# Patient Record
Sex: FEMALE | Race: WHITE | NOT HISPANIC OR LATINO | Employment: OTHER | URBAN - METROPOLITAN AREA
[De-identification: names, ages, dates, MRNs, and addresses within clinical notes are randomized per-mention and may not be internally consistent; named-entity substitution may affect disease eponyms.]

---

## 2017-01-30 ENCOUNTER — HOSPITAL ENCOUNTER (OUTPATIENT)
Dept: RADIOLOGY | Facility: HOSPITAL | Age: 82
Discharge: HOME | End: 2017-01-30
Payer: COMMERCIAL

## 2017-01-30 ENCOUNTER — ALLSCRIPTS OFFICE VISIT (OUTPATIENT)
Dept: OTHER | Facility: OTHER | Age: 82
End: 2017-01-30

## 2017-01-30 ENCOUNTER — TRANSCRIBE ORDERS (OUTPATIENT)
Dept: ADMINISTRATIVE | Facility: HOSPITAL | Age: 82
End: 2017-01-30

## 2017-01-30 DIAGNOSIS — R05.9 COUGH: ICD-10-CM

## 2017-01-30 DIAGNOSIS — R06.2 WHEEZING: ICD-10-CM

## 2017-01-30 PROCEDURE — 71020 HB CHEST X-RAY 2VW FRONTAL&LATL: CPT

## 2017-01-31 ENCOUNTER — GENERIC CONVERSION - ENCOUNTER (OUTPATIENT)
Dept: OTHER | Facility: OTHER | Age: 82
End: 2017-01-31

## 2017-04-19 ENCOUNTER — ALLSCRIPTS OFFICE VISIT (OUTPATIENT)
Dept: OTHER | Facility: OTHER | Age: 82
End: 2017-04-19

## 2017-07-19 DIAGNOSIS — E11.49 TYPE 2 DIABETES MELLITUS WITH OTHER DIABETIC NEUROLOGICAL COMPLICATION (HCC): ICD-10-CM

## 2017-07-26 ENCOUNTER — LAB CONVERSION - ENCOUNTER (OUTPATIENT)
Dept: OTHER | Facility: OTHER | Age: 82
End: 2017-07-26

## 2017-07-26 LAB
A/G RATIO (HISTORICAL): 1.9 (CALC) (ref 1–2.5)
ALBUMIN SERPL BCP-MCNC: 4.1 G/DL (ref 3.6–5.1)
ALP SERPL-CCNC: 88 U/L (ref 33–130)
ALT SERPL W P-5'-P-CCNC: 9 U/L (ref 6–29)
AST SERPL W P-5'-P-CCNC: 13 U/L (ref 10–35)
BILIRUB SERPL-MCNC: 0.6 MG/DL (ref 0.2–1.2)
BUN SERPL-MCNC: 31 MG/DL (ref 7–25)
BUN/CREA RATIO (HISTORICAL): 20 (CALC) (ref 6–22)
CALCIUM SERPL-MCNC: 10.2 MG/DL (ref 8.6–10.4)
CHLORIDE SERPL-SCNC: 110 MMOL/L (ref 98–110)
CO2 SERPL-SCNC: 26 MMOL/L (ref 20–31)
CREAT SERPL-MCNC: 1.58 MG/DL (ref 0.6–0.88)
CREATININE, RANDOM URINE (HISTORICAL): 59 MG/DL (ref 20–320)
EGFR AFRICAN AMERICAN (HISTORICAL): 33 ML/MIN/1.73M2
EGFR-AMERICAN CALC (HISTORICAL): 29 ML/MIN/1.73M2
GAMMA GLOBULIN (HISTORICAL): 2.2 G/DL (CALC) (ref 1.9–3.7)
GLUCOSE (HISTORICAL): 145 MG/DL (ref 65–99)
HBA1C MFR BLD HPLC: 6.4 % OF TOTAL HGB
MAGNESIUM, UR (HISTORICAL): 18.8 MG/DL
MICROALBUMIN/CREATININE RATIO (HISTORICAL): 319 MCG/MG CREAT
POTASSIUM SERPL-SCNC: 4.7 MMOL/L (ref 3.5–5.3)
SODIUM SERPL-SCNC: 143 MMOL/L (ref 135–146)
TOTAL PROTEIN (HISTORICAL): 6.3 G/DL (ref 6.1–8.1)

## 2017-08-02 ENCOUNTER — ALLSCRIPTS OFFICE VISIT (OUTPATIENT)
Dept: OTHER | Facility: OTHER | Age: 82
End: 2017-08-02

## 2017-09-02 DIAGNOSIS — N18.9 CHRONIC KIDNEY DISEASE: ICD-10-CM

## 2017-09-08 ENCOUNTER — GENERIC CONVERSION - ENCOUNTER (OUTPATIENT)
Dept: OTHER | Facility: OTHER | Age: 82
End: 2017-09-08

## 2017-09-22 LAB
BUN SERPL-MCNC: 31 MG/DL (ref 7–25)
BUN/CREA RATIO (HISTORICAL): 19 (CALC) (ref 6–22)
CALCIUM SERPL-MCNC: 10.3 MG/DL (ref 8.6–10.4)
CHLORIDE SERPL-SCNC: 109 MMOL/L (ref 98–110)
CO2 SERPL-SCNC: 27 MMOL/L (ref 20–31)
CREAT SERPL-MCNC: 1.64 MG/DL (ref 0.6–0.88)
EGFR AFRICAN AMERICAN (HISTORICAL): 32 ML/MIN/1.73M2
EGFR-AMERICAN CALC (HISTORICAL): 27 ML/MIN/1.73M2
GLUCOSE (HISTORICAL): 129 MG/DL (ref 65–99)
POTASSIUM SERPL-SCNC: 4.7 MMOL/L (ref 3.5–5.3)
SODIUM SERPL-SCNC: 142 MMOL/L (ref 135–146)

## 2017-09-25 LAB
LEFT EYE DIABETIC RETINOPATHY: NORMAL
RIGHT EYE DIABETIC RETINOPATHY: NORMAL

## 2017-09-27 ENCOUNTER — GENERIC CONVERSION - ENCOUNTER (OUTPATIENT)
Dept: OTHER | Facility: OTHER | Age: 82
End: 2017-09-27

## 2017-10-11 DIAGNOSIS — N18.9 CHRONIC KIDNEY DISEASE: ICD-10-CM

## 2017-10-18 LAB
BUN SERPL-MCNC: 35 MG/DL (ref 7–25)
BUN/CREA RATIO (HISTORICAL): 19 (CALC) (ref 6–22)
CALCIUM SERPL-MCNC: 10.4 MG/DL (ref 8.6–10.4)
CHLORIDE SERPL-SCNC: 109 MMOL/L (ref 98–110)
CO2 SERPL-SCNC: 25 MMOL/L (ref 20–31)
CREAT SERPL-MCNC: 1.81 MG/DL (ref 0.6–0.88)
EGFR AFRICAN AMERICAN (HISTORICAL): 28 ML/MIN/1.73M2
EGFR-AMERICAN CALC (HISTORICAL): 24 ML/MIN/1.73M2
GLUCOSE (HISTORICAL): 158 MG/DL (ref 65–99)
POTASSIUM SERPL-SCNC: 4.4 MMOL/L (ref 3.5–5.3)
SODIUM SERPL-SCNC: 140 MMOL/L (ref 135–146)

## 2017-10-19 ENCOUNTER — GENERIC CONVERSION - ENCOUNTER (OUTPATIENT)
Dept: OTHER | Facility: OTHER | Age: 82
End: 2017-10-19

## 2017-11-01 ENCOUNTER — ALLSCRIPTS OFFICE VISIT (OUTPATIENT)
Dept: OTHER | Facility: OTHER | Age: 82
End: 2017-11-01

## 2017-11-01 LAB — HBA1C MFR BLD HPLC: NORMAL %

## 2017-11-02 NOTE — PROGRESS NOTES
Assessment  1  Chronic renal insufficiency (585 9) (N18 9)   2  Hypertension (401 9) (I10)   3  Diabetes with neurologic complications (612 82) (H05 98)   4  Peripheral neuropathy (356 9) (G62 9)   5  Hyperlipidemia (272 4) (E78 5)    Plan  Chronic renal insufficiency    · 1 - Warner Martínez MD, Patricia De  (Nephrology) Co-Management  *  Status: Active  Requested for:  82GSK8888  Care Summary provided  : Yes  Diabetes with neurologic complications    · Hemoglobin A1c- POC; Status:Complete - Retrospective By Protocol Authorization;    Done: 36JDE1892 11:05AM  Hypertension    · Lisinopril 40 MG Oral Tablet; TAKE 1 TABLET DAILY  Need for pneumococcal vaccination    · Pneumo (Pneumovax)    Discussion/Summary    and youthful 80-year-old white female presents today to follow-up on her chronic medical conditions  She was recently seen in the ER and diagnosed with a UTI  She had a CT scan of her abdomen and pelvis at that time  Her daughter is present with her today  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 -hemoglobin A1c is 6 2  She is not on any medication at the present time  Her situation is limited due to her renal insufficiency and the fact that several medications are contraindicated in this state  Weighing the risks and benefit of additional therapy at this point, I have decided not to add any medications at this time  Await Nephrology input  Peripheral neuropathy -stable on gabapentin  Chronic renal insufficiency -patient's GFR is low and her creatinine is now 1 8  Her situation is complicated by diabetes and hypertension  I am not sure whether to decrease her lisinopril, or to maintain the current regimen  Her Mariellen [place] was recently decreased to 1 daily in the hopes that it would improve her renal function  I suspect her increasing renal insufficiency might be due to underlying diabetes  I am referring her to nephrology for consultation and further intervention  Hypertension -blood pressure is acceptable   Continue current regimen for now  As above, I am referring her to Nephrology  Gout -she has decreased her Colchrys to 1 daily  Symptoms are stable  No recent flare ups  reviewed with patient and her daughter  visit reviewed as well to office for months  The treatment plan was reviewed with the patient/guardian  The patient/guardian understands and agrees with the treatment plan     Self Referrals: No      Chief Complaint  pt is here for follow up of hyperlipidemia and to review blood work    she states she was at [facility] emergency department 10/22 for a uti   she is on the last day of her antibiotic that was given and that is cephalexin 500 mg 1 po 4 times a day   cd   Patient is here today for follow up of chronic conditions described in HPI  History of Present Illness  1  Patient was seen at [facility] ER at because her bladder mesh dropped  She was in discomfort  She was also diagnosed with a UTI  She is feeling much better now  SHe was referred to a nephrologist        Review of Systems    Constitutional: as noted in HPI  Eyes: No complaints of eye pain, no red eyes, no eyesight problems, no discharge, no dry eyes, no itching of eyes  ENT: no complaints of earache, no loss of hearing, no nose bleeds, no nasal discharge, no sore throat, no hoarseness  Cardiovascular: No complaints of slow heart rate, no fast heart rate, no chest pain, no palpitations, no leg claudication, no lower extremity edema  Respiratory: No complaints of shortness of breath, no wheezing, no cough, no SOB on exertion, no orthopnea, no PND  Gastrointestinal: as noted in HPI  Genitourinary: No complaints of dysuria, no incontinence, no pelvic pain, no dysmenorrhea, no vaginal discharge or bleeding  Musculoskeletal: No complaints of arthralgias, no myalgias, no joint swelling or stiffness, no limb pain or swelling  Integumentary: No complaints of skin rash or lesions, no itching, no skin wounds, no breast pain or lump  Neurological: No complaints of headache, no confusion, no convulsions, no numbness, no dizziness or fainting, no tingling, no limb weakness, no difficulty walking  Psychiatric: Not suicidal, no sleep disturbance, no anxiety or depression, no change in personality, no emotional problems  Endocrine: No complaints of proptosis, no hot flashes, no muscle weakness, no deepening of the voice, no feelings of weakness  Hematologic/Lymphatic: No complaints of swollen glands, no swollen glands in the neck, does not bleed easily, does not bruise easily  Active Problems  1  Allergic rhinitis (477 9) (J30 9)   2  Bilateral deafness (389 9) (H91 93)   3  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)   4  Cataract (366 9) (H26 9)   5  Chronic renal insufficiency (585 9) (N18 9)   6  Diabetes with neurologic complications (350 81) (Z23 65)   7  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)   8  Gait disturbance (781 2) (R26 9)   9  Gout (274 9) (M10 9)   10  Hyperlipidemia (272 4) (E78 5)   11  Hypertension (401 9) (I10)   12  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)   13  Need for pneumococcal vaccination (V03 82) (Z23)   14  Peripheral neuropathy (356 9) (G62 9)   15  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 54) (H33 321)   16  Vitamin D insufficiency (268 9) (E55 9)    Past Medical History  1  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)   2  History of Skin tear (879 8)   3  History of Squamous cell carcinoma, 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 42)    Surgical History  1  History of Bladder Surgery   2  History of Total Abdominal Hysterectomy With Removal Of Both Ovaries    The surgical history was reviewed and updated today  Family History  Mother    1  Family history of Family Health Status Of Mother -    2  Family history of Hypertension (V17 49)  Father    3  Family history of Family Health Status Of Father -    4  Family history of Hypertension (V17 49)  Family History    5   Family history of Alcoholism and drug addiction in family    The family history was reviewed and updated today  Social History   · Being A Social Drinker   · Former smoker (Z69 02) (E65 383)   · Lives independently  The social history was reviewed and updated today  The social history was reviewed and is unchanged  Current Meds   1  Aspirin 81 MG TABS; TAKE 1 TABLET DAILY; Therapy: 98ZIP4426 to Recorded   2  Colcrys 0 6 MG Oral Tablet; Take 1 tablet daily; Therapy: 49KYN2162 to  Requested for: 47RUI9362 Recorded   3  Durezol 0 05 % Ophthalmic Emulsion; Therapy: 62Pcs5605 to Recorded   4  Gabapentin 300 MG Oral Capsule; 1 tab po qhs;   Therapy: 91SGT9061 to (Evaluate:76Cmn0851) Recorded   5  Ipratropium Bromide 0 03 % Nasal Solution; USE 2 SPRAYS IN EACH NOSTRIL 3 TIMES   DAILY AS NEEDED; Therapy: 68MXG1174 to (Last Rx:30Jan2017)  Requested for: 30Jan2017 Ordered   6  Lisinopril 40 MG Oral Tablet; TAKE 1 TABLET DAILY; Therapy: 58DMG4013 to (Evaluate:26Nov2017)  Requested for: 19JIU5340; Last   Rx:53Ecs3479 Ordered   7  OneTouch Ultra Blue In [employer]; TEST TWICE A DAY; Therapy: 24KMS7651 to (Evaluate:52Xcw2010)  Requested for: 04Apr2016; Last   Rx:04Apr2016 Ordered   8  OneTouch Ultra System w/Device Kit; USE AS DIRECTED; Therapy: 86FGS0365 to (Last [facility])  Requested for: 21Nov2012 Ordered   9  OneTouch UltraSoft Lancets Miscellaneous; TEST TWICE A DAY; Therapy: 17ZHN6463 to (Evaluate:20Mar2016)  Requested for: 56HEO7790; Last   Rx:65Sye6799 Ordered   10  Simvastatin 40 MG Oral Tablet; TAKE 1 TABLET AT BEDTIME; Therapy: 75NNW8447 to (Evaluate:13Mpe7914)  Requested for: 15GPT5113; Last    Rx:15Mar2017 Ordered    The medication list was reviewed and updated today  Allergies  1  Influenza Virus Vaccine INJ   2   Sulfa Antibiotics    Vitals  Vital Signs    Recorded: 13UYM2398 10:26AM   Heart Rate 72, L Radial   Pulse Quality Regular, L Radial   Systolic 530, LUE, Sitting   Diastolic 82, LUE, Sitting   Height 4 ft 11 in   Weight 159 lb 6 4 oz   BMI Calculated 32 2   BSA Calculated 1 67     Physical Exam    Constitutional   General appearance: No acute distress, well appearing and well nourished  -- Looks younger than stated age  Eyes   Conjunctiva and lids: No swelling, erythema or discharge  Pupils and irises: Equal, round and reactive to light  Pulmonary   Respiratory effort: No increased work of breathing or signs of respiratory distress  Auscultation of lungs: Clear to auscultation  Cardiovascular   Auscultation of heart: Normal rate and rhythm, normal S1 and S2, without murmurs  Examination of extremities for edema and/or varicosities: Normal     Carotid pulses: Normal     Lymphatic   Palpation of lymph nodes in neck: No lymphadenopathy  Musculoskeletal   Gait and station: Abnormal  -- Uses cane for added stability  Psychiatric   Orientation to person, place, and time: Normal     Mood and affect: Normal          Results/Data  Hemoglobin A1c- POC 71LIE1985 11:05AM Arabella Espino     Test Name Result Flag Reference   HEMOGLOBIN A1C 6 2%       Falls Risk Assessment (Dx Z13 89 Screen for Neurologic Disorder) 69VAB9804 10:18AM User, Ahs     Test Name Result Flag Reference   Falls Risk      No falls in the past year       Future Appointments    Date/Time Provider Specialty Site   03/13/2018 09:15 KIARA Coleman   [address] [facility]     Signatures   Electronically signed by : KIARA Myles ; Nov 1 2017 11:22AM EST                       (Author)

## 2017-12-22 ENCOUNTER — ALLSCRIPTS OFFICE VISIT (OUTPATIENT)
Dept: OTHER | Facility: OTHER | Age: 82
End: 2017-12-22

## 2017-12-22 DIAGNOSIS — E11.9 TYPE 2 DIABETES MELLITUS WITHOUT COMPLICATIONS (HCC): ICD-10-CM

## 2017-12-22 LAB
BILIRUB UR QL STRIP: NORMAL
CLARITY UR: NORMAL
COLOR UR: YELLOW
GLUCOSE (HISTORICAL): NORMAL
HGB UR QL STRIP.AUTO: NORMAL
KETONES UR STRIP-MCNC: NORMAL MG/DL
LEUKOCYTE ESTERASE UR QL STRIP: NORMAL
NITRITE UR QL STRIP: NORMAL
PH UR STRIP.AUTO: 5 [PH]
PROT UR STRIP-MCNC: 2000 MG/DL
SP GR UR STRIP.AUTO: 1.01
UROBILINOGEN UR QL STRIP.AUTO: NORMAL

## 2018-01-14 VITALS
BODY MASS INDEX: 32.13 KG/M2 | HEART RATE: 72 BPM | HEIGHT: 59 IN | SYSTOLIC BLOOD PRESSURE: 140 MMHG | WEIGHT: 159.4 LBS | DIASTOLIC BLOOD PRESSURE: 82 MMHG

## 2018-01-14 VITALS
WEIGHT: 161 LBS | SYSTOLIC BLOOD PRESSURE: 140 MMHG | BODY MASS INDEX: 31.97 KG/M2 | HEART RATE: 72 BPM | DIASTOLIC BLOOD PRESSURE: 70 MMHG

## 2018-01-14 VITALS
WEIGHT: 159.2 LBS | DIASTOLIC BLOOD PRESSURE: 86 MMHG | SYSTOLIC BLOOD PRESSURE: 126 MMHG | HEIGHT: 60 IN | TEMPERATURE: 98.6 F | HEART RATE: 92 BPM | BODY MASS INDEX: 31.25 KG/M2

## 2018-01-15 VITALS
BODY MASS INDEX: 32.66 KG/M2 | DIASTOLIC BLOOD PRESSURE: 84 MMHG | HEIGHT: 59 IN | WEIGHT: 162 LBS | SYSTOLIC BLOOD PRESSURE: 132 MMHG | HEART RATE: 88 BPM

## 2018-01-15 NOTE — RESULT NOTES
Message   Recorded as Task   Date: 09/27/2017 08:38 AM, Created By: Bushra Barakat   Task Name: Follow Up   Assigned To: Elías and Heidi,Clinical Team   Regarding Patient: Anjel Conn, Status: Complete   Comment:    Jessica Mckinley - 27 Sep 2017 8:38 AM     TASK CREATED  Please call Mrs Obregon to follow up on her BMP  Her creatinine is still elevated  1  Please make sure she is not using Aleve /  NSAIDS every day, since this could contribute  2  Please DECREASE her colcrys to ONCE daily instead of twice  3  repeat BMP in 2 weeks  ( I will place the order )    THank you,    CB    When you are done, please copy to Nurse note  Thanks  Kiana Otero - 27 Sep 2017 12:58 PM     TASK IN PROGRESS   Kiana Otero 27 Sep 2017 1:01 PM     TASK EDITED  Pt aware of results  She does NOT take NSAIDS daily  Script mailed to pt     Kiana Otero - 27 Sep 2017 1:01 PM     TASK COMPLETED        Signatures   Electronically signed by : Mathews Snellen, ; Sep 27 2017  1:02PM EST                       (Author)

## 2018-01-15 NOTE — RESULT NOTES
Verified Results  * XR CHEST PA & LATERAL 22YFN0319 11:32AM Jeffie Hodgkin Deann Gladden Order Number: [id number]     Test Name Result Flag Reference   XR CHEST PA & LATERAL (Report)     CHEST - DUAL ENERGY     INDICATION: Chest congestion and sore throat and cough and shortness of breath  Symptoms for several days  COMPARISON: 2/27/2012     VIEWS: PA (including soft tissue/bone algorithms) and lateral projections; 4 images     FINDINGS:        Heart shadow appears unremarkable  Atherosclerotic vascular calcifications are noted  The lungs are clear  No pneumothorax or pleural effusion  Visualized osseous structures appear within normal limits for the patient's age  IMPRESSION:     No active pulmonary disease         Workstation performed: [id number]     Signed by:   Farhana Bonilla MD   1/31/17

## 2018-01-16 ENCOUNTER — GENERIC CONVERSION - ENCOUNTER (OUTPATIENT)
Dept: OTHER | Facility: OTHER | Age: 83
End: 2018-01-16

## 2018-01-16 ENCOUNTER — LAB CONVERSION - ENCOUNTER (OUTPATIENT)
Dept: OTHER | Facility: OTHER | Age: 83
End: 2018-01-16

## 2018-01-16 LAB
BUN SERPL-MCNC: 29 MG/DL (ref 7–25)
BUN/CREA RATIO (HISTORICAL): 18 (CALC) (ref 6–22)
CALCIUM SERPL-MCNC: 10.5 MG/DL (ref 8.6–10.4)
CHLORIDE SERPL-SCNC: 109 MMOL/L (ref 98–110)
CO2 SERPL-SCNC: 27 MMOL/L (ref 20–31)
CREAT SERPL-MCNC: 1.63 MG/DL (ref 0.6–0.88)
EGFR AFRICAN AMERICAN (HISTORICAL): 32 ML/MIN/1.73M2
EGFR-AMERICAN CALC (HISTORICAL): 27 ML/MIN/1.73M2
EOSINOPHIL # BLD AUTO: NORMAL % EOS
GLUCOSE (HISTORICAL): 118 MG/DL (ref 65–99)
POTASSIUM SERPL-SCNC: 4.5 MMOL/L (ref 3.5–5.3)
SODIUM SERPL-SCNC: 142 MMOL/L (ref 135–146)

## 2018-01-16 NOTE — RESULT NOTES
Message   Recorded as Task   Date: 10/18/2017 08:35 AM, Created By: Jorge Alberto Bernard   Task Name: Follow Up   Assigned To: Sravani,Clinical Team   Regarding Patient: Gabriella Kay, Status: In Progress   Meliton Miner - 18 Oct 2017 8:35 AM     TASK CREATED  Please notify patient that I reviewed her labs  I am still watching her kidney function  Please ask her to decrease her Colcrys to 1 tab daily  Thank you,    CB   Princess Bender - 18 Oct 2017 1:07 PM     TASK EDITED  lmom to call for results and instructions  Princess Bender - 18 Oct 2017 1:07 PM     TASK IN PROGRESS   Salome Nelson - 19 Oct 2017 10:56 AM     TASK EDITED  Pt notified of results and instructions          Signatures   Electronically signed by : Sheryl Doyle, ; Oct 19 2017 11:00AM EST                       (Author)

## 2018-01-23 VITALS
WEIGHT: 159.13 LBS | BODY MASS INDEX: 32.08 KG/M2 | DIASTOLIC BLOOD PRESSURE: 108 MMHG | HEIGHT: 59 IN | SYSTOLIC BLOOD PRESSURE: 162 MMHG

## 2018-01-23 NOTE — RESULT NOTES
Verified Results  (1) BASIC METABOLIC PROFILE 94SRJ1778 09:02AM Abel Chowdary     Test Name Result Flag Reference   GLUCOSE 118 mg/dL H 65-99   Fasting reference interval     For someone without known diabetes, a glucose value  between 100 and 125 mg/dL is consistent with  prediabetes and should be confirmed with a  follow-up test    UREA NITROGEN (BUN) 29 mg/dL H 7-25   CREATININE 1 63 mg/dL H 0 60-0 88   For patients >52years of age, the reference limit  for Creatinine is approximately 13% higher for people  identified as -American  eGFR NON-AFR   AMERICAN 27 mL/min/1 73m2 L > OR = 60   eGFR AFRICAN AMERICAN 32 mL/min/1 73m2 L > OR = 60   BUN/CREATININE RATIO 18 (calc)  6-22   SODIUM 142 mmol/L  135-146   POTASSIUM 4 5 mmol/L  3 5-5 3   CHLORIDE 109 mmol/L     CARBON DIOXIDE 27 mmol/L  20-31   CALCIUM 10 5 mg/dL H 8 6-10 4

## 2018-01-24 ENCOUNTER — TELEPHONE (OUTPATIENT)
Dept: NEPHROLOGY | Facility: CLINIC | Age: 83
End: 2018-01-24

## 2018-01-25 ENCOUNTER — HOSPITAL ENCOUNTER (OUTPATIENT)
Dept: ULTRASOUND IMAGING | Facility: HOSPITAL | Age: 83
Discharge: HOME | End: 2018-01-25
Attending: INTERNAL MEDICINE
Payer: COMMERCIAL

## 2018-01-25 ENCOUNTER — TELEPHONE (OUTPATIENT)
Dept: NEPHROLOGY | Facility: CLINIC | Age: 83
End: 2018-01-25

## 2018-01-25 DIAGNOSIS — E11.9 TYPE 2 DIABETES MELLITUS WITHOUT COMPLICATIONS (HCC): ICD-10-CM

## 2018-01-25 PROCEDURE — 76770 US EXAM ABDO BACK WALL COMP: CPT

## 2018-03-08 LAB
ALBUMIN SERPL-MCNC: 3.9 G/DL (ref 3.6–5.1)
ALBUMIN/CREAT UR: 483 MCG/MG CREAT
BUN SERPL-MCNC: 24 MG/DL (ref 7–25)
BUN/CREAT SERPL: 15 (CALC) (ref 6–22)
CALCIUM SERPL-MCNC: 10.1 MG/DL (ref 8.6–10.4)
CALCIUM SERPL-MCNC: 10.1 MG/DL (ref 8.6–10.4)
CHLORIDE SERPL-SCNC: 109 MMOL/L (ref 98–110)
CO2 SERPL-SCNC: 27 MMOL/L (ref 20–31)
CREAT SERPL-MCNC: 1.55 MG/DL (ref 0.6–0.88)
CREAT UR-MCNC: 72 MG/DL (ref 20–320)
ERYTHROCYTE [DISTWIDTH] IN BLOOD BY AUTOMATED COUNT: 12.6 % (ref 11–15)
GLUCOSE SERPL-MCNC: 206 MG/DL (ref 65–99)
HCT VFR BLD AUTO: 37.3 % (ref 35–45)
HGB BLD-MCNC: 12.5 G/DL (ref 11.7–15.5)
MCH RBC QN AUTO: 31.5 PG (ref 27–33)
MCHC RBC AUTO-ENTMCNC: 33.5 G/DL (ref 32–36)
MCV RBC AUTO: 94 FL (ref 80–100)
MICROALBUMIN UR-MCNC: 34.8 MG/DL
PHOSPHATE SERPL-MCNC: 2.9 MG/DL (ref 2.1–4.3)
PLATELET # BLD AUTO: 135 THOUSAND/UL (ref 140–400)
PMV BLD REES-ECKER: 13.7 FL (ref 7.5–12.5)
POTASSIUM SERPL-SCNC: 4.4 MMOL/L (ref 3.5–5.3)
PTH-INTACT SERPL-MCNC: 183 PG/ML (ref 14–64)
RBC # BLD AUTO: 3.97 MILLION/UL (ref 3.8–5.1)
SL AMB EGFR AFRICAN AMERICAN: 34 ML/MIN/1.73M2
SL AMB EGFR NON AFRICAN AMERICAN: 29 ML/MIN/1.73M2
SODIUM SERPL-SCNC: 142 MMOL/L (ref 135–146)
WBC # BLD AUTO: 6.1 THOUSAND/UL (ref 3.8–10.8)

## 2018-03-12 PROBLEM — E11.42 CONTROLLED TYPE 2 DIABETES MELLITUS WITH DIABETIC POLYNEUROPATHY (HCC): Status: ACTIVE | Noted: 2017-02-13

## 2018-03-12 PROBLEM — I10 HYPERTENSION: Status: ACTIVE | Noted: 2017-02-13

## 2018-03-12 PROBLEM — E78.5 HYPERLIPIDEMIA: Status: ACTIVE | Noted: 2017-02-13

## 2018-03-12 PROBLEM — N28.1 COMPLEX RENAL CYST: Status: ACTIVE | Noted: 2017-12-22

## 2018-03-12 PROBLEM — H33.321 RETINAL HOLE OF RIGHT EYE: Status: ACTIVE | Noted: 2017-09-25

## 2018-03-12 PROBLEM — N26.1 ATROPHIC KIDNEY, ACQUIRED: Status: ACTIVE | Noted: 2017-12-22

## 2018-03-12 PROBLEM — N18.4 CKD (CHRONIC KIDNEY DISEASE), STAGE IV (HCC): Status: ACTIVE | Noted: 2017-12-22

## 2018-03-12 PROBLEM — J30.9 ALLERGIC RHINITIS: Status: ACTIVE | Noted: 2017-01-30

## 2018-03-12 RX ORDER — AMLODIPINE BESYLATE 2.5 MG/1
1 TABLET ORAL DAILY
COMMUNITY
Start: 2017-12-22 | End: 2018-03-13 | Stop reason: SDUPTHER

## 2018-03-12 RX ORDER — LANCETS
EACH MISCELLANEOUS 2 TIMES DAILY
COMMUNITY
Start: 2011-11-16

## 2018-03-12 RX ORDER — IPRATROPIUM BROMIDE 21 UG/1
2 SPRAY, METERED NASAL 3 TIMES DAILY PRN
COMMUNITY
Start: 2017-01-30 | End: 2019-10-01

## 2018-03-12 RX ORDER — COLCHICINE 0.6 MG/1
0.6 TABLET ORAL
COMMUNITY
Start: 2015-09-11 | End: 2018-03-13 | Stop reason: SDUPTHER

## 2018-03-12 RX ORDER — LISINOPRIL 40 MG/1
40 TABLET ORAL
COMMUNITY
Start: 2015-10-21 | End: 2018-03-12

## 2018-03-12 RX ORDER — GABAPENTIN 300 MG/1
1 CAPSULE ORAL
COMMUNITY
Start: 2016-08-17 | End: 2019-04-26 | Stop reason: SDUPTHER

## 2018-03-12 RX ORDER — LISINOPRIL 20 MG/1
1 TABLET ORAL DAILY
COMMUNITY
Start: 2012-03-20 | End: 2018-07-11 | Stop reason: SDUPTHER

## 2018-03-12 RX ORDER — DIFLUPREDNATE 0.5 MG/ML
EMULSION OPHTHALMIC
COMMUNITY
Start: 2014-09-08 | End: 2018-08-01

## 2018-03-12 RX ORDER — ACETAMINOPHEN 160 MG
1 TABLET,DISINTEGRATING ORAL DAILY
COMMUNITY
Start: 2017-12-22 | End: 2022-03-03 | Stop reason: HOSPADM

## 2018-03-12 RX ORDER — SIMVASTATIN 40 MG
1 TABLET ORAL
COMMUNITY
Start: 2012-05-23 | End: 2018-05-14 | Stop reason: SDUPTHER

## 2018-03-13 ENCOUNTER — OFFICE VISIT (OUTPATIENT)
Dept: FAMILY MEDICINE CLINIC | Facility: CLINIC | Age: 83
End: 2018-03-13
Payer: COMMERCIAL

## 2018-03-13 VITALS
DIASTOLIC BLOOD PRESSURE: 100 MMHG | BODY MASS INDEX: 32.54 KG/M2 | SYSTOLIC BLOOD PRESSURE: 168 MMHG | HEIGHT: 59 IN | HEART RATE: 78 BPM | WEIGHT: 161.4 LBS

## 2018-03-13 DIAGNOSIS — E11.42 CONTROLLED TYPE 2 DIABETES MELLITUS WITH DIABETIC POLYNEUROPATHY, WITHOUT LONG-TERM CURRENT USE OF INSULIN (HCC): ICD-10-CM

## 2018-03-13 DIAGNOSIS — M1A.00X0 IDIOPATHIC CHRONIC GOUT WITHOUT TOPHUS, UNSPECIFIED SITE: Primary | ICD-10-CM

## 2018-03-13 DIAGNOSIS — I10 ESSENTIAL HYPERTENSION: ICD-10-CM

## 2018-03-13 DIAGNOSIS — E78.5 HYPERLIPIDEMIA, UNSPECIFIED HYPERLIPIDEMIA TYPE: ICD-10-CM

## 2018-03-13 DIAGNOSIS — G63 POLYNEUROPATHY ASSOCIATED WITH UNDERLYING DISEASE (HCC): ICD-10-CM

## 2018-03-13 DIAGNOSIS — E55.9 VITAMIN D INSUFFICIENCY: ICD-10-CM

## 2018-03-13 DIAGNOSIS — N18.4 CKD (CHRONIC KIDNEY DISEASE), STAGE IV (HCC): ICD-10-CM

## 2018-03-13 LAB — SL AMB POCT HEMOGLOBIN AIC: NORMAL

## 2018-03-13 PROCEDURE — 99214 OFFICE O/P EST MOD 30 MIN: CPT | Performed by: FAMILY MEDICINE

## 2018-03-13 PROCEDURE — 83036 HEMOGLOBIN GLYCOSYLATED A1C: CPT | Performed by: FAMILY MEDICINE

## 2018-03-13 RX ORDER — COLCHICINE 0.6 MG/1
0.6 TABLET ORAL DAILY
Qty: 90 TABLET | Refills: 3 | Status: SHIPPED | OUTPATIENT
Start: 2018-03-13 | End: 2019-04-26 | Stop reason: SDUPTHER

## 2018-03-13 RX ORDER — AMLODIPINE BESYLATE 5 MG/1
5 TABLET ORAL DAILY
Qty: 30 TABLET | Refills: 5 | Status: SHIPPED | OUTPATIENT
Start: 2018-03-13 | End: 2018-09-05 | Stop reason: SDUPTHER

## 2018-03-13 NOTE — PROGRESS NOTES
Assessment/Plan:    Spritely 19-year-old female presents with her daughter today to follow-up on her chronic medical conditions  Due to her decreasing GFR and increasing creatinine, she was referred to Nephrology  Her lisinopril was decreased to 20 mg daily and a low dose of amlodipine was added  Ultrasound of kidneys was ordered to evaluate for polycystic kidney disease  Labs reviewed  She needs a Hgb A1C     1   Hypertension -blood pressure is elevated today  Lisinopril was decreased to 20 mg daily in light of increased creatinine  Since then, her creatinine has come down  She is on amlodipine 2 5 mg   I am going to increase it to 5 mg daily  She will be seeing Dr Deznel Allison at the end of the month  Notify me if systolic blood pressure remains over 160   2   Hyperlipidemia -stable  Continue simvastatin 40 mg daily  3   Gout -stable on Colcrys  4   Diabetes mellitus type 2, controlled -hemoglobin A1c today is 6 3  Discussed low sugar, low-carbohydrate diet  Avoid orange juice and bananas  Focus mostly on vegetables and proteins  Patient voices understanding  5   Polyneuropathy -stable on gabapentin  6   Chronic kidney disease stage 4 -continue follow-up with Neurology  Ultrasound of kidneys was apparently done  7   Vitamin-D deficiency continue over-the-counter vitamin D3 supplementation  F/U 4 months  Subjective:      Patient ID: Chhaya Acevedo is a 80 y o  female  Chief complaint:  Pt is here for follow up of hyperlipidemia and hypertension    ~cd        The following portions of the patient's history were reviewed and updated as appropriate: allergies, current medications, past family history, past medical history, past social history, past surgical history and problem list     Review of Systems   Constitutional:        See HPI   HENT: Negative for congestion, ear pain, mouth sores, sinus pressure and trouble swallowing  Eyes: Negative for discharge, redness and itching  Respiratory: Negative for apnea, cough, chest tightness, shortness of breath, wheezing and stridor  Cardiovascular: Negative for chest pain, palpitations and leg swelling  Gastrointestinal: Negative for abdominal distention, abdominal pain, blood in stool, constipation, diarrhea, nausea and vomiting  Endocrine: Negative for cold intolerance and heat intolerance  Genitourinary: Negative for difficulty urinating, dysuria, flank pain and urgency  Musculoskeletal: Negative for arthralgias and myalgias  Skin: Negative for rash  Neurological: Negative for dizziness, seizures, syncope, speech difficulty, weakness, light-headedness, numbness and headaches  Hematological: Negative for adenopathy  Psychiatric/Behavioral: Negative for agitation, behavioral problems, confusion and sleep disturbance  The patient is not nervous/anxious  Objective:  Vitals:    03/13/18 0904   BP: 168/100   BP Location: Left arm   Patient Position: Sitting   Cuff Size: Standard   Pulse: 78   Weight: 73 2 kg (161 lb 6 4 oz)   Height: 4' 11" (1 499 m)                Physical Exam   Constitutional: She is oriented to person, place, and time  She appears well-developed and well-nourished  No distress  HENT:   Head: Normocephalic and atraumatic  Right Ear: External ear normal    Left Ear: External ear normal    Eyes: Conjunctivae and EOM are normal  Pupils are equal, round, and reactive to light  No scleral icterus  Neck: Normal range of motion  Neck supple  Cardiovascular: Normal rate, regular rhythm, normal heart sounds and intact distal pulses  Exam reveals no gallop and no friction rub  No murmur heard  Pulmonary/Chest: Effort normal  No respiratory distress  She has no wheezes  She has no rales  She exhibits no tenderness  Musculoskeletal: Normal range of motion  She exhibits no edema, tenderness or deformity  Lymphadenopathy:     She has no cervical adenopathy     Neurological: She is alert and oriented to person, place, and time  She has normal reflexes  Skin: Skin is warm and dry  She is not diaphoretic  Psychiatric: She has a normal mood and affect   Her behavior is normal  Judgment and thought content normal

## 2018-03-13 NOTE — PATIENT INSTRUCTIONS
1   Continue to watch high sugar foods and carbohydrates  This includes breads, muffins, bagels and pasta and potatoes  UR labs 3 servings of fruit per day, 1 serving is equal to about 1 cup berries are the best choice  Try to avoid bananas  A good snack food would be peanut butter on crackers  Stick with proteins and lots of vegetables  2  Continue to check her blood pressure  At each sitting check it 3 times all if the top number is consistently over 160 please let me or Dr Danya Whitley know  3  We are increasing Amlodipine to 5 mg daily

## 2018-03-26 ENCOUNTER — OFFICE VISIT (OUTPATIENT)
Dept: NEPHROLOGY | Facility: CLINIC | Age: 83
End: 2018-03-26
Payer: COMMERCIAL

## 2018-03-26 VITALS
WEIGHT: 164.2 LBS | HEIGHT: 59 IN | HEART RATE: 68 BPM | DIASTOLIC BLOOD PRESSURE: 84 MMHG | BODY MASS INDEX: 33.1 KG/M2 | SYSTOLIC BLOOD PRESSURE: 122 MMHG

## 2018-03-26 DIAGNOSIS — N18.4 CKD (CHRONIC KIDNEY DISEASE), STAGE IV (HCC): Primary | ICD-10-CM

## 2018-03-26 DIAGNOSIS — N28.1 COMPLEX RENAL CYST: ICD-10-CM

## 2018-03-26 DIAGNOSIS — I12.9 HYPERTENSIVE CHRONIC KIDNEY DISEASE WITH STAGE 1 THROUGH STAGE 4 CHRONIC KIDNEY DISEASE, OR UNSPECIFIED CHRONIC KIDNEY DISEASE: ICD-10-CM

## 2018-03-26 PROCEDURE — 99214 OFFICE O/P EST MOD 30 MIN: CPT | Performed by: INTERNAL MEDICINE

## 2018-03-26 RX ORDER — AMLODIPINE BESYLATE 2.5 MG/1
2.5 TABLET ORAL
Qty: 90 TABLET | Refills: 3 | Status: ON HOLD | OUTPATIENT
Start: 2018-03-26 | End: 2018-08-01

## 2018-03-26 NOTE — LETTER
March 26, 2018     Erna Saldivar, [address]    Patient: Jessica Marie   YOB: 1927   Date of Visit: 3/26/2018     Dear Dr Grecia Prado      Thank you for referring Lorena Campo to me for evaluation  Below are the relevant portions of my assessment and plan of care  If you have questions, please do not hesitate to call me  I look forward to following Pb Goldsmith along with you  Sincerely,        Master Coleman, DO        CC: No Recipients    Progress Notes:    ASSESSMENT and PLAN:  1  Chronic kidney disease, stage IV, baseline creatinine between 1 5 and 1 7, current creatinine 1 5, estimated GFR 29  2  Complex renal cysts, stable, continue with yearly ultrasounds  3  Hypertension, overall improved, increase amlodipine to a total dose of 7 5 mg daily, 5 mg in the morning, 2 5 mg in the evening, continue with 20 mg of lisinopril  4  Secondary hyperparathyroidism, , acceptable given patient's CKD  5  Proteinuria, stable, continue to monitor    · Noted blood pressure medication changes as above  · She is to call the office if her blood pressure is not improved, goal blood pressure in the 140s  · Assuming her blood pressure is improved, plan to see her in 6 months with repeat laboratory studies at that time

## 2018-03-26 NOTE — PROGRESS NOTES
NEPHROLOGY OUTPATIENT PROGRESS NOTE   Nadir Durham 80 y o  female MRN: [id number]  Reason for visit:  Chronic kidney disease    ASSESSMENT and PLAN:  1  Chronic kidney disease, stage IV, baseline creatinine between 1 5 and 1 7, current creatinine 1 5, estimated GFR 29  2  Complex renal cysts, stable, continue with yearly ultrasounds  3  Hypertension, overall improved, increase amlodipine to a total dose of 7 5 mg daily, 5 mg in the morning, 2 5 mg in the evening, continue with 20 mg of lisinopril  4  Secondary hyperparathyroidism, , acceptable given patient's CKD  5  Proteinuria, stable, continue to monitor    · Noted blood pressure medication changes as above  · She is to call the office if her blood pressure is not improved, goal blood pressure in the 140s  · Assuming her blood pressure is improved, plan to see her in 6 months with repeat laboratory studies at that time  SUBJECTIVE / INTERVAL HISTORY:  She has been doing well  She denies any recent hospitalizations  Denies any recent illnesses  Blood pressure recently has improved from 180s now to 160s  Denies any dizziness, lightheadedness  Denies any chest pain shortness of breath or significant lower extremity swelling  Review of Systems      OBJECTIVE:  /84 (BP Location: Left arm, Patient Position: Sitting, Cuff Size: Standard)   Pulse 68   Ht 4' 11" (1 499 m)   Wt 74 5 kg (164 lb 3 2 oz)   LMP  (LMP Unknown)   BMI 33 16 kg/m²     Physical Exam   Constitutional: She is oriented to person, place, and time  No distress  HENT:   Head: Normocephalic  Neck: Neck supple  No JVD present  Cardiovascular: Normal rate  Pulmonary/Chest: Effort normal and breath sounds normal    Abdominal: Soft  Bowel sounds are normal    Musculoskeletal: She exhibits no edema  Neurological: She is alert and oriented to person, place, and time  Skin: Skin is warm and dry  Psychiatric: She has a normal mood and affect  Medications:    Current Outpatient Prescriptions:     amLODIPine (NORVASC) 5 mg tablet, Take 1 tablet (5 mg total) by mouth daily, Disp: 30 tablet, Rfl: 5    aspirin 81 MG tablet, Take 1 tablet by mouth daily, Disp: , Rfl:     Cholecalciferol (VITAMIN D3) 2000 units capsule, Take 1 capsule by mouth daily, Disp: , Rfl:     colchicine (COLCRYS) 0 6 mg tablet, Take 1 tablet (0 6 mg total) by mouth daily, Disp: 90 tablet, Rfl: 3    gabapentin (NEURONTIN) 300 mg capsule, Take 1 tablet by mouth, Disp: , Rfl:     glucose blood test strip, Test twice a day E11 9 non insulin , Disp: , Rfl:     Lancets (ONETOUCH ULTRASOFT) lancets, by Does not apply route 2 (two) times a day, Disp: , Rfl:     lisinopril (ZESTRIL) 20 mg tablet, Take 1 tablet by mouth daily, Disp: , Rfl:     simvastatin (ZOCOR) 40 mg tablet, Take 1 tablet by mouth, Disp: , Rfl:     Difluprednate (DUREZOL) 0 05 % EMUL, Apply to eye, Disp: , Rfl:     ipratropium (ATROVENT) 0 03 % nasal spray, 2 sprays into each nostril 3 (three) times a day as needed, Disp: , Rfl:     Laboratory Results:  Results for orders placed or performed in visit on 03/13/18   POCT hemoglobin A1c   Result Value Ref Range     HGB A1C 6 3%

## 2018-03-26 NOTE — PATIENT INSTRUCTIONS
ASSESSMENT and PLAN:  1  Chronic kidney disease, stage IV, baseline creatinine between 1 5 and 1 7, current creatinine 1 5, estimated GFR 29  2  Complex renal cysts, stable, continue with yearly ultrasounds  3  Hypertension, overall improved, increase amlodipine to a total dose of 7 5 mg daily, 5 mg in the morning, 2 5 mg in the evening, continue with 20 mg of lisinopril  4  Secondary hyperparathyroidism, , acceptable given patient's CKD  5  Proteinuria, stable, continue to monitor    · Noted blood pressure medication changes as above  · She is to call the office if her blood pressure is not improved, goal blood pressure in the 140s  · Assuming her blood pressure is improved, plan to see her in 6 months with repeat laboratory studies at that time

## 2018-04-27 ENCOUNTER — TRANSCRIBE ORDERS (OUTPATIENT)
Dept: FAMILY MEDICINE CLINIC | Facility: CLINIC | Age: 83
End: 2018-04-27

## 2018-04-27 DIAGNOSIS — E11.00 TYPE 2 DIABETES MELLITUS WITH HYPEROSMOLARITY WITHOUT COMA, UNSPECIFIED WHETHER LONG TERM INSULIN USE (HCC): Primary | ICD-10-CM

## 2018-04-27 DIAGNOSIS — B35.1 ONYCHOMYCOSIS OF TOENAIL: ICD-10-CM

## 2018-05-14 DIAGNOSIS — E78.5 HYPERLIPIDEMIA, UNSPECIFIED HYPERLIPIDEMIA TYPE: Primary | ICD-10-CM

## 2018-05-14 RX ORDER — SIMVASTATIN 40 MG
40 TABLET ORAL DAILY
Qty: 30 TABLET | Refills: 5 | Status: SHIPPED | OUTPATIENT
Start: 2018-05-14 | End: 2018-12-03

## 2018-07-10 ENCOUNTER — TELEPHONE (OUTPATIENT)
Dept: NEPHROLOGY | Facility: CLINIC | Age: 83
End: 2018-07-10

## 2018-07-11 DIAGNOSIS — I10 HYPERTENSION, UNSPECIFIED TYPE: Primary | ICD-10-CM

## 2018-07-11 RX ORDER — LISINOPRIL 20 MG/1
20 TABLET ORAL DAILY
Qty: 30 TABLET | Refills: 5 | Status: SHIPPED | OUTPATIENT
Start: 2018-07-11 | End: 2019-01-02 | Stop reason: SDUPTHER

## 2018-07-24 PROBLEM — N25.81 SECONDARY HYPERPARATHYROIDISM OF RENAL ORIGIN (HCC): Status: ACTIVE | Noted: 2018-07-24

## 2018-07-25 ENCOUNTER — OFFICE VISIT (OUTPATIENT)
Dept: FAMILY MEDICINE CLINIC | Facility: CLINIC | Age: 83
End: 2018-07-25
Payer: COMMERCIAL

## 2018-07-25 VITALS
HEIGHT: 59 IN | SYSTOLIC BLOOD PRESSURE: 122 MMHG | HEART RATE: 64 BPM | DIASTOLIC BLOOD PRESSURE: 80 MMHG | BODY MASS INDEX: 32.21 KG/M2 | WEIGHT: 159.8 LBS

## 2018-07-25 DIAGNOSIS — G63 POLYNEUROPATHY ASSOCIATED WITH UNDERLYING DISEASE (HCC): Primary | ICD-10-CM

## 2018-07-25 DIAGNOSIS — M1A.00X0 IDIOPATHIC CHRONIC GOUT WITHOUT TOPHUS, UNSPECIFIED SITE: ICD-10-CM

## 2018-07-25 DIAGNOSIS — I10 ESSENTIAL HYPERTENSION: ICD-10-CM

## 2018-07-25 DIAGNOSIS — E11.42 CONTROLLED TYPE 2 DIABETES MELLITUS WITH DIABETIC POLYNEUROPATHY, WITHOUT LONG-TERM CURRENT USE OF INSULIN (HCC): ICD-10-CM

## 2018-07-25 DIAGNOSIS — D49.2 NEOPLASM OF EAR: ICD-10-CM

## 2018-07-25 DIAGNOSIS — N25.81 SECONDARY HYPERPARATHYROIDISM OF RENAL ORIGIN (HCC): ICD-10-CM

## 2018-07-25 DIAGNOSIS — N18.4 CKD (CHRONIC KIDNEY DISEASE), STAGE IV (HCC): ICD-10-CM

## 2018-07-25 PROCEDURE — 3725F SCREEN DEPRESSION PERFORMED: CPT | Performed by: FAMILY MEDICINE

## 2018-07-25 PROCEDURE — 1101F PT FALLS ASSESS-DOCD LE1/YR: CPT | Performed by: FAMILY MEDICINE

## 2018-07-25 PROCEDURE — 99214 OFFICE O/P EST MOD 30 MIN: CPT | Performed by: FAMILY MEDICINE

## 2018-07-25 NOTE — PROGRESS NOTES
Assessment/Plan:    1  Hypertension -blood pressure is well controlled  Continue lisinopril, amlodipine increased and pt tolerating it well  2   Chronic kidney disease -patient recently saw Nephrology  Amlodipine was increased to 5 mg in the morning and 2 5 mg in the evening  She remains on 20 mg of lisinopril for renal protection  Her proteinuria is stable  Follow-up scheduled for 6 months with Dr Yolanda Duval  3   Diabetes mellitus with polyneuropathy -last hemoglobin A1c was 6 3  Continue diet control  She continues to check her sugars at home  4   Peripheral neuropathy -stable on gabapentin  5   Gout -no recent flare-ups  Continue colchicine  6   Secondary hyperparathyroidism -due to renal origin  PTH is acceptable, as per Nephrology  Continue to monitor  7  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  Follow-up 4 months  Hemoglobin A1c will be rechecked in the office at that time  Subjective: pt is here for follow up of hypertension  She states she would like you to look at a growth on her left outer ear~cd     Patient ID: Tatianna Donis is a 80 y o  female  Patient presents today to review her chronic medical conditions  Since her last visit, she has seen Nephrology  Her amlodipine was increased to 5 mg in the morning and 2 5 mg at night  The following portions of the patient's history were reviewed and updated as appropriate: allergies, current medications, past family history, past medical history, past social history, past surgical history and problem list     Review of Systems   Constitutional:        See HPI   HENT: Negative for congestion, ear pain, mouth sores, sinus pressure and trouble swallowing  Eyes: Negative for discharge, redness and itching  Respiratory: Negative for apnea, cough, chest tightness, shortness of breath, wheezing and stridor  Cardiovascular: Negative for chest pain, palpitations and leg swelling     Gastrointestinal: Negative for abdominal distention, abdominal pain, blood in stool, constipation, diarrhea, nausea and vomiting  Endocrine: Negative for cold intolerance and heat intolerance  Genitourinary: Negative for difficulty urinating, dysuria, flank pain and urgency  Musculoskeletal: Negative for arthralgias and myalgias  Skin: Negative for rash  Neurological: Negative for dizziness, seizures, syncope, speech difficulty, weakness, light-headedness, numbness and headaches  Hematological: Negative for adenopathy  Psychiatric/Behavioral: Negative for agitation, behavioral problems, confusion and sleep disturbance  The patient is not nervous/anxious  Objective:    Vitals:    07/25/18 0912   BP: 122/80   BP Location: Left arm   Patient Position: Sitting   Cuff Size: Standard   Pulse: 64   Weight: 72 5 kg (159 lb 12 8 oz)   Height: 4' 11" (1 499 m)              Physical Exam   Constitutional: She is oriented to person, place, and time  She appears well-developed and well-nourished  No distress  HENT:   Head: Normocephalic and atraumatic  Right Ear: External ear normal    Left Ear: External ear normal    Eyes: Conjunctivae and EOM are normal  Pupils are equal, round, and reactive to light  No scleral icterus  Neck: Normal range of motion  Neck supple  Cardiovascular: Normal rate, regular rhythm, normal heart sounds and intact distal pulses  Exam reveals no gallop and no friction rub  No murmur heard  Pulmonary/Chest: Effort normal  No respiratory distress  She has no wheezes  She has no rales  She exhibits no tenderness  Musculoskeletal: Normal range of motion  She exhibits no edema, tenderness or deformity  Lymphadenopathy:     She has no cervical adenopathy  Neurological: She is alert and oriented to person, place, and time  She has normal reflexes  Skin: Skin is warm and dry  She is not diaphoretic  Left earlobe reveals a 0 5 cm thorny lesion  Psychiatric: She has a normal mood and affect   Her behavior is normal  Judgment and thought content normal

## 2018-07-31 ENCOUNTER — APPOINTMENT (EMERGENCY)
Dept: CT IMAGING | Facility: HOSPITAL | Age: 83
End: 2018-07-31
Payer: COMMERCIAL

## 2018-07-31 ENCOUNTER — HOSPITAL ENCOUNTER (OUTPATIENT)
Facility: HOSPITAL | Age: 83
Setting detail: OBSERVATION
Discharge: HOME | End: 2018-08-01
Attending: EMERGENCY MEDICINE | Admitting: INTERNAL MEDICINE
Payer: COMMERCIAL

## 2018-07-31 ENCOUNTER — APPOINTMENT (EMERGENCY)
Dept: RADIOLOGY | Facility: HOSPITAL | Age: 83
End: 2018-07-31
Payer: COMMERCIAL

## 2018-07-31 DIAGNOSIS — I10 ESSENTIAL HYPERTENSION: ICD-10-CM

## 2018-07-31 DIAGNOSIS — R07.9 CHEST PAIN: Primary | ICD-10-CM

## 2018-07-31 DIAGNOSIS — I12.9 HYPERTENSIVE CHRONIC KIDNEY DISEASE WITH STAGE 1 THROUGH STAGE 4 CHRONIC KIDNEY DISEASE, OR UNSPECIFIED CHRONIC KIDNEY DISEASE: ICD-10-CM

## 2018-07-31 DIAGNOSIS — N18.4 CKD (CHRONIC KIDNEY DISEASE), STAGE IV (HCC): ICD-10-CM

## 2018-07-31 DIAGNOSIS — E11.9 DIABETES (HCC): ICD-10-CM

## 2018-07-31 LAB
ALBUMIN SERPL BCP-MCNC: 3.7 G/DL (ref 3.5–5)
ALP SERPL-CCNC: 94 U/L (ref 46–116)
ALT SERPL W P-5'-P-CCNC: 17 U/L (ref 12–78)
ANION GAP BLD CALC-SCNC: 14 MMOL/L (ref 4–13)
ANION GAP SERPL CALCULATED.3IONS-SCNC: 9 MMOL/L (ref 4–13)
APTT PPP: 29 SECONDS (ref 24–36)
AST SERPL W P-5'-P-CCNC: 12 U/L (ref 5–45)
BASOPHILS # BLD AUTO: 0.03 THOUSANDS/ΜL (ref 0–0.1)
BASOPHILS NFR BLD AUTO: 0 % (ref 0–1)
BILIRUB SERPL-MCNC: 0.63 MG/DL (ref 0.2–1)
BUN BLD-MCNC: 35 MG/DL (ref 5–25)
BUN SERPL-MCNC: 36 MG/DL (ref 5–25)
CA-I BLD-SCNC: 1.32 MMOL/L (ref 1.12–1.32)
CALCIUM SERPL-MCNC: 10.1 MG/DL (ref 8.3–10.1)
CHLORIDE BLD-SCNC: 108 MMOL/L (ref 100–108)
CHLORIDE SERPL-SCNC: 106 MMOL/L (ref 100–108)
CO2 SERPL-SCNC: 25 MMOL/L (ref 21–32)
CREAT BLD-MCNC: 1.6 MG/DL (ref 0.6–1.3)
CREAT SERPL-MCNC: 1.73 MG/DL (ref 0.6–1.3)
EOSINOPHIL # BLD AUTO: 0.13 THOUSAND/ΜL (ref 0–0.61)
EOSINOPHIL NFR BLD AUTO: 2 % (ref 0–6)
ERYTHROCYTE [DISTWIDTH] IN BLOOD BY AUTOMATED COUNT: 13.4 % (ref 11.6–15.1)
GFR SERPL CREATININE-BSD FRML MDRD: 25 ML/MIN/1.73SQ M
GFR SERPL CREATININE-BSD FRML MDRD: 28 ML/MIN/1.73SQ M
GLUCOSE SERPL-MCNC: 233 MG/DL (ref 65–140)
GLUCOSE SERPL-MCNC: 238 MG/DL (ref 65–140)
HCT VFR BLD AUTO: 36.4 % (ref 34.8–46.1)
HCT VFR BLD CALC: 33 % (ref 34.8–46.1)
HGB BLD-MCNC: 12.2 G/DL (ref 11.5–15.4)
HGB BLDA-MCNC: 11.2 G/DL (ref 11.5–15.4)
INR PPP: 1.01 (ref 0.86–1.17)
LYMPHOCYTES # BLD AUTO: 1.75 THOUSANDS/ΜL (ref 0.6–4.47)
LYMPHOCYTES NFR BLD AUTO: 23 % (ref 14–44)
MAGNESIUM SERPL-MCNC: 2.1 MG/DL (ref 1.6–2.6)
MCH RBC QN AUTO: 32.6 PG (ref 26.8–34.3)
MCHC RBC AUTO-ENTMCNC: 33.5 G/DL (ref 31.4–37.4)
MCV RBC AUTO: 97 FL (ref 82–98)
MONOCYTES # BLD AUTO: 0.51 THOUSAND/ΜL (ref 0.17–1.22)
MONOCYTES NFR BLD AUTO: 7 % (ref 4–12)
NEUTROPHILS # BLD AUTO: 5.32 THOUSANDS/ΜL (ref 1.85–7.62)
NEUTS SEG NFR BLD AUTO: 69 % (ref 43–75)
NRBC BLD AUTO-RTO: 0 /100 WBCS
PCO2 BLD: 23 MMOL/L (ref 21–32)
PLATELET # BLD AUTO: 162 THOUSANDS/UL (ref 149–390)
PMV BLD AUTO: 12.6 FL (ref 8.9–12.7)
POTASSIUM BLD-SCNC: 4 MMOL/L (ref 3.5–5.3)
POTASSIUM SERPL-SCNC: 4 MMOL/L (ref 3.5–5.3)
PROT SERPL-MCNC: 7.1 G/DL (ref 6.4–8.2)
PROTHROMBIN TIME: 13.4 SECONDS (ref 11.8–14.2)
RBC # BLD AUTO: 3.74 MILLION/UL (ref 3.81–5.12)
SODIUM BLD-SCNC: 140 MMOL/L (ref 136–145)
SODIUM SERPL-SCNC: 140 MMOL/L (ref 136–145)
SPECIMEN SOURCE: ABNORMAL
TROPONIN I SERPL-MCNC: <0.02 NG/ML
WBC # BLD AUTO: 7.74 THOUSAND/UL (ref 4.31–10.16)

## 2018-07-31 PROCEDURE — 83690 ASSAY OF LIPASE: CPT | Performed by: NURSE PRACTITIONER

## 2018-07-31 PROCEDURE — 71275 CT ANGIOGRAPHY CHEST: CPT

## 2018-07-31 PROCEDURE — 74175 CTA ABDOMEN W/CONTRAST: CPT

## 2018-07-31 PROCEDURE — 80053 COMPREHEN METABOLIC PANEL: CPT | Performed by: EMERGENCY MEDICINE

## 2018-07-31 PROCEDURE — 96374 THER/PROPH/DIAG INJ IV PUSH: CPT

## 2018-07-31 PROCEDURE — 96361 HYDRATE IV INFUSION ADD-ON: CPT

## 2018-07-31 PROCEDURE — 71046 X-RAY EXAM CHEST 2 VIEWS: CPT

## 2018-07-31 PROCEDURE — 85014 HEMATOCRIT: CPT

## 2018-07-31 PROCEDURE — 96375 TX/PRO/DX INJ NEW DRUG ADDON: CPT

## 2018-07-31 PROCEDURE — 93005 ELECTROCARDIOGRAM TRACING: CPT

## 2018-07-31 PROCEDURE — 83735 ASSAY OF MAGNESIUM: CPT | Performed by: NURSE PRACTITIONER

## 2018-07-31 PROCEDURE — 85610 PROTHROMBIN TIME: CPT | Performed by: EMERGENCY MEDICINE

## 2018-07-31 PROCEDURE — 80047 BASIC METABLC PNL IONIZED CA: CPT

## 2018-07-31 PROCEDURE — 85025 COMPLETE CBC W/AUTO DIFF WBC: CPT | Performed by: EMERGENCY MEDICINE

## 2018-07-31 PROCEDURE — 85730 THROMBOPLASTIN TIME PARTIAL: CPT | Performed by: EMERGENCY MEDICINE

## 2018-07-31 PROCEDURE — 84484 ASSAY OF TROPONIN QUANT: CPT | Performed by: EMERGENCY MEDICINE

## 2018-07-31 PROCEDURE — 36415 COLL VENOUS BLD VENIPUNCTURE: CPT

## 2018-07-31 RX ORDER — FENTANYL CITRATE 50 UG/ML
25 INJECTION, SOLUTION INTRAMUSCULAR; INTRAVENOUS ONCE
Status: COMPLETED | OUTPATIENT
Start: 2018-07-31 | End: 2018-07-31

## 2018-07-31 RX ORDER — ONDANSETRON 2 MG/ML
4 INJECTION INTRAMUSCULAR; INTRAVENOUS ONCE
Status: COMPLETED | OUTPATIENT
Start: 2018-07-31 | End: 2018-07-31

## 2018-07-31 RX ADMIN — IODIXANOL 85 ML: 320 INJECTION, SOLUTION INTRAVASCULAR at 23:39

## 2018-07-31 RX ADMIN — ONDANSETRON 4 MG: 2 INJECTION INTRAMUSCULAR; INTRAVENOUS at 22:16

## 2018-07-31 RX ADMIN — SODIUM CHLORIDE 500 ML: 0.9 INJECTION, SOLUTION INTRAVENOUS at 23:54

## 2018-07-31 RX ADMIN — FENTANYL CITRATE 25 MCG: 50 INJECTION, SOLUTION INTRAMUSCULAR; INTRAVENOUS at 22:17

## 2018-08-01 ENCOUNTER — TRANSITIONAL CARE MANAGEMENT (OUTPATIENT)
Dept: FAMILY MEDICINE CLINIC | Facility: CLINIC | Age: 83
End: 2018-08-01

## 2018-08-01 VITALS
OXYGEN SATURATION: 95 % | HEART RATE: 61 BPM | DIASTOLIC BLOOD PRESSURE: 92 MMHG | BODY MASS INDEX: 31.97 KG/M2 | TEMPERATURE: 97.4 F | RESPIRATION RATE: 18 BRPM | SYSTOLIC BLOOD PRESSURE: 141 MMHG | WEIGHT: 158.29 LBS

## 2018-08-01 PROBLEM — Q76.49 VERTEBRAL ANOMALY: Status: ACTIVE | Noted: 2018-08-01

## 2018-08-01 PROBLEM — R07.9 RIGHT-SIDED CHEST PAIN: Status: RESOLVED | Noted: 2018-08-01 | Resolved: 2018-08-01

## 2018-08-01 PROBLEM — I10 ACCELERATED HYPERTENSION: Status: ACTIVE | Noted: 2018-08-01

## 2018-08-01 PROBLEM — R07.9 RIGHT-SIDED CHEST PAIN: Status: ACTIVE | Noted: 2018-08-01

## 2018-08-01 PROBLEM — I10 ACCELERATED HYPERTENSION: Status: RESOLVED | Noted: 2018-08-01 | Resolved: 2018-08-01

## 2018-08-01 LAB
ANION GAP SERPL CALCULATED.3IONS-SCNC: 5 MMOL/L (ref 4–13)
ATRIAL RATE: 81 BPM
ATRIAL RATE: 81 BPM
BASOPHILS # BLD AUTO: 0.02 THOUSANDS/ΜL (ref 0–0.1)
BASOPHILS NFR BLD AUTO: 0 % (ref 0–1)
BUN SERPL-MCNC: 31 MG/DL (ref 5–25)
CALCIUM SERPL-MCNC: 9.8 MG/DL (ref 8.3–10.1)
CHLORIDE SERPL-SCNC: 109 MMOL/L (ref 100–108)
CO2 SERPL-SCNC: 25 MMOL/L (ref 21–32)
CREAT SERPL-MCNC: 1.63 MG/DL (ref 0.6–1.3)
EOSINOPHIL # BLD AUTO: 0.12 THOUSAND/ΜL (ref 0–0.61)
EOSINOPHIL NFR BLD AUTO: 2 % (ref 0–6)
ERYTHROCYTE [DISTWIDTH] IN BLOOD BY AUTOMATED COUNT: 13.5 % (ref 11.6–15.1)
GFR SERPL CREATININE-BSD FRML MDRD: 27 ML/MIN/1.73SQ M
GLUCOSE P FAST SERPL-MCNC: 129 MG/DL (ref 65–99)
GLUCOSE SERPL-MCNC: 107 MG/DL (ref 65–140)
GLUCOSE SERPL-MCNC: 129 MG/DL (ref 65–140)
GLUCOSE SERPL-MCNC: 138 MG/DL (ref 65–140)
HCT VFR BLD AUTO: 35.2 % (ref 34.8–46.1)
HGB BLD-MCNC: 11.4 G/DL (ref 11.5–15.4)
LIPASE SERPL-CCNC: 170 U/L (ref 73–393)
LYMPHOCYTES # BLD AUTO: 2.06 THOUSANDS/ΜL (ref 0.6–4.47)
LYMPHOCYTES NFR BLD AUTO: 29 % (ref 14–44)
MCH RBC QN AUTO: 31.7 PG (ref 26.8–34.3)
MCHC RBC AUTO-ENTMCNC: 32.4 G/DL (ref 31.4–37.4)
MCV RBC AUTO: 98 FL (ref 82–98)
MONOCYTES # BLD AUTO: 0.73 THOUSAND/ΜL (ref 0.17–1.22)
MONOCYTES NFR BLD AUTO: 10 % (ref 4–12)
NEUTROPHILS # BLD AUTO: 4.12 THOUSANDS/ΜL (ref 1.85–7.62)
NEUTS SEG NFR BLD AUTO: 58 % (ref 43–75)
NRBC BLD AUTO-RTO: 0 /100 WBCS
P AXIS: 35 DEGREES
P AXIS: 71 DEGREES
PLATELET # BLD AUTO: 145 THOUSANDS/UL (ref 149–390)
PMV BLD AUTO: 12.7 FL (ref 8.9–12.7)
POTASSIUM SERPL-SCNC: 4.4 MMOL/L (ref 3.5–5.3)
PR INTERVAL: 156 MS
PR INTERVAL: 156 MS
QRS AXIS: -3 DEGREES
QRS AXIS: -7 DEGREES
QRSD INTERVAL: 78 MS
QRSD INTERVAL: 82 MS
QT INTERVAL: 352 MS
QT INTERVAL: 360 MS
QTC INTERVAL: 408 MS
QTC INTERVAL: 418 MS
RBC # BLD AUTO: 3.6 MILLION/UL (ref 3.81–5.12)
SODIUM SERPL-SCNC: 139 MMOL/L (ref 136–145)
T WAVE AXIS: 67 DEGREES
T WAVE AXIS: 68 DEGREES
TROPONIN I SERPL-MCNC: <0.02 NG/ML
TROPONIN I SERPL-MCNC: <0.02 NG/ML
VENTRICULAR RATE: 81 BPM
VENTRICULAR RATE: 81 BPM
WBC # BLD AUTO: 7.05 THOUSAND/UL (ref 4.31–10.16)

## 2018-08-01 PROCEDURE — 84484 ASSAY OF TROPONIN QUANT: CPT | Performed by: NURSE PRACTITIONER

## 2018-08-01 PROCEDURE — 99285 EMERGENCY DEPT VISIT HI MDM: CPT

## 2018-08-01 PROCEDURE — 93005 ELECTROCARDIOGRAM TRACING: CPT

## 2018-08-01 PROCEDURE — 93010 ELECTROCARDIOGRAM REPORT: CPT | Performed by: INTERNAL MEDICINE

## 2018-08-01 PROCEDURE — 82948 REAGENT STRIP/BLOOD GLUCOSE: CPT

## 2018-08-01 PROCEDURE — 85025 COMPLETE CBC W/AUTO DIFF WBC: CPT | Performed by: NURSE PRACTITIONER

## 2018-08-01 PROCEDURE — 80048 BASIC METABOLIC PNL TOTAL CA: CPT | Performed by: NURSE PRACTITIONER

## 2018-08-01 PROCEDURE — 36415 COLL VENOUS BLD VENIPUNCTURE: CPT | Performed by: NURSE PRACTITIONER

## 2018-08-01 PROCEDURE — 99235 HOSP IP/OBS SAME DATE MOD 70: CPT | Performed by: INTERNAL MEDICINE

## 2018-08-01 RX ORDER — DOCUSATE SODIUM 100 MG/1
100 CAPSULE, LIQUID FILLED ORAL DAILY
Status: DISCONTINUED | OUTPATIENT
Start: 2018-08-01 | End: 2018-08-01 | Stop reason: HOSPADM

## 2018-08-01 RX ORDER — DOCUSATE SODIUM 100 MG/1
100 CAPSULE, LIQUID FILLED ORAL DAILY
COMMUNITY
End: 2022-03-29

## 2018-08-01 RX ORDER — HYDRALAZINE HYDROCHLORIDE 20 MG/ML
5 INJECTION INTRAMUSCULAR; INTRAVENOUS EVERY 6 HOURS PRN
Status: DISCONTINUED | OUTPATIENT
Start: 2018-08-01 | End: 2018-08-01 | Stop reason: HOSPADM

## 2018-08-01 RX ORDER — AMLODIPINE BESYLATE 5 MG/1
5 TABLET ORAL DAILY
Status: DISCONTINUED | OUTPATIENT
Start: 2018-08-01 | End: 2018-08-01 | Stop reason: HOSPADM

## 2018-08-01 RX ORDER — ONDANSETRON 2 MG/ML
4 INJECTION INTRAMUSCULAR; INTRAVENOUS EVERY 6 HOURS PRN
Status: DISCONTINUED | OUTPATIENT
Start: 2018-08-01 | End: 2018-08-01 | Stop reason: HOSPADM

## 2018-08-01 RX ORDER — HEPARIN SODIUM 5000 [USP'U]/ML
5000 INJECTION, SOLUTION INTRAVENOUS; SUBCUTANEOUS EVERY 8 HOURS SCHEDULED
Status: DISCONTINUED | OUTPATIENT
Start: 2018-08-01 | End: 2018-08-01 | Stop reason: HOSPADM

## 2018-08-01 RX ORDER — COLCHICINE 0.6 MG/1
0.6 TABLET ORAL DAILY
Status: DISCONTINUED | OUTPATIENT
Start: 2018-08-01 | End: 2018-08-01 | Stop reason: HOSPADM

## 2018-08-01 RX ORDER — AMLODIPINE BESYLATE 2.5 MG/1
2.5 TABLET ORAL
Status: DISCONTINUED | OUTPATIENT
Start: 2018-08-01 | End: 2018-08-01 | Stop reason: HOSPADM

## 2018-08-01 RX ORDER — LISINOPRIL 20 MG/1
20 TABLET ORAL DAILY
Status: DISCONTINUED | OUTPATIENT
Start: 2018-08-01 | End: 2018-08-01 | Stop reason: HOSPADM

## 2018-08-01 RX ORDER — AMLODIPINE BESYLATE 2.5 MG/1
5 TABLET ORAL
Qty: 90 TABLET | Refills: 0 | Status: SHIPPED | OUTPATIENT
Start: 2018-08-01 | End: 2018-09-18

## 2018-08-01 RX ORDER — ASPIRIN 81 MG/1
324 TABLET, CHEWABLE ORAL ONCE
Status: COMPLETED | OUTPATIENT
Start: 2018-08-01 | End: 2018-08-01

## 2018-08-01 RX ORDER — ASPIRIN 81 MG/1
81 TABLET, CHEWABLE ORAL DAILY
Status: DISCONTINUED | OUTPATIENT
Start: 2018-08-01 | End: 2018-08-01 | Stop reason: HOSPADM

## 2018-08-01 RX ORDER — PRAVASTATIN SODIUM 80 MG/1
80 TABLET ORAL
Status: DISCONTINUED | OUTPATIENT
Start: 2018-08-01 | End: 2018-08-01 | Stop reason: HOSPADM

## 2018-08-01 RX ORDER — GABAPENTIN 300 MG/1
300 CAPSULE ORAL
Status: DISCONTINUED | OUTPATIENT
Start: 2018-08-01 | End: 2018-08-01 | Stop reason: HOSPADM

## 2018-08-01 RX ORDER — HYDRALAZINE HYDROCHLORIDE 20 MG/ML
10 INJECTION INTRAMUSCULAR; INTRAVENOUS ONCE
Status: COMPLETED | OUTPATIENT
Start: 2018-08-01 | End: 2018-08-01

## 2018-08-01 RX ORDER — MELATONIN
1000 DAILY
Status: DISCONTINUED | OUTPATIENT
Start: 2018-08-01 | End: 2018-08-01 | Stop reason: HOSPADM

## 2018-08-01 RX ORDER — ACETAMINOPHEN 325 MG/1
650 TABLET ORAL EVERY 6 HOURS PRN
Status: DISCONTINUED | OUTPATIENT
Start: 2018-08-01 | End: 2018-08-01 | Stop reason: HOSPADM

## 2018-08-01 RX ORDER — LIDOCAINE 50 MG/G
1 PATCH TOPICAL DAILY
Status: DISCONTINUED | OUTPATIENT
Start: 2018-08-01 | End: 2018-08-01

## 2018-08-01 RX ORDER — LIDOCAINE 50 MG/G
1 PATCH TOPICAL DAILY
Status: DISCONTINUED | OUTPATIENT
Start: 2018-08-01 | End: 2018-08-01 | Stop reason: HOSPADM

## 2018-08-01 RX ADMIN — COLCHICINE 0.6 MG: 0.6 TABLET, FILM COATED ORAL at 08:32

## 2018-08-01 RX ADMIN — AMLODIPINE BESYLATE 5 MG: 5 TABLET ORAL at 08:32

## 2018-08-01 RX ADMIN — ASPIRIN 81 MG 81 MG: 81 TABLET ORAL at 08:31

## 2018-08-01 RX ADMIN — HEPARIN SODIUM 5000 UNITS: 5000 INJECTION, SOLUTION INTRAVENOUS; SUBCUTANEOUS at 05:12

## 2018-08-01 RX ADMIN — ASPIRIN 81 MG 324 MG: 81 TABLET ORAL at 00:33

## 2018-08-01 RX ADMIN — VITAMIN D, TAB 1000IU (100/BT) 1000 UNITS: 25 TAB at 08:32

## 2018-08-01 RX ADMIN — DOCUSATE SODIUM 100 MG: 100 CAPSULE, LIQUID FILLED ORAL at 08:32

## 2018-08-01 RX ADMIN — LIDOCAINE 1 PATCH: 50 PATCH TOPICAL at 05:11

## 2018-08-01 RX ADMIN — LISINOPRIL 20 MG: 20 TABLET ORAL at 08:31

## 2018-08-01 RX ADMIN — HYDRALAZINE HYDROCHLORIDE 10 MG: 20 INJECTION INTRAMUSCULAR; INTRAVENOUS at 00:53

## 2018-08-01 NOTE — H&P
H&P- Jazmine Killian 2/24/1927, 80 y o  female MRN: [id number]    Unit/Bed#: E4 -01 Encounter: [id number]    Primary Care Provider: Ekta Woods MD   Date and time admitted to hospital: 7/31/2018  9:55 PM      Accelerated hypertension   Assessment & Plan    · /84, SBP's >190  · On amlodipine and lisinopril, continue  · P r n  hydralazine SBP >180        * Right-sided chest pain   Assessment & Plan    · Doubt cardiac  · Troponin negative x2, will check 1 more set  · GUERO 3  · EKG: SR, PAC, rate 81  · CTA:  Pulmonary artery mildly dilated which may indicate pulmonary artery hypertension  · CXR:  Negative for acute cardiopulmonary disease, official CXR result pending  · Continue ASA, lisinopril and statin  · Lidocaine TD patch  · Pain control  · Telemetry monitoring        CKD (chronic kidney disease), stage IV (HCC)   Assessment & Plan    · Creat 1 7, at baseline  · Avoid hypotension, NSAIDs and nephrotoxins   · Follows outpatient with Nephrology, seen 3/26/2018 by Dr Presley Pollack        Vertebral anomaly   Assessment & Plan    · CTA shows 18mm lucent lesion with rim sclerosis within the L2 vertebral body, nonspecific although possibly representing vertebral hemangioma  · Outpatient MRI lumbar spine if clinically warranted        Controlled type 2 diabetes mellitus with diabetic polyneuropathy [facility])   Assessment & Plan    Lab Results   Component Value Date    HGBA1C 6 3% 03/13/2018 ·   Accuchecks and ISS    No results for input(s): POCGLU in the last 72 hours  Blood Sugar Average: Last 72 hrs:          Hyperlipidemia   Assessment & Plan    · Continue statin          VTE Prophylaxis: Heparin  / reason for no mechanical VTE prophylaxis mbulate   Code Status: FC  POLST: POLST is not applicable to this patient  Discussion with family:     Anticipated Length of Stay:  Patient will be admitted on an Observation basis with an anticipated length of stay of  > 2 midnights     Justification for Hospital Stay: CP    Total Time for Visit, including Counseling / Coordination of Care: 45 minutes  Greater than 50% of this total time spent on direct patient counseling and coordination of care  Chief Complaint:   Right-sided chest pain    History of Present Illness:    Linda Coker is a 80 y o  female who presents with c/o right-sided chest pain radiating posteriorly to mid back, worse with deep breathing and when lying down, nonexertional, not associated with meals, no associated diaphoresis, nausea or SOB, no relieving factors identified; reproducible on palpation  Denies fevers, shortness of breath, cough, chills, loss of appetite, abdominal pain, NVCD or dysuria  Review of Systems:    Review of Systems   Constitutional: Negative  HENT: Negative  Respiratory: Negative  Cardiovascular: Positive for chest pain  Negative for palpitations and leg swelling  Right-sided chest pain, worse with deep respirations and rest   Gastrointestinal: Negative  Genitourinary: Negative  Musculoskeletal: Negative  Neurological: Negative  Past Medical and Surgical History:     Past Medical History:   Diagnosis Date    Diabetes mellitus ([facility] )     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  Authorizing Provider   amLODIPine (NORVASC) 2 5 mg tablet Take 1 tablet (2 5 mg total) by mouth daily at bedtime 3/26/18   Alexsander Chávez DO   amLODIPine (NORVASC) 5 mg tablet Take 1 tablet (5 mg total) by mouth daily 3/13/18   Isma Gamez MD   aspirin 81 MG tablet Take 1 tablet by mouth daily 6/27/14   Historical Provider, MD   Cholecalciferol (VITAMIN D3) 2000 units capsule Take 1 capsule by mouth daily 12/22/17   Historical Provider, MD   colchicine (COLCRYS) 0 6 mg tablet Take 1 tablet (0 6 mg total) by mouth daily 3/13/18   Isma Gamez MD   gabapentin (NEURONTIN) 300 mg capsule Take 1 tablet by mouth 8/17/16   Historical Provider, MD   glucose blood test strip Test twice a day E11 9 non insulin  10/22/15   Historical Provider, MD   ipratropium (ATROVENT) 0 03 % nasal spray 2 sprays into each nostril 3 (three) times a day as needed 1/30/17   Historical Provider, MD   Lancets (ONETOUCH ULTRASOFT) lancets by Does not apply route 2 (two) times a day 11/16/11   Historical Provider, MD   lisinopril (ZESTRIL) 20 mg tablet Take 1 tablet (20 mg total) by mouth daily 7/11/18   Myrna Celis PA-C   simvastatin (ZOCOR) 40 mg tablet Take 1 tablet (40 mg total) by mouth daily 5/14/18   Isma Gamez MD   Difluprednate (DUREZOL) 0 05 % EMUL Apply to eye 9/8/14 8/1/18  Historical Provider, MD     I have reviewed home medications with patient personally  Allergies: Allergies   Allergen Reactions    Flu Virus Vaccine      Other reaction(s): HX of Guillain-[place] Syndrome    Sulfa Antibiotics        Social History:     Marital Status:     Occupation:  Charge volunteer  Patient Pre-hospital Living Situation:  Lives at home  Patient Pre-hospital Level of Mobility:  Ambulates with cane  Patient Pre-hospital Diet Restrictions:  Low-salt diabetic  Substance Use History:   History   Alcohol Use    Yes     Comment: SOCIAL     History   Smoking Status    Former Smoker   Smokeless Tobacco    Never Used     History   Drug Use No       Family History:    Family History   Problem Relation Age of Onset    Hypertension Mother     Hypertension Father     Kidney disease Sister         CHRONIC    Alcohol abuse Family  Substance Abuse Family        Physical Exam:     Vitals:   Blood Pressure: 164/80 (08/01/18 0138)  Pulse: 77 (08/01/18 0138)  Temperature: (!) 97 4 °F (36 3 °C) (08/01/18 0138)  Temp Source: Tympanic (08/01/18 0138)  Respirations: 18 (08/01/18 0138)  Weight - Scale: 71 8 kg (158 lb 4 6 oz) (08/01/18 0138)  SpO2: 95 % (08/01/18 0138)    Physical Exam   Constitutional: She is oriented to person, place, and time  She appears well-developed and well-nourished  No distress  HENT:   Head: Normocephalic and atraumatic  Eyes: Conjunctivae and EOM are normal  Right eye exhibits no discharge  Left eye exhibits no discharge  No scleral icterus  Neck: Neck supple  No JVD present  Cardiovascular: Normal rate, regular rhythm, normal heart sounds and intact distal pulses  Pulmonary/Chest: Effort normal and breath sounds normal  No respiratory distress  She has no wheezes  She has no rales  She exhibits no tenderness  Chest wall tenderness on palpation   Abdominal: Soft  Bowel sounds are normal  She exhibits no distension and no mass  There is no tenderness  There is no rebound and no guarding  Musculoskeletal: She exhibits no edema  Neurological: She is alert and oriented to person, place, and time  Skin: Skin is warm and dry  No rash noted  She is not diaphoretic  No erythema  No pallor  Psychiatric: She has a normal mood and affect  Her behavior is normal  Judgment and thought content normal      Additional Data:     Lab Results: I have personally reviewed pertinent reports          Results from last 7 days  Lab Units 07/31/18 2213 07/31/18 2201   WBC Thousand/uL  --  7 74   HEMOGLOBIN g/dL  --  12 2   I STAT HEMOGLOBIN g/dl 11 2*  --    HEMATOCRIT %  --  36 4   PLATELETS Thousands/uL  --  162   NEUTROS PCT %  --  69   LYMPHS PCT %  --  23   MONOS PCT %  --  7   EOS PCT %  --  2       Results from last 7 days  Lab Units 07/31/18 2213 07/31/18 2201   SODIUM mmol/L  --  140   POTASSIUM mmol/L  --  4 0 CHLORIDE mmol/L  --  106   CO2 mmol/L  --  25   BUN mg/dL  --  36*   CREATININE mg/dL  --  1 73*   CALCIUM mg/dL  --  10 1   TOTAL PROTEIN g/dL  --  7 1   BILIRUBIN TOTAL mg/dL  --  0 63   ALK PHOS U/L  --  94   ALT U/L  --  17   AST U/L  --  12   GLUCOSE RANDOM mg/dL  --  238*   GLUCOSE, ISTAT mg/dl 233*  --        Results from last 7 days  Lab Units 07/31/18  2201   INR  1 01               Imaging: I have personally reviewed pertinent reports  CTA dissection protocol chest and abdomen   Final Result by Allen Garcia DO (08/01 0022)      No evidence of aortic dissection or aneurysm  The main pulmonary artery is mildly dilated measuring 31 mm which may be indicative of pulmonary artery hypertension  No acute intra-abdominal abnormality  Mildly complicated right renal cysts as above  Follow-up ultrasound in 9-12 months may be obtained  Colonic diverticulosis without evidence of diverticulitis  18 mm lucent lesion with rim sclerosis seen within the L2 vertebral body, nonspecific although possibly representing vertebral hemangioma  This could be verified with MRI lumbar spine if clinically warranted  Workstation performed: [id number]         XR chest 2 views   ED Interpretation by JONAH Jimenez (07/31 8612)   Preliminary reading   tortous aorta   Waiting for reading  EKG, Pathology, and Other Studies Reviewed on Admission:   · EKG CTA CXR    Allscripts / Epic Records Reviewed: Yes     ** Please Note: This note has been constructed using a voice recognition system   **

## 2018-08-01 NOTE — ED PROVIDER NOTES
History  Chief Complaint   Patient presents with    Chest Pain     pt reports RUQ pain, right-sided chest pain that radiates around to right side of back  pt reports pain worsens with deep breath  This is a 80year old female who states about 4 pm today she was sitting in her chair at home and developed right sided chest pain that wraps around into her right shoulder and back  She sates she did eat a roll with sausage in it after the pain began  She states she still has her gallbladder but has no RUQ pain  She denies n/v/d, diaphoresis  She denies MI, stents  She is on blood pressure medication  She states the pain seems to be worse with inspiration and with deep inspiration it is worse  Pain now 6/10  She states she takes 81 mg baby aspirin daily  Pt denies recent travel or hx of DVT/PE, leg swelling  History provided by:  Patient, medical records and relative  History limited by:  Age and acuity of condition   used: No    Chest Pain   Pain location:  R chest and R lateral chest  Pain quality: sharp and stabbing    Pain radiates to:  Upper back and R shoulder  Pain radiates to the back: yes    Pain severity:  Severe  Onset quality:  Sudden  Duration:  3 hours  Timing:  Constant  Progression:  Worsening  Chronicity:  New  Context: breathing and at rest    Relieved by:  None tried  Worsened by:  Deep breathing  Ineffective treatments:  None tried  Associated symptoms: back pain and shortness of breath    Risk factors: hypertension        Prior to Admission Medications   Prescriptions Last Dose Informant Patient Reported? Taking?    Cholecalciferol (VITAMIN D3) 2000 units capsule   Yes No   Sig: Take 1 capsule by mouth daily   Lancets (ONETOUCH ULTRASOFT) lancets   Yes No   Sig: by Does not apply route 2 (two) times a day   amLODIPine (NORVASC) 2 5 mg tablet   No No   Sig: Take 1 tablet (2 5 mg total) by mouth daily at bedtime   amLODIPine (NORVASC) 5 mg tablet   No No   Sig: Take 1 tablet (5 mg total) by mouth daily   aspirin 81 MG tablet   Yes No   Sig: Take 1 tablet by mouth daily   colchicine (COLCRYS) 0 6 mg tablet   No No   Sig: Take 1 tablet (0 6 mg total) by mouth daily   gabapentin (NEURONTIN) 300 mg capsule   Yes No   Sig: Take 1 tablet by mouth   glucose blood test strip   Yes No   Sig: Test twice a day E11 9 non insulin    ipratropium (ATROVENT) 0 03 % nasal spray   Yes No   Si sprays into each nostril 3 (three) times a day as needed   lisinopril (ZESTRIL) 20 mg tablet   No No   Sig: Take 1 tablet (20 mg total) by mouth daily   simvastatin (ZOCOR) 40 mg tablet   No No   Sig: Take 1 tablet (40 mg total) by mouth daily      Facility-Administered Medications: None       Past Medical History:   Diagnosis Date    Diabetes mellitus (HCC)     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 ([facility] [address] )     LAST ASSESSED: 2016    Hyperlipidemia     Hypertension     Renal disorder     Squamous cell carcinoma, scalp/neck     LAST ASSESSED: [id number]       Past Surgical History:   Procedure Laterality Date    BLADDER SURGERY      LAST ASSESSED: 2016    PELVIC FLOOR REPAIR      VAGINAL SURG INSERTION OF MESH    TOTAL ABDOMINAL HYSTERECTOMY W/ BILATERAL SALPINGOOPHORECTOMY      LAST ASSESSED: [id number]       Family History   Problem Relation Age of Onset    Hypertension Mother     Hypertension Father     Kidney disease Sister         CHRONIC    Alcohol abuse Family     Substance Abuse Family      I have reviewed and agree with the history as documented  Social History   Substance Use Topics    Smoking status: Former Smoker    Smokeless tobacco: Never Used    Alcohol use Yes      Comment: SOCIAL        Review of Systems   Constitutional: Negative  HENT: Negative  Eyes: Negative  Respiratory: Positive for shortness of breath  Cardiovascular: Positive for chest pain  Gastrointestinal: Negative  Endocrine: Negative  Genitourinary: Negative  Musculoskeletal: Positive for back pain  Skin: Negative  Allergic/Immunologic: Negative  Neurological: Negative  Hematological: Negative  Psychiatric/Behavioral: Negative  Physical Exam  Physical Exam   Constitutional: She is oriented to person, place, and time  She appears well-developed and well-nourished  She appears distressed  HENT:   Head: Normocephalic and atraumatic  Eyes: EOM are normal  Pupils are equal, round, and reactive to light  Neck: Normal range of motion  Neck supple  Cardiovascular: Normal rate, regular rhythm and intact distal pulses  Murmur heard  Questionable soft/faint murmer   + 1-2 pitting edema to lower legs, ankles  Pulmonary/Chest: Effort normal and breath sounds normal  No respiratory distress  She has no wheezes  She has no rales  She exhibits no tenderness  Guarded with breathing     Abdominal: Soft  Bowel sounds are normal  She exhibits no distension  There is no tenderness  No TTP RUQ  No cabrera sign    Musculoskeletal: Normal range of motion  Neurological: She is alert and oriented to person, place, and time  Skin: Skin is warm and dry  Capillary refill takes less than 2 seconds  She is not diaphoretic  Psychiatric: She has a normal mood and affect  Her behavior is normal  Judgment and thought content normal    Nursing note and vitals reviewed        Vital Signs  ED Triage Vitals   Temperature Pulse Respirations Blood Pressure SpO2   07/31/18 2208 07/31/18 2156 07/31/18 2156 07/31/18 2159 07/31/18 2156   98 8 °F (37 1 °C) 80 (!) 24 (!) 227/95 93 %      Temp Source Heart Rate Source Patient Position - Orthostatic VS BP Location FiO2 (%)   07/31/18 2208 07/31/18 2156 08/01/18 0138 07/31/18 2208 --   Oral Monitor Lying Left arm       Pain Score       08/01/18 0112       No Pain           Vitals:    07/31/18 2317 08/01/18 0037 08/01/18 0112 08/01/18 0138   BP: (!) 178/74 (!) 192/84 (!) 172/75 164/80   Pulse: 65 69 78 77   Patient Position - Orthostatic VS:    Lying       Visual Acuity      ED Medications  Medications   ondansetron (ZOFRAN) injection 4 mg (4 mg Intravenous Given 7/31/18 2216)   fentanyl citrate (PF) 100 MCG/2ML 25 mcg (25 mcg Intravenous Given 7/31/18 2217)   sodium chloride 0 9 % bolus 500 mL (0 mL Intravenous Stopped 8/1/18 0104)   iodixanol (VISIPAQUE) 320 MG/ML injection 85 mL (85 mL Intravenous Given 7/31/18 2339)   aspirin chewable tablet 324 mg (324 mg Oral Given 8/1/18 0033)   hydrALAZINE (APRESOLINE) injection 10 mg (10 mg Intravenous Given 8/1/18 0053)       Diagnostic Studies  Results Reviewed     Procedure Component Value Units Date/Time    Lipase [[id number]]  (Normal) Collected:  07/31/18 2201    Lab Status:  Final result Specimen:  Blood from Arm, Right Updated:  08/01/18 0131     Lipase 170 u/L     Troponin I [[id number]]  (Normal) Collected:  08/01/18 0052    Lab Status:  Final result Specimen:  Blood from Arm, Right Updated:  08/01/18 0118     Troponin I <0 02 ng/mL     Comprehensive metabolic panel [[id number]]  (Abnormal) Collected:  07/31/18 2201    Lab Status:  Final result Specimen:  Blood from Arm, Right Updated:  07/31/18 2246     Sodium 140 mmol/L      Potassium 4 0 mmol/L      Chloride 106 mmol/L      CO2 25 mmol/L      Anion Gap 9 mmol/L      BUN 36 (H) mg/dL      Creatinine 1 73 (H) mg/dL      Glucose 238 (H) mg/dL      Calcium 10 1 mg/dL      AST 12 U/L      ALT 17 U/L      Alkaline Phosphatase 94 U/L      Total Protein 7 1 g/dL      Albumin 3 7 g/dL      Total Bilirubin 0 63 mg/dL      eGFR 25 ml/min/1 73sq m     Narrative:         National Kidney Disease Education Program recommendations are as follows:  GFR calculation is accurate only with a steady state creatinine  Chronic Kidney disease less than 60 ml/min/1 73 sq  meters  Kidney failure less than 15 ml/min/1 73 sq  meters      Magnesium [[id number]]  (Normal) Collected:  07/31/18 2201    Lab Status:  Final result Specimen:  Blood from Arm, Right Updated: 07/31/18 2246     Magnesium 2 1 mg/dL     Troponin I [[id number]]  (Normal) Collected:  07/31/18 2201    Lab Status:  Final result Specimen:  Blood from Arm, Right Updated:  07/31/18 2236     Troponin I <0 02 ng/mL     Protime-INR [[id number]]  (Normal) Collected:  07/31/18 2201    Lab Status:  Final result Specimen:  Blood from Arm, Right Updated:  07/31/18 2233     Protime 13 4 seconds      INR 1 01    APTT [[id number]]  (Normal) Collected:  07/31/18 2201    Lab Status:  Final result Specimen:  Blood from Arm, Right Updated:  07/31/18 2233     PTT 29 seconds     POCT Chem 8+ [[id number]]  (Abnormal) Collected:  07/31/18 2213    Lab Status:  Final result Updated:  07/31/18 2218     SODIUM, I-STAT 140 mmol/l      Potassium, i-STAT 4 0 mmol/L      Chloride, istat 108 mmol/L      CO2, i-STAT 23 mmol/L      Anion Gap, Istat 14 (H) mmol/L      Calcium, Ionized i-STAT 1 32 mmol/L      BUN, I-STAT 35 (H) mg/dl      Creatinine, i-STAT 1 6 (H) mg/dl      eGFR 28 ml/min/1 73sq m      Glucose, i-STAT 233 (H) mg/dl      Hct, i-STAT 33 (L) %      Hgb, i-STAT 11 2 (L) g/dl      Specimen Type VENOUS    CBC and differential [[id number]]  (Abnormal) Collected:  07/31/18 2201    Lab Status:  Final result Specimen:  Blood from Arm, Right Updated:  07/31/18 2210     WBC 7 74 Thousand/uL      RBC 3 74 (L) Million/uL      Hemoglobin 12 2 g/dL      Hematocrit 36 4 %      MCV 97 fL      MCH 32 6 pg      MCHC 33 5 g/dL      RDW 13 4 %      MPV 12 6 fL      Platelets 967 Thousands/uL      nRBC 0 /100 WBCs      Neutrophils Relative 69 %      Lymphocytes Relative 23 %      Monocytes Relative 7 %      Eosinophils Relative 2 %      Basophils Relative 0 %      Neutrophils Absolute 5 32 Thousands/µL      Lymphocytes Absolute 1 75 Thousands/µL      Monocytes Absolute 0 51 Thousand/µL      Eosinophils Absolute 0 13 Thousand/µL      Basophils Absolute 0 03 Thousands/µL                  CTA dissection protocol chest and abdomen   Final Result by Maico Mo, DO (08/01 0022)      No evidence of aortic dissection or aneurysm  The main pulmonary artery is mildly dilated measuring 31 mm which may be indicative of pulmonary artery hypertension  No acute intra-abdominal abnormality  Mildly complicated right renal cysts as above  Follow-up ultrasound in 9-12 months may be obtained  Colonic diverticulosis without evidence of diverticulitis  18 mm lucent lesion with rim sclerosis seen within the L2 vertebral body, nonspecific although possibly representing vertebral hemangioma  This could be verified with MRI lumbar spine if clinically warranted  Workstation performed: [id number]         XR chest 2 views   ED Interpretation by JONAH Roman (07/31 2323)   Preliminary reading   tortous aorta   Waiting for reading                    Procedures  ECG 12 Lead Documentation  Date/Time: 7/31/2018 10:16 PM  Performed by: Enrrique Walsh by: Bowman Limb     Indications / Diagnosis:  Chest pain   ECG reviewed by me, the ED Provider: yes (Dr Maricarmen CARLOS )    Patient location:  ED  Previous ECG:     Previous ECG:  Unavailable    Comparison to cardiac monitor: Yes    Interpretation:     Interpretation: abnormal    Rate:     ECG rate:  81  nonspecific T wave abnormality     ECG rate assessment: normal    Rhythm:     Rhythm: sinus rhythm      ECG 12 Lead Documentation  Date/Time: 8/1/2018 1:12 AM  Performed by: Enrrique Walsh by: Bowman Limb     Indications / Diagnosis:  Chest pain   ECG reviewed: Dr Maricarmen CARLOS     Patient location:  ED  Previous ECG:     Previous ECG:  Compared to current    Similarity:  No change    Comparison to cardiac monitor: Yes    Interpretation:     Interpretation: abnormal    Rate:     ECG rate:  81 non specific T wave abnormality     ECG rate assessment: normal    Rhythm:     Rhythm: sinus rhythm             Phone Contacts  ED Phone Contact    ED Course  ED Course as of Aug 01 0148   Tue Jul 31, 2018 2224 GFR 28 POCT chem 8  Will wait for formal GFR and consider MRA if 30 or above  BP each arm reviewed - BP is elevated but similar  [place] Dr Brandon White saw pt  Pt will have CXR done and if negative, will do CTA chest rule out dissection/dissection protocol  Pt   of dissection  Pt agrees to [address]  2320 Xray reviewed and discussed with Dr Brandon White Will order CTA     0480 66 01 75 Spoke with CT - will do CTA  2359 Trop < 0 02  EKG unremarkable  Wed Aug 01, 2018   0024 IMPRESSION:     No evidence of aortic dissection or aneurysm  The main pulmonary artery is mildly dilated measuring 31 mm which may be indicative of pulmonary artery hypertension       No acute intra-abdominal abnormality      Mildly complicated right renal cysts as above  Follow-up ultrasound in 9-12 months may be obtained      Colonic diverticulosis without evidence of diverticulitis     18 mm lucent lesion with rim sclerosis seen within the L2 vertebral body, nonspecific although possibly representing vertebral hemangioma  This could be verified with MRI lumbar spine if clinically warranted        0026 Reviewed labs and radiology results with pt and family  CT negative for dissection  Pt will be admitted for serial troponins and EKG and cr recheck  Family and pt agree with POC       0031 Pt states her pain is almost gone  0894 Call placed to [facility] will accept for observation  Will give hydralazine and recheck BP              HEART Risk Score      Most Recent Value   History  1 Filed at: 2018 2344   ECG  1 Filed at: 2018 2344   Age  2 Filed at: 2018 2344   Risk Factors  1 Filed at: 2018 2344   Troponin  0 Filed at: 2018 2344   Heart Score Risk Calculator   History  1 Filed at: 2018 2344   ECG  1 Filed at: 2018 2344   Age  2 Filed at: 2018 2344   Risk Factors  1 Filed at: 2018 2344   Troponin  0 Filed at: 2018 2344   HEART Score  5 Filed at: 2018 2344   HEART Score  5 Filed at: 07/31/2018 2344                            [facility]  Number of Diagnoses or Management Options  Diagnosis management comments: Differential diagnosis:  Aortic dissection  PE  MI, STEMI, NSTEMI    Plan  Labs  ekg  Tele  Pain control  CTA        Amount and/or Complexity of Data Reviewed  Clinical lab tests: ordered and reviewed  Tests in the radiology section of CPT®: ordered and reviewed  Review and summarize past medical records: yes  Discuss the patient with other providers: (Dr Alexandro Darden - radiology   Dr Antonio Bansal - radiology )      CritCare Time    Disposition  Final diagnoses:   Essential hypertension   Chest pain   Diabetes [facility])     Time reflects when diagnosis was documented in both MDM as applicable and the Disposition within this note     Time User Action Codes Description Comment    8/1/2018 12:30 AM Julisa Jaksch Add [I10] Essential hypertension     8/1/2018 12:30 AM Julisa Jaksch Modify [I10] Essential hypertension     8/1/2018 12:31 AM Julisa Jaksch Add [R07 9] Chest pain     8/1/2018 12:31 AM Julisa Jaksch Add [E11 9] Diabetes ([facility] )     8/1/2018  1:42 AM Sherle Dienes Modify [I10] Essential hypertension     8/1/2018  1:42 AM Sherle Dienes Modify [R07 9] Chest pain       ED Disposition     ED Disposition Condition Comment    Admit  Case was discussed with [facility] and the patient's admission status was agreed to be Admission Status: observation status to the service of Dr Angel Kaur   Follow-up Information    None         Current Discharge Medication List      CONTINUE these medications which have NOT CHANGED    Details   !! amLODIPine (NORVASC) 2 5 mg tablet Take 1 tablet (2 5 mg total) by mouth daily at bedtime  Qty: 90 tablet, Refills: 3    Comments: Total dose 7 5 mg daily  Associated Diagnoses: CKD (chronic kidney disease), stage IV ([facility] [address] );  Hypertensive chronic kidney disease with stage 1 through stage 4 chronic kidney disease, or unspecified chronic kidney disease      !! amLODIPine (NORVASC) 5 mg tablet Take 1 tablet (5 mg total) by mouth daily  Qty: 30 tablet, Refills: 5    Associated Diagnoses: Essential hypertension      aspirin 81 MG tablet Take 1 tablet by mouth daily      Cholecalciferol (VITAMIN D3) 2000 units capsule Take 1 capsule by mouth daily      colchicine (COLCRYS) 0 6 mg tablet Take 1 tablet (0 6 mg total) by mouth daily  Qty: 90 tablet, Refills: 3    Associated Diagnoses: Idiopathic chronic gout without tophus, unspecified site      gabapentin (NEURONTIN) 300 mg capsule Take 1 tablet by mouth      glucose blood test strip Test twice a day E11 9 non insulin       ipratropium (ATROVENT) 0 03 % nasal spray 2 sprays into each nostril 3 (three) times a day as needed      Lancets (ONETOUCH ULTRASOFT) lancets by Does not apply route 2 (two) times a day      lisinopril (ZESTRIL) 20 mg tablet Take 1 tablet (20 mg total) by mouth daily  Qty: 30 tablet, Refills: 5    Associated Diagnoses: Hypertension, unspecified type      simvastatin (ZOCOR) 40 mg tablet Take 1 tablet (40 mg total) by mouth daily  Qty: 30 tablet, Refills: 5    Associated Diagnoses: Hyperlipidemia, unspecified hyperlipidemia type       !! - Potential duplicate medications found  Please discuss with provider  No discharge procedures on file      ED Provider  Electronically Signed by           Kirby Arthur  08/01/18 8761

## 2018-08-01 NOTE — DISCHARGE SUMMARY
Transition of Care Discharge Summary - [facility] Internal Medicine    Patient Information: Camilla Franz 80 y o  female MRN: [id number]  Unit/Bed#: E4 -01 Encounter: [id number]    Discharging Physician / Practitioner: Vamshi Matos MD  PCP: Keshav Garcia MD  Admission Date: 7/31/2018  Discharge Date: 08/01/18    Disposition:      Other: home    For Discharges to [facility] SNF:   · Not Applicable to this Patient - Not Applicable to this Patient    Reason for Admission: right sided chest pain    Discharge Diagnoses:     Principal Problem (Resolved):    Right-sided chest pain  Active Problems:    Hyperlipidemia    Controlled type 2 diabetes mellitus with diabetic polyneuropathy ([facility] [address] )    [facility]    Vertebral anomaly  Resolved Problems:    Accelerated hypertension      Consultations During Hospital Stay:  · none    Procedures Performed:     · none    Medication Adjustments and Discharge Medications:  · Medication Dosing Tapers - Please refer to Discharge Medication List for details on any medication dosing tapers (if applicable to patient)  · Discharge Medication List: See after visit summary for reconciled discharge medications  Wound Care Recommendations:  When applicable, please see wound care section of After Visit Summary  Diet Recommendations at Discharge:  Diet -        Diet Orders            Start     Ordered    08/01/18 0422  Diet Cardiac; Cardiac TLC 2 3 GM NA; Consistent Carbohydrate Diet Level 2 (5 carb servings/75 grams CHO/meal)  Diet effective now     Question Answer Comment   Diet Type Cardiac    Cardiac Cardiac TLC 2 3 GM NA    Other Restriction(s): Consistent Carbohydrate Diet Level 2 (5 carb servings/75 grams CHO/meal)    RD to adjust diet per protocol? Yes        08/01/18 0423        Fluid Restriction - No Fluid Restriction at Discharge        Significant Findings / Test Results:     · CXR: negative  CTA: No evidence of aortic dissection or aneurysm  The main pulmonary artery is mildly dilated measuring 31 mm which may be indicative of pulmonary artery hypertension       No acute intra-abdominal abnormality      Mildly complicated right renal cysts as above  Follow-up ultrasound in 9-12 months may be obtained      Colonic diverticulosis without evidence of diverticulitis     18 mm lucent lesion with rim sclerosis seen within the L2 vertebral body, nonspecific although possibly representing vertebral hemangioma  This could be verified with MRI lumbar spine if clinically warranted  Hospital Course:     Edward Givens is a 80 y o  female patient who originally presented to the hospital on 7/31/2018 due to right-sided chest pain  Patient lives at home by herself and walks with a cane  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 after which she started having right-sided chest pain radiating to the back  Patient denies any associated symptoms  Troponin negative x3  CTA was negative for any dissection  Patient's symptoms have resolved  Likely etiology was musculoskeletal   Incidental finding of L2 vertebral body possibly vertebral hemangioma, patient denies any pain  I recommended outpatient workup and follow up with PCP  Patient also noted to have right renal cyst which patient states she is aware and follows with Nephrology outpatient  Patient otherwise hemodynamically stable for discharge home today, daughter at bedside and explained      Condition at Discharge: good     Discharge Day Visit / Exam:     Subjective:  Patient sen and examined at bedside, denies    Vitals: Blood Pressure: 142/63 (08/01/18 0704)  Pulse: 61 (08/01/18 0704)  Temperature: (!) 97 4 °F (36 3 °C) (08/01/18 0704)  Temp Source: Tympanic (08/01/18 0704)  Respirations: 18 (08/01/18 0704)  Weight - Scale: 71 8 kg (158 lb 4 6 oz) (08/01/18 0138)  SpO2: 95 % (08/01/18 0704)    Physical Exam:    Constitutional: Patient is oriented to person, place and time, no acute distress  HEENT:  Normocephalic, atraumatic, EOMI, PERRLA, no scleral icterus, no pallor, moist oral mucosa  Neck:  Supple, no masses, no thyromegaly, no bruits Normal range of motion  Lymph nodes:  No lymphadenopathy  Cardiovascular: Normal S1S2, RRR, No murmurs/rubs/gallops appreciated  Pulmonary: Clear to auscultation bilaterally, No rhonchi/rales/wheezing appreciated  Abdominal: Soft, Bowel sounds present, Non-tender, Non-distended, No rebound/guarding, no hepatomegaly   Musculoskeletal: No tenderness/abnormality   Extremities:  No cyanosis, clubbing or edema  Peripheral pulses palpable and equal bilaterally  Neurological: Cranial nerves II-XII grossly intact, sensation intact, otherwise no focal neurological symptoms  Skin: Skin is warm and dry, no rashes  Discharge instructions/Information to patient and family:   See after visit summary section titled Discharge Instructions for information provided to patient and family  Planned Readmission: no     Discharge Statement:  I spent 30 minutes discharging the patient  This time was spent on the day of discharge  I had direct contact with the patient on the day of discharge  Greater than 50% of the total time was spent examining patient, answering all patient questions, arranging and discussing plan of care with patient as well as directly providing post-discharge instructions  Additional time then spent on discharge activities      ** Please Note: This note has been constructed using a voice recognition system **

## 2018-08-01 NOTE — CASE MANAGEMENT
Initial Clinical Review    Admission: Date/Time/Statement: 08/01/2018 @ 00:44  Observation    Orders Placed This Encounter   Procedures    Place in Observation (expected length of stay for this patient is less than two midnights)     Standing Status:   Standing     Number of Occurrences:   1     Order Specific Question:   Admitting Physician     Answer:   Sonya Eden     Order Specific Question:   Level of Care     Answer:   Med Surg [16]         ED: Date/Time/Mode of Arrival:   ED Arrival Information     Expected Arrival Acuity Means of Arrival Escorted By Service Admission Type    - 7/31/2018 21:50 Emergent Walk-In Family Member General Medicine Emergency    Arrival Complaint    chest pain          Chief Complaint:   Chief Complaint   Patient presents with    Chest Pain     pt reports RUQ pain, right-sided chest pain that radiates around to right side of back  pt reports pain worsens with deep breath  History of Illness: This is a 80year old female who states about 4 pm today she was sitting in her chair at home and developed right sided chest pain that wraps around into her right shoulder and back  She sates she did eat a roll with sausage in it after the pain began  She states she still has her gallbladder but has no RUQ pain  She is on blood pressure medication  She states the pain seems to be worse with inspiration and with deep inspiration it is worse  Pain now 6/10      ED Vital Signs:   ED Triage Vitals   Temperature Pulse Respirations Blood Pressure SpO2   07/31/18 2208 07/31/18 2156 07/31/18 2156 07/31/18 2159 07/31/18 2156   98 8 °F (37 1 °C) 80 (!) 24 (!) 227/95 93 %      Temp Source Heart Rate Source Patient Position - Orthostatic VS BP Location FiO2 (%)   07/31/18 2208 07/31/18 2156 08/01/18 0138 07/31/18 2208 --   Oral Monitor Lying Left arm       Pain Score       08/01/18 0112       No Pain        Wt Readings from Last 1 Encounters:   08/01/18 71 8 kg (158 lb 4 6 oz) Vital Signs (abnormal):   Date and Time Temp Pulse SpO2 Resp BP   08/01/18 0112 -- 78 94 % 18  172/75   08/01/18 0037 -- 69 94 % 18  192/84   07/31/18 2317 -- 65 94 % 18  178/74   07/31/18 2210 -- 68 95 % 20  197/84   07/31/18 2208 98 8 °F (37 1 °C) 71 95 % 20  194/84         Abnormal Labs/Diagnostic Test Results: Bun 36, Creat 1 73, Glucose 238    CTA Dissection Protocol Chest/Abd: No evidence of aortic dissection or aneurysm  The main pulmonary artery is mildly dilated measuring 31 mm which may be indicative of pulmonary artery hypertension  No acute intra-abdominal abnormality  Mildly complicated right renal cysts as above    Colonic diverticulosis without evidence of diverticulitis  18 mm lucent lesion with rim sclerosis seen within the L2 vertebral     body, nonspecific although possibly representing vertebral hemangioma    ED Treatment:   Medication Administration from 07/31/2018 2150 to 08/01/2018 0126       Date/Time Order Dose Route Action Action by Comments     07/31/2018 2216 ondansetron (ZOFRAN) injection 4 mg 4 mg Intravenous Given Sarita Resendez RN      07/31/2018 2217 fentanyl citrate (PF) 100 MCG/2ML 25 mcg 25 mcg Intravenous Given Sarita Resendez RN      08/01/2018 0104 sodium chloride 0 9 % bolus 500 mL 0 mL Intravenous Stopped Sarita Resendez RN      07/31/2018 2354 sodium chloride 0 9 % bolus 500 mL 500 mL Intravenous 5655 Bela Jane RN      07/31/2018 2339 iodixanol (VISIPAQUE) 320 MG/ML injection 85 mL 85 mL Intravenous Given Deborah Cedeño      08/01/2018 0033 aspirin chewable tablet 324 mg 324 mg Oral Given Sarita Resendez RN      08/01/2018 0053 hydrALAZINE (APRESOLINE) injection 10 mg 10 mg Intravenous Given Clemencia Jane RN           Past Medical/Surgical History:    Active Ambulatory Problems     Diagnosis Date Noted    Vitamin D insufficiency 08/17/2016    Retinal hole of right eye 09/25/2017    Peripheral neuropathy 12/04/2015    Hypertension 02/13/2017  Hyperlipidemia 02/13/2017    Gout 06/27/2014    Gait disturbance 09/30/2014    Ectropion 03/19/2013    Controlled type 2 diabetes mellitus with diabetic polyneuropathy ([facility] ) 02/13/2017    Complex renal cyst 12/22/2017    CKD (chronic kidney disease), stage IV ([place] [address] ) 12/22/2017    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    Bilateral deafness 02/18/2016    Atrophic kidney, acquired 12/22/2017    Allergic rhinitis 01/30/2017    Secondary hyperparathyroidism of renal origin ([place] [address] ) 07/24/2018     Resolved Ambulatory Problems     Diagnosis Date Noted    No Resolved Ambulatory Problems     Past Medical History:   Diagnosis Date    Diabetes mellitus ([place] [address] )     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 ([place] [address] )     Hyperlipidemia     Hypertension     Renal disorder     Squamous cell carcinoma, scalp/neck        Admitting Diagnosis: Diabetes ([place] [address] ) [E11 9]  Chest pain [R07 9]  Essential hypertension [I10]    Age/Sex: 80 y o  female    Assessment/Plan:     Accelerated hypertension   Assessment & Plan     · /84, SBP's >190  · On amlodipine and lisinopril, continue  · P r n  hydralazine SBP >180          * Right-sided chest pain   Assessment & Plan     · Doubt cardiac  · Troponin negative x2, will check 1 more set  · GUERO 3  · EKG: SR, PAC, rate 81  · CTA:  Pulmonary artery mildly dilated which may indicate pulmonary artery hypertension  · CXR:  Negative for acute cardiopulmonary disease, official CXR result pending  · Continue ASA, lisinopril and statin  · Lidocaine TD patch  · Pain control  · Telemetry monitoring          CKD (chronic kidney disease), stage IV ([facility])   Assessment & Plan     · Creat 1 7, at baseline  · Avoid hypotension, NSAIDs and nephrotoxins   · Follows outpatient with Nephrology, seen 3/26/2018 by Dr Tasneem Caceres          Vertebral anomaly   Assessment & Plan     · CTA shows 18mm lucent lesion with rim sclerosis within the L2 vertebral body, nonspecific although possibly representing vertebral hemangioma     · Outpatient MRI lumbar spine if clinically warranted          Controlled type 2 diabetes mellitus with diabetic polyneuropathy [facility])   Assessment & Plan             Lab Results   Component Value Date     HGBA1C 6 3% 03/13/2018 ·   Accuchecks and ISS     No results for input(s): POCGLU in the last 72 hours      Blood Sugar Average: Last 72 hrs:             Hyperlipidemia   Assessment & Plan     · Continue statin             VTE Prophylaxis: Heparin  / reason for no mechanical VTE prophylaxis mbulate   Code Status: FC  POLST: POLST is not applicable to this patient  Discussion with family:      Anticipated Length of Stay:  Patient will be admitted on an Observation basis with an anticipated length of stay of  > 2 midnights     Justification for Hospital Stay: CP      Admission Orders:  Observation/Tele  Continuous Cardiac Monitoring  Serial Cardiac Enzymes q3h x 3   Bilateral Sequential Compression Device  SSI    Scheduled Meds:   Current Facility-Administered Medications:  amLODIPine 2 5 mg Oral HS   amLODIPine 5 mg Oral Daily   aspirin 81 mg Oral Daily   cholecalciferol 1,000 Units Oral Daily   colchicine 0 6 mg Oral Daily   docusate sodium 100 mg Oral Daily   gabapentin 300 mg Oral HS   heparin (porcine) 5,000 Units Subcutaneous Q8H [address]   insulin lispro 1-5 Units Subcutaneous TID AC   insulin lispro 1-5 Units Subcutaneous HS   lidocaine 1 patch Transdermal Daily   lisinopril 20 mg Oral Daily   pravastatin 80 mg Oral Daily With [email or web address]

## 2018-08-01 NOTE — ASSESSMENT & PLAN NOTE
Lab Results   Component Value Date    HGBA1C 6 3% 03/13/2018   · Accuchecks and ISS    No results for input(s): POCGLU in the last 72 hours      Blood Sugar Average: Last 72 hrs:

## 2018-08-01 NOTE — NURSING NOTE
Patient had paperwork reviewed and IV was removed an additional change had been made to increase bp med to twice daily  I also told her the importance of following up with her PCP  Patient was escorted down to the lobby via w/c

## 2018-08-01 NOTE — ASSESSMENT & PLAN NOTE
· CTA shows 18mm lucent lesion with rim sclerosis within the L2 vertebral body, nonspecific although possibly representing vertebral hemangioma      · Outpatient MRI lumbar spine if clinically warranted

## 2018-08-01 NOTE — ASSESSMENT & PLAN NOTE
· Creat 1 7, at baseline  · Avoid hypotension, NSAIDs and nephrotoxins   · Follows outpatient with Nephrology, seen 3/26/2018 by Dr Tasneem Caceres

## 2018-08-01 NOTE — ED ATTENDING ATTESTATION
Tracie Klein MD, saw and evaluated the patient  All available labs and X-rays were ordered by me or the resident and have been reviewed by myself  I discussed the patient with the resident / non-physician and agree with the resident's / non-physician practitioner's findings and plan as documented in the resident's / non-physician practicitioner's note, except where noted  At this point, I agree with the current assessment done in the ED  Chief Complaint   Patient presents with    Chest Pain     pt reports RUQ pain, right-sided chest pain that radiates around to right side of back  pt reports pain worsens with deep breath  This is a 80year old female presenting for evaluation of chest pain, back pain  She states that before coming in she had severe chest pain that was radiating upwards and towards her back with associated shortness of breath  The symptoms were worse with inspiration, better if she held her breath perfectly  Denies any f/ch/n/v with it  Denies dizziness/LH  Denies focal neurologic deficits  Denies hx of similar  Denies falls, trauma recently  Denies any urinary tract infection symptoms (burning, itching, pain, blood, frequency)  Denies any upper respiratory tract infection symptoms (cough, congestion, rhinorrhea, sore throat)  Denies ill contacts  Able to walk and do her normal activities  PMH:  - GBS s/p immunizations  - SCC  - HTN-  - HLD  - CKD  - DM  PSH:  - Bladder surgery  - JOSH BSO  - Pelvic floor repair  Former smoker  No alcohol  No drugs  PE:  Vitals:    07/31/18 2317 08/01/18 0037 08/01/18 0112 08/01/18 0138   BP: (!) 178/74 (!) 192/84 (!) 172/75 164/80   BP Location: Left arm Left arm Left arm Right arm   Pulse: 65 69 78 77   Resp: 18 18 18 18   Temp:    (!) 97 4 °F (36 3 °C)   TempSrc:       SpO2: 94% 94% 94% 95%   Weight:    71 8 kg (158 lb 4 6 oz)   General: VSS, NAD, awake, alert  Well-nourished, well-developed  Appears stated age     Head: Normocephalic, atraumatic, nontender  Eyes: PERRL, EOM-I  No diplopia  No hyphema  No subconjunctival hemorrhages  Symmetrical lids  ENT: Atraumatic external nose and ears  MMM  No malocclusion  No stridor  Normal phonation  No drooling  Normal swallowing  Neck: Symmetric, trachea midline  No JVD  CV: RRR  +S1/S2  No murmurs or gallops  Peripheral pulses +2 throughout  No chest wall tenderness  Lungs:   Unlabored No retractions  CTAB, lungs sounds equal bilateral    No crepitus  No tachypnea  No paradoxical motion  Abd: +BS, soft, NT/ND  No guarding  No rigidity  No rebound  No hepatosplenomegaly noted  No peritoneal signs  MSK:   FROM   No lower extremity edema  2+ radials bilaterally equal  2+ DPs bilaterally equal  Sensation grossly intact throughout  EHL FHL PF DF HF HE 5/5   BICEPS TRICEPS 5/5   Back:   No C/T/L-spine tenderness  No CVAT  Skin: Dry, intact  Neuro: AAOx3, GCS 15, CN II-XII grossly intact  Motor grossly intact  Sensory grossly intact  Psychiatric/Behavioral: Appropriate mood and affect   Exam: deferred  A:  - pleuritic pain  - sudden onset of pain radiating/spreading from chest --> upper back --> back  P:  - agree with NP; concern for AD   - Discussed given age + risk factors --> CXR for pna  - if negative --> dissection scan  Some suspicion for PE but I think dissection is the more dangerous diagnosis and a dissection scan would  larger PE / saddle / sevgemental  Her HR isn't elevated which is re-assuring but she does appear in discomfort  - her  did die of an aneurysm rupture  - 13 point ROS was performed and all are normal unless stated in the history above  - Nursing note reviewed  Vitals reviewed  - Orders placed by myself and/or advanced practitioner / resident     - Previous chart was reviewed  - No language barrier    - History obtained from daughter, patient  - There are no limitations to the history obtained     - Critical care time: Not applicable for this patient  Final Diagnosis:  1  Chest pain    2  Essential hypertension    3  Diabetes [facility])      ED Course as of Aug 01 0142   Tue Jul 31, 2018   2253 Creatinine: (!) 1 73   2253 BUN: (!) 36   2253 AST: 12   2253 ALT: 17   2253 Alkaline Phosphatase: 94   2253 WBC: 7 74     Medications   ondansetron (ZOFRAN) injection 4 mg (4 mg Intravenous Given 7/31/18 2216)   fentanyl citrate (PF) 100 MCG/2ML 25 mcg (25 mcg Intravenous Given 7/31/18 2217)   sodium chloride 0 9 % bolus 500 mL (0 mL Intravenous Stopped 8/1/18 0104)   iodixanol (VISIPAQUE) 320 MG/ML injection 85 mL (85 mL Intravenous Given 7/31/18 2339)   aspirin chewable tablet 324 mg (324 mg Oral Given 8/1/18 0033)   hydrALAZINE (APRESOLINE) injection 10 mg (10 mg Intravenous Given 8/1/18 0053)     CTA dissection protocol chest and abdomen   Final Result      No evidence of aortic dissection or aneurysm  The main pulmonary artery is mildly dilated measuring 31 mm which may be indicative of pulmonary artery hypertension  No acute intra-abdominal abnormality  Mildly complicated right renal cysts as above  Follow-up ultrasound in 9-12 months may be obtained  Colonic diverticulosis without evidence of diverticulitis  18 mm lucent lesion with rim sclerosis seen within the L2 vertebral body, nonspecific although possibly representing vertebral hemangioma  This could be verified with MRI lumbar spine if clinically warranted  Workstation performed: [id number]         XR chest 2 views   ED Interpretation   Preliminary reading   tortous aorta   Waiting for reading           Orders Placed This Encounter   Procedures    ED ECG Documentation Only    ED ECG Documentation Only    CTA dissection protocol chest and abdomen    XR chest 2 views    Comprehensive metabolic panel    CBC and differential    Troponin I    Protime-INR    APTT    Magnesium    Troponin I    Lipase    Diet Dereck/CHO Controlled; Consistent Carbohydrate Diet Level 1 (4 carb servings/60 grams CHO/meal); Lo Cholesterol    Insert peripheral IV    Continuous cardiac monitoring    Continuous pulse oximetry    POCT chem 8+    Nursing communcation Continue IV as ordered   Notify admitting physician    Notify admitting physician on arrival    48 Hour Telemetry Monitoring    Activity as tolerated    Bathroom privileges    ECG 12 lead    ECG 12 lead    Place in Observation (expected length of stay for this patient is less than two midnights)     Labs Reviewed   COMPREHENSIVE METABOLIC PANEL - Abnormal        Result Value Ref Range Status    Sodium 140  136 - 145 mmol/L Final    Potassium 4 0  3 5 - 5 3 mmol/L Final    Chloride 106  100 - 108 mmol/L Final    CO2 25  21 - 32 mmol/L Final    Anion Gap 9  4 - 13 mmol/L Final    BUN 36 (*) 5 - 25 mg/dL Final    Creatinine 1 73 (*) 0 60 - 1 30 mg/dL Final    Comment: Standardized to IDMS reference method    Glucose 238 (*) 65 - 140 mg/dL Final    Comment:   If the patient is fasting, the ADA then defines impaired fasting glucose as > 100 mg/dL and diabetes as > or equal to 123 mg/dL  Specimen collection should occur prior to Sulfasalazine administration due to the potential for falsely depressed results  Specimen collection should occur prior to Sulfapyridine administration due to the potential for falsely elevated results  Calcium 10 1  8 3 - 10 1 mg/dL Final    AST 12  5 - 45 U/L Final    Comment:   Specimen collection should occur prior to Sulfasalazine administration due to the potential for falsely depressed results  ALT 17  12 - 78 U/L Final    Comment:   Specimen collection should occur prior to Sulfasalazine administration due to the potential for falsely depressed results       Alkaline Phosphatase 94  46 - 116 U/L Final    Total Protein 7 1  6 4 - 8 2 g/dL Final    Albumin 3 7  3 5 - 5 0 g/dL Final    Total Bilirubin 0 63  0 20 - 1 00 mg/dL Final    eGFR 25  ml/min/1 73sq m Final Narrative:     National Kidney Disease Education Program recommendations are as follows:  GFR calculation is accurate only with a steady state creatinine  Chronic Kidney disease less than 60 ml/min/1 73 sq  meters  Kidney failure less than 15 ml/min/1 73 sq  meters     CBC AND DIFFERENTIAL - Abnormal     WBC 7 74  4 31 - 10 16 Thousand/uL Final    RBC 3 74 (*) 3 81 - 5 12 Million/uL Final    Hemoglobin 12 2  11 5 - 15 4 g/dL Final    Hematocrit 36 4  34 8 - 46 1 % Final    MCV 97  82 - 98 fL Final    MCH 32 6  26 8 - 34 3 pg Final    MCHC 33 5  31 4 - 37 4 g/dL Final    RDW 13 4  11 6 - 15 1 % Final    MPV 12 6  8 9 - 12 7 fL Final    Platelets 358  620 - 390 Thousands/uL Final    nRBC 0  /100 WBCs Final    Neutrophils Relative 69  43 - 75 % Final    Lymphocytes Relative 23  14 - 44 % Final    Monocytes Relative 7  4 - 12 % Final    Eosinophils Relative 2  0 - 6 % Final    Basophils Relative 0  0 - 1 % Final    Neutrophils Absolute 5 32  1 85 - 7 62 Thousands/µL Final    Lymphocytes Absolute 1 75  0 60 - 4 47 Thousands/µL Final    Monocytes Absolute 0 51  0 17 - 1 22 Thousand/µL Final    Eosinophils Absolute 0 13  0 00 - 0 61 Thousand/µL Final    Basophils Absolute 0 03  0 00 - 0 10 Thousands/µL Final   POCT CHEM 8+ - Abnormal     SODIUM, I-STAT 140  136 - 145 mmol/l Final    Potassium, i-STAT 4 0  3 5 - 5 3 mmol/L Final    Chloride, istat 108  100 - 108 mmol/L Final    CO2, i-STAT 23  21 - 32 mmol/L Final    Anion Gap, Istat 14 (*) 4 - 13 mmol/L Final    Calcium, Ionized i-STAT 1 32  1 12 - 1 32 mmol/L Final    BUN, I-STAT 35 (*) 5 - 25 mg/dl Final    Creatinine, i-STAT 1 6 (*) 0 6 - 1 3 mg/dl Final    eGFR 28  ml/min/1 73sq m Final    Glucose, i-STAT 233 (*) 65 - 140 mg/dl Final    Hct, i-STAT 33 (*) 34 8 - 46 1 % Final    Hgb, i-STAT 11 2 (*) 11 5 - 15 4 g/dl Final    Specimen Type VENOUS   Final   TROPONIN I - Normal    Troponin I <0 02  <=0 04 ng/mL Final    Comment: 3Autovalidation override  [employer] analyzer 99% cutoff is > 0 04 ng/mL in network labs     o cTnI 99% cutoff is useful only when applied to patients in the clinical setting of myocardial ischemia   o cTnI 99% cutoff should be interpreted in the context of clinical history, ECG findings and possibly cardiac imaging to establish correct diagnosis  o cTnI 99% cutoff may be suggestive but clearly not indicative of a coronary event without the clinical setting of myocardial ischemia  PROTIME-INR - Normal    Protime 13 4  11 8 - 14 2 seconds Final    INR 1 01  0 86 - 1 17 Final   APTT - Normal    PTT 29  24 - 36 seconds Final    Comment: Therapeutic Heparin Range =  60-90 seconds   MAGNESIUM - Normal    Magnesium 2 1  1 6 - 2 6 mg/dL Final   TROPONIN I - Normal    Troponin I <0 02  <=0 04 ng/mL Final    Comment: 3Autovalidation override  [employer] analyzer 99% cutoff is > 0 04 ng/mL in network labs     o cTnI 99% cutoff is useful only when applied to patients in the clinical setting of myocardial ischemia   o cTnI 99% cutoff should be interpreted in the context of clinical history, ECG findings and possibly cardiac imaging to establish correct diagnosis  o cTnI 99% cutoff may be suggestive but clearly not indicative of a coronary event without the clinical setting of myocardial ischemia         Time reflects when diagnosis was documented in both MDM as applicable and the Disposition within this note     Time User Action Codes Description Comment    8/1/2018 12:30 AM Fallon Pyo Add [I10] Essential hypertension     8/1/2018 12:30 AM Fallon Pyo Modify [I10] Essential hypertension     8/1/2018 12:31 AM Fallon Pyo Add [R07 9] Chest pain     8/1/2018 12:31 AM Fallon Pyo Add [E11 9] Diabetes ([facility] )     8/1/2018  1:42 AM Precious Bihari Modify [I10] Essential hypertension     8/1/2018  1:42 AM Precious Bihari Modify [R07 9] Chest pain       ED Disposition     ED Disposition Condition Comment    Admit  Case was discussed with SLIM and the patient's admission status was agreed to be Admission Status: observation status to the service of Dr Angel Kaur   Follow-up Information    None       Current Discharge Medication List      CONTINUE these medications which have NOT CHANGED    Details   !! amLODIPine (NORVASC) 2 5 mg tablet Take 1 tablet (2 5 mg total) by mouth daily at bedtime  Qty: 90 tablet, Refills: 3    Comments: Total dose 7 5 mg daily  Associated Diagnoses: CKD (chronic kidney disease), stage IV ([facility] [address] ); Hypertensive chronic kidney disease with stage 1 through stage 4 chronic kidney disease, or unspecified chronic kidney disease      !! amLODIPine (NORVASC) 5 mg tablet Take 1 tablet (5 mg total) by mouth daily  Qty: 30 tablet, Refills: 5    Associated Diagnoses: Essential hypertension      aspirin 81 MG tablet Take 1 tablet by mouth daily      Cholecalciferol (VITAMIN D3) 2000 units capsule Take 1 capsule by mouth daily      colchicine (COLCRYS) 0 6 mg tablet Take 1 tablet (0 6 mg total) by mouth daily  Qty: 90 tablet, Refills: 3    Associated Diagnoses: Idiopathic chronic gout without tophus, unspecified site      gabapentin (NEURONTIN) 300 mg capsule Take 1 tablet by mouth      glucose blood test strip Test twice a day E11 9 non insulin       ipratropium (ATROVENT) 0 03 % nasal spray 2 sprays into each nostril 3 (three) times a day as needed      Lancets (ONETOUCH ULTRASOFT) lancets by Does not apply route 2 (two) times a day      lisinopril (ZESTRIL) 20 mg tablet Take 1 tablet (20 mg total) by mouth daily  Qty: 30 tablet, Refills: 5    Associated Diagnoses: Hypertension, unspecified type      simvastatin (ZOCOR) 40 mg tablet Take 1 tablet (40 mg total) by mouth daily  Qty: 30 tablet, Refills: 5    Associated Diagnoses: Hyperlipidemia, unspecified hyperlipidemia type       !! - Potential duplicate medications found  Please discuss with provider  No discharge procedures on file    Prior to Admission Medications   Prescriptions Last Dose Informant Patient Reported? Taking? Cholecalciferol (VITAMIN D3) 2000 units capsule   Yes No   Sig: Take 1 capsule by mouth daily   Lancets (ONETOUCH ULTRASOFT) lancets   Yes No   Sig: by Does not apply route 2 (two) times a day   amLODIPine (NORVASC) 2 5 mg tablet   No No   Sig: Take 1 tablet (2 5 mg total) by mouth daily at bedtime   amLODIPine (NORVASC) 5 mg tablet   No No   Sig: Take 1 tablet (5 mg total) by mouth daily   aspirin 81 MG tablet   Yes No   Sig: Take 1 tablet by mouth daily   colchicine (COLCRYS) 0 6 mg tablet   No No   Sig: Take 1 tablet (0 6 mg total) by mouth daily   gabapentin (NEURONTIN) 300 mg capsule   Yes No   Sig: Take 1 tablet by mouth   glucose blood test strip   Yes No   Sig: Test twice a day E11 9 non insulin    ipratropium (ATROVENT) 0 03 % nasal spray   Yes No   Si sprays into each nostril 3 (three) times a day as needed   lisinopril (ZESTRIL) 20 mg tablet   No No   Sig: Take 1 tablet (20 mg total) by mouth daily   simvastatin (ZOCOR) 40 mg tablet   No No   Sig: Take 1 tablet (40 mg total) by mouth daily      Facility-Administered Medications: None       Portions of the record may have been created with voice recognition software  Occasional wrong word or "sound a like" substitutions may have occurred due to the inherent limitations of voice recognition software  Read the chart carefully and recognize, using context, where substitutions have occurred      Electronically signed by:  Diaz Rivera

## 2018-08-30 ENCOUNTER — TRANSCRIBE ORDERS (OUTPATIENT)
Dept: FAMILY MEDICINE CLINIC | Facility: CLINIC | Age: 83
End: 2018-08-30

## 2018-08-30 DIAGNOSIS — I10 ESSENTIAL (PRIMARY) HYPERTENSION: ICD-10-CM

## 2018-08-30 DIAGNOSIS — N18.4 CHRONIC KIDNEY DISEASE, STAGE IV (SEVERE) (HCC): Primary | ICD-10-CM

## 2018-09-05 DIAGNOSIS — I10 ESSENTIAL HYPERTENSION: ICD-10-CM

## 2018-09-05 RX ORDER — AMLODIPINE BESYLATE 5 MG/1
5 TABLET ORAL DAILY
Qty: 30 TABLET | Refills: 4 | Status: SHIPPED | OUTPATIENT
Start: 2018-09-05 | End: 2018-09-18 | Stop reason: SDUPTHER

## 2018-09-18 ENCOUNTER — OFFICE VISIT (OUTPATIENT)
Dept: NEPHROLOGY | Facility: CLINIC | Age: 83
End: 2018-09-18
Payer: COMMERCIAL

## 2018-09-18 VITALS
HEIGHT: 59 IN | WEIGHT: 157.6 LBS | HEART RATE: 60 BPM | DIASTOLIC BLOOD PRESSURE: 74 MMHG | BODY MASS INDEX: 31.77 KG/M2 | SYSTOLIC BLOOD PRESSURE: 150 MMHG

## 2018-09-18 DIAGNOSIS — N26.1 ATROPHIC KIDNEY, ACQUIRED: ICD-10-CM

## 2018-09-18 DIAGNOSIS — N18.4 ANEMIA OF CHRONIC RENAL FAILURE, STAGE 4 (SEVERE) (HCC): ICD-10-CM

## 2018-09-18 DIAGNOSIS — I12.9 HYPERTENSIVE CHRONIC KIDNEY DISEASE WITH STAGE 1 THROUGH STAGE 4 CHRONIC KIDNEY DISEASE, OR UNSPECIFIED CHRONIC KIDNEY DISEASE: ICD-10-CM

## 2018-09-18 DIAGNOSIS — N18.4 CKD (CHRONIC KIDNEY DISEASE), STAGE IV (HCC): Primary | ICD-10-CM

## 2018-09-18 DIAGNOSIS — D63.1 ANEMIA OF CHRONIC RENAL FAILURE, STAGE 4 (SEVERE) (HCC): ICD-10-CM

## 2018-09-18 DIAGNOSIS — N28.1 COMPLEX RENAL CYST: ICD-10-CM

## 2018-09-18 DIAGNOSIS — I10 ESSENTIAL HYPERTENSION: ICD-10-CM

## 2018-09-18 PROCEDURE — 99214 OFFICE O/P EST MOD 30 MIN: CPT | Performed by: INTERNAL MEDICINE

## 2018-09-18 RX ORDER — AMLODIPINE BESYLATE 5 MG/1
5 TABLET ORAL 2 TIMES DAILY
Qty: 180 TABLET | Refills: 3 | Status: SHIPPED | OUTPATIENT
Start: 2018-09-18 | End: 2019-09-23 | Stop reason: SDUPTHER

## 2018-09-18 NOTE — PATIENT INSTRUCTIONS
ASSESSMENT and PLAN:  1  Chronic kidney disease, stage IV, baseline creatinine between 1 51 7, most recent creatinine 1 6, estimated GFR 27  2  Hypertension, overall stable, continue with current regimen  3  Anemia of chronic disease, hemoglobin stable 11 4  4  Secondary hyperparathyroidism, previous   5  History of atrophic left kidney  6  Bilateral complex renal cysts, no masses seen on recent CT with contrast    · Overall renal function remains fairly stable  · Continue with current antihypertensive regimen  · Hemoglobin appears stable  · Plan to see her approximately 6 months with repeat laboratory studies at that time

## 2018-09-18 NOTE — PROGRESS NOTES
NEPHROLOGY OUTPATIENT PROGRESS NOTE   Alvin Badillo 80 y o  female MRN: [id number]  Reason for visit:   Chronic kidney disease    ASSESSMENT and PLAN:  1  Chronic kidney disease, stage IV, baseline creatinine between 1 51 7, most recent creatinine 1 6, estimated GFR 27  2  Hypertension, overall stable, continue with current regimen  3  Anemia of chronic disease, hemoglobin stable 11 4  4  Secondary hyperparathyroidism, previous   5  History of atrophic left kidney  6  Bilateral complex renal cysts, no masses seen on recent CT with contrast    · Overall renal function remains fairly stable  · Continue with current antihypertensive regimen  · Hemoglobin appears stable  · Plan to see her approximately 6 months with repeat laboratory studies at that time  SUBJECTIVE / INTERVAL HISTORY:  She has been doing reasonably well  She did have 1 brief hospital stay secondary to chest pain  Evaluation that time showed likely musculoskeletal   CT for dissection was negative  Noted bilateral complex cysts  Creatinine on discharge was 1 6  Denies any chest pain shortness of breath or swelling  Her appetite has been stable  Review of Systems      OBJECTIVE:  /74 (BP Location: Left arm, Patient Position: Sitting, Cuff Size: Standard)   Pulse 60   Ht 4' 11" (1 499 m)   Wt 71 5 kg (157 lb 9 6 oz)   LMP  (LMP Unknown)   BMI 31 83 kg/m²   Vitals:    09/18/18 1342   Weight: 71 5 kg (157 lb 9 6 oz)       Physical Exam   Constitutional: She is oriented to person, place, and time  No distress  HENT:   Head: Normocephalic  Eyes: No scleral icterus  Neck: Neck supple  Cardiovascular: Normal rate and regular rhythm  Pulmonary/Chest: Effort normal and breath sounds normal    Abdominal: Soft  She exhibits no distension  Musculoskeletal: She exhibits no edema  Neurological: She is alert and oriented to person, place, and time  Skin: Skin is warm and dry  Psychiatric: She has a normal mood and affect  Medications:    Current Outpatient Prescriptions:     amLODIPine (NORVASC) 5 mg tablet, TAKE 1 TABLET (5 MG TOTAL) BY MOUTH DAILY, Disp: 30 tablet, Rfl: 4    aspirin 81 MG tablet, Take 1 tablet by mouth daily, Disp: , Rfl:     Cholecalciferol (VITAMIN D3) 2000 units capsule, Take 1 capsule by mouth daily, Disp: , Rfl:     colchicine (COLCRYS) 0 6 mg tablet, Take 1 tablet (0 6 mg total) by mouth daily, Disp: 90 tablet, Rfl: 3    docusate sodium (COLACE) 100 mg capsule, Take 100 mg by mouth daily, Disp: , Rfl:     gabapentin (NEURONTIN) 300 mg capsule, Take 1 tablet by mouth daily at bedtime  , Disp: , Rfl:     glucose blood test strip, Test twice a day E11 9 non insulin , Disp: , Rfl:     Lancets (ONETOUCH ULTRASOFT) lancets, by Does not apply route 2 (two) times a day, Disp: , Rfl:     lisinopril (ZESTRIL) 20 mg tablet, Take 1 tablet (20 mg total) by mouth daily, Disp: 30 tablet, Rfl: 5    simvastatin (ZOCOR) 40 mg tablet, Take 1 tablet (40 mg total) by mouth daily, Disp: 30 tablet, Rfl: 5    ipratropium (ATROVENT) 0 03 % nasal spray, 2 sprays into each nostril 3 (three) times a day as needed, Disp: , Rfl:     Laboratory Results:  Results for orders placed or performed during the hospital encounter of 07/31/18   Comprehensive metabolic panel   Result Value Ref Range    Sodium 140 136 - 145 mmol/L    Potassium 4 0 3 5 - 5 3 mmol/L    Chloride 106 100 - 108 mmol/L    CO2 25 21 - 32 mmol/L    ANION GAP 9 4 - 13 mmol/L    BUN 36 (H) 5 - 25 mg/dL    Creatinine 1 73 (H) 0 60 - 1 30 mg/dL    Glucose 238 (H) 65 - 140 mg/dL    Calcium 10 1 8 3 - 10 1 mg/dL    AST 12 5 - 45 U/L    ALT 17 12 - 78 U/L    Alkaline Phosphatase 94 46 - 116 U/L    Total Protein 7 1 6 4 - 8 2 g/dL    Albumin 3 7 3 5 - 5 0 g/dL    Total Bilirubin 0 63 0 20 - 1 00 mg/dL    eGFR 25 ml/min/1 73sq m   CBC and differential   Result Value Ref Range    WBC 7 74 4 31 - 10 16 Thousand/uL    RBC 3 74 (L) 3 81 - 5 12 Million/uL    Hemoglobin 12 2 11 5 - 15 4 g/dL    Hematocrit 36 4 34 8 - 46 1 %    MCV 97 82 - 98 fL    MCH 32 6 26 8 - 34 3 pg    MCHC 33 5 31 4 - 37 4 g/dL    RDW 13 4 11 6 - 15 1 %    MPV 12 6 8 9 - 12 7 fL    Platelets 258 880 - 148 Thousands/uL    nRBC 0 /100 WBCs    Neutrophils Relative 69 43 - 75 %    Lymphocytes Relative 23 14 - 44 %    Monocytes Relative 7 4 - 12 %    Eosinophils Relative 2 0 - 6 %    Basophils Relative 0 0 - 1 %    Neutrophils Absolute 5 32 1 85 - 7 62 Thousands/µL    Lymphocytes Absolute 1 75 0 60 - 4 47 Thousands/µL    Monocytes Absolute 0 51 0 17 - 1 22 Thousand/µL    Eosinophils Absolute 0 13 0 00 - 0 61 Thousand/µL    Basophils Absolute 0 03 0 00 - 0 10 Thousands/µL   Troponin I   Result Value Ref Range    Troponin I <0 02 <=0 04 ng/mL   Protime-INR   Result Value Ref Range    Protime 13 4 11 8 - 14 2 seconds    INR 1 01 0 86 - 1 17   APTT   Result Value Ref Range    PTT 29 24 - 36 seconds   Magnesium   Result Value Ref Range    Magnesium 2 1 1 6 - 2 6 mg/dL   Troponin I   Result Value Ref Range    Troponin I <0 02 <=0 04 ng/mL   Lipase   Result Value Ref Range    Lipase 170 73 - 393 u/L   Troponin I   Result Value Ref Range    Troponin I <0 02 <=0 04 ng/mL   Basic metabolic panel   Result Value Ref Range    Sodium 139 136 - 145 mmol/L    Potassium 4 4 3 5 - 5 3 mmol/L    Chloride 109 (H) 100 - 108 mmol/L    CO2 25 21 - 32 mmol/L    ANION GAP 5 4 - 13 mmol/L    BUN 31 (H) 5 - 25 mg/dL    Creatinine 1 63 (H) 0 60 - 1 30 mg/dL    Glucose 129 65 - 140 mg/dL    Glucose, Fasting 129 (H) 65 - 99 mg/dL    Calcium 9 8 8 3 - 10 1 mg/dL    eGFR 27 ml/min/1 73sq m   CBC and differential   Result Value Ref Range    WBC 7 05 4 31 - 10 16 Thousand/uL    RBC 3 60 (L) 3 81 - 5 12 Million/uL    Hemoglobin 11 4 (L) 11 5 - 15 4 g/dL    Hematocrit 35 2 34 8 - 46 1 %    MCV 98 82 - 98 fL    MCH 31 7 26 8 - 34 3 pg    MCHC 32 4 31 4 - 37 4 g/dL    RDW 13 5 11 6 - 15 1 %    MPV 12 7 8 9 - 12 7 fL    Platelets 083 (L) 237 - 390 Thousands/uL    nRBC 0 /100 WBCs    Neutrophils Relative 58 43 - 75 %    Lymphocytes Relative 29 14 - 44 %    Monocytes Relative 10 4 - 12 %    Eosinophils Relative 2 0 - 6 %    Basophils Relative 0 0 - 1 %    Neutrophils Absolute 4 12 1 85 - 7 62 Thousands/µL    Lymphocytes Absolute 2 06 0 60 - 4 47 Thousands/µL    Monocytes Absolute 0 73 0 17 - 1 22 Thousand/µL    Eosinophils Absolute 0 12 0 00 - 0 61 Thousand/µL    Basophils Absolute 0 02 0 00 - 0 10 Thousands/µL   ECG 12 lead   Result Value Ref Range    Ventricular Rate 81 BPM    Atrial Rate 81 BPM    FL Interval 156 ms    QRSD Interval 82 ms    QT Interval 352 ms    QTC Interval 408 ms    P Axis 71 degrees    QRS Axis -7 degrees    T Wave Axis 68 degrees   ECG 12 lead   Result Value Ref Range    Ventricular Rate 81 BPM    Atrial Rate 81 BPM    FL Interval 156 ms    QRSD Interval 78 ms    QT Interval 360 ms    QTC Interval 418 ms    P Axis 35 degrees    QRS Axis -3 degrees    T Wave Axis 67 degrees   POCT Chem 8+   Result Value Ref Range    SODIUM, I-STAT 140 136 - 145 mmol/l    Potassium, i-STAT 4 0 3 5 - 5 3 mmol/L    Chloride, istat 108 100 - 108 mmol/L    CO2, i-STAT 23 21 - 32 mmol/L    Anion Gap, Istat 14 (H) 4 - 13 mmol/L    Calcium, Ionized i-STAT 1 32 1 12 - 1 32 mmol/L    BUN, I-STAT 35 (H) 5 - 25 mg/dl    Creatinine, i-STAT 1 6 (H) 0 6 - 1 3 mg/dl    eGFR 28 ml/min/1 73sq m    Glucose, i-STAT 233 (H) 65 - 140 mg/dl    Hct, i-STAT 33 (L) 34 8 - 46 1 %    Hgb, i-STAT 11 2 (L) 11 5 - 15 4 g/dl    Specimen Type VENOUS    Fingerstick Glucose (POCT)   Result Value Ref Range    POC Glucose 138 65 - 140 mg/dl   Fingerstick Glucose (POCT)   Result Value Ref Range    POC Glucose 107 65 - 140 mg/dl

## 2018-10-13 ENCOUNTER — OFFICE VISIT (OUTPATIENT)
Dept: FAMILY MEDICINE CLINIC | Facility: CLINIC | Age: 83
End: 2018-10-13
Payer: COMMERCIAL

## 2018-10-13 VITALS
SYSTOLIC BLOOD PRESSURE: 160 MMHG | HEIGHT: 59 IN | HEART RATE: 72 BPM | TEMPERATURE: 97.5 F | BODY MASS INDEX: 31.57 KG/M2 | DIASTOLIC BLOOD PRESSURE: 80 MMHG | WEIGHT: 156.6 LBS

## 2018-10-13 DIAGNOSIS — E11.42 TYPE 2 DIABETES MELLITUS WITH DIABETIC POLYNEUROPATHY, WITHOUT LONG-TERM CURRENT USE OF INSULIN (HCC): Primary | ICD-10-CM

## 2018-10-13 DIAGNOSIS — Z23 NEED FOR INFLUENZA VACCINATION: ICD-10-CM

## 2018-10-13 DIAGNOSIS — N30.01 ACUTE CYSTITIS WITH HEMATURIA: Primary | ICD-10-CM

## 2018-10-13 DIAGNOSIS — N30.01 ACUTE CYSTITIS WITH HEMATURIA: ICD-10-CM

## 2018-10-13 LAB
SL AMB  POCT GLUCOSE, UA: NORMAL
SL AMB LEUKOCYTE ESTERASE,UA: NORMAL
SL AMB POCT BILIRUBIN,UA: NORMAL
SL AMB POCT BLOOD,UA: NORMAL
SL AMB POCT CLARITY,UA: NORMAL
SL AMB POCT COLOR,UA: YELLOW
SL AMB POCT GLUCOSE BLD: 131
SL AMB POCT KETONES,UA: NORMAL
SL AMB POCT NITRITE,UA: NORMAL
SL AMB POCT PH,UA: 6
SL AMB POCT SPECIFIC GRAVITY,UA: 1.01
SL AMB POCT URINE PROTEIN: 100
SL AMB POCT UROBILINOGEN: 0.2

## 2018-10-13 PROCEDURE — 87077 CULTURE AEROBIC IDENTIFY: CPT | Performed by: PHYSICIAN ASSISTANT

## 2018-10-13 PROCEDURE — 81002 URINALYSIS NONAUTO W/O SCOPE: CPT | Performed by: PHYSICIAN ASSISTANT

## 2018-10-13 PROCEDURE — 1036F TOBACCO NON-USER: CPT | Performed by: PHYSICIAN ASSISTANT

## 2018-10-13 PROCEDURE — 87086 URINE CULTURE/COLONY COUNT: CPT | Performed by: PHYSICIAN ASSISTANT

## 2018-10-13 PROCEDURE — 1160F RVW MEDS BY RX/DR IN RCRD: CPT | Performed by: PHYSICIAN ASSISTANT

## 2018-10-13 PROCEDURE — 82948 REAGENT STRIP/BLOOD GLUCOSE: CPT | Performed by: PHYSICIAN ASSISTANT

## 2018-10-13 PROCEDURE — 99214 OFFICE O/P EST MOD 30 MIN: CPT | Performed by: PHYSICIAN ASSISTANT

## 2018-10-13 RX ORDER — CIPROFLOXACIN 250 MG/1
250 TABLET, FILM COATED ORAL EVERY 12 HOURS SCHEDULED
Qty: 14 TABLET | Refills: 0 | Status: SHIPPED | OUTPATIENT
Start: 2018-10-13 | End: 2018-10-20

## 2018-10-13 NOTE — PROGRESS NOTES
Assessment/Plan:    Acute cystitis with hematuria  Antibiotic as directed repeat urine for resolution after treatment  Increase fluids  Diagnoses and all orders for this visit:    Type 2 diabetes mellitus with diabetic polyneuropathy, without long-term current use of insulin (HCC)  -     POCT blood glucose  -     Glucometer  -     Glucometer test strips    Acute cystitis with hematuria  -     POCT urine dip  -     ciprofloxacin (CIPRO) 250 mg tablet; Take 1 tablet (250 mg total) by mouth every 12 (twelve) hours for 7 days  -     Urine culture; Future    Need for influenza vaccination          Subjective: Reports episode of dizziness last night, near fall  This morning symptoms are lessened but still present  Requesting fingerstick blood sugar  Results 131, pt has not eaten but did have one cup of coffee this morning --bb     Patient ID: Ya Luis is a 80 y o  female  Patient here today accompanied by her daughter with complaints of dizziness since last evening after supper  She is a diet-controlled diabetic off medication last hemoglobin A1c was 6 3 in March  She does not have a working glucometer at home as it either needs a battery or is old and is asking for a new 1  Blood pressure has been running elevated in the 150s on top however patient's daughter states the kidney specialist once it at this level  Patient did eat waffles with strawberries last evening and dizziness started happening at around 9 last night she did use her cane and use walls for ambulation to the bathroom in her home and did sleep from 3:00 a m  until 6:00 a m  which is her normal pattern of sleeping  She states that last week she thought she might have a urinary tract infection because she felt some pelvic pressure but that only lasted 1 day and now she has no urinary tract infection symptoms          The following portions of the patient's history were reviewed and updated as appropriate: allergies, current medications, past family history, past medical history, past social history, past surgical history and problem list     Review of Systems   Constitutional: Negative  HENT: Negative  Eyes: Negative  Respiratory: Negative  Cardiovascular: Negative  Gastrointestinal: Negative  Endocrine: Negative  Genitourinary: Negative  Musculoskeletal: Negative  Skin: Negative  Allergic/Immunologic: Negative  Neurological: Positive for dizziness  Hematological: Negative  Psychiatric/Behavioral: Negative  Objective:      /80 (BP Location: Left arm, Patient Position: Sitting, Cuff Size: Standard)   Pulse 72   Temp 97 5 °F (36 4 °C)   Ht 4' 11" (1 499 m)   Wt 71 kg (156 lb 9 6 oz)   LMP  (LMP Unknown)   BMI 31 63 kg/m²          Physical Exam   Constitutional: She is oriented to person, place, and time  She appears well-developed and well-nourished  No distress  HENT:   Head: Normocephalic and atraumatic  Eyes: Conjunctivae are normal  Right eye exhibits no discharge  Left eye exhibits no discharge  Neck: Neck supple  Carotid bruit is not present  Cardiovascular: Normal rate, regular rhythm and normal heart sounds  Exam reveals no gallop and no friction rub  No murmur heard  Pulmonary/Chest: Effort normal and breath sounds normal  No respiratory distress  She has no wheezes  She has no rales  Abdominal: Soft  Bowel sounds are normal  She exhibits no distension and no mass  There is no tenderness  There is no rebound, no guarding and no CVA tenderness  Neurological: She is alert and oriented to person, place, and time  Skin: Skin is warm and dry  She is not diaphoretic  Psychiatric: She has a normal mood and affect  Judgment normal    Nursing note and vitals reviewed

## 2018-10-13 NOTE — PATIENT INSTRUCTIONS
Problem List Items Addressed This Visit        Genitourinary    Acute cystitis with hematuria     Antibiotic as directed repeat urine for resolution after treatment  Increase fluids  Relevant Medications    ciprofloxacin (CIPRO) 250 mg tablet    Other Relevant Orders    POCT urine dip (Completed)    Urine culture      Other Visit Diagnoses     Type 2 diabetes mellitus with diabetic polyneuropathy, without long-term current use of insulin (HCC)    -  Primary    Relevant Orders    POCT blood glucose (Completed)    Glucometer    Glucometer test strips    Need for influenza vaccination            Urinary Tract Infection in Women   AMBULATORY CARE:   A urinary tract infection (UTI)  is caused by bacteria that get inside your urinary tract  Most bacteria that enter your urinary tract come out when you urinate  If the bacteria stay in your urinary tract, you may get an infection  Your urinary tract includes your kidneys, ureters, bladder, and urethra  Urine is made in your kidneys, and it flows from the ureters to the bladder  Urine leaves the bladder through the urethra  A UTI is more common in your lower urinary tract, which includes your bladder and urethra  Common symptoms include the following:   · Urinating more often or waking from sleep to urinate    · Pain or burning when you urinate    · Pain or pressure in your lower abdomen     · Urine that smells bad    · Blood in your urine    · Leaking urine  Seek care immediately if:   · You are urinating very little or not at all  · You have a high fever with shaking chills  · You have side or back pain that gets worse  Contact your healthcare provider if:   · You have a fever  · You do not feel better after 2 days of taking antibiotics  · You are vomiting  · You have questions or concerns about your condition or care  Treatment for a UTI  may include medicines to treat a bacterial infection   You may also need medicines to decrease pain and burning, or decrease the urge to urinate often  Prevent a UTI:   · Empty your bladder often  Urinate and empty your bladder as soon as you feel the need  Do not hold your urine for long periods of time  · Wipe from front to back after you urinate or have a bowel movement  This will help prevent germs from getting into your urinary tract through your urethra  · Drink liquids as directed  Ask how much liquid to drink each day and which liquids are best for you  You may need to drink more liquids than usual to help flush out the bacteria  Do not drink alcohol, caffeine, or citrus juices  These can irritate your bladder and increase your symptoms  Your healthcare provider may recommend cranberry juice to help prevent a UTI  · Urinate after you have sex  This can help flush out bacteria passed during sex  · Do not douche or use feminine deodorants  These can change the chemical balance in your vagina  · Change sanitary pads or tampons often  This will help prevent germs from getting into your urinary tract  · Do pelvic muscle exercises often  Pelvic muscle exercises may help you start and stop urinating  Strong pelvic muscles may help you empty your bladder easier  Squeeze these muscles tightly for 5 seconds like you are trying to hold back urine  Then relax for 5 seconds  Gradually work up to squeezing for 10 seconds  Do 3 sets of 15 repetitions a day, or as directed  Follow up with your healthcare provider as directed:  Write down your questions so you remember to ask them during your visits  © 2017 [employer] Information is for End User's use only and may not be sold, redistributed or otherwise used for commercial purposes  All illustrations and images included in CareNotes® are the copyrighted property of [employer]  or Devin Guerrero  The above information is an  only   It is not intended as medical advice for individual conditions or treatments  Talk to your doctor, nurse or pharmacist before following any medical regimen to see if it is safe and effective for you

## 2018-10-15 LAB — BACTERIA UR CULT: ABNORMAL

## 2018-10-16 DIAGNOSIS — N30.01 ACUTE CYSTITIS WITH HEMATURIA: Primary | ICD-10-CM

## 2018-10-16 RX ORDER — NITROFURANTOIN 25; 75 MG/1; MG/1
100 CAPSULE ORAL 2 TIMES DAILY
Qty: 14 CAPSULE | Refills: 0 | Status: SHIPPED | OUTPATIENT
Start: 2018-10-16 | End: 2019-10-01

## 2018-10-25 ENCOUNTER — CLINICAL SUPPORT (OUTPATIENT)
Dept: FAMILY MEDICINE CLINIC | Facility: CLINIC | Age: 83
End: 2018-10-25
Payer: COMMERCIAL

## 2018-10-25 DIAGNOSIS — N30.01 ACUTE CYSTITIS WITH HEMATURIA: Primary | ICD-10-CM

## 2018-10-25 LAB
SL AMB  POCT GLUCOSE, UA: ABNORMAL
SL AMB LEUKOCYTE ESTERASE,UA: ABNORMAL
SL AMB POCT BILIRUBIN,UA: ABNORMAL
SL AMB POCT BLOOD,UA: ABNORMAL
SL AMB POCT CLARITY,UA: CLEAR
SL AMB POCT COLOR,UA: YELLOW
SL AMB POCT KETONES,UA: ABNORMAL
SL AMB POCT NITRITE,UA: ABNORMAL
SL AMB POCT PH,UA: 6.5
SL AMB POCT SPECIFIC GRAVITY,UA: 1.02
SL AMB POCT URINE PROTEIN: ABNORMAL
SL AMB POCT UROBILINOGEN: 0.2

## 2018-10-25 PROCEDURE — 87086 URINE CULTURE/COLONY COUNT: CPT | Performed by: PHYSICIAN ASSISTANT

## 2018-10-25 PROCEDURE — 81002 URINALYSIS NONAUTO W/O SCOPE: CPT | Performed by: PHYSICIAN ASSISTANT

## 2018-10-25 NOTE — PROGRESS NOTES
Recheck Urine after completion of Antibiotic therapy  Urine still showed blood so a culture was sent as well  kw

## 2018-10-27 LAB — BACTERIA UR CULT: NORMAL

## 2018-12-03 RX ORDER — SIMVASTATIN 20 MG
20 TABLET ORAL DAILY
Refills: 5 | COMMUNITY
Start: 2018-10-17 | End: 2019-04-03 | Stop reason: SDUPTHER

## 2018-12-04 ENCOUNTER — OFFICE VISIT (OUTPATIENT)
Dept: FAMILY MEDICINE CLINIC | Facility: CLINIC | Age: 83
End: 2018-12-04
Payer: COMMERCIAL

## 2018-12-04 VITALS
HEIGHT: 59 IN | HEART RATE: 72 BPM | BODY MASS INDEX: 31.97 KG/M2 | DIASTOLIC BLOOD PRESSURE: 72 MMHG | SYSTOLIC BLOOD PRESSURE: 140 MMHG | WEIGHT: 158.6 LBS

## 2018-12-04 DIAGNOSIS — N25.81 SECONDARY HYPERPARATHYROIDISM OF RENAL ORIGIN (HCC): ICD-10-CM

## 2018-12-04 DIAGNOSIS — G63 POLYNEUROPATHY ASSOCIATED WITH UNDERLYING DISEASE (HCC): ICD-10-CM

## 2018-12-04 DIAGNOSIS — M1A.00X0 IDIOPATHIC CHRONIC GOUT WITHOUT TOPHUS, UNSPECIFIED SITE: ICD-10-CM

## 2018-12-04 DIAGNOSIS — I10 ESSENTIAL HYPERTENSION: ICD-10-CM

## 2018-12-04 DIAGNOSIS — N18.4 CKD (CHRONIC KIDNEY DISEASE), STAGE IV (HCC): ICD-10-CM

## 2018-12-04 DIAGNOSIS — E11.42 CONTROLLED TYPE 2 DIABETES MELLITUS WITH DIABETIC POLYNEUROPATHY, WITHOUT LONG-TERM CURRENT USE OF INSULIN (HCC): ICD-10-CM

## 2018-12-04 DIAGNOSIS — R31.9 HEMATURIA, UNSPECIFIED TYPE: Primary | ICD-10-CM

## 2018-12-04 PROCEDURE — 99214 OFFICE O/P EST MOD 30 MIN: CPT | Performed by: FAMILY MEDICINE

## 2018-12-04 PROCEDURE — 1160F RVW MEDS BY RX/DR IN RCRD: CPT | Performed by: FAMILY MEDICINE

## 2018-12-04 NOTE — PROGRESS NOTES
Assessment/Plan:  Hudson and sprkaushik 29-year-old white female presents today to follow up on her chronic medical conditions  She is here with her daughter today  She had a recent UTI but is feeling much better and has no further symptoms  1  Hematuria - patient had recent UTI  Repeat C&S showed resolution of infection  However, patient continues to have moderate blood on UA  She denies gross hematuria or any vaginal bleeding  She is status post hysterectomy  Discussed with patient and her daughter what the further workup for hematuria would entail  She prefers not to engage in any further invasive testing at this time  She had an ultrasound of her kidney and bladder in January 2018 revealing a simple and mildly complicated renal cyst     - Send Urine for cytology  - I will review her case with her nephrologist, Dr Wei House, regarding further intervention  2   Controlled diabetes mellitus - stable on diet control  3   Polyneuropathy - stable on gabapentin 300 mg at bedtime  4   Hypertension - blood pressure stable  Continue lisinopril and amlodipine  5   Secondary hyperparathyroidism of renal origin - stable  Further intervention as per Nephrology  6   Chronic kidney disease - last creatinine was 1 6 with GFR of 27  Labs and follow-up 3-4 months  Subjective: pt is here for follow up of hypertension~cd     Patient ID: Minh Cazares is a 80 y o  female  No complaints  The following portions of the patient's history were reviewed and updated as appropriate: allergies, current medications, past family history, past medical history, past social history, past surgical history and problem list     Review of Systems   Constitutional:        See HPI   HENT: Negative for congestion, ear pain, mouth sores, sinus pressure and trouble swallowing  Eyes: Negative for discharge, redness and itching     Respiratory: Negative for apnea, cough, chest tightness, shortness of breath, wheezing and stridor  Cardiovascular: Negative for chest pain, palpitations and leg swelling  Gastrointestinal: Negative for abdominal distention, abdominal pain, blood in stool, constipation, diarrhea, nausea and vomiting  Endocrine: Negative for cold intolerance and heat intolerance  Genitourinary: Negative for difficulty urinating, dysuria, flank pain and urgency  Musculoskeletal: Negative for arthralgias and myalgias  Skin: Negative for rash  Neurological: Negative for dizziness, seizures, syncope, speech difficulty, weakness, light-headedness, numbness and headaches  Hematological: Negative for adenopathy  Psychiatric/Behavioral: Negative for agitation, behavioral problems, confusion and sleep disturbance  The patient is not nervous/anxious  Objective:  Vitals:    12/04/18 1004   BP: 140/72   BP Location: Left arm   Patient Position: Sitting   Cuff Size: Large   Pulse: 72   Weight: 71 9 kg (158 lb 9 6 oz)   Height: 4' 11" (1 499 m)                Physical Exam   Constitutional: She is oriented to person, place, and time  She appears well-developed and well-nourished  No distress  HENT:   Head: Normocephalic and atraumatic  Right Ear: External ear normal    Left Ear: External ear normal    Eyes: Pupils are equal, round, and reactive to light  Conjunctivae and EOM are normal  No scleral icterus  Neck: Normal range of motion  Neck supple  Cardiovascular: Normal rate, regular rhythm, normal heart sounds and intact distal pulses  Exam reveals no gallop and no friction rub  No murmur heard  Pulmonary/Chest: Effort normal  No respiratory distress  She has no wheezes  She has no rales  She exhibits no tenderness  No CVA tenderness  Abdominal: Soft  Bowel sounds are normal    Musculoskeletal: Normal range of motion  She exhibits no edema, tenderness or deformity  Lymphadenopathy:     She has no cervical adenopathy     Neurological: She is alert and oriented to person, place, and time  She has normal reflexes  Skin: Skin is warm and dry  She is not diaphoretic  Psychiatric: She has a normal mood and affect  Her behavior is normal  Judgment and thought content normal    Nursing note and vitals reviewed

## 2019-01-02 DIAGNOSIS — I10 HYPERTENSION, UNSPECIFIED TYPE: ICD-10-CM

## 2019-01-02 RX ORDER — LISINOPRIL 20 MG/1
TABLET ORAL
Qty: 30 TABLET | Refills: 5 | Status: SHIPPED | OUTPATIENT
Start: 2019-01-02 | End: 2019-07-09 | Stop reason: SDUPTHER

## 2019-02-05 LAB
LEFT EYE DIABETIC RETINOPATHY: NORMAL
RIGHT EYE DIABETIC RETINOPATHY: NORMAL
SEVERITY (EYE EXAM): NORMAL

## 2019-02-28 ENCOUNTER — TRANSCRIBE ORDERS (OUTPATIENT)
Dept: FAMILY MEDICINE CLINIC | Facility: CLINIC | Age: 84
End: 2019-02-28

## 2019-02-28 DIAGNOSIS — N28.1 ACQUIRED CYST OF KIDNEY: ICD-10-CM

## 2019-02-28 DIAGNOSIS — I12.9 HYPERTENSIVE CHRONIC KIDNEY DISEASE WITH STAGE 1 THROUGH STAGE 4 CHRONIC KIDNEY DISEASE, OR UNSPECIFIED CHRONIC KIDNEY DISEASE: ICD-10-CM

## 2019-02-28 DIAGNOSIS — N18.4 CHRONIC KIDNEY DISEASE, STAGE IV (SEVERE) (HCC): Primary | ICD-10-CM

## 2019-03-18 ENCOUNTER — TELEPHONE (OUTPATIENT)
Dept: NEPHROLOGY | Facility: CLINIC | Age: 84
End: 2019-03-18

## 2019-03-19 ENCOUNTER — OFFICE VISIT (OUTPATIENT)
Dept: NEPHROLOGY | Facility: CLINIC | Age: 84
End: 2019-03-19
Payer: COMMERCIAL

## 2019-03-19 VITALS
WEIGHT: 157 LBS | BODY MASS INDEX: 31.65 KG/M2 | HEART RATE: 70 BPM | HEIGHT: 59 IN | DIASTOLIC BLOOD PRESSURE: 70 MMHG | SYSTOLIC BLOOD PRESSURE: 140 MMHG

## 2019-03-19 DIAGNOSIS — N26.1 ATROPHIC KIDNEY, ACQUIRED: ICD-10-CM

## 2019-03-19 DIAGNOSIS — N18.4 CKD (CHRONIC KIDNEY DISEASE), STAGE IV (HCC): Primary | ICD-10-CM

## 2019-03-19 DIAGNOSIS — I12.9 HYPERTENSIVE CHRONIC KIDNEY DISEASE WITH STAGE 1 THROUGH STAGE 4 CHRONIC KIDNEY DISEASE, OR UNSPECIFIED CHRONIC KIDNEY DISEASE: ICD-10-CM

## 2019-03-19 DIAGNOSIS — N28.1 COMPLEX RENAL CYST: ICD-10-CM

## 2019-03-19 DIAGNOSIS — N25.81 SECONDARY HYPERPARATHYROIDISM OF RENAL ORIGIN (HCC): ICD-10-CM

## 2019-03-19 PROCEDURE — 99214 OFFICE O/P EST MOD 30 MIN: CPT | Performed by: INTERNAL MEDICINE

## 2019-03-19 NOTE — PROGRESS NOTES
NEPHROLOGY OUTPATIENT PROGRESS NOTE   Dada Carlos 80 y o  female MRN: [id number]  Reason for visit: CKD    ASSESSMENT and PLAN:  1  Chronic kidney disease, previous creatinine 1 6 with an estimated GFR 27  2  Hypertension, suspect some component of white coat hypertension, resume home blood pressure monitoring  3  Anemia of chronic disease, previous hemoglobin 11 4  4  History of diabetes, previous hemoglobin A1c 6 3  5  Microscopic hematuria, repeat urinalysis  6  Complex renal cyst, repeat renal ultrasound in 6 months    · Last laboratory studies was August of last year  Repeat laboratory studies to ensure stability of renal function  · Will plan to repeat urinalysis to assess for hematuria  · Repeat renal ultrasound in 6 months given history of complex renal cyst  · Assuming repeat laboratory studies are stable, plan to see her in 6 months with repeat laboratory studies at that time  · She is to resume home blood pressure monitoring    SUBJECTIVE / INTERVAL HISTORY:  She has been doing reasonably well  Her last hospitalization was August last year  Since then she has been feeling well  Did have 1 episode of a UTI but seems to have resolved  Denies any gross blood with urination  Denies any chest pain shortness of breath  Occasional lower extremity swelling but seems to be stable  No appetite changes  Occasional dizziness but denies any falls or near syncopal episodes      Review of Systems      OBJECTIVE:  /70 (BP Location: Left arm, Patient Position: Sitting, Cuff Size: Adult)   Pulse 70   Ht 4' 11" (1 499 m)   Wt 71 2 kg (157 lb)   LMP  (LMP Unknown)   BMI 31 71 kg/m²   Vitals:    03/19/19 1516   Weight: 71 2 kg (157 lb)       Physical Exam   Constitutional: She is oriented to person, place, and time  No distress  HENT:   Head: Normocephalic  Eyes: No scleral icterus  Neck: Neck supple  Cardiovascular: Normal rate and regular rhythm     Pulmonary/Chest: Effort normal and breath sounds normal    Abdominal: Soft  She exhibits no distension  Musculoskeletal: She exhibits edema (1+ edema)  Neurological: She is alert and oriented to person, place, and time  Skin: Skin is warm and dry           Medications:    Current Outpatient Medications:     amLODIPine (NORVASC) 5 mg tablet, Take 1 tablet (5 mg total) by mouth 2 (two) times a day, Disp: 180 tablet, Rfl: 3    aspirin 81 MG tablet, Take 1 tablet by mouth daily, Disp: , Rfl:     Cholecalciferol (VITAMIN D3) 2000 units capsule, Take 1 capsule by mouth daily, Disp: , Rfl:     colchicine (COLCRYS) 0 6 mg tablet, Take 1 tablet (0 6 mg total) by mouth daily, Disp: 90 tablet, Rfl: 3    docusate sodium (COLACE) 100 mg capsule, Take 100 mg by mouth daily, Disp: , Rfl:     gabapentin (NEURONTIN) 300 mg capsule, Take 1 tablet by mouth daily at bedtime  , Disp: , Rfl:     glucose blood test strip, Test twice a day E11 9 non insulin , Disp: , Rfl:     Lancets (ONETOUCH ULTRASOFT) lancets, by Does not apply route 2 (two) times a day, Disp: , Rfl:     lisinopril (ZESTRIL) 20 mg tablet, TAKE 1 TABLET BY MOUTH EVERY DAY, Disp: 30 tablet, Rfl: 5    simvastatin (ZOCOR) 20 mg tablet, Take 20 mg by mouth daily, Disp: , Rfl: 5    ipratropium (ATROVENT) 0 03 % nasal spray, 2 sprays into each nostril 3 (three) times a day as needed, Disp: , Rfl:     nitrofurantoin (MACROBID) 100 mg capsule, Take 1 capsule (100 mg total) by mouth 2 (two) times a day (Patient not taking: Reported on 3/19/2019), Disp: 14 capsule, Rfl: 0    Laboratory Results:  Results for orders placed or performed in visit on 10/25/18   Urine culture   Result Value Ref Range    Urine Culture 20,000-29,000 cfu/ml     POCT urine dip   Result Value Ref Range    LEUKOCYTE ESTERASE,UA SMALL     NITRITE,UA neg     SL AMB POCT UROBILINOGEN 0 2     POCT URINE PROTEIN 30 MG      PH,UA 6 5     BLOOD,UA MODERATE     SPECIFIC GRAVITY,UA 1 020     KETONES,UA neg     BILIRUBIN,UA neg     GLUCOSE, UA neg      COLOR,UA yellow     CLARITY,UA clear

## 2019-03-19 NOTE — PATIENT INSTRUCTIONS
ASSESSMENT and PLAN:  1  Chronic kidney disease, previous creatinine 1 6 with an estimated GFR 27  2  Hypertension, suspect some component of white coat hypertension, resume home blood pressure monitoring  3  Anemia of chronic disease, previous hemoglobin 11 4  4  History of diabetes, previous hemoglobin A1c 6 3  5  Microscopic hematuria, repeat urinalysis  6  Complex renal cyst, repeat renal ultrasound in 6 months    · Last laboratory studies was August of last year  Repeat laboratory studies to ensure stability of renal function  · Will plan to repeat urinalysis to assess for hematuria    · Repeat renal ultrasound in 6 months given history of complex renal cyst  · Assuming repeat laboratory studies are stable, plan to see her in 6 months with repeat laboratory studies at that time  · She is to resume home blood pressure monitoring

## 2019-04-03 DIAGNOSIS — E78.2 MIXED HYPERLIPIDEMIA: Primary | ICD-10-CM

## 2019-04-03 RX ORDER — SIMVASTATIN 20 MG
TABLET ORAL
Qty: 30 TABLET | Refills: 5 | Status: SHIPPED | OUTPATIENT
Start: 2019-04-03 | End: 2019-09-30 | Stop reason: SDUPTHER

## 2019-04-08 PROBLEM — N30.01 ACUTE CYSTITIS WITH HEMATURIA: Status: RESOLVED | Noted: 2018-10-13 | Resolved: 2019-04-08

## 2019-04-09 ENCOUNTER — OFFICE VISIT (OUTPATIENT)
Dept: FAMILY MEDICINE CLINIC | Facility: CLINIC | Age: 84
End: 2019-04-09
Payer: COMMERCIAL

## 2019-04-09 VITALS
SYSTOLIC BLOOD PRESSURE: 110 MMHG | HEIGHT: 59 IN | BODY MASS INDEX: 31.81 KG/M2 | HEART RATE: 68 BPM | WEIGHT: 157.8 LBS | DIASTOLIC BLOOD PRESSURE: 88 MMHG

## 2019-04-09 DIAGNOSIS — E11.42 CONTROLLED TYPE 2 DIABETES MELLITUS WITH DIABETIC POLYNEUROPATHY, WITHOUT LONG-TERM CURRENT USE OF INSULIN (HCC): Primary | ICD-10-CM

## 2019-04-09 DIAGNOSIS — I10 ESSENTIAL HYPERTENSION: ICD-10-CM

## 2019-04-09 DIAGNOSIS — G63 POLYNEUROPATHY ASSOCIATED WITH UNDERLYING DISEASE (HCC): ICD-10-CM

## 2019-04-09 DIAGNOSIS — E66.9 OBESITY (BMI 30.0-34.9): ICD-10-CM

## 2019-04-09 DIAGNOSIS — M1A.00X0 IDIOPATHIC CHRONIC GOUT WITHOUT TOPHUS, UNSPECIFIED SITE: ICD-10-CM

## 2019-04-09 DIAGNOSIS — N28.1 COMPLEX RENAL CYST: ICD-10-CM

## 2019-04-09 DIAGNOSIS — N18.4 CKD (CHRONIC KIDNEY DISEASE), STAGE IV (HCC): ICD-10-CM

## 2019-04-09 DIAGNOSIS — Z23 NEED FOR TETANUS BOOSTER: ICD-10-CM

## 2019-04-09 LAB — SL AMB POCT HEMOGLOBIN AIC: 5.8 (ref ?–6.5)

## 2019-04-09 PROCEDURE — 83036 HEMOGLOBIN GLYCOSYLATED A1C: CPT | Performed by: FAMILY MEDICINE

## 2019-04-09 PROCEDURE — 1036F TOBACCO NON-USER: CPT | Performed by: FAMILY MEDICINE

## 2019-04-09 PROCEDURE — 99214 OFFICE O/P EST MOD 30 MIN: CPT | Performed by: FAMILY MEDICINE

## 2019-04-26 DIAGNOSIS — M1A.00X0 IDIOPATHIC CHRONIC GOUT WITHOUT TOPHUS, UNSPECIFIED SITE: ICD-10-CM

## 2019-04-26 RX ORDER — COLCHICINE 0.6 MG/1
TABLET, FILM COATED ORAL
Qty: 90 TABLET | Refills: 3 | Status: SHIPPED | OUTPATIENT
Start: 2019-04-26 | End: 2020-04-13

## 2019-04-26 RX ORDER — GABAPENTIN 300 MG/1
CAPSULE ORAL
Qty: 540 CAPSULE | Refills: 0 | Status: SHIPPED | OUTPATIENT
Start: 2019-04-26 | End: 2020-07-08

## 2019-06-24 LAB
ALBUMIN SERPL-MCNC: 4.1 G/DL (ref 3.6–5.1)
ALBUMIN/CREAT UR: 375 MCG/MG CREAT
BASOPHILS # BLD AUTO: 32 CELLS/UL (ref 0–200)
BASOPHILS NFR BLD AUTO: 0.6 %
BUN SERPL-MCNC: 36 MG/DL (ref 7–25)
BUN/CREAT SERPL: 21 (CALC) (ref 6–22)
CALCIUM SERPL-MCNC: 10.1 MG/DL (ref 8.6–10.4)
CALCIUM SERPL-MCNC: 10.1 MG/DL (ref 8.6–10.4)
CHLORIDE SERPL-SCNC: 110 MMOL/L (ref 98–110)
CO2 SERPL-SCNC: 24 MMOL/L (ref 20–32)
CREAT SERPL-MCNC: 1.72 MG/DL (ref 0.6–0.88)
CREAT UR-MCNC: 53 MG/DL (ref 20–275)
EOSINOPHIL # BLD AUTO: 92 CELLS/UL (ref 15–500)
EOSINOPHIL NFR BLD AUTO: 1.7 %
ERYTHROCYTE [DISTWIDTH] IN BLOOD BY AUTOMATED COUNT: 12.6 % (ref 11–15)
GLUCOSE SERPL-MCNC: 113 MG/DL (ref 65–139)
HCT VFR BLD AUTO: 35.6 % (ref 35–45)
HGB BLD-MCNC: 12 G/DL (ref 11.7–15.5)
LYMPHOCYTES # BLD AUTO: 1345 CELLS/UL (ref 850–3900)
LYMPHOCYTES NFR BLD AUTO: 24.9 %
MCH RBC QN AUTO: 32.2 PG (ref 27–33)
MCHC RBC AUTO-ENTMCNC: 33.7 G/DL (ref 32–36)
MCV RBC AUTO: 95.4 FL (ref 80–100)
MICROALBUMIN UR-MCNC: 19.9 MG/DL
MONOCYTES # BLD AUTO: 443 CELLS/UL (ref 200–950)
MONOCYTES NFR BLD AUTO: 8.2 %
NEUTROPHILS # BLD AUTO: 3488 CELLS/UL (ref 1500–7800)
NEUTROPHILS NFR BLD AUTO: 64.6 %
PHOSPHATE SERPL-MCNC: 3.3 MG/DL (ref 2.1–4.3)
PLATELET # BLD AUTO: 147 THOUSAND/UL (ref 140–400)
PMV BLD REES-ECKER: 13.8 FL (ref 7.5–12.5)
POTASSIUM SERPL-SCNC: 4.7 MMOL/L (ref 3.5–5.3)
PTH-INTACT SERPL-MCNC: 208 PG/ML (ref 14–64)
RBC # BLD AUTO: 3.73 MILLION/UL (ref 3.8–5.1)
SL AMB EGFR AFRICAN AMERICAN: 29 ML/MIN/1.73M2
SL AMB EGFR NON AFRICAN AMERICAN: 25 ML/MIN/1.73M2
SODIUM SERPL-SCNC: 141 MMOL/L (ref 135–146)
URATE SERPL-MCNC: 7.6 MG/DL (ref 2.5–7)
WBC # BLD AUTO: 5.4 THOUSAND/UL (ref 3.8–10.8)

## 2019-06-25 ENCOUNTER — TELEPHONE (OUTPATIENT)
Dept: NEPHROLOGY | Facility: CLINIC | Age: 84
End: 2019-06-25

## 2019-07-09 DIAGNOSIS — I10 HYPERTENSION, UNSPECIFIED TYPE: ICD-10-CM

## 2019-07-09 RX ORDER — LISINOPRIL 20 MG/1
20 TABLET ORAL DAILY
Qty: 30 TABLET | Refills: 5 | Status: SHIPPED | OUTPATIENT
Start: 2019-07-09 | End: 2020-01-09

## 2019-07-24 ENCOUNTER — OFFICE VISIT (OUTPATIENT)
Dept: FAMILY MEDICINE CLINIC | Facility: CLINIC | Age: 84
End: 2019-07-24
Payer: COMMERCIAL

## 2019-07-24 VITALS
HEART RATE: 84 BPM | HEIGHT: 59 IN | WEIGHT: 154.6 LBS | SYSTOLIC BLOOD PRESSURE: 130 MMHG | BODY MASS INDEX: 31.17 KG/M2 | DIASTOLIC BLOOD PRESSURE: 70 MMHG

## 2019-07-24 DIAGNOSIS — I10 ESSENTIAL HYPERTENSION: ICD-10-CM

## 2019-07-24 DIAGNOSIS — E11.42 CONTROLLED TYPE 2 DIABETES MELLITUS WITH DIABETIC POLYNEUROPATHY, WITHOUT LONG-TERM CURRENT USE OF INSULIN (HCC): ICD-10-CM

## 2019-07-24 DIAGNOSIS — S81.811A LACERATION OF RIGHT LOWER EXTREMITY, INITIAL ENCOUNTER: Primary | ICD-10-CM

## 2019-07-24 DIAGNOSIS — M1A.00X0 IDIOPATHIC CHRONIC GOUT WITHOUT TOPHUS, UNSPECIFIED SITE: ICD-10-CM

## 2019-07-24 DIAGNOSIS — L03.115 CELLULITIS OF RIGHT LOWER EXTREMITY: ICD-10-CM

## 2019-07-24 DIAGNOSIS — N18.4 CKD (CHRONIC KIDNEY DISEASE), STAGE IV (HCC): ICD-10-CM

## 2019-07-24 PROCEDURE — 99214 OFFICE O/P EST MOD 30 MIN: CPT | Performed by: FAMILY MEDICINE

## 2019-07-24 PROCEDURE — 1160F RVW MEDS BY RX/DR IN RCRD: CPT | Performed by: FAMILY MEDICINE

## 2019-07-24 RX ORDER — GAUZE BANDAGE 2" X 2"
BANDAGE TOPICAL DAILY
Qty: 10 EACH | Refills: 0 | Status: SHIPPED | OUTPATIENT
Start: 2019-07-24 | End: 2019-08-07

## 2019-07-24 RX ORDER — NICOTINE POLACRILEX 2 MG
LOZENGE BUCCAL DAILY
Qty: 1 EACH | Refills: 0 | Status: SHIPPED | OUTPATIENT
Start: 2019-07-24 | End: 2019-08-07

## 2019-07-24 RX ORDER — CEFADROXIL 1000 MG/1
1 TABLET ORAL DAILY
Qty: 10 TABLET | Refills: 0 | Status: SHIPPED | OUTPATIENT
Start: 2019-07-24 | End: 2019-08-03

## 2019-07-24 NOTE — PROGRESS NOTES
Assessment and Plan:    Problem List Items Addressed This Visit     CKD (chronic kidney disease), stage IV ([facility] [address] )     Patient does have CKD  Limited benefit with diuretics secondary to this  No changes at the moment  Follow with Nephrology and Dr Michael Arriaga  Controlled type 2 diabetes mellitus with diabetic polyneuropathy (HCC)     Lab Results   Component Value Date    HGBA1C 5 8 04/09/2019       No results for input(s): POCGLU in the last 72 hours  Blood Sugar Average: Last 72 hrs:  Stable at the moment  Check labs as scheduled  No changes  Of note, the patient should watch for low blood sugars  Her last A1c was quite low, but she is not on medications for diabetes  Gout     Stable at the moment  She did have a uric acid that was slightly above 7, but given her kidney function it may be reasonable to talk to Nephrology about discontinuing the Colcrys  Hypertension     Blood pressure is doing quite well today  No changes  Continue with current treatment  Other Visit Diagnoses     Laceration of right lower extremity, initial encounter    -  Primary    Recommend Vaseline, 4 x 4 gauze, wrap with Hay, tape to [facility]  Change daily  Follow in 1-2 weeks  Relevant Medications    cefadroxil (DURICEF) 1 g tablet    Gauze Pads & Dressings (BAND-AID HAY ROLLED GAUZE LG) MISC    Adhesive Tape 1/2"x6yd TAPE    Gauze Pads & Dressings 3"X4" PADS    Cellulitis of right lower extremity        Recommend Duricef  Follow in 2 weeks if needed  Unfortunately, she has increased edema but cannot tolerate diuretics  Relevant Medications    cefadroxil (DURICEF) 1 g tablet                 Diagnoses and all orders for this visit:    Laceration of right lower extremity, initial encounter  Comments:  Recommend Vaseline, 4 x 4 gauze, wrap with Hay, tape to [facility]  Change daily  Follow in 1-2 weeks  Orders:  -     cefadroxil (DURICEF) 1 g tablet;  Take 1 tablet (1 g total) by mouth daily for 10 days  -     Gauze Pads & Dressings (BAND-AID JEN ROLLED GAUZE LG) MISC; by Does not apply route daily for 14 days  -     Adhesive Tape 1/2"x6yd TAPE; by Does not apply route daily for 14 days  -     Gauze Pads & Dressings 3"X4" PADS; by Does not apply route daily for 14 days Non adherant pads    Cellulitis of right lower extremity  Comments:  Recommend Duricef  Follow in 2 weeks if needed  Unfortunately, she has increased edema but cannot tolerate diuretics  Orders:  -     cefadroxil (DURICEF) 1 g tablet; Take 1 tablet (1 g total) by mouth daily for 10 days    Essential hypertension    Idiopathic chronic gout without tophus, unspecified site    CKD (chronic kidney disease), stage IV ([facility])    Controlled type 2 diabetes mellitus with diabetic polyneuropathy, without long-term current use of insulin ([facility])              Subjective:      Patient ID: Concetta Ryan is a 80 y o  female  CC:    Chief Complaint   Patient presents with    Other     c/o open area on back of right leg and redness  Pt  scraped it on 7/19/19       HPI:    The patient is here because she has a laceration on the posterior aspect of the right lower extremity  She was walking down the stairs at the [facility], and noticed that she hit the back of her leg on the step  That then caused a laceration and bleeding  This was on Friday  She did try to treat locally, i e  At home  She did use dressings over this  Unfortunately, it remains open but has stopped bleeding  She also has some redness and irritation on the anterior aspect of the right lower extremity  Patient is noted that she has had edema for quite some time  She has followed with Nephrology in the past   She was on diuretics at 1 point, but these were discontinued by Nephrology secondary to her kidney function  Patient does have a history of diabetes, but she is not currently on medications  Her last A1c was 5 8  CKD 4 noted  GFR has been quite low    Creatinine most recently was elevated  Again, she does follow with Nephrology for this  She has a history of gout, currently on colchicine  Has followed with Nephrology with this knowledge  The following portions of the patient's history were reviewed and updated as appropriate: allergies, current medications, past family history, past medical history, past social history, past surgical history and problem list       Review of Systems   Constitutional: Negative  HENT: Negative  Eyes: Negative  Respiratory: Negative  Cardiovascular: Negative  All other systems reviewed and are negative  Data to review:       Objective:    Vitals:    07/24/19 1540   BP: 130/70   BP Location: Left arm   Patient Position: Sitting   Pulse: 84   Weight: 70 1 kg (154 lb 9 6 oz)   Height: 4' 11" (1 499 m)        Physical Exam   Constitutional: She appears well-developed and well-nourished  HENT:   Head: Normocephalic and atraumatic  Cardiovascular: Normal rate, regular rhythm and normal heart sounds  Pulses:       Carotid pulses are 2+ on the right side, and 2+ on the left side  Pulmonary/Chest: Effort normal and breath sounds normal  She has no wheezes  She has no rales  She exhibits no tenderness  Skin:        Nursing note and vitals reviewed

## 2019-07-24 NOTE — ASSESSMENT & PLAN NOTE
Patient does have CKD  Limited benefit with diuretics secondary to this  No changes at the moment  Follow with Nephrology and Dr Jodee Barriga

## 2019-07-24 NOTE — ASSESSMENT & PLAN NOTE
Stable at the moment  She did have a uric acid that was slightly above 7, but given her kidney function it may be reasonable to talk to Nephrology about discontinuing the Colcrys

## 2019-07-24 NOTE — PATIENT INSTRUCTIONS
Problem List Items Addressed This Visit     CKD (chronic kidney disease), stage IV ([facility] [address] )     Patient does have CKD  Limited benefit with diuretics secondary to this  No changes at the moment  Follow with Nephrology and Dr Aury Swartz  Controlled type 2 diabetes mellitus with diabetic polyneuropathy (HCC)     Lab Results   Component Value Date    HGBA1C 5 8 04/09/2019       No results for input(s): POCGLU in the last 72 hours  Blood Sugar Average: Last 72 hrs:  Stable at the moment  Check labs as scheduled  No changes  Of note, the patient should watch for low blood sugars  Her last A1c was quite low, but she is not on medications for diabetes  Gout     Stable at the moment  She did have a uric acid that was slightly above 7, but given her kidney function it may be reasonable to talk to Nephrology about discontinuing the Colcrys  Hypertension     Blood pressure is doing quite well today  No changes  Continue with current treatment  Other Visit Diagnoses     Laceration of right lower extremity, initial encounter    -  Primary    Recommend Vaseline, 4 x 4 gauze, wrap with Hay, tape to [facility]  Change daily  Follow in 1-2 weeks  Relevant Medications    cefadroxil (DURICEF) 1 g tablet    Gauze Pads & Dressings (BAND-AID HAY ROLLED GAUZE LG) MISC    Adhesive Tape 1/2"x6yd TAPE    Gauze Pads & Dressings 3"X4" PADS    Cellulitis of right lower extremity        Recommend Duricef  Follow in 2 weeks if needed  Unfortunately, she has increased edema but cannot tolerate diuretics      Relevant Medications    cefadroxil (DURICEF) 1 g tablet

## 2019-07-24 NOTE — ASSESSMENT & PLAN NOTE
Lab Results   Component Value Date    HGBA1C 5 8 04/09/2019       No results for input(s): POCGLU in the last 72 hours  Blood Sugar Average: Last 72 hrs:  Stable at the moment  Check labs as scheduled  No changes  Of note, the patient should watch for low blood sugars  Her last A1c was quite low, but she is not on medications for diabetes

## 2019-07-31 ENCOUNTER — TELEPHONE (OUTPATIENT)
Dept: NEPHROLOGY | Facility: CLINIC | Age: 84
End: 2019-07-31

## 2019-08-13 ENCOUNTER — OFFICE VISIT (OUTPATIENT)
Dept: FAMILY MEDICINE CLINIC | Facility: CLINIC | Age: 84
End: 2019-08-13
Payer: COMMERCIAL

## 2019-08-13 VITALS
SYSTOLIC BLOOD PRESSURE: 110 MMHG | DIASTOLIC BLOOD PRESSURE: 60 MMHG | BODY MASS INDEX: 30.64 KG/M2 | HEIGHT: 59 IN | HEART RATE: 78 BPM | WEIGHT: 152 LBS

## 2019-08-13 DIAGNOSIS — H61.22 IMPACTED CERUMEN OF LEFT EAR: ICD-10-CM

## 2019-08-13 DIAGNOSIS — E11.42 CONTROLLED TYPE 2 DIABETES MELLITUS WITH DIABETIC POLYNEUROPATHY, WITHOUT LONG-TERM CURRENT USE OF INSULIN (HCC): ICD-10-CM

## 2019-08-13 DIAGNOSIS — I10 ESSENTIAL HYPERTENSION: Primary | ICD-10-CM

## 2019-08-13 DIAGNOSIS — E55.9 VITAMIN D DEFICIENCY: ICD-10-CM

## 2019-08-13 DIAGNOSIS — H33.321 RETINAL HOLE OF RIGHT EYE: ICD-10-CM

## 2019-08-13 DIAGNOSIS — N25.81 SECONDARY HYPERPARATHYROIDISM OF RENAL ORIGIN (HCC): ICD-10-CM

## 2019-08-13 DIAGNOSIS — G63 POLYNEUROPATHY ASSOCIATED WITH UNDERLYING DISEASE (HCC): ICD-10-CM

## 2019-08-13 DIAGNOSIS — N18.4 CKD (CHRONIC KIDNEY DISEASE), STAGE IV (HCC): ICD-10-CM

## 2019-08-13 DIAGNOSIS — E78.5 HYPERLIPIDEMIA, UNSPECIFIED HYPERLIPIDEMIA TYPE: ICD-10-CM

## 2019-08-13 DIAGNOSIS — E55.9 VITAMIN D INSUFFICIENCY: ICD-10-CM

## 2019-08-13 DIAGNOSIS — L03.116 CELLULITIS OF LEFT LOWER EXTREMITY: ICD-10-CM

## 2019-08-13 PROCEDURE — 3725F SCREEN DEPRESSION PERFORMED: CPT | Performed by: FAMILY MEDICINE

## 2019-08-13 PROCEDURE — 99214 OFFICE O/P EST MOD 30 MIN: CPT | Performed by: FAMILY MEDICINE

## 2019-08-13 PROCEDURE — 1036F TOBACCO NON-USER: CPT | Performed by: FAMILY MEDICINE

## 2019-08-13 PROCEDURE — 1101F PT FALLS ASSESS-DOCD LE1/YR: CPT | Performed by: FAMILY MEDICINE

## 2019-08-13 NOTE — PROGRESS NOTES
Assessment and Plan:    41-year-old white female presents with her daughter today to review her chronic medical conditions and blood work from June  Her daughter accompanies her today  1  Cerumen impaction - return to office for cerumen lavage of left ear  2  Cellulitis - of right posterior lower extremity  Patient had a skin tear due to trauma  Patient was seen by Dr Cecile Lemus and was put on [address]  Patient states it is looking much better  Use topical bacitracin or Aquaphor and cover at nighttime or if traveling, as needed  Wound is healing nicely  3  Diabetes mellitus with neuropathy - sugars remained stable  She is on diet control  Her last A1c is 5 8  She will have a repeat A1c in 3-4 months at her next visit  4  Peripheral neuropathy - gabapentin  5  Hyperparathyroidism - secondary  Related to kidney disease and possibly vitamin-D deficiency  Continue to monitor PTH  Check vitamin-D level  Further intervention as per Nephrology  6  Hypertension - continue lisinopril and amlodipine  7  Chronic kidney disease - kidney function is stable  Follow up with Dr Sunitha Sweeney  8  Hyperlipidemia - stable on Zocor 20 mg daily  9  Vitamin deficiency - continue over-the-counter vitamin D3 supplementation  Check vitamin-D level  She has secondary hyperparathyroidism  Labs reviewed at length with patient today  Return to office in 2 months  Subjective:      Patient ID: Edward Givens is a 80 y o  female  CC:    Chief Complaint   Patient presents with    Hypertension    Diabetes    Other     pt also has blood work to review  she would also like you to check a proceedure spot on the back of her right leg, where dr Aiyana Sam removed a skin tear  she is also concerned and saddened by the fact that they believe her daughter who is 71 may be suffering from earlly dementia   patient also went to ear   today and they recommened ear lavage~cd       HPI:    Not hearing well out of left ear  Just had hearing aid check  Was told she had cerumen impaction      The following portions of the patient's history were reviewed and updated as appropriate: allergies, current medications, past family history, past medical history, past social history, past surgical history and problem list       Review of Systems   Constitutional:        See HPI   HENT: Negative for congestion, ear pain, mouth sores, sinus pressure and trouble swallowing  Decreased hearing  Cerumen impaction  Just had her hearing aids evaluated  Eyes: Negative for discharge, redness and itching  Respiratory: Negative for apnea, cough, chest tightness, shortness of breath, wheezing and stridor  Cardiovascular: Negative for chest pain, palpitations and leg swelling  Gastrointestinal: Negative for abdominal distention, abdominal pain, blood in stool, constipation, diarrhea, nausea and vomiting  Endocrine: Negative for cold intolerance and heat intolerance  Genitourinary: Negative for difficulty urinating, dysuria, flank pain and urgency  Musculoskeletal: Negative for arthralgias and myalgias  Skin: Negative for rash  Neurological: Negative for dizziness, seizures, syncope, speech difficulty, weakness, light-headedness, numbness and headaches  Hematological: Negative for adenopathy  Psychiatric/Behavioral: Negative for agitation, behavioral problems, confusion and sleep disturbance  The patient is not nervous/anxious  Data to review:       Objective:    Vitals:    08/13/19 1030   BP: 140/64   BP Location: Left arm   Patient Position: Sitting   Cuff Size: Large   Pulse: 78   Weight: 68 9 kg (152 lb)   Height: 4' 11" (1 499 m)        Physical Exam   Constitutional: She is oriented to person, place, and time  She appears well-developed and well-nourished  No distress  HENT:   Head: Normocephalic and atraumatic     Right Ear: External ear normal    Left Ear: External ear normal    Eyes: Pupils are equal, round, and reactive to light  Conjunctivae and EOM are normal  No scleral icterus  Neck: Normal range of motion  Neck supple  Cardiovascular: Normal rate, regular rhythm, normal heart sounds and intact distal pulses  Exam reveals no gallop and no friction rub  No murmur heard  No carotid bruits appreciated  Pulmonary/Chest: Effort normal  No respiratory distress  She has no wheezes  She has no rales  She exhibits no tenderness  Musculoskeletal: Normal range of motion  She exhibits no edema, tenderness or deformity  Lymphadenopathy:     She has no cervical adenopathy  Neurological: She is alert and oriented to person, place, and time  She has normal reflexes  Skin: Skin is warm and dry  She is not diaphoretic  Psychiatric: She has a normal mood and affect  Her behavior is normal  Judgment and thought content normal    Nursing note and vitals reviewed

## 2019-09-07 LAB — 25(OH)D3 SERPL-MCNC: 37 NG/ML (ref 30–100)

## 2019-09-09 LAB
ALBUMIN SERPL-MCNC: 4.2 G/DL (ref 3.6–5.1)
BASOPHILS # BLD AUTO: 39 CELLS/UL (ref 0–200)
BASOPHILS NFR BLD AUTO: 0.7 %
BUN SERPL-MCNC: 39 MG/DL (ref 7–25)
BUN/CREAT SERPL: 23 (CALC) (ref 6–22)
CALCIUM SERPL-MCNC: 10.2 MG/DL (ref 8.6–10.4)
CALCIUM SERPL-MCNC: 10.2 MG/DL (ref 8.6–10.4)
CHLORIDE SERPL-SCNC: 111 MMOL/L (ref 98–110)
CO2 SERPL-SCNC: 24 MMOL/L (ref 20–32)
CREAT SERPL-MCNC: 1.68 MG/DL (ref 0.6–0.88)
EOSINOPHIL # BLD AUTO: 72 CELLS/UL (ref 15–500)
EOSINOPHIL NFR BLD AUTO: 1.3 %
ERYTHROCYTE [DISTWIDTH] IN BLOOD BY AUTOMATED COUNT: 12.5 % (ref 11–15)
GLUCOSE SERPL-MCNC: 111 MG/DL (ref 65–99)
HCT VFR BLD AUTO: 35.5 % (ref 35–45)
HGB BLD-MCNC: 11.8 G/DL (ref 11.7–15.5)
LYMPHOCYTES # BLD AUTO: 1293 CELLS/UL (ref 850–3900)
LYMPHOCYTES NFR BLD AUTO: 23.5 %
MCH RBC QN AUTO: 31.9 PG (ref 27–33)
MCHC RBC AUTO-ENTMCNC: 33.2 G/DL (ref 32–36)
MCV RBC AUTO: 95.9 FL (ref 80–100)
MONOCYTES # BLD AUTO: 396 CELLS/UL (ref 200–950)
MONOCYTES NFR BLD AUTO: 7.2 %
NEUTROPHILS # BLD AUTO: 3702 CELLS/UL (ref 1500–7800)
NEUTROPHILS NFR BLD AUTO: 67.3 %
PHOSPHATE SERPL-MCNC: 3.7 MG/DL (ref 2.1–4.3)
PLATELET # BLD AUTO: 155 THOUSAND/UL (ref 140–400)
PMV BLD REES-ECKER: 13.6 FL (ref 7.5–12.5)
POTASSIUM SERPL-SCNC: 4.7 MMOL/L (ref 3.5–5.3)
PTH-INTACT SERPL-MCNC: 203 PG/ML (ref 14–64)
RBC # BLD AUTO: 3.7 MILLION/UL (ref 3.8–5.1)
SL AMB EGFR AFRICAN AMERICAN: 30 ML/MIN/1.73M2
SL AMB EGFR NON AFRICAN AMERICAN: 26 ML/MIN/1.73M2
SODIUM SERPL-SCNC: 142 MMOL/L (ref 135–146)
WBC # BLD AUTO: 5.5 THOUSAND/UL (ref 3.8–10.8)

## 2019-09-19 ENCOUNTER — HOSPITAL ENCOUNTER (OUTPATIENT)
Dept: ULTRASOUND IMAGING | Facility: HOSPITAL | Age: 84
Discharge: HOME | End: 2019-09-19
Attending: INTERNAL MEDICINE
Payer: COMMERCIAL

## 2019-09-19 DIAGNOSIS — N28.1 COMPLEX RENAL CYST: ICD-10-CM

## 2019-09-19 DIAGNOSIS — N18.4 CKD (CHRONIC KIDNEY DISEASE), STAGE IV (HCC): ICD-10-CM

## 2019-09-19 DIAGNOSIS — I12.9 HYPERTENSIVE CHRONIC KIDNEY DISEASE WITH STAGE 1 THROUGH STAGE 4 CHRONIC KIDNEY DISEASE, OR UNSPECIFIED CHRONIC KIDNEY DISEASE: ICD-10-CM

## 2019-09-19 DIAGNOSIS — N26.1 ATROPHIC KIDNEY, ACQUIRED: ICD-10-CM

## 2019-09-19 DIAGNOSIS — N25.81 SECONDARY HYPERPARATHYROIDISM OF RENAL ORIGIN (HCC): ICD-10-CM

## 2019-09-19 PROCEDURE — 76770 US EXAM ABDO BACK WALL COMP: CPT

## 2019-09-23 ENCOUNTER — TRANSCRIBE ORDERS (OUTPATIENT)
Dept: FAMILY MEDICINE CLINIC | Facility: CLINIC | Age: 84
End: 2019-09-23

## 2019-09-23 DIAGNOSIS — N18.4 CHRONIC KIDNEY DISEASE, STAGE IV (SEVERE) (HCC): Primary | ICD-10-CM

## 2019-09-23 DIAGNOSIS — I10 ESSENTIAL HYPERTENSION: ICD-10-CM

## 2019-09-23 RX ORDER — AMLODIPINE BESYLATE 5 MG/1
5 TABLET ORAL 2 TIMES DAILY
Qty: 180 TABLET | Refills: 3 | Status: SHIPPED | OUTPATIENT
Start: 2019-09-23 | End: 2020-09-27

## 2019-09-30 DIAGNOSIS — E78.2 MIXED HYPERLIPIDEMIA: ICD-10-CM

## 2019-09-30 RX ORDER — SIMVASTATIN 20 MG
20 TABLET ORAL DAILY
Qty: 30 TABLET | Refills: 5 | Status: SHIPPED | OUTPATIENT
Start: 2019-09-30 | End: 2020-03-23 | Stop reason: SDUPTHER

## 2019-10-01 ENCOUNTER — OFFICE VISIT (OUTPATIENT)
Dept: FAMILY MEDICINE CLINIC | Facility: CLINIC | Age: 84
End: 2019-10-01
Payer: COMMERCIAL

## 2019-10-01 VITALS
DIASTOLIC BLOOD PRESSURE: 72 MMHG | WEIGHT: 153 LBS | BODY MASS INDEX: 30.84 KG/M2 | HEART RATE: 60 BPM | SYSTOLIC BLOOD PRESSURE: 180 MMHG | HEIGHT: 59 IN

## 2019-10-01 DIAGNOSIS — I10 ESSENTIAL HYPERTENSION: ICD-10-CM

## 2019-10-01 DIAGNOSIS — R19.4 BOWEL HABIT CHANGES: ICD-10-CM

## 2019-10-01 DIAGNOSIS — R19.8 RECTAL DISCHARGE: ICD-10-CM

## 2019-10-01 DIAGNOSIS — K64.9 HEMORRHOIDS, UNSPECIFIED HEMORRHOID TYPE: Primary | ICD-10-CM

## 2019-10-01 PROCEDURE — 99214 OFFICE O/P EST MOD 30 MIN: CPT | Performed by: PHYSICIAN ASSISTANT

## 2019-10-01 NOTE — ASSESSMENT & PLAN NOTE
It does appear upon exam with proctoscope that pt has both internal and external  Recommend PREP H as directed   Refer to colorectal specialist for further eval

## 2019-10-01 NOTE — PROGRESS NOTES
Assessment and Plan:    Problem List Items Addressed This Visit        Digestive    Hemorrhoid - Primary     It does appear upon exam with proctoscope that pt has both internal and external  Recommend PREP H as directed  Refer to colorectal specialist for further eval          Relevant Orders    Ambulatory referral to Colorectal Surgery       Cardiovascular and Mediastinum    Hypertension       Patient has elevated blood pressure today at 180 over 72 repeated  Patient was very nervous about today's examination and outcome  She does have a blood pressure cuff at home and the last visit was extremely good  Patient was instructed to go home relax and the next couple days take her home blood pressure cuff readings and if there are above 150 took call us and let us know and we will make a medication adjustment  Other    Bowel habit changes    Relevant Orders    Ambulatory referral to Colorectal Surgery    Rectal discharge    Relevant Orders    Ambulatory referral to Colorectal Surgery                 Diagnoses and all orders for this visit:    Hemorrhoids, unspecified hemorrhoid type  -     Ambulatory referral to Colorectal Surgery; Future    Rectal discharge  -     Ambulatory referral to Colorectal Surgery; Future    Bowel habit changes  -     Ambulatory referral to Colorectal Surgery; Future    Essential hypertension              Subjective:      Patient ID: Gabriel Almonte is a 80 y o  female  CC:    Chief Complaint   Patient presents with    Rectal Pain     patient c/o a lot of gas, possiblity of a hemorrhoid/fissure x 1-2 months  Patient was applying vaseline and using baby wipes instead of toilet paper  patient states she has to wear a pantiliner due to leakage  ak       HPI:    Pt here today with her daughter  Has a hx of constipation and large BM with  A lot of pushing   [place] something there in her rectal area and there was a little blood on her baby wipe only-not in the BM or in the toilet and liquid d/c seems copious so she had to start having to wear a big pad instead of just a liner  NO rectal pain  Going more frequently now 2-3 times a day when she normally would struggle to have just one BM daily  She has been baby wipes instead of normal toilet paper  There is something not normal that she can feel in the rectal area for about 3-4 weeks  The following portions of the patient's history were reviewed and updated as appropriate: allergies, current medications, past family history, past medical history, past social history, past surgical history and problem list       Review of Systems   Constitutional: Negative  HENT: Negative  Eyes: Negative  Respiratory: Negative  Cardiovascular: Negative  Gastrointestinal:          Rectal bleeding with discharge  Endocrine: Negative  Genitourinary: Negative  Musculoskeletal: Negative  Skin: Negative  Allergic/Immunologic: Negative  Neurological: Negative  Hematological: Negative  Psychiatric/Behavioral: Negative  Data to review:       Objective:    Vitals:    10/01/19 1435   BP: (!) 180/72   Pulse: 60   Weight: 69 4 kg (153 lb)   Height: 4' 11" (1 499 m)        Physical Exam   Constitutional: She is oriented to person, place, and time  She appears well-developed and well-nourished  No distress  HENT:   Head: Normocephalic and atraumatic  Eyes: Conjunctivae are normal  Right eye exhibits no discharge  Left eye exhibits no discharge  Neck: Neck supple  Carotid bruit is not present  Cardiovascular: Normal rate, regular rhythm and normal heart sounds  Exam reveals no gallop and no friction rub  No murmur heard  Pulmonary/Chest: Effort normal and breath sounds normal  No respiratory distress  She has no wheezes  She has no rales     Genitourinary:   Genitourinary Comments:   Proctoscope done patient does not appear to have any fissures no active bleeding internal sphincter does look irritated with some possible internal hemorrhoids  Patient does have non tender non thrombosed external hemorrhoids at least 2-3  Evidence shows that they may have recently been exacerbated  No discharge/abscess is noted  Neurological: She is alert and oriented to person, place, and time  Skin: Skin is warm and dry  She is not diaphoretic  Psychiatric: She has a normal mood and affect  Judgment normal    Nursing note and vitals reviewed

## 2019-10-01 NOTE — PATIENT INSTRUCTIONS
Problem List Items Addressed This Visit        Digestive    Hemorrhoid - Primary     It does appear upon exam with proctoscope that pt has both internal and external  Recommend PREP H as directed  Refer to colorectal specialist for further eval          Relevant Orders    Ambulatory referral to Colorectal Surgery       Cardiovascular and Mediastinum    Hypertension       Patient has elevated blood pressure today at 180 over 72 repeated  Patient was very nervous about today's examination and outcome  She does have a blood pressure cuff at home and the last visit was extremely good  Patient was instructed to go home relax and the next couple days take her home blood pressure cuff readings and if there are above 150 took call us and let us know and we will make a medication adjustment              Other    Bowel habit changes    Relevant Orders    Ambulatory referral to Colorectal Surgery    Rectal discharge    Relevant Orders    Ambulatory referral to Colorectal Surgery

## 2019-10-01 NOTE — ASSESSMENT & PLAN NOTE
Patient has elevated blood pressure today at 180 over 72 repeated  Patient was very nervous about today's examination and outcome  She does have a blood pressure cuff at home and the last visit was extremely good  Patient was instructed to go home relax and the next couple days take her home blood pressure cuff readings and if there are above 150 took call us and let us know and we will make a medication adjustment

## 2019-10-08 ENCOUNTER — OFFICE VISIT (OUTPATIENT)
Dept: NEPHROLOGY | Facility: CLINIC | Age: 84
End: 2019-10-08
Payer: COMMERCIAL

## 2019-10-08 VITALS
WEIGHT: 150 LBS | BODY MASS INDEX: 30.24 KG/M2 | HEIGHT: 59 IN | DIASTOLIC BLOOD PRESSURE: 90 MMHG | RESPIRATION RATE: 16 BRPM | SYSTOLIC BLOOD PRESSURE: 164 MMHG | HEART RATE: 68 BPM

## 2019-10-08 DIAGNOSIS — N18.4 CKD (CHRONIC KIDNEY DISEASE), STAGE IV (HCC): Primary | ICD-10-CM

## 2019-10-08 DIAGNOSIS — I12.9 HYPERTENSIVE CHRONIC KIDNEY DISEASE WITH STAGE 1 THROUGH STAGE 4 CHRONIC KIDNEY DISEASE, OR UNSPECIFIED CHRONIC KIDNEY DISEASE: ICD-10-CM

## 2019-10-08 DIAGNOSIS — N25.81 SECONDARY HYPERPARATHYROIDISM OF RENAL ORIGIN (HCC): ICD-10-CM

## 2019-10-08 DIAGNOSIS — N28.1 COMPLEX RENAL CYST: ICD-10-CM

## 2019-10-08 PROBLEM — R60.1 GENERALIZED EDEMA: Status: ACTIVE | Noted: 2019-10-08

## 2019-10-08 PROCEDURE — 99214 OFFICE O/P EST MOD 30 MIN: CPT | Performed by: INTERNAL MEDICINE

## 2019-10-08 RX ORDER — FUROSEMIDE 20 MG/1
20 TABLET ORAL 3 TIMES WEEKLY
Qty: 36 TABLET | Refills: 3 | Status: SHIPPED | OUTPATIENT
Start: 2019-10-09 | End: 2019-10-22 | Stop reason: SDUPTHER

## 2019-10-08 NOTE — PROGRESS NOTES
NEPHROLOGY OUTPATIENT PROGRESS NOTE   Karlo Done 80 y o  female MRN: [id number]  Reason for visit:  Chronic kidney disease    ASSESSMENT and PLAN:  1  Chronic kidney disease, baseline creatinine between 1 6 and 1 8, current creatinine 1 6 with an estimated GFR 26  2  Hypertension, blood pressure appears uncontrolled today, may be a component of white coat hypertension, resume home blood pressure monitoring  3  Worsening lower extremity edema, prescribed Lasix 20 mg 3 times weekly  4  Secondary hyperparathyroidism PTH stable at 203, vitamin-D level normal at 37  5  History of gout, previous uric acid at 7 6  6  Complex renal cyst, recent ultrasounds show stability, repeat ultrasound in 6 months    · Overall again blood pressure appears to be uncontrolled although could be component white coat hypertension  · Prescribed Lasix 20 mg 3 times weekly to help with blood pressure control as well as lower extremity swelling  · Resume home blood pressure monitoring, she is to purchase another blood pressure cuff  · She is to call if her blood pressure is consistently greater than 331 systolic likely  · No other changes in her regimen, follow-up in 3 months with repeat labs at that time  SUBJECTIVE / INTERVAL HISTORY:  She has been complaining of worsening lower extremity swelling especially of the right versus left  She does have chronic swelling but is significantly worse  Denies any chest pain shortness of breath or abdominal bloating  Appetite has been normal without nausea vomiting diarrhea  Denies any dizziness lightheadedness or falls  Review of Systems      OBJECTIVE:  /90 (BP Location: Left arm, Patient Position: Sitting, Cuff Size: Standard)   Pulse 68   Resp 16   Ht 4' 11" (1 499 m)   Wt 68 kg (150 lb)   LMP  (LMP Unknown)   BMI 30 30 kg/m²   Vitals:    10/08/19 1126   Weight: 68 kg (150 lb)       Physical Exam   Constitutional: She is oriented to person, place, and time  No distress  HENT:   Head: Normocephalic  Neck: Neck supple  Cardiovascular: Normal rate and regular rhythm  Pulmonary/Chest: Effort normal and breath sounds normal    Abdominal: Soft  She exhibits no distension  Musculoskeletal: She exhibits edema (One to 2+ edema, right greater than left)  Neurological: She is alert and oriented to person, place, and time  Skin: Skin is warm and dry  No rash noted           Medications:    Current Outpatient Medications:     amLODIPine (NORVASC) 5 mg tablet, Take 1 tablet (5 mg total) by mouth 2 (two) times a day, Disp: 180 tablet, Rfl: 3    aspirin 81 MG tablet, Take 1 tablet by mouth daily, Disp: , Rfl:     Cholecalciferol (VITAMIN D3) 2000 units capsule, Take 1 capsule by mouth daily, Disp: , Rfl:     COLCRYS 0 6 MG tablet, TAKE 1 TABLET (0 6 MG TOTAL) BY MOUTH DAILY, Disp: 90 tablet, Rfl: 3    docusate sodium (COLACE) 100 mg capsule, Take 100 mg by mouth daily, Disp: , Rfl:     gabapentin (NEURONTIN) 300 mg capsule, TAKE 1 CAPSULE BY MOUTH AT BEDTIME, Disp: 540 capsule, Rfl: 0    glucose blood test strip, Test twice a day E11 9 non insulin , Disp: , Rfl:     Lancets (ONETOUCH ULTRASOFT) lancets, by Does not apply route 2 (two) times a day, Disp: , Rfl:     lisinopril (ZESTRIL) 20 mg tablet, Take 1 tablet (20 mg total) by mouth daily, Disp: 30 tablet, Rfl: 5    simvastatin (ZOCOR) 20 mg tablet, Take 1 tablet (20 mg total) by mouth daily, Disp: 30 tablet, Rfl: 5    [START ON 10/9/2019] furosemide (LASIX) 20 mg tablet, Take 1 tablet (20 mg total) by mouth 3 (three) times a week, Disp: 36 tablet, Rfl: 3    Laboratory Results:  Results for orders placed or performed in visit on 10/07/19    Diabetes Eye Exam   Result Value Ref Range    Severity Alert     Right Eye Diabetic Retinopathy Moderate     Left Eye Diabetic Retinopathy Moderate

## 2019-10-08 NOTE — LETTER
October 8, 2019     Nancy Es, [address]    Patient: Philomena Brown   YOB: 1927   Date of Visit: 10/8/2019       Dear Dr Esteban Grey:    Thank you for referring Radhika Garcia to me for evaluation  Below are the relevant portions of my assessment and plan of care  If you have questions, please do not hesitate to call me  I look forward to following Chay Day along with you  Sincerely,        Sergio Vo DO        CC: No Recipients                       ASSESSMENT and PLAN:  1  Chronic kidney disease, baseline creatinine between 1 6 and 1 8, current creatinine 1 6 with an estimated GFR 26  2  Hypertension, blood pressure appears uncontrolled today, may be a component of white coat hypertension, resume home blood pressure monitoring  3  Worsening lower extremity edema, prescribed Lasix 20 mg 3 times weekly  4  Secondary hyperparathyroidism PTH stable at 203, vitamin-D level normal at 37  5  History of gout, previous uric acid at 7 6    · Overall again blood pressure appears to be uncontrolled although could be component white coat hypertension  · Prescribed Lasix 20 mg 3 times weekly to help with blood pressure control as well as lower extremity swelling  · Resume home blood pressure monitoring, she is to purchase another blood pressure cuff  · She is to call if her blood pressure is consistently greater than 353 systolic likely  · No other changes in her regimen, follow-up in 3 months with repeat labs at that time

## 2019-10-22 ENCOUNTER — OFFICE VISIT (OUTPATIENT)
Dept: FAMILY MEDICINE CLINIC | Facility: CLINIC | Age: 84
End: 2019-10-22
Payer: COMMERCIAL

## 2019-10-22 VITALS
BODY MASS INDEX: 30.96 KG/M2 | DIASTOLIC BLOOD PRESSURE: 80 MMHG | HEART RATE: 64 BPM | HEIGHT: 59 IN | WEIGHT: 153.6 LBS | SYSTOLIC BLOOD PRESSURE: 140 MMHG

## 2019-10-22 DIAGNOSIS — N28.1 COMPLEX RENAL CYST: ICD-10-CM

## 2019-10-22 DIAGNOSIS — N18.4 CKD (CHRONIC KIDNEY DISEASE), STAGE IV (HCC): ICD-10-CM

## 2019-10-22 DIAGNOSIS — I10 ESSENTIAL HYPERTENSION: ICD-10-CM

## 2019-10-22 DIAGNOSIS — E78.5 HYPERLIPIDEMIA, UNSPECIFIED HYPERLIPIDEMIA TYPE: ICD-10-CM

## 2019-10-22 DIAGNOSIS — G63 POLYNEUROPATHY ASSOCIATED WITH UNDERLYING DISEASE (HCC): ICD-10-CM

## 2019-10-22 DIAGNOSIS — I12.9 HYPERTENSIVE CHRONIC KIDNEY DISEASE WITH STAGE 1 THROUGH STAGE 4 CHRONIC KIDNEY DISEASE, OR UNSPECIFIED CHRONIC KIDNEY DISEASE: ICD-10-CM

## 2019-10-22 DIAGNOSIS — N25.81 SECONDARY HYPERPARATHYROIDISM OF RENAL ORIGIN (HCC): ICD-10-CM

## 2019-10-22 DIAGNOSIS — R60.1 GENERALIZED EDEMA: ICD-10-CM

## 2019-10-22 DIAGNOSIS — E11.42 CONTROLLED TYPE 2 DIABETES MELLITUS WITH DIABETIC POLYNEUROPATHY, WITHOUT LONG-TERM CURRENT USE OF INSULIN (HCC): Primary | ICD-10-CM

## 2019-10-22 LAB — SL AMB POCT HEMOGLOBIN AIC: 5.1 (ref ?–6.5)

## 2019-10-22 PROCEDURE — 99214 OFFICE O/P EST MOD 30 MIN: CPT | Performed by: FAMILY MEDICINE

## 2019-10-22 PROCEDURE — 83036 HEMOGLOBIN GLYCOSYLATED A1C: CPT | Performed by: FAMILY MEDICINE

## 2019-10-22 RX ORDER — FUROSEMIDE 20 MG/1
20 TABLET ORAL DAILY
Qty: 90 TABLET | Refills: 3 | Status: SHIPPED | OUTPATIENT
Start: 2019-10-22 | End: 2021-08-03 | Stop reason: SDUPTHER

## 2019-10-22 NOTE — PROGRESS NOTES
Assessment and Plan:    Pleasant 80-year-old white female presents today to follow-up on her chronic medical conditions  She has chronic kidney disease and recently saw her nephrologist   Lasix 20 mg 3 times a week was added  She is watching her blood pressure which has been recently running higher than usual     1  Hypertension - she remains on lisinopril 20 mg daily, and amlodipine 5 mg daily  Increase Lasix to 20 mg daily  BMP in 2 weeks  See me in 3 weeks  Continue to monitor at home  2  Diabetes mellitus with neuropathy - hemoglobin A1c today is 5 6  Continue low sugar, low-carbohydrate diet  3  Polyneuropathy - stable on gabapentin  4  CKD- patient has recently seen Dr Mike Perdomo  Will increase Lasix, as above  5  Edema - likely multifactorial in origin  May be exacerbated by gabapentin and amlodipine  She is currently on Lasix 20 mg 3 times a week  Increase Lasix to daily  BMP in 2 weeks  Follow up in office for recheck in 3 weeks  If symptoms persist, consider 2D echo  Patient and daughter in agreement with this plan  Follow-up 3 weeks  Subjective:      Patient ID: Tyree Agustin is a 80 y o  female  CC:    Chief Complaint   Patient presents with    Diabetes    Hypertension     pt also has blood work to review~cd       HPI:    Patient here to review chronic medical conditions  Recently saw Nephrology  Blood pressure has been running higher  Lasix 20 mg 3 times a week started by Dr Mike Perdomo  Edema, R greater than L  Had surgery on r ankle  The swelling is a bit better  The following portions of the patient's history were reviewed and updated as appropriate: allergies, current medications, past family history, past medical history, past social history, past surgical history and problem list       Review of Systems   Constitutional:        See HPI   HENT: Negative for congestion, ear pain, mouth sores, sinus pressure and trouble swallowing      Eyes: Negative for discharge, redness and itching  Respiratory: Negative for apnea, cough, chest tightness, shortness of breath, wheezing and stridor  Cardiovascular: Positive for leg swelling  Negative for chest pain and palpitations  See HPI  Gastrointestinal: Negative for abdominal distention, abdominal pain, blood in stool, constipation, diarrhea, nausea and vomiting  Endocrine: Negative for cold intolerance and heat intolerance  Genitourinary: Negative for difficulty urinating, dysuria, flank pain and urgency  Musculoskeletal: Negative for arthralgias and myalgias  Skin: Negative for rash  Neurological: Negative for dizziness, seizures, syncope, speech difficulty, weakness, light-headedness, numbness and headaches  Hematological: Negative for adenopathy  Psychiatric/Behavioral: Negative for agitation, behavioral problems, confusion and sleep disturbance  The patient is not nervous/anxious  Data to review:       Objective:    Vitals:    10/22/19 1115 10/22/19 1138   BP: 160/80 140/80   BP Location: Left arm    Patient Position: Sitting    Cuff Size: Standard    Pulse: 64    Weight: 69 7 kg (153 lb 9 6 oz)    Height: 4' 11" (1 499 m)         Physical Exam   Constitutional: She is oriented to person, place, and time  She appears well-developed and well-nourished  No distress  HENT:   Head: Normocephalic and atraumatic  Right Ear: External ear normal    Left Ear: External ear normal    Eyes: Pupils are equal, round, and reactive to light  Conjunctivae and EOM are normal  No scleral icterus  Neck: Normal range of motion  Neck supple  Cardiovascular: Normal rate, regular rhythm, normal heart sounds and intact distal pulses  Exam reveals no gallop and no friction rub  No murmur heard  No carotid bruits appreciated  Pulmonary/Chest: Effort normal  No respiratory distress  She has no wheezes  She has no rales  She exhibits no tenderness  Faint crackles at both bases  Good air movement  Good effort  Musculoskeletal: Normal range of motion  She exhibits edema  She exhibits no tenderness or deformity  1+ pitting edema in both ankles, right greater than left  Lymphadenopathy:     She has no cervical adenopathy  Neurological: She is alert and oriented to person, place, and time  She has normal reflexes  Skin: Skin is warm and dry  She is not diaphoretic  Psychiatric: She has a normal mood and affect  Her behavior is normal  Judgment and thought content normal    Nursing note and vitals reviewed

## 2019-10-24 PROBLEM — R15.1 FECAL SMEARING: Status: ACTIVE | Noted: 2019-10-24

## 2019-11-05 LAB
ALBUMIN SERPL-MCNC: 4 G/DL (ref 3.6–5.1)
ALBUMIN/GLOB SERPL: 1.8 (CALC) (ref 1–2.5)
ALP SERPL-CCNC: 89 U/L (ref 33–130)
ALT SERPL-CCNC: 12 U/L (ref 6–29)
AST SERPL-CCNC: 12 U/L (ref 10–35)
BILIRUB SERPL-MCNC: 0.5 MG/DL (ref 0.2–1.2)
BUN SERPL-MCNC: 36 MG/DL (ref 7–25)
BUN/CREAT SERPL: 19 (CALC) (ref 6–22)
CALCIUM SERPL-MCNC: 10.3 MG/DL (ref 8.6–10.4)
CHLORIDE SERPL-SCNC: 112 MMOL/L (ref 98–110)
CO2 SERPL-SCNC: 26 MMOL/L (ref 20–32)
CREAT SERPL-MCNC: 1.88 MG/DL (ref 0.6–0.88)
GLOBULIN SER CALC-MCNC: 2.2 G/DL (CALC) (ref 1.9–3.7)
GLUCOSE SERPL-MCNC: 105 MG/DL (ref 65–99)
POTASSIUM SERPL-SCNC: 4.4 MMOL/L (ref 3.5–5.3)
PROT SERPL-MCNC: 6.2 G/DL (ref 6.1–8.1)
SL AMB EGFR AFRICAN AMERICAN: 26 ML/MIN/1.73M2
SL AMB EGFR NON AFRICAN AMERICAN: 23 ML/MIN/1.73M2
SODIUM SERPL-SCNC: 144 MMOL/L (ref 135–146)

## 2019-11-12 ENCOUNTER — OFFICE VISIT (OUTPATIENT)
Dept: FAMILY MEDICINE CLINIC | Facility: CLINIC | Age: 84
End: 2019-11-12
Payer: COMMERCIAL

## 2019-11-12 VITALS
SYSTOLIC BLOOD PRESSURE: 132 MMHG | DIASTOLIC BLOOD PRESSURE: 76 MMHG | HEART RATE: 60 BPM | WEIGHT: 153 LBS | BODY MASS INDEX: 30.84 KG/M2 | HEIGHT: 59 IN

## 2019-11-12 DIAGNOSIS — E11.42 CONTROLLED TYPE 2 DIABETES MELLITUS WITH DIABETIC POLYNEUROPATHY, WITHOUT LONG-TERM CURRENT USE OF INSULIN (HCC): ICD-10-CM

## 2019-11-12 DIAGNOSIS — R60.0 LOCALIZED EDEMA: Primary | ICD-10-CM

## 2019-11-12 DIAGNOSIS — G63 POLYNEUROPATHY ASSOCIATED WITH UNDERLYING DISEASE (HCC): ICD-10-CM

## 2019-11-12 DIAGNOSIS — I10 ESSENTIAL HYPERTENSION: ICD-10-CM

## 2019-11-12 PROCEDURE — 1036F TOBACCO NON-USER: CPT | Performed by: FAMILY MEDICINE

## 2019-11-12 PROCEDURE — 1160F RVW MEDS BY RX/DR IN RCRD: CPT | Performed by: FAMILY MEDICINE

## 2019-11-12 PROCEDURE — 99214 OFFICE O/P EST MOD 30 MIN: CPT | Performed by: FAMILY MEDICINE

## 2019-11-12 NOTE — PROGRESS NOTES
Assessment and Plan:    40-year-old white female with a history of diabetes and chronic kidney disease stage 4, presents today to follow-up on her lower extremity edema  At the last visit, her furosemide 20 mg was increased to daily  At the present time she remains on amlodipine and gabapentin  She has a BMP to review today  1  Edema -controlled on furosemide 20 mg daily  Much improved from last visit  Labs remain normal   Continue to watch renal function  2  Hypertension - blood pressure is well controlled today  Continue amlodipine and lisinopril  3  Diabetes - stable  Continue current treatment  4  Diabetic neuropathy - on gabapentin  Continue 300 mg at bedtime  Review blood work with patient and her daughter today  Will remain on current regimen for now  She is doing much better on the edema has improved  Continue to watch renal function  Patient encouraged to stay well hydrated  She will be seeing her nephrologist in January  Follow-up in 2 months  Subjective:      Patient ID: Roger Pitts is a 80 y o  female  CC:    Chief Complaint   Patient presents with    Follow-up     Follow up re: right ankle swelling and review results  kw    Edema    Results       HPI:    40-year-old white female with a history of diabetes and chronic kidney disease stage 4, presents today to follow-up on her lower extremity edema  At the last visit, her furosemide 20 mg was increased to daily  At the present time she remains on amlodipine and gabapentin  She has a BMP to review today  The following portions of the patient's history were reviewed and updated as appropriate: allergies, current medications, past family history, past medical history, past social history, past surgical history and problem list       Review of Systems   Constitutional:        See HPI   HENT: Negative for congestion, ear pain, mouth sores, sinus pressure and trouble swallowing      Eyes: Negative for discharge, redness and itching  Respiratory: Negative for apnea, cough, chest tightness, shortness of breath, wheezing and stridor  Cardiovascular: Negative for chest pain, palpitations and leg swelling  Lower extremity edema is much improved  Gastrointestinal: Negative for abdominal distention, abdominal pain, blood in stool, constipation, diarrhea, nausea and vomiting  Endocrine: Negative for cold intolerance and heat intolerance  Genitourinary: Negative for difficulty urinating, dysuria, flank pain and urgency  Musculoskeletal: Negative for arthralgias and myalgias  Skin: Negative for rash  Neurological: Negative for dizziness, seizures, syncope, speech difficulty, weakness, light-headedness, numbness and headaches  Hematological: Negative for adenopathy  Psychiatric/Behavioral: Negative for agitation, behavioral problems, confusion and sleep disturbance  The patient is not nervous/anxious  Data to review:       Objective:    Vitals:    11/12/19 0830   BP: 132/76   BP Location: Left arm   Patient Position: Sitting   Pulse: 60   Weight: 69 4 kg (153 lb)   Height: 4' 11" (1 499 m)        Physical Exam   Constitutional: She is oriented to person, place, and time  She appears well-developed and well-nourished  No distress  HENT:   Head: Normocephalic and atraumatic  Right Ear: External ear normal    Left Ear: External ear normal    Eyes: Pupils are equal, round, and reactive to light  Conjunctivae and EOM are normal  No scleral icterus  Neck: Normal range of motion  Neck supple  Cardiovascular: Normal rate, regular rhythm, normal heart sounds and intact distal pulses  Exam reveals no gallop and no friction rub  No murmur heard  No carotid bruits appreciated  Pulmonary/Chest: Effort normal  No respiratory distress  She has no wheezes  She has no rales  She exhibits no tenderness  Musculoskeletal: Normal range of motion  She exhibits no edema, tenderness or deformity  Left ankle shows no edema  Right ankle has mild, chronic "puffiness  "Nonpitting  Lymphadenopathy:     She has no cervical adenopathy  Neurological: She is alert and oriented to person, place, and time  She has normal reflexes  Skin: Skin is warm and dry  She is not diaphoretic  Psychiatric: She has a normal mood and affect  Her behavior is normal  Judgment and thought content normal    Nursing note and vitals reviewed

## 2019-12-31 LAB
CALCIUM SERPL-MCNC: 10.4 MG/DL (ref 8.6–10.4)
CREAT UR-MCNC: 61 MG/DL (ref 20–275)
HBA1C MFR BLD: 5.8 % OF TOTAL HGB
PROT UR-MCNC: 40 MG/DL (ref 5–24)
PROT/CREAT UR: 0.66 MG/MG CREAT (ref 0.02–0.16)
PROT/CREAT UR: 656 MG/G CREAT (ref 21–161)
PTH-INTACT SERPL-MCNC: 147 PG/ML (ref 14–64)
T4 SERPL-MCNC: 7.9 MCG/DL (ref 5.1–11.9)
TSH SERPL-ACNC: 2.04 MIU/L (ref 0.4–4.5)
URATE SERPL-MCNC: 7.7 MG/DL (ref 2.5–7)

## 2020-01-02 LAB
CHOLEST SERPL-MCNC: 147 MG/DL
CHOLEST/HDLC SERPL: 2.8 (CALC)
HDLC SERPL-MCNC: 52 MG/DL
LDLC SERPL CALC-MCNC: 76 MG/DL (CALC)
NONHDLC SERPL-MCNC: 95 MG/DL (CALC)
TRIGL SERPL-MCNC: 108 MG/DL

## 2020-01-09 ENCOUNTER — OFFICE VISIT (OUTPATIENT)
Dept: NEPHROLOGY | Facility: CLINIC | Age: 85
End: 2020-01-09
Payer: COMMERCIAL

## 2020-01-09 VITALS
HEART RATE: 66 BPM | HEIGHT: 59 IN | DIASTOLIC BLOOD PRESSURE: 74 MMHG | WEIGHT: 148 LBS | BODY MASS INDEX: 29.84 KG/M2 | SYSTOLIC BLOOD PRESSURE: 122 MMHG

## 2020-01-09 DIAGNOSIS — N18.4 CKD (CHRONIC KIDNEY DISEASE), STAGE IV (HCC): Primary | ICD-10-CM

## 2020-01-09 DIAGNOSIS — N25.81 SECONDARY HYPERPARATHYROIDISM OF RENAL ORIGIN (HCC): ICD-10-CM

## 2020-01-09 DIAGNOSIS — I10 HYPERTENSION, UNSPECIFIED TYPE: ICD-10-CM

## 2020-01-09 DIAGNOSIS — N26.1 ATROPHIC KIDNEY, ACQUIRED: ICD-10-CM

## 2020-01-09 DIAGNOSIS — N28.1 COMPLEX RENAL CYST: ICD-10-CM

## 2020-01-09 DIAGNOSIS — E11.42 CONTROLLED TYPE 2 DIABETES MELLITUS WITH DIABETIC POLYNEUROPATHY, WITHOUT LONG-TERM CURRENT USE OF INSULIN (HCC): ICD-10-CM

## 2020-01-09 PROCEDURE — 99214 OFFICE O/P EST MOD 30 MIN: CPT | Performed by: INTERNAL MEDICINE

## 2020-01-09 RX ORDER — LISINOPRIL 20 MG/1
TABLET ORAL
Qty: 90 TABLET | Refills: 3 | Status: SHIPPED | OUTPATIENT
Start: 2020-01-09 | End: 2020-12-15

## 2020-01-09 NOTE — PROGRESS NOTES
NEPHROLOGY OUTPATIENT PROGRESS NOTE   Lacy Lozano 80 y o  female MRN: [id number]  Reason for visit:  Chronic kidney disease    ASSESSMENT and PLAN:  1  Chronic kidney disease, stage IV, baseline creatinine between 1 6 and 1 8, most recent creatinine 1 88, estimated GFR approximately 23, underlying disease secondary to renal atrophy, hypertension, diabetes as well as age-related changes  2  Hypertension, blood pressure overall appears stable, continue with current regimen  3  Lower extremity edema, overall improved, continue with diuretic dosing  4  Secondary hyperparathyroidism, PTH stable 147, calcium of 10 4  5  History of diabetes, recent hemoglobin A1c less than 6  6  Bilateral cysts, some which are complex    · Overall renal function remains fairly stable  · Continue with current antihypertensive and diuretic regimen  · No other changes  · Follow-up in 3-4 months with repeat labs as well as ultrasound at that time  SUBJECTIVE / INTERVAL HISTORY:  She has been doing reasonably well  Denies any recent hospitalizations or ER visits  Her lower extremity swelling has improved  Denies any chest pain or shortness of breath  Denies any dizziness lightheadedness or falls  Home blood pressures have been running in the 130s to 140s  Review of Systems      OBJECTIVE:  /74 (BP Location: Left arm, Patient Position: Sitting, Cuff Size: Adult)   Pulse 66   Ht 4' 11" (1 499 m)   Wt 67 1 kg (148 lb)   LMP  (LMP Unknown)   BMI 29 89 kg/m²   Vitals:    01/09/20 1135   Weight: 67 1 kg (148 lb)       Physical Exam   Constitutional: She is oriented to person, place, and time  No distress  HENT:   Head: Normocephalic  Eyes: No scleral icterus  Neck: Neck supple  Cardiovascular: Normal rate and regular rhythm  Pulmonary/Chest: Effort normal and breath sounds normal    Abdominal: Soft  She exhibits no distension  Musculoskeletal: She exhibits edema (Right greater than left, trace to 1+)  Neurological: She is alert and oriented to person, place, and time  Skin: Skin is warm and dry  No rash noted  Psychiatric: She has a normal mood and affect           Medications:    Current Outpatient Medications:     amLODIPine (NORVASC) 5 mg tablet, Take 1 tablet (5 mg total) by mouth 2 (two) times a day, Disp: 180 tablet, Rfl: 3    aspirin 81 MG tablet, Take 1 tablet by mouth daily, Disp: , Rfl:     Cholecalciferol (VITAMIN D3) 2000 units capsule, Take 1 capsule by mouth daily, Disp: , Rfl:     COLCRYS 0 6 MG tablet, TAKE 1 TABLET (0 6 MG TOTAL) BY MOUTH DAILY, Disp: 90 tablet, Rfl: 3    docusate sodium (COLACE) 100 mg capsule, Take 100 mg by mouth daily, Disp: , Rfl:     furosemide (LASIX) 20 mg tablet, Take 1 tablet (20 mg total) by mouth daily, Disp: 90 tablet, Rfl: 3    gabapentin (NEURONTIN) 300 mg capsule, TAKE 1 CAPSULE BY MOUTH AT BEDTIME, Disp: 540 capsule, Rfl: 0    glucose blood test strip, Test twice a day E11 9 non insulin , Disp: , Rfl:     Lancets (ONETOUCH ULTRASOFT) lancets, by Does not apply route 2 (two) times a day, Disp: , Rfl:     lisinopril (ZESTRIL) 20 mg tablet, TAKE 1 TABLET BY MOUTH EVERY DAY, Disp: 90 tablet, Rfl: 3    simvastatin (ZOCOR) 20 mg tablet, Take 1 tablet (20 mg total) by mouth daily, Disp: 30 tablet, Rfl: 5    Laboratory Results:  Results for orders placed or performed in visit on 01/02/20   Lipid panel   Result Value Ref Range    Total Cholesterol 147 <200 mg/dL    HDL 52 >50 mg/dL    Triglycerides 108 <150 mg/dL    LDL Direct 76 mg/dL (calc)    Chol HDLC Ratio 2 8 <5 0 (calc)    Non-HDL Cholesterol 95 <130 mg/dL (calc)

## 2020-01-09 NOTE — LETTER
January 9, 2020     Breanne Alvarado, [address]    Patient: Dayton Alcantara   YOB: 1927   Date of Visit: 1/9/2020       Dear Dr Coleman Philippe:    Thank you for referring Kristina Camargo to me for evaluation  Below are the relevant portions of my assessment and plan of care  If you have questions, please do not hesitate to call me  I look forward to following Sunday Alonso along with you  Sincerely,        Fredrick Poe DO        CC: No Recipients                       ASSESSMENT and PLAN:  1  Chronic kidney disease, stage IV, baseline creatinine between 1 6 and 1 8, most recent creatinine 1 88, estimated GFR approximately 23, underlying disease secondary to renal atrophy, hypertension, diabetes as well as age-related changes  2  Hypertension, blood pressure overall appears stable, continue with current regimen  3  Lower extremity edema, overall improved, continue with diuretic dosing  4  Secondary hyperparathyroidism, PTH stable 147, calcium of 10 4  5  History of diabetes, recent hemoglobin A1c less than 6  6  Bilateral cysts, some which are complex    · Overall renal function remains fairly stable  · Continue with current antihypertensive and diuretic regimen  · No other changes  · Follow-up in 3-4 months with repeat labs as well as ultrasound at that time

## 2020-01-27 PROBLEM — E78.00 PURE HYPERCHOLESTEROLEMIA: Status: ACTIVE | Noted: 2017-02-13

## 2020-01-28 ENCOUNTER — OFFICE VISIT (OUTPATIENT)
Dept: FAMILY MEDICINE CLINIC | Facility: CLINIC | Age: 85
End: 2020-01-28
Payer: COMMERCIAL

## 2020-01-28 VITALS
BODY MASS INDEX: 30.28 KG/M2 | WEIGHT: 150.2 LBS | HEART RATE: 68 BPM | DIASTOLIC BLOOD PRESSURE: 82 MMHG | HEIGHT: 59 IN | SYSTOLIC BLOOD PRESSURE: 138 MMHG

## 2020-01-28 DIAGNOSIS — M1A.00X0 IDIOPATHIC CHRONIC GOUT WITHOUT TOPHUS, UNSPECIFIED SITE: ICD-10-CM

## 2020-01-28 DIAGNOSIS — Z00.00 MEDICARE ANNUAL WELLNESS VISIT, SUBSEQUENT: ICD-10-CM

## 2020-01-28 DIAGNOSIS — Z23 ENCOUNTER FOR IMMUNIZATION: ICD-10-CM

## 2020-01-28 DIAGNOSIS — I10 ESSENTIAL HYPERTENSION: ICD-10-CM

## 2020-01-28 DIAGNOSIS — N25.81 SECONDARY HYPERPARATHYROIDISM OF RENAL ORIGIN (HCC): ICD-10-CM

## 2020-01-28 DIAGNOSIS — D48.5 NEOPLASM OF UNCERTAIN BEHAVIOR OF SKIN: ICD-10-CM

## 2020-01-28 DIAGNOSIS — E78.00 PURE HYPERCHOLESTEROLEMIA: ICD-10-CM

## 2020-01-28 DIAGNOSIS — G63 POLYNEUROPATHY ASSOCIATED WITH UNDERLYING DISEASE (HCC): ICD-10-CM

## 2020-01-28 DIAGNOSIS — E11.42 CONTROLLED TYPE 2 DIABETES MELLITUS WITH DIABETIC POLYNEUROPATHY, WITHOUT LONG-TERM CURRENT USE OF INSULIN (HCC): Primary | ICD-10-CM

## 2020-01-28 DIAGNOSIS — N18.4 CKD (CHRONIC KIDNEY DISEASE), STAGE IV (HCC): ICD-10-CM

## 2020-01-28 PROCEDURE — 1125F AMNT PAIN NOTED PAIN PRSNT: CPT | Performed by: FAMILY MEDICINE

## 2020-01-28 PROCEDURE — 1160F RVW MEDS BY RX/DR IN RCRD: CPT | Performed by: FAMILY MEDICINE

## 2020-01-28 PROCEDURE — 3725F SCREEN DEPRESSION PERFORMED: CPT | Performed by: FAMILY MEDICINE

## 2020-01-28 PROCEDURE — 1036F TOBACCO NON-USER: CPT | Performed by: FAMILY MEDICINE

## 2020-01-28 PROCEDURE — G0439 PPPS, SUBSEQ VISIT: HCPCS | Performed by: FAMILY MEDICINE

## 2020-01-28 PROCEDURE — 1170F FXNL STATUS ASSESSED: CPT | Performed by: FAMILY MEDICINE

## 2020-01-28 PROCEDURE — 99214 OFFICE O/P EST MOD 30 MIN: CPT | Performed by: FAMILY MEDICINE

## 2020-01-28 NOTE — PROGRESS NOTES
Assessment and Plan:       Pt presents for AWV  1  Immunizations reviewed  She has a hx og [employer] - so no further vaccines  2  Advanced directives completed  3  Uses cane for added stability but ambulation is very good  4  For full evaluation of medical conditions see today's office note  Problem List Items Addressed This Visit        Endocrine    Controlled type 2 diabetes mellitus with diabetic polyneuropathy ([facility] [address] ) - Primary    Secondary hyperparathyroidism of renal origin [facility])       Cardiovascular and Mediastinum    Essential hypertension       Nervous and Auditory    Peripheral neuropathy       Genitourinary    CKD (chronic kidney disease), stage IV (HCC)       Other    Pure hypercholesterolemia    Gout    Vertebral anomaly      Other Visit Diagnoses     Encounter for immunization        Medicare annual wellness visit, subsequent               Preventive health issues were discussed with patient, and age appropriate screening tests were ordered as noted in patient's After Visit Summary  Personalized health advice and appropriate referrals for health education or preventive services given if needed, as noted in patient's After Visit Summary       History of Present Illness:     Patient presents for Medicare Annual Wellness visit    Patient Care Team:  Efe Cruz MD as PCP - General  Kearney Logan, MD Vito [place], DO Ardine Dubois, MD Manya Dakin, MD Si Amor, PA-C Trena Drones, PA-C Kyla Pfeiffer, PA-C     Problem List:     Patient Active Problem List   Diagnosis    Vitamin D insufficiency    Retinal hole of right eye    Peripheral neuropathy    Essential hypertension    Pure hypercholesterolemia    Gout    Gait disturbance    Ectropion    Controlled type 2 diabetes mellitus with diabetic polyneuropathy ([address] )    Complex renal cyst    CKD (chronic kidney disease), stage IV ([address] )    Borderline glaucoma    Bilateral deafness    Atrophic kidney, acquired  Allergic rhinitis    Secondary hyperparathyroidism of renal origin ([place] [address] )    Vertebral anomaly    Hemorrhoid    Bowel habit changes    Rectal discharge    Generalized edema    Fecal smearing      Past Medical and Surgical History:     Past Medical History:   Diagnosis Date    Diabetes mellitus ([place] [address] )     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 ([place] [address] )     LAST ASSESSED: [id number]    Hyperlipidemia     Hypertension     Renal disorder     Squamous cell carcinoma, scalp/neck     LAST ASSESSED: [id number]     Past Surgical History:   Procedure Laterality Date    BLADDER SURGERY      LAST ASSESSED: [id number]    PELVIC FLOOR REPAIR      VAGINAL SURG INSERTION OF MESH    TOTAL ABDOMINAL HYSTERECTOMY W/ BILATERAL SALPINGOOPHORECTOMY      LAST ASSESSED: [id number]      Family History:     Family History   Problem Relation Age of Onset    Hypertension Mother     Hypertension Father     Kidney disease Sister         CHRONIC    Alcohol abuse Family     Substance Abuse Family       Social History:     Social History     Socioeconomic History    Marital status:       Spouse name: None    Number of children: None    Years of education: None    Highest education level: None   Occupational History    None   Social Needs    Financial resource strain: None    Food insecurity:     Worry: None     Inability: None    Transportation needs:     Medical: None     Non-medical: None   Tobacco Use    Smoking status: Former Smoker    Smokeless tobacco: Never Used   Substance and Sexual Activity    Alcohol use: Yes     Comment: SOCIAL    Drug use: No    Sexual activity: None   Lifestyle    Physical activity:     Days per week: None     Minutes per session: None    Stress: None   Relationships    Social connections:     Talks on phone: None     Gets together: None     Attends Zoroastrian service: None     Active member of club or organization: None     Attends meetings of clubs or organizations: None Relationship status: None    Intimate partner violence:     Fear of current or ex partner: None     Emotionally abused: None     Physically abused: None     Forced sexual activity: None   Other Topics Concern    None   Social History Narrative    DAILY CAFFEINE CONSUMPTION, 1 SERVING A DAY    LIVES INDEPENDENTLY       Medications and Allergies:     Current Outpatient Medications   Medication Sig Dispense Refill    amLODIPine (NORVASC) 5 mg tablet Take 1 tablet (5 mg total) by mouth 2 (two) times a day 180 tablet 3    aspirin 81 MG tablet Take 1 tablet by mouth daily      Cholecalciferol (VITAMIN D3) 2000 units capsule Take 1 capsule by mouth daily      COLCRYS 0 6 MG tablet TAKE 1 TABLET (0 6 MG TOTAL) BY MOUTH DAILY 90 tablet 3    docusate sodium (COLACE) 100 mg capsule Take 100 mg by mouth daily      furosemide (LASIX) 20 mg tablet Take 1 tablet (20 mg total) by mouth daily 90 tablet 3    gabapentin (NEURONTIN) 300 mg capsule TAKE 1 CAPSULE BY MOUTH AT BEDTIME 540 capsule 0    glucose blood test strip Test twice a day E11 9 non insulin       Lancets (ONETOUCH ULTRASOFT) lancets by Does not apply route 2 (two) times a day      lisinopril (ZESTRIL) 20 mg tablet TAKE 1 TABLET BY MOUTH EVERY DAY 90 tablet 3    simvastatin (ZOCOR) 20 mg tablet Take 1 tablet (20 mg total) by mouth daily 30 tablet 5     No current facility-administered medications for this visit  Allergies   Allergen Reactions    Flu Virus Vaccine      Other reaction(s): HX of Guillain-[place] Syndrome    Sulfa Antibiotics       Immunizations: There is no immunization history for the selected administration types on file for this patient  Health Maintenance: There are no preventive care reminders to display for this patient  There are no preventive care reminders to display for this patient     Medicare Health Risk Assessment:     /82 (BP Location: Left arm, Patient Position: Sitting)   Pulse 68   Ht 4' 11" (1 499 m)   Wt 68 1 kg (150 lb 3 2 oz)   LMP  (LMP Unknown)   BMI 30 34 kg/m²      Narendra Loomis is here for her Subsequent Wellness visit  Health Risk Assessment:   Patient rates overall health as very good  Patient feels that their physical health rating is same  Eyesight was rated as same  Hearing was rated as slightly worse  Patient feels that their emotional and mental health rating is same  Pain experienced in the last 7 days has been none  Patient states that she has experienced no weight loss or gain in last 6 months  Depression Screening:   PHQ-2 Score: 0      Fall Risk Screening: In the past year, patient has experienced: no history of falling in past year      Urinary Incontinence Screening:   Patient has not leaked urine accidently in the last six months  Home Safety:  Patient has trouble with stairs inside or outside of their home  Patient has working smoke alarms and has no working carbon monoxide detector  Home safety hazards include: none  Nutrition:   Current diet is Unhealthy  Medications:   Patient is currently taking over-the-counter supplements  OTC medications include: see medication list  Patient is able to manage medications  Activities of Daily Living (ADLs)/Instrumental Activities of Daily Living (IADLs):   Walk and transfer into and out of bed and chair?: Yes  Dress and groom yourself?: Yes    Bathe or shower yourself?: Yes    Feed yourself? Yes  Do your laundry/housekeeping?: Yes  Manage your money, pay your bills and track your expenses?: Yes  Make your own meals?: Yes    Do your own shopping?: Yes    Previous Hospitalizations:   Any hospitalizations or ED visits within the last 12 months?: No      Advance Care Planning:   Living will: Yes    Durable POA for healthcare:  Yes    Advanced directive: Yes      Cognitive Screening:   Provider or family/friend/caregiver concerned regarding cognition?: No    PREVENTIVE SCREENINGS      Cardiovascular Screening:    General: Screening Not Indicated and History Lipid Disorder      Diabetes Screening:     General: History Diabetes and Screening Current      Colorectal Cancer Screening:     General: Screening Not Indicated      Breast Cancer Screening:     General: Screening Not Indicated      Cervical Cancer Screening:    General: Screening Not Indicated      Abdominal Aortic Aneurysm (AAA) Screening:        General: Screening Not Indicated      Lung Cancer Screening:     General: Screening Not Indicated      Hepatitis C Screening:    General: Screening Not Indicated      Sadie Watson MD

## 2020-01-28 NOTE — PROGRESS NOTES
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  Refer to Derm  Recent labwork reviewed with patient and her daughter  RTO 3 months  Subjective:      Patient ID: Josemanuel Zamorano is a 80 y o  female  CC:    Chief Complaint   Patient presents with    Follow-up    Hypertension    Diabetes    Medicare Wellness Visit       HPI:    Feeling well  Uses cane for added stability but no problems with ambulation or ADLs  Meds are unchanged  No acute complaints  The following portions of the patient's history were reviewed and updated as appropriate: allergies, current medications, past family history, past medical history, past social history, past surgical history and problem list       Review of Systems   Constitutional:        See HPI   HENT: Negative for congestion, ear pain, mouth sores, sinus pressure and trouble swallowing  Eyes: Negative for discharge, redness and itching  Respiratory: Negative for apnea, cough, chest tightness, shortness of breath, wheezing and stridor  Cardiovascular: Negative for chest pain, palpitations and leg swelling  Gastrointestinal: Negative for abdominal distention, abdominal pain, blood in stool, constipation, diarrhea, nausea and vomiting  Endocrine: Negative for cold intolerance and heat intolerance  Genitourinary: Negative for difficulty urinating, dysuria, flank pain and urgency  Musculoskeletal: Negative for arthralgias and myalgias  Skin: Negative for rash  Neurological: Negative for dizziness, seizures, syncope, speech difficulty, weakness, light-headedness, numbness and headaches  Hematological: Negative for adenopathy  Psychiatric/Behavioral: Negative for agitation, behavioral problems, confusion and sleep disturbance  The patient is not nervous/anxious  Data to review:       Objective:    Vitals:    01/28/20 1112   BP: 138/82   BP Location: Left arm   Patient Position: Sitting   Pulse: 68   Weight: 68 1 kg (150 lb 3 2 oz)   Height: 4' 11" (1 499 m)        Physical Exam   Constitutional: She is oriented to person, place, and time  She appears well-developed and well-nourished  No distress  HENT:   Head: Normocephalic and atraumatic  Right Ear: External ear normal    Left Ear: External ear normal    Eyes: Pupils are equal, round, and reactive to light  Conjunctivae and EOM are normal  No scleral icterus  Neck: Normal range of motion  Neck supple  Cardiovascular: Normal rate, regular rhythm, normal heart sounds and intact distal pulses  Exam reveals no gallop and no friction rub  No murmur heard  No carotid bruits appreciated  Pulmonary/Chest: Effort normal  No respiratory distress  She has no wheezes  She has no rales  She exhibits no tenderness  Musculoskeletal: Normal range of motion  She exhibits no edema, tenderness or deformity  Grossly intact  Uses cane for added stability  Lymphadenopathy:     She has no cervical adenopathy  Neurological: She is alert and oriented to person, place, and time  She has normal reflexes  Skin: Skin is warm and dry  She is not diaphoretic  Well-circumscribed crusted lesions on dorsal surface of hands, L forehead and R cheek  Psychiatric: She has a normal mood and affect  Her behavior is normal  Judgment and thought content normal    Nursing note and vitals reviewed  BMI Counseling:  Body mass index is 30 34 kg/m²  The BMI is above normal  Nutrition recommendations include consuming healthier snacks  Exercise recommendations include exercising 3-5 times per week

## 2020-03-22 DIAGNOSIS — E78.2 MIXED HYPERLIPIDEMIA: ICD-10-CM

## 2020-03-23 RX ORDER — SIMVASTATIN 20 MG
TABLET ORAL
Qty: 90 TABLET | Refills: 1 | Status: SHIPPED | OUTPATIENT
Start: 2020-03-23 | End: 2020-09-27

## 2020-04-13 DIAGNOSIS — M1A.00X0 IDIOPATHIC CHRONIC GOUT WITHOUT TOPHUS, UNSPECIFIED SITE: ICD-10-CM

## 2020-04-13 RX ORDER — COLCHICINE 0.6 MG/1
TABLET, FILM COATED ORAL
Qty: 90 TABLET | Refills: 0 | Status: SHIPPED | OUTPATIENT
Start: 2020-04-13 | End: 2020-04-18

## 2020-04-15 ENCOUNTER — TELEPHONE (OUTPATIENT)
Dept: NEPHROLOGY | Facility: CLINIC | Age: 85
End: 2020-04-15

## 2020-04-18 DIAGNOSIS — M1A.00X0 IDIOPATHIC CHRONIC GOUT WITHOUT TOPHUS, UNSPECIFIED SITE: ICD-10-CM

## 2020-04-18 RX ORDER — COLCHICINE 0.6 MG/1
TABLET, FILM COATED ORAL
Qty: 90 TABLET | Refills: 3 | Status: SHIPPED | OUTPATIENT
Start: 2020-04-18 | End: 2021-05-19

## 2020-05-05 ENCOUNTER — TELEMEDICINE (OUTPATIENT)
Dept: FAMILY MEDICINE CLINIC | Facility: CLINIC | Age: 85
End: 2020-05-05
Payer: COMMERCIAL

## 2020-05-05 DIAGNOSIS — E55.9 VITAMIN D INSUFFICIENCY: ICD-10-CM

## 2020-05-05 DIAGNOSIS — N18.4 CKD (CHRONIC KIDNEY DISEASE), STAGE IV (HCC): ICD-10-CM

## 2020-05-05 DIAGNOSIS — E11.42 CONTROLLED TYPE 2 DIABETES MELLITUS WITH DIABETIC POLYNEUROPATHY, WITHOUT LONG-TERM CURRENT USE OF INSULIN (HCC): ICD-10-CM

## 2020-05-05 DIAGNOSIS — E78.00 PURE HYPERCHOLESTEROLEMIA: ICD-10-CM

## 2020-05-05 DIAGNOSIS — I10 ESSENTIAL HYPERTENSION: Primary | ICD-10-CM

## 2020-05-05 DIAGNOSIS — R60.1 GENERALIZED EDEMA: ICD-10-CM

## 2020-05-05 DIAGNOSIS — G63 POLYNEUROPATHY ASSOCIATED WITH UNDERLYING DISEASE (HCC): ICD-10-CM

## 2020-05-05 PROCEDURE — 99214 OFFICE O/P EST MOD 30 MIN: CPT | Performed by: FAMILY MEDICINE

## 2020-05-05 PROCEDURE — 1160F RVW MEDS BY RX/DR IN RCRD: CPT | Performed by: FAMILY MEDICINE

## 2020-06-26 DIAGNOSIS — E11.42 CONTROLLED TYPE 2 DIABETES MELLITUS WITH DIABETIC POLYNEUROPATHY, WITHOUT LONG-TERM CURRENT USE OF INSULIN (HCC): Primary | ICD-10-CM

## 2020-07-01 ENCOUNTER — TRANSCRIBE ORDERS (OUTPATIENT)
Dept: FAMILY MEDICINE CLINIC | Facility: CLINIC | Age: 85
End: 2020-07-01

## 2020-07-01 DIAGNOSIS — D48.5 NEOPLASM OF UNCERTAIN BEHAVIOR OF SKIN: Primary | ICD-10-CM

## 2020-07-08 DIAGNOSIS — M1A.00X0 IDIOPATHIC CHRONIC GOUT WITHOUT TOPHUS, UNSPECIFIED SITE: ICD-10-CM

## 2020-07-08 RX ORDER — GABAPENTIN 300 MG/1
CAPSULE ORAL
Qty: 90 CAPSULE | Refills: 5 | Status: SHIPPED | OUTPATIENT
Start: 2020-07-08 | End: 2021-08-03 | Stop reason: SDUPTHER

## 2020-08-06 PROBLEM — E11.22 TYPE 2 DIABETES MELLITUS WITH DIABETIC CHRONIC KIDNEY DISEASE (HCC): Status: ACTIVE | Noted: 2020-08-06

## 2020-08-11 ENCOUNTER — OFFICE VISIT (OUTPATIENT)
Dept: FAMILY MEDICINE CLINIC | Facility: CLINIC | Age: 85
End: 2020-08-11
Payer: COMMERCIAL

## 2020-08-11 VITALS
HEART RATE: 64 BPM | WEIGHT: 154 LBS | HEIGHT: 59 IN | TEMPERATURE: 98.2 F | RESPIRATION RATE: 16 BRPM | BODY MASS INDEX: 31.04 KG/M2 | DIASTOLIC BLOOD PRESSURE: 74 MMHG | SYSTOLIC BLOOD PRESSURE: 134 MMHG

## 2020-08-11 DIAGNOSIS — I10 ESSENTIAL HYPERTENSION: ICD-10-CM

## 2020-08-11 DIAGNOSIS — N18.4 CKD (CHRONIC KIDNEY DISEASE), STAGE IV (HCC): ICD-10-CM

## 2020-08-11 DIAGNOSIS — E11.22 TYPE 2 DIABETES MELLITUS WITH STAGE 4 CHRONIC KIDNEY DISEASE, WITHOUT LONG-TERM CURRENT USE OF INSULIN (HCC): ICD-10-CM

## 2020-08-11 DIAGNOSIS — N18.4 TYPE 2 DIABETES MELLITUS WITH STAGE 4 CHRONIC KIDNEY DISEASE, WITHOUT LONG-TERM CURRENT USE OF INSULIN (HCC): ICD-10-CM

## 2020-08-11 DIAGNOSIS — E11.42 CONTROLLED TYPE 2 DIABETES MELLITUS WITH DIABETIC POLYNEUROPATHY, WITHOUT LONG-TERM CURRENT USE OF INSULIN (HCC): Primary | ICD-10-CM

## 2020-08-11 DIAGNOSIS — E78.00 PURE HYPERCHOLESTEROLEMIA: ICD-10-CM

## 2020-08-11 DIAGNOSIS — R60.0 LOWER EXTREMITY EDEMA: ICD-10-CM

## 2020-08-11 DIAGNOSIS — G63 POLYNEUROPATHY ASSOCIATED WITH UNDERLYING DISEASE (HCC): ICD-10-CM

## 2020-08-11 DIAGNOSIS — M1A.00X0 IDIOPATHIC CHRONIC GOUT WITHOUT TOPHUS, UNSPECIFIED SITE: ICD-10-CM

## 2020-08-11 LAB — SL AMB POCT HEMOGLOBIN AIC: 5.3 (ref ?–6.5)

## 2020-08-11 PROCEDURE — 3008F BODY MASS INDEX DOCD: CPT | Performed by: FAMILY MEDICINE

## 2020-08-11 PROCEDURE — 3078F DIAST BP <80 MM HG: CPT | Performed by: FAMILY MEDICINE

## 2020-08-11 PROCEDURE — 92250 FUNDUS PHOTOGRAPHY W/I&R: CPT | Performed by: FAMILY MEDICINE

## 2020-08-11 PROCEDURE — 3075F SYST BP GE 130 - 139MM HG: CPT | Performed by: FAMILY MEDICINE

## 2020-08-11 PROCEDURE — 1160F RVW MEDS BY RX/DR IN RCRD: CPT | Performed by: FAMILY MEDICINE

## 2020-08-11 PROCEDURE — 3066F NEPHROPATHY DOC TX: CPT | Performed by: FAMILY MEDICINE

## 2020-08-11 PROCEDURE — 3044F HG A1C LEVEL LT 7.0%: CPT | Performed by: FAMILY MEDICINE

## 2020-08-11 PROCEDURE — 1036F TOBACCO NON-USER: CPT | Performed by: FAMILY MEDICINE

## 2020-08-11 PROCEDURE — 99214 OFFICE O/P EST MOD 30 MIN: CPT | Performed by: FAMILY MEDICINE

## 2020-08-11 PROCEDURE — 83036 HEMOGLOBIN GLYCOSYLATED A1C: CPT | Performed by: FAMILY MEDICINE

## 2020-08-11 NOTE — PROGRESS NOTES
Assessment and Plan:      1  Diabetes mellitus - hemoglobin A1c today is 5 3  Doing very well on diet control  Iris exam performed today  2  Polyneuropathy - gabapentin  3  Chronic kidney disease - renal function remains stable  Avoid nephrotoxic agents and contrast dye    4  Lower extremity edema - patient remains on Lasix, several times a week  She states this regimen  She notes that when she elevates her legs, or awakens in the morning, the swelling improves  Swelling may be related to amlodipine, so if symptoms worsen or persist, consider stopping amlodipine  4  Hypertension - stable on lisinopril 20 mg daily and amlodipine 5 mg daily  5  Gout - stable with no recent flare ups  Continue Colcrys  6  Hyperlipidemia - stable on simvastatin 20 mg daily  Follow-up 3-4 months  Subjective:      Patient ID: Desmond Other is a 80 y o  female  CC:    Chief Complaint   Patient presents with    Follow-up     pt here for a follow up on her chronic conditions  HOWARD Martinez       HPI:    Pleasant 70-year-old white female presents today with her daughter to review chronic medical conditions  Her daughter adds to history  Patient lives alone but family lives close spine  She is doing very well  She is fully capable of all her ADLs and continues to cook and bake for her family  She has no acute complaints today  The following portions of the patient's history were reviewed and updated as appropriate: allergies, current medications, past family history, past medical history, past social history, past surgical history and problem list       Review of Systems   Constitutional:        See HPI   HENT: Negative for congestion, ear pain, mouth sores, sinus pressure and trouble swallowing  Eyes: Negative for discharge, redness and itching  Respiratory: Negative for apnea, cough, chest tightness, shortness of breath, wheezing and stridor      Cardiovascular: Positive for leg swelling  Negative for chest pain and palpitations  Lower extremity edema, usually worse at the end of the day  Right leg is always worse  History of broken ankle  Denies shortness of breath   Gastrointestinal: Negative for abdominal distention, abdominal pain, blood in stool, constipation, diarrhea, nausea and vomiting  Endocrine: Negative for cold intolerance and heat intolerance  Genitourinary: Negative for difficulty urinating, dysuria, flank pain and urgency  Musculoskeletal: Negative for arthralgias and myalgias  Skin: Negative for rash  Neurological: Negative for dizziness, seizures, syncope, speech difficulty, weakness, light-headedness, numbness and headaches  Hematological: Negative for adenopathy  Psychiatric/Behavioral: Negative for agitation, behavioral problems, confusion and sleep disturbance  The patient is not nervous/anxious  Data to review:       Objective:    Vitals:    08/11/20 0906   BP: 134/74   BP Location: Left arm   Patient Position: Sitting   Cuff Size: Standard   Pulse: 64   Resp: 16   Temp: 98 2 °F (36 8 °C)   TempSrc: Temporal   Weight: 69 9 kg (154 lb)   Height: 4' 11" (1 499 m)        Physical Exam  Vitals signs and nursing note reviewed  Constitutional:       General: She is not in acute distress  Appearance: She is well-developed  She is not diaphoretic  HENT:      Head: Normocephalic and atraumatic  Right Ear: External ear normal       Left Ear: External ear normal    Eyes:      General: No scleral icterus  Conjunctiva/sclera: Conjunctivae normal       Pupils: Pupils are equal, round, and reactive to light  Neck:      Musculoskeletal: Normal range of motion and neck supple  Cardiovascular:      Rate and Rhythm: Normal rate and regular rhythm  Heart sounds: Normal heart sounds  No murmur  No friction rub  No gallop  Comments: No carotid bruits appreciated    Pulmonary:      Effort: Pulmonary effort is normal  No respiratory distress  Breath sounds: No wheezing or rales  Chest:      Chest wall: No tenderness  Musculoskeletal: Normal range of motion  General: No tenderness or deformity  Right lower leg: Edema present  Left lower leg: Edema present  Comments: Bilateral lower extremity edema, right greater than left, 1+  Lymphadenopathy:      Cervical: No cervical adenopathy  Skin:     General: Skin is warm and dry  Neurological:      Mental Status: She is alert and oriented to person, place, and time  Deep Tendon Reflexes: Reflexes are normal and symmetric  Psychiatric:         Behavior: Behavior normal          Thought Content:  Thought content normal          Judgment: Judgment normal

## 2020-08-12 LAB
LEFT EYE DIABETIC RETINOPATHY: ABNORMAL
LEFT EYE IMAGE QUALITY: ABNORMAL
LEFT EYE MACULAR EDEMA: ABNORMAL
LEFT EYE OTHER RETINOPATHY: ABNORMAL
RIGHT EYE DIABETIC RETINOPATHY: ABNORMAL
RIGHT EYE IMAGE QUALITY: ABNORMAL
RIGHT EYE MACULAR EDEMA: ABNORMAL
RIGHT EYE OTHER RETINOPATHY: ABNORMAL
SEVERITY (EYE EXAM): ABNORMAL

## 2020-08-15 DIAGNOSIS — E11.42 CONTROLLED TYPE 2 DIABETES MELLITUS WITH DIABETIC POLYNEUROPATHY, WITHOUT LONG-TERM CURRENT USE OF INSULIN (HCC): ICD-10-CM

## 2020-08-17 RX ORDER — BLOOD SUGAR DIAGNOSTIC
STRIP MISCELLANEOUS
Qty: 100 EACH | Refills: 3 | Status: SHIPPED | OUTPATIENT
Start: 2020-08-17

## 2020-09-08 LAB
ALBUMIN SERPL-MCNC: 3.9 G/DL (ref 3.6–5.1)
ALBUMIN/CREAT UR: 312 MCG/MG CREAT
BASOPHILS # BLD AUTO: 39 CELLS/UL (ref 0–200)
BASOPHILS NFR BLD AUTO: 0.7 %
BUN SERPL-MCNC: 34 MG/DL (ref 7–25)
BUN/CREAT SERPL: 20 (CALC) (ref 6–22)
CALCIUM SERPL-MCNC: 10.2 MG/DL (ref 8.6–10.4)
CALCIUM SERPL-MCNC: 10.2 MG/DL (ref 8.6–10.4)
CHLORIDE SERPL-SCNC: 113 MMOL/L (ref 98–110)
CO2 SERPL-SCNC: 24 MMOL/L (ref 20–32)
CREAT SERPL-MCNC: 1.74 MG/DL (ref 0.6–0.88)
CREAT UR-MCNC: 60 MG/DL (ref 20–275)
EOSINOPHIL # BLD AUTO: 88 CELLS/UL (ref 15–500)
EOSINOPHIL NFR BLD AUTO: 1.6 %
ERYTHROCYTE [DISTWIDTH] IN BLOOD BY AUTOMATED COUNT: 12.7 % (ref 11–15)
GLUCOSE SERPL-MCNC: 104 MG/DL (ref 65–99)
HCT VFR BLD AUTO: 35.2 % (ref 35–45)
HGB BLD-MCNC: 11.5 G/DL (ref 11.7–15.5)
LYMPHOCYTES # BLD AUTO: 1348 CELLS/UL (ref 850–3900)
LYMPHOCYTES NFR BLD AUTO: 24.5 %
MCH RBC QN AUTO: 31.4 PG (ref 27–33)
MCHC RBC AUTO-ENTMCNC: 32.7 G/DL (ref 32–36)
MCV RBC AUTO: 96.2 FL (ref 80–100)
MICROALBUMIN UR-MCNC: 18.7 MG/DL
MONOCYTES # BLD AUTO: 358 CELLS/UL (ref 200–950)
MONOCYTES NFR BLD AUTO: 6.5 %
NEUTROPHILS # BLD AUTO: 3669 CELLS/UL (ref 1500–7800)
NEUTROPHILS NFR BLD AUTO: 66.7 %
PHOSPHATE SERPL-MCNC: 2.9 MG/DL (ref 2.1–4.3)
PLATELET # BLD AUTO: 150 THOUSAND/UL (ref 140–400)
PMV BLD REES-ECKER: 13.1 FL (ref 7.5–12.5)
POTASSIUM SERPL-SCNC: 5 MMOL/L (ref 3.5–5.3)
PTH-INTACT SERPL-MCNC: 190 PG/ML (ref 14–64)
RBC # BLD AUTO: 3.66 MILLION/UL (ref 3.8–5.1)
SL AMB EGFR AFRICAN AMERICAN: 29 ML/MIN/1.73M2
SL AMB EGFR NON AFRICAN AMERICAN: 25 ML/MIN/1.73M2
SODIUM SERPL-SCNC: 144 MMOL/L (ref 135–146)
URATE SERPL-MCNC: 7.4 MG/DL (ref 2.5–7)
WBC # BLD AUTO: 5.5 THOUSAND/UL (ref 3.8–10.8)

## 2020-09-14 ENCOUNTER — OFFICE VISIT (OUTPATIENT)
Dept: NEPHROLOGY | Facility: CLINIC | Age: 85
End: 2020-09-14
Payer: COMMERCIAL

## 2020-09-14 VITALS
HEIGHT: 59 IN | RESPIRATION RATE: 18 BRPM | HEART RATE: 62 BPM | DIASTOLIC BLOOD PRESSURE: 62 MMHG | SYSTOLIC BLOOD PRESSURE: 128 MMHG | WEIGHT: 146 LBS | BODY MASS INDEX: 29.43 KG/M2

## 2020-09-14 DIAGNOSIS — N18.4 ANEMIA OF CHRONIC RENAL FAILURE, STAGE 4 (SEVERE) (HCC): ICD-10-CM

## 2020-09-14 DIAGNOSIS — N18.4 TYPE 2 DIABETES MELLITUS WITH STAGE 4 CHRONIC KIDNEY DISEASE, WITHOUT LONG-TERM CURRENT USE OF INSULIN (HCC): ICD-10-CM

## 2020-09-14 DIAGNOSIS — N28.1 COMPLEX RENAL CYST: ICD-10-CM

## 2020-09-14 DIAGNOSIS — N18.4 CKD (CHRONIC KIDNEY DISEASE), STAGE IV (HCC): Primary | ICD-10-CM

## 2020-09-14 DIAGNOSIS — N25.81 SECONDARY HYPERPARATHYROIDISM OF RENAL ORIGIN (HCC): ICD-10-CM

## 2020-09-14 DIAGNOSIS — R60.1 GENERALIZED EDEMA: ICD-10-CM

## 2020-09-14 DIAGNOSIS — E11.22 TYPE 2 DIABETES MELLITUS WITH STAGE 4 CHRONIC KIDNEY DISEASE, WITHOUT LONG-TERM CURRENT USE OF INSULIN (HCC): ICD-10-CM

## 2020-09-14 DIAGNOSIS — D63.1 ANEMIA OF CHRONIC RENAL FAILURE, STAGE 4 (SEVERE) (HCC): ICD-10-CM

## 2020-09-14 DIAGNOSIS — N26.1 ATROPHIC KIDNEY, ACQUIRED: ICD-10-CM

## 2020-09-14 PROCEDURE — 99214 OFFICE O/P EST MOD 30 MIN: CPT | Performed by: INTERNAL MEDICINE

## 2020-09-14 NOTE — PROGRESS NOTES
NEPHROLOGY OUTPATIENT PROGRESS NOTE   Kira Ruano 80 y o  female MRN: [id number]  Reason for visit:  Chronic kidney disease    ASSESSMENT and PLAN:  1  Chronic kidney disease, baseline creatinine in the mid 1s, most recent creatinine stable 1 74, estimated GFR 2  2  Hypertension, blood pressure appears well controlled, continue with current regimen  3  Bilateral lower extremity swelling, right greater than left, resume Lasix therapy 20 mg daily if persistently swollen, will proceed with lower extremity ultrasound  4  Complex renal cysts, repeat renal ultrasound  5  Anemia of chronic kidney disease hemoglobin stable 11 5  6  Hyperparathyroidism secondary to CKD, PTH of 190, calcium borderline at 10 2  7  Diabetes with recent hemoglobin A1c of 5 3    · Overall renal function remains stable, within previous baseline  · Blood pressure under good control  · Resume Lasix this time at 20 mg daily  · Regarding persistent lower extremity swelling, agreeable to proceed with lower extremity Doppler  · Repeat renal ultrasound given bilateral complex renal cysts  · Follow-up in 6 months with repeat labs at that time  SUBJECTIVE / INTERVAL HISTORY:  She has been doing reasonably well  However she is complaining of increasing lower extremity swelling right greater than left  Unfortunately she had stopped her diuretic therapy given urinary frequency  She has resume diuretic therapy once daily starting on Friday  Also agreeable to start lower extremity stockings  No reports of chest pain shortness of breath  No appetite changes  Denies any dizziness lightheadedness or falls  OBJECTIVE:  /62 (BP Location: Left arm, Patient Position: Sitting, Cuff Size: Standard)   Pulse 62   Resp 18   Ht 4' 11" (1 499 m)   Wt 66 2 kg (146 lb)   LMP  (LMP Unknown)   BMI 29 49 kg/m²   Vitals:    09/14/20 1002   Weight: 66 2 kg (146 lb)       Physical Exam  Constitutional:       Appearance: Normal appearance   She is not ill-appearing  HENT:      Head: Normocephalic and atraumatic  Eyes:      General: No scleral icterus  Cardiovascular:      Rate and Rhythm: Normal rate and regular rhythm  Pulmonary:      Effort: Pulmonary effort is normal       Breath sounds: Normal breath sounds  Abdominal:      General: There is no distension  Palpations: Abdomen is soft  Tenderness: There is no abdominal tenderness  Musculoskeletal:      Right lower leg: Edema (2+) present  Left lower leg: Edema (1+) present  Skin:     General: Skin is warm and dry  Findings: No rash  Neurological:      Mental Status: She is alert and oriented to person, place, and time     Psychiatric:         Mood and Affect: Mood normal            Medications:    Current Outpatient Medications:     amLODIPine (NORVASC) 5 mg tablet, Take 1 tablet (5 mg total) by mouth 2 (two) times a day, Disp: 180 tablet, Rfl: 3    aspirin 81 MG tablet, Take 1 tablet by mouth daily, Disp: , Rfl:     Cholecalciferol (VITAMIN D3) 2000 units capsule, Take 1 capsule by mouth daily, Disp: , Rfl:     COLCRYS 0 6 MG tablet, TAKE 1 TABLET BY MOUTH EVERY DAY, Disp: 90 tablet, Rfl: 3    Contour Test test strip, TEST AS DIRECTED, Disp: 100 each, Rfl: 3    docusate sodium (COLACE) 100 mg capsule, Take 100 mg by mouth daily, Disp: , Rfl:     furosemide (LASIX) 20 mg tablet, Take 1 tablet (20 mg total) by mouth daily, Disp: 90 tablet, Rfl: 3    gabapentin (NEURONTIN) 300 mg capsule, TAKE 1 CAPSULE BY MOUTH EVERYDAY AT BEDTIME, Disp: 90 capsule, Rfl: 5    Lancets (ONETOUCH ULTRASOFT) lancets, by Does not apply route 2 (two) times a day, Disp: , Rfl:     lisinopril (ZESTRIL) 20 mg tablet, TAKE 1 TABLET BY MOUTH EVERY DAY, Disp: 90 tablet, Rfl: 3    simvastatin (ZOCOR) 20 mg tablet, TAKE 1 TABLET BY MOUTH EVERY DAY, Disp: 90 tablet, Rfl: 1    Laboratory Results:  Results for orders placed or performed in visit on 09/04/20   Microalbumin, Random Urine (W/Creatinine) Result Value Ref Range    Creatinine, Urine 60 20 - 275 mg/dL    Microalbum  ,U,Random 18 7 See Note: mg/dL    Microalb/Creat Ratio 312 (H) <30 mcg/mg creat   PTH, Intact and Calcium   Result Value Ref Range    PTH, Intact 190 (H) 14 - 64 pg/mL    Calcium 10 2 8 6 - 10 4 mg/dL   Uric acid   Result Value Ref Range    Uric Acid 7 4 (H) 2 5 - 7 0 mg/dL   Renal function panel   Result Value Ref Range    Glucose, Random 104 (H) 65 - 99 mg/dL    BUN 34 (H) 7 - 25 mg/dL    Creatinine 1 74 (H) 0 60 - 0 88 mg/dL    eGFR Non  25 (L) > OR = 60 mL/min/1 73m2    eGFR  29 (L) > OR = 60 mL/min/1 73m2    SL AMB BUN/CREATININE RATIO 20 6 - 22 (calc)    Sodium 144 135 - 146 mmol/L    Potassium 5 0 3 5 - 5 3 mmol/L    Chloride 113 (H) 98 - 110 mmol/L    CO2 24 20 - 32 mmol/L    Calcium 10 2 8 6 - 10 4 mg/dL    Phosphorus, Serum 2 9 2 1 - 4 3 mg/dL    Albumin 3 9 3 6 - 5 1 g/dL   CBC and differential   Result Value Ref Range    White Blood Cell Count 5 5 3 8 - 10 8 Thousand/uL    Red Blood Cell Count 3 66 (L) 3 80 - 5 10 Million/uL    Hemoglobin 11 5 (L) 11 7 - 15 5 g/dL    HCT 35 2 35 0 - 45 0 %    MCV 96 2 80 0 - 100 0 fL    MCH 31 4 27 0 - 33 0 pg    MCHC 32 7 32 0 - 36 0 g/dL    RDW 12 7 11 0 - 15 0 %    Platelet Count 321 070 - 400 Thousand/uL    SL AMB MPV 13 1 (H) 7 5 - 12 5 fL    Neutrophils (Absolute) 3,669 1,500 - 7,800 cells/uL    Lymphocytes (Absolute) 1,348 850 - 3,900 cells/uL    Monocytes (Absolute) 358 200 - 950 cells/uL    Eosinophils (Absolute) 88 15 - 500 cells/uL    Basophils ABS 39 0 - 200 cells/uL    Neutrophils 66 7 %    Lymphocytes 24 5 %    Monocytes 6 5 %    Eosinophils 1 6 %    Basophils PCT 0 7 %

## 2020-09-14 NOTE — LETTER
September 14, 2020     Anne Wu, [address] [place] [address]    Patient: Soniya Tamayo   YOB: 1927   Date of Visit: 9/14/2020       Dear Dr Honey Casanova:    Thank you for referring Meli Flynn to me for evaluation  Below are the relevant portions of my assessment and plan of care  If you have questions, please do not hesitate to call me  I look forward to following Lucille Mello along with you  Sincerely,        Zoe Shown, DO        CC: No Recipients                         ASSESSMENT and PLAN:  1  Chronic kidney disease, baseline creatinine in the mid 1s, most recent creatinine stable 1 74, estimated GFR 2  2  Hypertension, blood pressure appears well controlled, continue with current regimen  3  Bilateral lower extremity swelling, right greater than left, resume Lasix therapy 20 mg daily if persistently swollen, will proceed with lower extremity ultrasound  4  Complex renal cysts, repeat renal ultrasound  5  Anemia of chronic kidney disease hemoglobin stable 11 5  6  Hyperparathyroidism secondary to CKD, PTH of 190, calcium borderline at 10 2  7  Diabetes with recent hemoglobin A1c of 5 3    · Overall renal function remains stable, within previous baseline  · Blood pressure under good control  · Resume Lasix this time at 20 mg daily  · Regarding persistent lower extremity swelling, agreeable to proceed with lower extremity Doppler  · Repeat renal ultrasound given bilateral complex renal cysts  · Follow-up in 6 months with repeat labs at that time

## 2020-09-24 ENCOUNTER — HOSPITAL ENCOUNTER (OUTPATIENT)
Dept: ULTRASOUND IMAGING | Facility: HOSPITAL | Age: 85
Discharge: HOME | End: 2020-09-24
Attending: INTERNAL MEDICINE
Payer: COMMERCIAL

## 2020-09-24 DIAGNOSIS — E11.42 CONTROLLED TYPE 2 DIABETES MELLITUS WITH DIABETIC POLYNEUROPATHY, WITHOUT LONG-TERM CURRENT USE OF INSULIN (HCC): ICD-10-CM

## 2020-09-24 DIAGNOSIS — N25.81 SECONDARY HYPERPARATHYROIDISM OF RENAL ORIGIN (HCC): ICD-10-CM

## 2020-09-24 DIAGNOSIS — N28.1 COMPLEX RENAL CYST: ICD-10-CM

## 2020-09-24 DIAGNOSIS — N18.4 CKD (CHRONIC KIDNEY DISEASE), STAGE IV (HCC): ICD-10-CM

## 2020-09-24 DIAGNOSIS — N26.1 ATROPHIC KIDNEY, ACQUIRED: ICD-10-CM

## 2020-09-24 PROCEDURE — 51798 US URINE CAPACITY MEASURE: CPT

## 2020-09-27 DIAGNOSIS — E78.2 MIXED HYPERLIPIDEMIA: ICD-10-CM

## 2020-09-27 DIAGNOSIS — I10 ESSENTIAL HYPERTENSION: ICD-10-CM

## 2020-09-27 RX ORDER — AMLODIPINE BESYLATE 5 MG/1
TABLET ORAL
Qty: 180 TABLET | Refills: 3 | Status: SHIPPED | OUTPATIENT
Start: 2020-09-27 | End: 2021-09-17

## 2020-09-27 RX ORDER — SIMVASTATIN 20 MG
TABLET ORAL
Qty: 90 TABLET | Refills: 1 | Status: SHIPPED | OUTPATIENT
Start: 2020-09-27 | End: 2021-04-14

## 2020-09-28 ENCOUNTER — TELEPHONE (OUTPATIENT)
Dept: NEPHROLOGY | Facility: CLINIC | Age: 85
End: 2020-09-28

## 2020-09-28 NOTE — TELEPHONE ENCOUNTER
Patient's daughter Emily Ramirez called, returning Dr Kay Macias call  Please call her at [phone]

## 2020-09-30 DIAGNOSIS — R33.9 URINARY RETENTION: Primary | ICD-10-CM

## 2020-10-02 NOTE — TELEPHONE ENCOUNTER
ROMAN LIVES IN [place], PLEASE ADVISE HOW SOON SHE NEEDS TO BE SEEN, DR William Beard HAS A 2:30P ON MONDAY

## 2020-10-23 ENCOUNTER — OFFICE VISIT (OUTPATIENT)
Dept: UROLOGY | Facility: MEDICAL CENTER | Age: 85
End: 2020-10-23
Payer: COMMERCIAL

## 2020-10-23 VITALS
HEIGHT: 59 IN | WEIGHT: 148 LBS | TEMPERATURE: 98.4 F | DIASTOLIC BLOOD PRESSURE: 72 MMHG | BODY MASS INDEX: 29.84 KG/M2 | SYSTOLIC BLOOD PRESSURE: 124 MMHG

## 2020-10-23 DIAGNOSIS — N28.1 RENAL CYSTS, ACQUIRED, BILATERAL: ICD-10-CM

## 2020-10-23 DIAGNOSIS — R33.9 INCOMPLETE EMPTYING OF BLADDER: Primary | ICD-10-CM

## 2020-10-23 DIAGNOSIS — N20.0 CALCULUS OF KIDNEY: ICD-10-CM

## 2020-10-23 DIAGNOSIS — R31.29 MICROSCOPIC HEMATURIA: ICD-10-CM

## 2020-10-23 LAB
POST-VOID RESIDUAL VOLUME, ML POC: 386 ML
SL AMB  POCT GLUCOSE, UA: ABNORMAL
SL AMB LEUKOCYTE ESTERASE,UA: ABNORMAL
SL AMB POCT BILIRUBIN,UA: ABNORMAL
SL AMB POCT BLOOD,UA: ABNORMAL
SL AMB POCT CLARITY,UA: CLEAR
SL AMB POCT COLOR,UA: YELLOW
SL AMB POCT KETONES,UA: ABNORMAL
SL AMB POCT NITRITE,UA: ABNORMAL
SL AMB POCT PH,UA: 6
SL AMB POCT SPECIFIC GRAVITY,UA: 1.01
SL AMB POCT URINE PROTEIN: ABNORMAL
SL AMB POCT UROBILINOGEN: 0.2

## 2020-10-23 PROCEDURE — 81003 URINALYSIS AUTO W/O SCOPE: CPT | Performed by: UROLOGY

## 2020-10-23 PROCEDURE — 1160F RVW MEDS BY RX/DR IN RCRD: CPT | Performed by: UROLOGY

## 2020-10-23 PROCEDURE — 99204 OFFICE O/P NEW MOD 45 MIN: CPT | Performed by: UROLOGY

## 2020-10-23 PROCEDURE — 1036F TOBACCO NON-USER: CPT | Performed by: UROLOGY

## 2020-10-23 PROCEDURE — 51798 US URINE CAPACITY MEASURE: CPT | Performed by: UROLOGY

## 2020-11-16 PROBLEM — K64.9 HEMORRHOID: Status: RESOLVED | Noted: 2019-10-01 | Resolved: 2020-11-16

## 2020-11-17 ENCOUNTER — OFFICE VISIT (OUTPATIENT)
Dept: FAMILY MEDICINE CLINIC | Facility: CLINIC | Age: 85
End: 2020-11-17
Payer: COMMERCIAL

## 2020-11-17 VITALS
BODY MASS INDEX: 29.31 KG/M2 | SYSTOLIC BLOOD PRESSURE: 130 MMHG | HEIGHT: 59 IN | DIASTOLIC BLOOD PRESSURE: 60 MMHG | WEIGHT: 145.4 LBS | HEART RATE: 64 BPM | TEMPERATURE: 97.8 F

## 2020-11-17 DIAGNOSIS — E11.42 CONTROLLED TYPE 2 DIABETES MELLITUS WITH DIABETIC POLYNEUROPATHY, WITHOUT LONG-TERM CURRENT USE OF INSULIN (HCC): ICD-10-CM

## 2020-11-17 DIAGNOSIS — I10 ESSENTIAL HYPERTENSION: Primary | ICD-10-CM

## 2020-11-17 DIAGNOSIS — E55.9 VITAMIN D INSUFFICIENCY: ICD-10-CM

## 2020-11-17 DIAGNOSIS — M1A.00X0 IDIOPATHIC CHRONIC GOUT WITHOUT TOPHUS, UNSPECIFIED SITE: ICD-10-CM

## 2020-11-17 DIAGNOSIS — N18.4 CKD (CHRONIC KIDNEY DISEASE), STAGE IV (HCC): ICD-10-CM

## 2020-11-17 DIAGNOSIS — Z23 ENCOUNTER FOR IMMUNIZATION: ICD-10-CM

## 2020-11-17 DIAGNOSIS — G63 POLYNEUROPATHY ASSOCIATED WITH UNDERLYING DISEASE (HCC): ICD-10-CM

## 2020-11-17 PROCEDURE — 99214 OFFICE O/P EST MOD 30 MIN: CPT | Performed by: FAMILY MEDICINE

## 2020-11-17 PROCEDURE — 3725F SCREEN DEPRESSION PERFORMED: CPT | Performed by: FAMILY MEDICINE

## 2020-11-17 PROCEDURE — 1160F RVW MEDS BY RX/DR IN RCRD: CPT | Performed by: FAMILY MEDICINE

## 2020-11-17 PROCEDURE — 1101F PT FALLS ASSESS-DOCD LE1/YR: CPT | Performed by: FAMILY MEDICINE

## 2020-11-17 PROCEDURE — T1015 CLINIC SERVICE: HCPCS | Performed by: FAMILY MEDICINE

## 2020-11-17 PROCEDURE — 3288F FALL RISK ASSESSMENT DOCD: CPT | Performed by: FAMILY MEDICINE

## 2020-11-17 PROCEDURE — 1036F TOBACCO NON-USER: CPT | Performed by: FAMILY MEDICINE

## 2020-12-15 DIAGNOSIS — I10 HYPERTENSION, UNSPECIFIED TYPE: ICD-10-CM

## 2020-12-15 RX ORDER — LISINOPRIL 20 MG/1
TABLET ORAL
Qty: 90 TABLET | Refills: 3 | Status: SHIPPED | OUTPATIENT
Start: 2020-12-15 | End: 2021-12-15

## 2021-03-09 NOTE — PROGRESS NOTES
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 - hemoglobin A1c is 5 3  Continue diet control    4  Chronic kidney disease - continue follow-up with Dr Mike Perdomo  Her renal status is slightly declined with a GFR of 22 and creatinine of 1 93  This is likely a reflection of age as well  Any medication adjustments will be deferred to Nephrology  Continue to drink adequate fluids  5  Secondary hyperparathyroidism - any change in regimen is as per Nephrology  6  Hyperlipidemia - lipids are stable  Continue simvastatin 20 mg daily    7  Hypertension - blood pressure is under excellent control  Continue amlodipine and lisinopril    8  Gout - uric acid is elevated today at 8 8  She has had no recent gouty flare ups  Continue on Colcrys and will defer any treatment changes to nephrology  Labs reviewed at length with patient today  She is due for a lipid panel, TFTs and vitamin-D level  Follow-up in 3-4 months  ENHANCED VISIT:       E11 42 Controlled type 2 diabetes mellitus with diabetic polyneuropathy ([facility] )  I have evaluated the patient and found the condition to be:  Stable  I have evaluated the patient and: Recommended continue with same treatment plan  The patient should continue treatment and follow-up with: PCP    N25 81 Secondary hyperparathyroidism of renal origin ([facility] )  I have evaluated the patient and: Recommended continue with same treatment plan  The patient should continue treatment and follow-up with: Nephrology    E11 22 Type 2 diabetes mellitus with diabetic chronic kidney disease ([facility] )  I have evaluated the patient and found the condition to be: Stable  I have evaluated the patient and: Recommended continue with same treatment plan  The patient should continue treatment and follow-up with: PCP    I10 Essential hypertension  I have evaluated the patient and found the condition to be: Stable  I have evaluated the patient and: Recommended continue with same treatment plan  The patient should continue treatment and follow-up with: PCP    G62 9 Peripheral neuropathy  I have evaluated the patient and found the condition to be: Stable  I have evaluated the patient and: Recommended continue with same treatment plan  The patient should continue treatment and follow-up with: PCP    N18 4 CKD (chronic kidney disease), stage IV ([facility] )  I have evaluated the patient and found the condition to be: Stable  I have evaluated the patient and: Recommended continue with same treatment plan  The patient should continue treatment and follow-up with: Nephrology    E55 9 Vitamin D insufficiency  I have evaluated the patient and found the condition to be: Stable  I have evaluated the patient and: Recommended continue with same treatment plan  The patient should continue treatment and follow-up with: PCP    E78 00 Pure hypercholesterolemia  I have evaluated the patient and found the condition to be: Stable  I have evaluated the patient and: Recommended continue with same treatment plan  The patient should continue treatment and follow-up with: PCP    M10 9 Gout  I have evaluated the patient and found the condition to be: Stable  I have evaluated the patient and: Recommended continue with same treatment plan  The patient should continue treatment and follow-up with: Nephrology    R60 1 Generalized edema  I have evaluated the patient and found the condition to be:  Stable  I have evaluated the patient and: Recommended continue with same treatment plan  The patient should continue treatment and follow-up with: PCP Subjective:     Patient ID: Lacy Lozano is a 80 y o  female     No chief complaint on file  Saw podiatrist last week and had no feeling at the bottom of her feet  He suggested that she wear shoes regularly  Review of Systems   Constitutional:        See HPI   HENT: Negative for congestion, ear pain, mouth sores, sinus pressure and trouble swallowing  Eyes: Negative for discharge, redness and itching  Respiratory: Negative for apnea, cough, chest tightness, shortness of breath, wheezing and stridor  Cardiovascular: Negative for chest pain, palpitations and leg swelling  Gastrointestinal: Negative for abdominal distention, abdominal pain, blood in stool, constipation, diarrhea, nausea and vomiting  Endocrine: Negative for cold intolerance and heat intolerance  Genitourinary: Negative for difficulty urinating, dysuria, flank pain and urgency  Musculoskeletal: Negative for arthralgias and myalgias  Skin: Negative for rash  Neurological: Positive for numbness  Negative for dizziness, seizures, syncope, speech difficulty, weakness, light-headedness and headaches  Peripheral neuropathy, see HPI   Hematological: Negative for adenopathy  Psychiatric/Behavioral: Negative for agitation, behavioral problems, confusion and sleep disturbance  The patient is not nervous/anxious  Objective:      LMP  (LMP Unknown)     Problem List Items Addressed This Visit     None          Physical Exam  Vitals signs and nursing note reviewed  Constitutional:       General: She is not in acute distress  Appearance: Normal appearance  She is well-developed  She is not ill-appearing, toxic-appearing or diaphoretic  HENT:      Head: Normocephalic and atraumatic  Eyes:      General: No scleral icterus  Conjunctiva/sclera: Conjunctivae normal    Neck:      Musculoskeletal: Normal range of motion and neck supple  Vascular: No carotid bruit     Cardiovascular:      Rate and Rhythm: Normal rate and regular rhythm  Heart sounds: Normal heart sounds  No murmur  No friction rub  No gallop  Pulmonary:      Effort: Pulmonary effort is normal  No respiratory distress  Breath sounds: Normal breath sounds  No wheezing or rales  Musculoskeletal: Normal range of motion  Right lower leg: No edema  Left lower leg: No edema  Lymphadenopathy:      Cervical: No cervical adenopathy  Skin:     General: Skin is warm and dry  Neurological:      General: No focal deficit present  Mental Status: She is alert and oriented to person, place, and time  Psychiatric:         Mood and Affect: Mood normal          Behavior: Behavior normal          Thought Content:  Thought content normal          Judgment: Judgment normal

## 2021-03-10 LAB
ALBUMIN SERPL-MCNC: 4.2 G/DL (ref 3.6–5.1)
ALBUMIN/CREAT UR: 159 MCG/MG CREAT
BASOPHILS # BLD AUTO: 39 CELLS/UL (ref 0–200)
BASOPHILS NFR BLD AUTO: 0.7 %
BUN SERPL-MCNC: 47 MG/DL (ref 7–25)
BUN/CREAT SERPL: 24 (CALC) (ref 6–22)
CALCIUM SERPL-MCNC: 10.4 MG/DL (ref 8.6–10.4)
CALCIUM SERPL-MCNC: 10.4 MG/DL (ref 8.6–10.4)
CHLORIDE SERPL-SCNC: 109 MMOL/L (ref 98–110)
CO2 SERPL-SCNC: 25 MMOL/L (ref 20–32)
CREAT SERPL-MCNC: 1.93 MG/DL (ref 0.6–0.88)
CREAT UR-MCNC: 54 MG/DL (ref 20–275)
EOSINOPHIL # BLD AUTO: 72 CELLS/UL (ref 15–500)
EOSINOPHIL NFR BLD AUTO: 1.3 %
ERYTHROCYTE [DISTWIDTH] IN BLOOD BY AUTOMATED COUNT: 12.7 % (ref 11–15)
GLUCOSE SERPL-MCNC: 125 MG/DL (ref 65–99)
HCT VFR BLD AUTO: 35.2 % (ref 35–45)
HGB BLD-MCNC: 11.5 G/DL (ref 11.7–15.5)
LYMPHOCYTES # BLD AUTO: 1111 CELLS/UL (ref 850–3900)
LYMPHOCYTES NFR BLD AUTO: 20.2 %
MCH RBC QN AUTO: 31.9 PG (ref 27–33)
MCHC RBC AUTO-ENTMCNC: 32.7 G/DL (ref 32–36)
MCV RBC AUTO: 97.8 FL (ref 80–100)
MICROALBUMIN UR-MCNC: 8.6 MG/DL
MONOCYTES # BLD AUTO: 424 CELLS/UL (ref 200–950)
MONOCYTES NFR BLD AUTO: 7.7 %
NEUTROPHILS # BLD AUTO: 3856 CELLS/UL (ref 1500–7800)
NEUTROPHILS NFR BLD AUTO: 70.1 %
PHOSPHATE SERPL-MCNC: 3.4 MG/DL (ref 2.1–4.3)
PLATELET # BLD AUTO: 137 THOUSAND/UL (ref 140–400)
PMV BLD REES-ECKER: 13.3 FL (ref 7.5–12.5)
POTASSIUM SERPL-SCNC: 4.6 MMOL/L (ref 3.5–5.3)
PTH-INTACT SERPL-MCNC: 271 PG/ML (ref 14–64)
RBC # BLD AUTO: 3.6 MILLION/UL (ref 3.8–5.1)
SL AMB EGFR AFRICAN AMERICAN: 25 ML/MIN/1.73M2
SL AMB EGFR NON AFRICAN AMERICAN: 22 ML/MIN/1.73M2
SODIUM SERPL-SCNC: 141 MMOL/L (ref 135–146)
URATE SERPL-MCNC: 8.8 MG/DL (ref 2.5–7)
WBC # BLD AUTO: 5.5 THOUSAND/UL (ref 3.8–10.8)

## 2021-03-16 ENCOUNTER — OFFICE VISIT (OUTPATIENT)
Dept: FAMILY MEDICINE CLINIC | Facility: CLINIC | Age: 86
End: 2021-03-16
Payer: COMMERCIAL

## 2021-03-16 VITALS
SYSTOLIC BLOOD PRESSURE: 114 MMHG | BODY MASS INDEX: 29.48 KG/M2 | TEMPERATURE: 97.6 F | HEART RATE: 60 BPM | WEIGHT: 146.2 LBS | DIASTOLIC BLOOD PRESSURE: 60 MMHG | HEIGHT: 59 IN | RESPIRATION RATE: 16 BRPM

## 2021-03-16 DIAGNOSIS — E78.00 PURE HYPERCHOLESTEROLEMIA: ICD-10-CM

## 2021-03-16 DIAGNOSIS — E11.22 TYPE 2 DIABETES MELLITUS WITH STAGE 4 CHRONIC KIDNEY DISEASE, WITHOUT LONG-TERM CURRENT USE OF INSULIN (HCC): ICD-10-CM

## 2021-03-16 DIAGNOSIS — N18.4 TYPE 2 DIABETES MELLITUS WITH STAGE 4 CHRONIC KIDNEY DISEASE, WITHOUT LONG-TERM CURRENT USE OF INSULIN (HCC): ICD-10-CM

## 2021-03-16 DIAGNOSIS — E11.42 CONTROLLED TYPE 2 DIABETES MELLITUS WITH DIABETIC POLYNEUROPATHY, WITHOUT LONG-TERM CURRENT USE OF INSULIN (HCC): Primary | ICD-10-CM

## 2021-03-16 DIAGNOSIS — N18.4 CKD (CHRONIC KIDNEY DISEASE), STAGE IV (HCC): ICD-10-CM

## 2021-03-16 DIAGNOSIS — G63 POLYNEUROPATHY ASSOCIATED WITH UNDERLYING DISEASE (HCC): ICD-10-CM

## 2021-03-16 DIAGNOSIS — Z00.00 MEDICARE ANNUAL WELLNESS VISIT, SUBSEQUENT: ICD-10-CM

## 2021-03-16 DIAGNOSIS — N25.81 SECONDARY HYPERPARATHYROIDISM OF RENAL ORIGIN (HCC): ICD-10-CM

## 2021-03-16 DIAGNOSIS — I10 ESSENTIAL HYPERTENSION: ICD-10-CM

## 2021-03-16 DIAGNOSIS — M1A.00X0 IDIOPATHIC CHRONIC GOUT WITHOUT TOPHUS, UNSPECIFIED SITE: ICD-10-CM

## 2021-03-16 LAB — SL AMB POCT HEMOGLOBIN AIC: 5.3 (ref ?–6.5)

## 2021-03-16 PROCEDURE — T1015 CLINIC SERVICE: HCPCS | Performed by: FAMILY MEDICINE

## 2021-03-16 PROCEDURE — G0439 PPPS, SUBSEQ VISIT: HCPCS | Performed by: FAMILY MEDICINE

## 2021-03-16 PROCEDURE — 36416 COLLJ CAPILLARY BLOOD SPEC: CPT | Performed by: FAMILY MEDICINE

## 2021-03-16 PROCEDURE — 1170F FXNL STATUS ASSESSED: CPT | Performed by: FAMILY MEDICINE

## 2021-03-16 PROCEDURE — 1125F AMNT PAIN NOTED PAIN PRSNT: CPT | Performed by: FAMILY MEDICINE

## 2021-03-16 PROCEDURE — 3288F FALL RISK ASSESSMENT DOCD: CPT | Performed by: FAMILY MEDICINE

## 2021-03-16 PROCEDURE — 83036 HEMOGLOBIN GLYCOSYLATED A1C: CPT | Performed by: FAMILY MEDICINE

## 2021-03-16 PROCEDURE — 99214 OFFICE O/P EST MOD 30 MIN: CPT | Performed by: FAMILY MEDICINE

## 2021-03-16 NOTE — PROGRESS NOTES
Assessment and Plan:       24-year-old white female presents today with her daughter for Medicare annual wellness questionnaire  1  Labs reviewed today  2  Immunizations reviewed  3  Advanced directives completed the  4  For full evaluation of chronic medical conditions, please see today's office note  Problem List Items Addressed This Visit        Endocrine    Controlled type 2 diabetes mellitus with diabetic polyneuropathy ([facility] ) - Primary    Relevant Orders    POCT hemoglobin A1c    Secondary hyperparathyroidism of renal origin ([facility] )    Type 2 diabetes mellitus with diabetic chronic kidney disease (HCC)       Cardiovascular and Mediastinum    Essential hypertension       Nervous and Auditory    Peripheral neuropathy       Genitourinary    CKD (chronic kidney disease), stage IV ([facility] )       Other    Pure hypercholesterolemia           Preventive health issues were discussed with patient, and age appropriate screening tests were ordered as noted in patient's After Visit Summary  Personalized health advice and appropriate referrals for health education or preventive services given if needed, as noted in patient's After Visit Summary       History of Present Illness:     Patient presents for Medicare Annual Wellness visit    Patient Care Team:  Kaci Browning MD as PCP - General  Kaci Browning MD as PCP - PCP-[facility] Cinthia DO Delinda Budds, MD Valente Kirsch, MD Deetta Bush, PA-C Hal Printers, PA-C Malon Ruse, PA-C     Problem List:     Patient Active Problem List   Diagnosis    Vitamin D insufficiency    Retinal hole of right eye    Peripheral neuropathy    Essential hypertension    Pure hypercholesterolemia    Gout    Gait disturbance    Ectropion    Controlled type 2 diabetes mellitus with diabetic polyneuropathy ([facility] )    Complex renal cyst    CKD (chronic kidney disease), stage IV ([facility] )    Borderline glaucoma    Bilateral deafness    Atrophic kidney, acquired    Allergic rhinitis    Secondary hyperparathyroidism of renal origin ([place] [address] )    Vertebral anomaly    Bowel habit changes    Rectal discharge    Generalized edema    Fecal smearing    Type 2 diabetes mellitus with diabetic chronic kidney disease (HCC)      Past Medical and Surgical History:     Past Medical History:   Diagnosis Date    Diabetes mellitus ([place] [address] )     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 ([place] [address] )     LAST ASSESSED: [id number]    Hyperlipidemia     Hypertension     Renal disorder     Squamous cell carcinoma, scalp/neck     LAST ASSESSED: [id number]     Past Surgical History:   Procedure Laterality Date    BLADDER SURGERY      LAST ASSESSED: [id number]    PELVIC FLOOR REPAIR      VAGINAL SURG INSERTION OF MESH    TOTAL ABDOMINAL HYSTERECTOMY W/ BILATERAL SALPINGOOPHORECTOMY      LAST ASSESSED: [id number]      Family History:     Family History   Problem Relation Age of Onset    Hypertension Mother     Hypertension Father     Kidney disease Sister         CHRONIC    Alcohol abuse Family     Substance Abuse Family       Social History:        Social History     Socioeconomic History    Marital status:       Spouse name: None    Number of children: None    Years of education: None    Highest education level: None   Occupational History    None   Social Needs    Financial resource strain: None    Food insecurity     Worry: None     Inability: None    Transportation needs     Medical: None     Non-medical: None   Tobacco Use    Smoking status: Former Smoker    Smokeless tobacco: Never Used   Substance and Sexual Activity    Alcohol use: Yes     Frequency: Monthly or less     Drinks per session: 1 or 2     Comment: SOCIAL    Drug use: No    Sexual activity: None   Lifestyle    Physical activity     Days per week: None     Minutes per session: None    Stress: None   Relationships    Social connections     Talks on phone: None Gets together: None     Attends Jain service: None     Active member of club or organization: None     Attends meetings of clubs or organizations: None     Relationship status: None    Intimate partner violence     Fear of current or ex partner: None     Emotionally abused: None     Physically abused: None     Forced sexual activity: None   Other Topics Concern    None   Social History Narrative    DAILY CAFFEINE CONSUMPTION, 1 SERVING A DAY    LIVES INDEPENDENTLY      Medications and Allergies:     Current Outpatient Medications   Medication Sig Dispense Refill    amLODIPine (NORVASC) 5 mg tablet TAKE 1 TABLET BY MOUTH TWICE A  tablet 3    aspirin 81 MG tablet Take 1 tablet by mouth daily      Cholecalciferol (VITAMIN D3) 2000 units capsule Take 1 capsule by mouth daily      COLCRYS 0 6 MG tablet TAKE 1 TABLET BY MOUTH EVERY DAY 90 tablet 3    Contour Test test strip TEST AS DIRECTED 100 each 3    docusate sodium (COLACE) 100 mg capsule Take 100 mg by mouth daily      furosemide (LASIX) 20 mg tablet Take 1 tablet (20 mg total) by mouth daily 90 tablet 3    gabapentin (NEURONTIN) 300 mg capsule TAKE 1 CAPSULE BY MOUTH EVERYDAY AT BEDTIME 90 capsule 5    Lancets (ONETOUCH ULTRASOFT) lancets by Does not apply route 2 (two) times a day      lisinopril (ZESTRIL) 20 mg tablet TAKE 1 TABLET BY MOUTH EVERY DAY 90 tablet 3    simvastatin (ZOCOR) 20 mg tablet TAKE 1 TABLET BY MOUTH EVERY DAY 90 tablet 1     No current facility-administered medications for this visit  Allergies   Allergen Reactions    Flu Virus Vaccine      Other reaction(s): HX of Guillain-[place] Syndrome    Sulfa Antibiotics       Immunizations: There is no immunization history for the selected administration types on file for this patient  Health Maintenance: There are no preventive care reminders to display for this patient        Topic Date Due    COVID-19 Vaccine (1 of 2) 02/24/1943    Pneumococcal Vaccine: 65+ Years (1 of 1 - PPSV23) 02/24/1992      Medicare Health Risk Assessment:     LMP  (LMP Unknown)          Health Risk Assessment:   Patient rates overall health as good  Patient feels that their physical health rating is same  Patient is very satisfied with their life  Eyesight was rated as slightly worse  Hearing was rated as same  Patient feels that their emotional and mental health rating is same  Patients states they are never, rarely angry  Patient states they are sometimes unusually tired/fatigued  Pain experienced in the last 7 days has been none  Patient states that she has experienced no weight loss or gain in last 6 months  Fall Risk Screening: In the past year, patient has experienced: no history of falling in past year      Urinary Incontinence Screening:   Patient has not leaked urine accidently in the last six months  Home Safety:  Patient has trouble with stairs inside or outside of their home  Patient has working smoke alarms and has working carbon monoxide detector  Home safety hazards include: none  Nutrition:   Current diet is Regular  Medications:   Patient is currently taking over-the-counter supplements  OTC medications include: see medication list  Patient is able to manage medications  Activities of Daily Living (ADLs)/Instrumental Activities of Daily Living (IADLs):   Walk and transfer into and out of bed and chair?: Yes  Dress and groom yourself?: Yes    Bathe or shower yourself?: Yes    Feed yourself? Yes  Do your laundry/housekeeping?: No  Manage your money, pay your bills and track your expenses?: Yes  Make your own meals?: Yes    Do your own shopping?: Yes    Previous Hospitalizations:   Any hospitalizations or ED visits within the last 12 months?: No      Advance Care Planning:   Living will: Yes    Durable POA for healthcare:  Yes    Advanced directive: Yes    Advanced directive counseling given: Yes    Five wishes given: Yes      Cognitive Screening:   Provider or family/friend/caregiver concerned regarding cognition?: No    PREVENTIVE SCREENINGS      Cardiovascular Screening:    General: Screening Not Indicated and History Lipid Disorder      Diabetes Screening:     General: Screening Not Indicated and History Diabetes      Colorectal Cancer Screening:     General: Screening Not Indicated      Breast Cancer Screening:     General: Screening Not Indicated      Cervical Cancer Screening:    General: Screening Not Indicated      Osteoporosis Screening:    General: Risks and Benefits Discussed and Screening Not Indicated      Abdominal Aortic Aneurysm (AAA) Screening:        General: Screening Not Indicated      Lung Cancer Screening:     General: Screening Not Indicated      Hepatitis C Screening:    General: Screening Not Indicated    Screening, Brief Intervention, and Referral to Treatment (SBIRT)    Screening  Typical number of drinks in a day: 0  Typical number of drinks in a week: 0  Interpretation: Low risk drinking behavior  AUDIT-C Screenin) How often did you have a drink containing alcohol in the past year? monthly or less  2) How many drinks did you have on a typical day when you were drinking in the past year?  1 to 2      Bernadette Loomis MD

## 2021-03-25 ENCOUNTER — OFFICE VISIT (OUTPATIENT)
Dept: NEPHROLOGY | Facility: CLINIC | Age: 86
End: 2021-03-25
Payer: COMMERCIAL

## 2021-03-25 VITALS
HEART RATE: 73 BPM | HEIGHT: 59 IN | WEIGHT: 145 LBS | SYSTOLIC BLOOD PRESSURE: 142 MMHG | BODY MASS INDEX: 29.23 KG/M2 | DIASTOLIC BLOOD PRESSURE: 80 MMHG

## 2021-03-25 DIAGNOSIS — R60.1 GENERALIZED EDEMA: ICD-10-CM

## 2021-03-25 DIAGNOSIS — E11.22 TYPE 2 DIABETES MELLITUS WITH STAGE 4 CHRONIC KIDNEY DISEASE, WITHOUT LONG-TERM CURRENT USE OF INSULIN (HCC): Primary | ICD-10-CM

## 2021-03-25 DIAGNOSIS — N25.81 SECONDARY HYPERPARATHYROIDISM OF RENAL ORIGIN (HCC): ICD-10-CM

## 2021-03-25 DIAGNOSIS — N18.4 TYPE 2 DIABETES MELLITUS WITH STAGE 4 CHRONIC KIDNEY DISEASE, WITHOUT LONG-TERM CURRENT USE OF INSULIN (HCC): Primary | ICD-10-CM

## 2021-03-25 DIAGNOSIS — E78.00 PURE HYPERCHOLESTEROLEMIA: ICD-10-CM

## 2021-03-25 DIAGNOSIS — N26.1 ATROPHIC KIDNEY, ACQUIRED: ICD-10-CM

## 2021-03-25 PROCEDURE — 99214 OFFICE O/P EST MOD 30 MIN: CPT | Performed by: INTERNAL MEDICINE

## 2021-03-25 PROCEDURE — 1160F RVW MEDS BY RX/DR IN RCRD: CPT | Performed by: INTERNAL MEDICINE

## 2021-03-25 PROCEDURE — 1036F TOBACCO NON-USER: CPT | Performed by: INTERNAL MEDICINE

## 2021-03-25 NOTE — PROGRESS NOTES
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, calcium 10 4, ,  Phosphorus 3 point, continue with vitamin-D supplementation  4   hypertension, blood pressure appears stable   5  Bilateral renal cysts, repeat ultrasound September 2020, unchanged  6  Elevated postvoid residual, previously with Urology consultation    ·  overall renal function remains relatively stable   · No changes in current regimen  · Continue to encourage adequate fluid intake   · Reviewed advanced care planning, patient does have a living will, does not want aggressive measures including hemodialysis  ·   Given advanced CKD, recommend close observation, repeat CBC renal function 3 months assuming stable follow-up in 6 months  SUBJECTIVE / INTERVAL HISTORY:    She has been doing real well  Denies any chest pain shortness of breath  Denies any nausea vomiting diarrhea  Denies any dizziness lightheadedness or falls  OBJECTIVE:  /80 (BP Location: Right arm, Patient Position: Sitting, Cuff Size: Large)   Pulse 73   Ht 4' 11" (1 499 m)   Wt 65 8 kg (145 lb)   LMP  (LMP Unknown)   BMI 29 29 kg/m²   Vitals:    03/25/21 1156   Weight: 65 8 kg (145 lb)       Physical Exam  Constitutional:       Appearance: She is not ill-appearing  HENT:      Head: Normocephalic and atraumatic  Eyes:      General: No scleral icterus  Cardiovascular:      Rate and Rhythm: Normal rate and regular rhythm  Pulmonary:      Effort: Pulmonary effort is normal       Breath sounds: Normal breath sounds  Abdominal:      General: There is no distension  Palpations: Abdomen is soft     Musculoskeletal: Right lower leg: No edema  Left lower leg: No edema  Skin:     General: Skin is warm and dry  Findings: No rash  Neurological:      Mental Status: She is alert and oriented to person, place, and time             Medications:    Current Outpatient Medications:     amLODIPine (NORVASC) 5 mg tablet, TAKE 1 TABLET BY MOUTH TWICE A DAY, Disp: 180 tablet, Rfl: 3    aspirin 81 MG tablet, Take 1 tablet by mouth daily, Disp: , Rfl:     Cholecalciferol (VITAMIN D3) 2000 units capsule, Take 1 capsule by mouth daily, Disp: , Rfl:     COLCRYS 0 6 MG tablet, TAKE 1 TABLET BY MOUTH EVERY DAY, Disp: 90 tablet, Rfl: 3    Contour Test test strip, TEST AS DIRECTED, Disp: 100 each, Rfl: 3    docusate sodium (COLACE) 100 mg capsule, Take 100 mg by mouth daily, Disp: , Rfl:     furosemide (LASIX) 20 mg tablet, Take 1 tablet (20 mg total) by mouth daily, Disp: 90 tablet, Rfl: 3    gabapentin (NEURONTIN) 300 mg capsule, TAKE 1 CAPSULE BY MOUTH EVERYDAY AT BEDTIME, Disp: 90 capsule, Rfl: 5    Lancets (ONETOUCH ULTRASOFT) lancets, by Does not apply route 2 (two) times a day, Disp: , Rfl:     lisinopril (ZESTRIL) 20 mg tablet, TAKE 1 TABLET BY MOUTH EVERY DAY, Disp: 90 tablet, Rfl: 3    simvastatin (ZOCOR) 20 mg tablet, TAKE 1 TABLET BY MOUTH EVERY DAY, Disp: 90 tablet, Rfl: 1    Laboratory Results:  Results for orders placed or performed in visit on 03/16/21   POCT hemoglobin A1c   Result Value Ref Range    Hemoglobin A1C 5 3 6 5

## 2021-03-25 NOTE — LETTER
March 25, 2021     Bernadette Loomis, [address] [place] [employer]    Patient: Melanie Bill   YOB: 1927   Date of Visit: 3/25/2021       Dear Dr Dayami Escamilla:    Thank you for referring Ayo Acosta to me for evaluation  Below are the relevant portions of my assessment and plan of care  If you have questions, please do not hesitate to call me  I look forward to following Neil Tamez along with you  Sincerely,        Anjel Patel, DO                                     ASSESSMENT and PLAN:  1  Chronic kidney disease, stage IV, baseline creatinine mid to high 1s most recent creatinine 1 93 with an estimated GFR of 22  2  Anemia of chronic kidney disease hemoglobin stable 11 5   3  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

## 2021-04-14 DIAGNOSIS — E78.2 MIXED HYPERLIPIDEMIA: ICD-10-CM

## 2021-04-14 RX ORDER — SIMVASTATIN 20 MG
TABLET ORAL
Qty: 90 TABLET | Refills: 1 | Status: SHIPPED | OUTPATIENT
Start: 2021-04-14 | End: 2021-08-12

## 2021-05-10 LAB
LEFT EYE DIABETIC RETINOPATHY: NORMAL
RIGHT EYE DIABETIC RETINOPATHY: NORMAL

## 2021-05-19 DIAGNOSIS — M1A.00X0 IDIOPATHIC CHRONIC GOUT WITHOUT TOPHUS, UNSPECIFIED SITE: ICD-10-CM

## 2021-05-19 RX ORDER — COLCHICINE 0.6 MG/1
TABLET ORAL
Qty: 90 TABLET | Refills: 3 | Status: SHIPPED | OUTPATIENT
Start: 2021-05-19 | End: 2022-03-03 | Stop reason: HOSPADM

## 2021-05-27 ENCOUNTER — TELEPHONE (OUTPATIENT)
Dept: ADMINISTRATIVE | Facility: OTHER | Age: 86
End: 2021-05-27

## 2021-05-27 NOTE — TELEPHONE ENCOUNTER
Upon review of the In Basket request we were able to locate, review, and update the patient chart as requested for Diabetic Eye Exam and Diabetic Foot Exam     Any additional questions or concerns should be emailed to the Practice Liaisons via [email or web address] email, please do not reply via In Basket      Thank you  Temi Brown

## 2021-05-27 NOTE — TELEPHONE ENCOUNTER
----- Message from Pb Pastrana sent at 5/26/2021  3:55 PM EDT -----  Regarding: [facility]  05/26/21 3:55 PM    Hello, our patient Dada Carlos has had Diabetic Foot Exam completed/performed  Please assist in updating the patient chart by pulling the document from the Media Tab  The date of service is 5/20/2021       Thank you,  Akila Mabry  [facility]

## 2021-06-30 ENCOUNTER — TELEPHONE (OUTPATIENT)
Dept: NEPHROLOGY | Facility: CLINIC | Age: 86
End: 2021-06-30

## 2021-06-30 NOTE — TELEPHONE ENCOUNTER
Patient's daughter called stating patient does not wish to be seen unless labwork comes back with poor results; daughter will call back to reschedule

## 2021-07-01 LAB
25(OH)D3 SERPL-MCNC: 43 NG/ML (ref 30–100)
BASOPHILS # BLD AUTO: 37 CELLS/UL (ref 0–200)
BASOPHILS NFR BLD AUTO: 0.6 %
CHOLEST SERPL-MCNC: 127 MG/DL
CHOLEST/HDLC SERPL: 2.6 (CALC)
EOSINOPHIL # BLD AUTO: 99 CELLS/UL (ref 15–500)
EOSINOPHIL NFR BLD AUTO: 1.6 %
ERYTHROCYTE [DISTWIDTH] IN BLOOD BY AUTOMATED COUNT: 12.7 % (ref 11–15)
HCT VFR BLD AUTO: 32.7 % (ref 35–45)
HDLC SERPL-MCNC: 48 MG/DL
HGB BLD-MCNC: 10.8 G/DL (ref 11.7–15.5)
LDLC SERPL CALC-MCNC: 63 MG/DL (CALC)
LYMPHOCYTES # BLD AUTO: 1389 CELLS/UL (ref 850–3900)
LYMPHOCYTES NFR BLD AUTO: 22.4 %
MCH RBC QN AUTO: 31.9 PG (ref 27–33)
MCHC RBC AUTO-ENTMCNC: 33 G/DL (ref 32–36)
MCV RBC AUTO: 96.5 FL (ref 80–100)
MONOCYTES # BLD AUTO: 496 CELLS/UL (ref 200–950)
MONOCYTES NFR BLD AUTO: 8 %
NEUTROPHILS # BLD AUTO: 4179 CELLS/UL (ref 1500–7800)
NEUTROPHILS NFR BLD AUTO: 67.4 %
NONHDLC SERPL-MCNC: 79 MG/DL (CALC)
PLATELET # BLD AUTO: 144 THOUSAND/UL (ref 140–400)
PMV BLD REES-ECKER: 13.5 FL (ref 7.5–12.5)
RBC # BLD AUTO: 3.39 MILLION/UL (ref 3.8–5.1)
T4 SERPL-MCNC: 8.4 MCG/DL (ref 5.1–11.9)
TRIGL SERPL-MCNC: 79 MG/DL
TSH SERPL-ACNC: 2.14 MIU/L (ref 0.4–4.5)
WBC # BLD AUTO: 6.2 THOUSAND/UL (ref 3.8–10.8)

## 2021-07-21 ENCOUNTER — TELEPHONE (OUTPATIENT)
Dept: NEPHROLOGY | Facility: CLINIC | Age: 86
End: 2021-07-21

## 2021-07-21 NOTE — TELEPHONE ENCOUNTER
----- Message from Giovanni Buchanan DO sent at 7/13/2021  1:48 PM EDT -----  Recent laboratory studies did not include her renal function  Can we have her check a renal function panel prior to her appointment  Thank you

## 2021-07-27 ENCOUNTER — RA CDI HCC (OUTPATIENT)
Dept: OTHER | Facility: HOSPITAL | Age: 86
End: 2021-07-27

## 2021-07-27 DIAGNOSIS — N18.4 CKD (CHRONIC KIDNEY DISEASE), STAGE IV (HCC): Primary | ICD-10-CM

## 2021-07-27 NOTE — PROGRESS NOTES
[facility]  coding opportunities          Chart reviewed, no opportunity found: CHART REVIEWED, NO OPPORTUNITY FOUND      plt normal on labs-noted bpa data               Patients insurance company: [employer] (Medicare Advantage and Commercial)

## 2021-08-03 ENCOUNTER — OFFICE VISIT (OUTPATIENT)
Dept: FAMILY MEDICINE CLINIC | Facility: CLINIC | Age: 86
End: 2021-08-03
Payer: COMMERCIAL

## 2021-08-03 VITALS
SYSTOLIC BLOOD PRESSURE: 144 MMHG | HEART RATE: 68 BPM | BODY MASS INDEX: 29.07 KG/M2 | DIASTOLIC BLOOD PRESSURE: 72 MMHG | WEIGHT: 144.2 LBS | HEIGHT: 59 IN

## 2021-08-03 DIAGNOSIS — E11.42 CONTROLLED TYPE 2 DIABETES MELLITUS WITH DIABETIC POLYNEUROPATHY, WITHOUT LONG-TERM CURRENT USE OF INSULIN (HCC): ICD-10-CM

## 2021-08-03 DIAGNOSIS — N18.4 CKD (CHRONIC KIDNEY DISEASE), STAGE IV (HCC): ICD-10-CM

## 2021-08-03 DIAGNOSIS — M1A.00X0 IDIOPATHIC CHRONIC GOUT WITHOUT TOPHUS, UNSPECIFIED SITE: ICD-10-CM

## 2021-08-03 DIAGNOSIS — H91.92 HEARING LOSS OF LEFT EAR, UNSPECIFIED HEARING LOSS TYPE: ICD-10-CM

## 2021-08-03 DIAGNOSIS — I12.9 HYPERTENSIVE CHRONIC KIDNEY DISEASE WITH STAGE 1 THROUGH STAGE 4 CHRONIC KIDNEY DISEASE, OR UNSPECIFIED CHRONIC KIDNEY DISEASE: Primary | ICD-10-CM

## 2021-08-03 DIAGNOSIS — G63 POLYNEUROPATHY ASSOCIATED WITH UNDERLYING DISEASE (HCC): ICD-10-CM

## 2021-08-03 PROCEDURE — 3725F SCREEN DEPRESSION PERFORMED: CPT | Performed by: FAMILY MEDICINE

## 2021-08-03 PROCEDURE — 1160F RVW MEDS BY RX/DR IN RCRD: CPT | Performed by: FAMILY MEDICINE

## 2021-08-03 PROCEDURE — 1036F TOBACCO NON-USER: CPT | Performed by: FAMILY MEDICINE

## 2021-08-03 PROCEDURE — 99214 OFFICE O/P EST MOD 30 MIN: CPT | Performed by: FAMILY MEDICINE

## 2021-08-03 RX ORDER — GABAPENTIN 300 MG/1
300 CAPSULE ORAL
Qty: 90 CAPSULE | Refills: 3 | Status: ON HOLD | OUTPATIENT
Start: 2021-08-03 | End: 2022-03-16

## 2021-08-03 RX ORDER — FUROSEMIDE 20 MG/1
20 TABLET ORAL DAILY
Qty: 90 TABLET | Refills: 3 | Status: ON HOLD | OUTPATIENT
Start: 2021-08-03 | End: 2022-03-16

## 2021-08-03 NOTE — PROGRESS NOTES
Assessment and Plan:    1  Hypertension -  Blood pressure stable  Continue amlodipine and lisinopril    2  Diabetes mellitus with polyneuropathy -  Stable on diet control  Last A1c 5 3     3  Chronic kidney disease -   Patient to follow-up with Nephrology  Avoid nephrotoxic agents and contrast dye  4  Polyneuropathy -  Stable  Continue gabapentin    5  Gout -   No recent gouty flares  Continue colchicine    6  Hearing loss of left ear -  Patient's ears checked today  No cerumen appreciated  Continue to wear hearing aid  Labs reviewed today with patient and her daughter  Return to office 2 months  Subjective:      Patient ID: Daniel Harvey is a 80 y o  female  CC:    Chief Complaint   Patient presents with    Follow-up    Diabetes    Hypertension       HPI:      Mrs Kathy Chávez is a 80-year-old female who presents today to follow-up on chronic medical conditions  Today she is upset because her daughter has Alzheimer's disease and is worsening and will need nursing home placement  They had an incident this weekend where of the daughter was agitated and wandered out of a restaurant  Patient is upset about that but other than that she is doing well and has no complaints  She has blood work to review today  The following portions of the patient's history were reviewed and updated as appropriate: allergies, current medications, past family history, past medical history, past social history, past surgical history and problem list       Review of Systems   Constitutional:        See HPI   HENT: Negative for congestion, ear pain, mouth sores, sinus pressure and trouble swallowing  Eyes: Negative for discharge, redness and itching  Respiratory: Negative for apnea, cough, chest tightness, shortness of breath, wheezing and stridor  Cardiovascular: Negative for chest pain, palpitations and leg swelling     Gastrointestinal: Negative for abdominal distention, abdominal pain, blood in stool, constipation, diarrhea, nausea and vomiting  Endocrine: Negative for cold intolerance and heat intolerance  Genitourinary: Negative for difficulty urinating, dysuria, flank pain and urgency  Musculoskeletal: Negative for arthralgias and myalgias  Skin: Negative for rash  Neurological: Negative for dizziness, seizures, syncope, speech difficulty, weakness, light-headedness, numbness and headaches  Hematological: Negative for adenopathy  Psychiatric/Behavioral: Negative for agitation, behavioral problems, confusion and sleep disturbance  The patient is not nervous/anxious  See HPI         Data to review:       Objective:    Vitals:    08/03/21 0903   BP: 144/72   BP Location: Left arm   Patient Position: Sitting   Pulse: 68   Weight: 65 4 kg (144 lb 3 2 oz)   Height: 4' 11" (1 499 m)        Physical Exam  Constitutional:       General: She is not in acute distress  Appearance: She is well-developed  HENT:      Right Ear: Tympanic membrane and external ear normal       Left Ear: Tympanic membrane and external ear normal    Eyes:      General: No scleral icterus  Conjunctiva/sclera: Conjunctivae normal    Neck:      Vascular: No carotid bruit  Cardiovascular:      Rate and Rhythm: Normal rate and regular rhythm  Heart sounds: Normal heart sounds  No murmur heard  No friction rub  No gallop  Pulmonary:      Effort: Pulmonary effort is normal  No respiratory distress  Breath sounds: Normal breath sounds  No wheezing or rales  Abdominal:      General: Bowel sounds are normal  There is no distension  Palpations: Abdomen is soft  Tenderness: There is no abdominal tenderness  There is no guarding  Hernia: No hernia is present  Musculoskeletal:         General: No tenderness  Normal range of motion  Cervical back: Normal range of motion and neck supple  Right lower leg: No edema  Left lower leg: No edema     Lymphadenopathy:      Cervical: No cervical adenopathy  Skin:     General: Skin is warm and dry  Coloration: Skin is not jaundiced  Neurological:      General: No focal deficit present  Mental Status: She is alert and oriented to person, place, and time  Psychiatric:         Behavior: Behavior normal          Thought Content: Thought content normal          Judgment: Judgment normal       Comments:  Tearful when talking about her daughter who has Alzheimer's  BMI Counseling: Body mass index is 29 12 kg/m²  The BMI is above normal  Nutrition recommendations include decreasing portion sizes, encouraging healthy choices of fruits and vegetables and decreasing fast food intake  Exercise recommendations include exercising 3-5 times per week

## 2021-08-12 DIAGNOSIS — E78.2 MIXED HYPERLIPIDEMIA: ICD-10-CM

## 2021-08-12 RX ORDER — SIMVASTATIN 20 MG
TABLET ORAL
Qty: 90 TABLET | Refills: 1 | Status: SHIPPED | OUTPATIENT
Start: 2021-08-12 | End: 2022-02-02

## 2021-08-23 LAB
ALBUMIN SERPL-MCNC: 4.3 G/DL (ref 3.6–5.1)
BUN SERPL-MCNC: 47 MG/DL (ref 7–25)
BUN/CREAT SERPL: 22 (CALC) (ref 6–22)
CALCIUM SERPL-MCNC: 10.5 MG/DL (ref 8.6–10.4)
CHLORIDE SERPL-SCNC: 112 MMOL/L (ref 98–110)
CO2 SERPL-SCNC: 26 MMOL/L (ref 20–32)
CREAT SERPL-MCNC: 2.1 MG/DL (ref 0.6–0.88)
GLUCOSE SERPL-MCNC: 112 MG/DL (ref 65–99)
PHOSPHATE SERPL-MCNC: 3.5 MG/DL (ref 2.1–4.3)
POTASSIUM SERPL-SCNC: 4.4 MMOL/L (ref 3.5–5.3)
SL AMB EGFR AFRICAN AMERICAN: 23 ML/MIN/1.73M2
SL AMB EGFR NON AFRICAN AMERICAN: 20 ML/MIN/1.73M2
SODIUM SERPL-SCNC: 144 MMOL/L (ref 135–146)

## 2021-09-07 ENCOUNTER — OFFICE VISIT (OUTPATIENT)
Dept: NEPHROLOGY | Facility: CLINIC | Age: 86
End: 2021-09-07
Payer: COMMERCIAL

## 2021-09-07 VITALS
SYSTOLIC BLOOD PRESSURE: 142 MMHG | BODY MASS INDEX: 28.83 KG/M2 | DIASTOLIC BLOOD PRESSURE: 72 MMHG | HEIGHT: 59 IN | WEIGHT: 143 LBS

## 2021-09-07 DIAGNOSIS — R60.1 GENERALIZED EDEMA: ICD-10-CM

## 2021-09-07 DIAGNOSIS — N26.1 ATROPHIC KIDNEY, ACQUIRED: ICD-10-CM

## 2021-09-07 DIAGNOSIS — N18.4 CKD (CHRONIC KIDNEY DISEASE), STAGE IV (HCC): Primary | ICD-10-CM

## 2021-09-07 DIAGNOSIS — D63.1 ANEMIA IN STAGE 4 CHRONIC KIDNEY DISEASE (HCC): ICD-10-CM

## 2021-09-07 DIAGNOSIS — E83.52 HYPERCALCEMIA: ICD-10-CM

## 2021-09-07 DIAGNOSIS — E11.22 TYPE 2 DIABETES MELLITUS WITH STAGE 4 CHRONIC KIDNEY DISEASE, WITHOUT LONG-TERM CURRENT USE OF INSULIN (HCC): ICD-10-CM

## 2021-09-07 DIAGNOSIS — N18.4 ANEMIA IN STAGE 4 CHRONIC KIDNEY DISEASE (HCC): ICD-10-CM

## 2021-09-07 DIAGNOSIS — N18.4 TYPE 2 DIABETES MELLITUS WITH STAGE 4 CHRONIC KIDNEY DISEASE, WITHOUT LONG-TERM CURRENT USE OF INSULIN (HCC): ICD-10-CM

## 2021-09-07 PROCEDURE — 99214 OFFICE O/P EST MOD 30 MIN: CPT | Performed by: INTERNAL MEDICINE

## 2021-09-07 PROCEDURE — 1160F RVW MEDS BY RX/DR IN RCRD: CPT | Performed by: INTERNAL MEDICINE

## 2021-09-07 PROCEDURE — 1036F TOBACCO NON-USER: CPT | Performed by: INTERNAL MEDICINE

## 2021-09-07 NOTE — PROGRESS NOTES
NEPHROLOGY OUTPATIENT PROGRESS NOTE   Cristal Cisneros 80 y o  female MRN: [id number]  Reason for visit:   Chronic kidney disease    ASSESSMENT and PLAN:  1  Chronic kidney disease, stage IV/ stage 5, previous baseline creatinine mid to high 1s most recent creatinine elevated at 2 10, estimated GFR 20   2  Elevated postvoid residual, previously following with Urology, currently asymptomatic  Recommend she follow-up with Urology  3  Mild hypercalcemia, currently not taking any calcium supplements likely related to CKD  Check SPEP / UPEP   4  Anemia of chronic kidney disease hemoglobin 10 8, repeat iron studies next visit   5  Hyperparathyroidism, previous  with borderline hypercalcemia, likely component of primary, check SPEP /UPEP  6  Hypertension, blood pressure overall appears fairly stable, continue with current regimen   7  Diabetes  8  Complex renal cysts without recent change seen on ultrasound, no evidence of mass    Unfortunately renal function slightly worse, likely multifactorial, suspect some progression given diabetes, hypertension as well as elevated postvoid residual   Recommend she follow-up with Urology   Continue with current diuretic regimen   Blood pressure appears reasonable   Check iron stores  Again patient likely component of primary hyperparathyroidism, given relatively stable calcium will continue to follow for now  SUBJECTIVE / INTERVAL HISTORY:    She has been doing reasonably well  However she has been experiencing a fair amount of stress given her daughter was recently placed in an [employer] [facility] unit  No reports of chest pain shortness of breath  Does complain of significant arthritis especially involving her lower legs  No appetite changes        OBJECTIVE:  /72 (BP Location: Right arm, Patient Position: Sitting, Cuff Size: Standard)   Ht 4' 11" (1 499 m)   Wt 64 9 kg (143 lb)   LMP  (LMP Unknown)   BMI 28 88 kg/m²   Vitals:    09/07/21 0922   Weight: 64 9 kg (143 lb)       Physical Exam  Constitutional:       Appearance: She is not ill-appearing  HENT:      Head: Normocephalic and atraumatic  Eyes:      General: No scleral icterus  Cardiovascular:      Rate and Rhythm: Normal rate and regular rhythm  Pulmonary:      Effort: Pulmonary effort is normal       Breath sounds: Normal breath sounds  Abdominal:      General: There is no distension  Palpations: Abdomen is soft  Musculoskeletal:      Right lower leg: No edema  Left lower leg: No edema  Skin:     General: Skin is dry  Findings: No rash  Neurological:      Mental Status: She is alert and oriented to person, place, and time             Medications:    Current Outpatient Medications:     amLODIPine (NORVASC) 5 mg tablet, TAKE 1 TABLET BY MOUTH TWICE A DAY, Disp: 180 tablet, Rfl: 3    aspirin 81 MG tablet, Take 1 tablet by mouth daily, Disp: , Rfl:     Cholecalciferol (VITAMIN D3) 2000 units capsule, Take 1 capsule by mouth daily, Disp: , Rfl:     colchicine (COLCRYS) 0 6 mg tablet, TAKE 1 TABLET BY MOUTH EVERY DAY, Disp: 90 tablet, Rfl: 3    Contour Test test strip, TEST AS DIRECTED, Disp: 100 each, Rfl: 3    docusate sodium (COLACE) 100 mg capsule, Take 100 mg by mouth daily, Disp: , Rfl:     furosemide (LASIX) 20 mg tablet, Take 1 tablet (20 mg total) by mouth daily, Disp: 90 tablet, Rfl: 3    gabapentin (NEURONTIN) 300 mg capsule, Take 1 capsule (300 mg total) by mouth daily at bedtime, Disp: 90 capsule, Rfl: 3    Lancets (ONETOUCH ULTRASOFT) lancets, by Does not apply route 2 (two) times a day, Disp: , Rfl:     lisinopril (ZESTRIL) 20 mg tablet, TAKE 1 TABLET BY MOUTH EVERY DAY, Disp: 90 tablet, Rfl: 3    simvastatin (ZOCOR) 20 mg tablet, TAKE 1 TABLET BY MOUTH EVERY DAY, Disp: 90 tablet, Rfl: 1    Laboratory Results:  Results for orders placed or performed in visit on 08/23/21   Renal function panel   Result Value Ref Range    Glucose, Random 112 (H) 65 - 99 mg/dL    BUN 47 (H) 7 - 25 mg/dL    Creatinine 2 10 (H) 0 60 - 0 88 mg/dL    eGFR Non  20 (L) > OR = 60 mL/min/1 73m2    eGFR  23 (L) > OR = 60 mL/min/1 73m2    SL AMB BUN/CREATININE RATIO 22 6 - 22 (calc)    Sodium 144 135 - 146 mmol/L    Potassium 4 4 3 5 - 5 3 mmol/L    Chloride 112 (H) 98 - 110 mmol/L    CO2 26 20 - 32 mmol/L    Calcium 10 5 (H) 8 6 - 10 4 mg/dL    Phosphorus, Serum 3 5 2 1 - 4 3 mg/dL    Albumin 4 3 3 6 - 5 1 g/dL

## 2021-09-07 NOTE — LETTER
September 7, 2021     Bryan Willams, [address]  [place] [address]  [id number]    Patient: Dominique Chester   YOB: 1927   Date of Visit: 9/7/2021       Dear Dr Evelia Wade:    Thank you for referring Florin Cyr to me for evaluation  Below are the relevant portions of my assessment and plan of care  If you have questions, please do not hesitate to call me  I look forward to following Lyssa Mark along with you  Sincerely,        Zach Gamboa,         CC: No Recipients                         ASSESSMENT and PLAN:  1  Chronic kidney disease, stage IV/ stage 5, previous baseline creatinine mid to high 1s most recent creatinine elevated at 2 10, estimated GFR 20   2  Elevated postvoid residual, previously following with Urology, currently asymptomatic  Recommend she follow-up with Urology  3  Mild hypercalcemia, currently not taking any calcium supplements likely related to CKD  Check SPEP / UPEP   4  Anemia of chronic kidney disease hemoglobin 10 8, repeat iron studies next visit   5  Hyperparathyroidism, previous  with borderline hypercalcemia, likely component of primary, check SPEP /UPEP  6  Hypertension, blood pressure overall appears fairly stable, continue with current regimen   7  Diabetes  8  Complex renal cysts without recent change seen on ultrasound, no evidence of mass    Unfortunately renal function slightly worse, likely multifactorial, suspect some progression given diabetes, hypertension as well as elevated postvoid residual   Recommend she follow-up with Urology   Continue with current diuretic regimen   Blood pressure appears reasonable   Check iron stores  Again patient likely component of primary hyperparathyroidism, given relatively stable calcium will continue to follow for now

## 2021-09-17 DIAGNOSIS — I10 ESSENTIAL HYPERTENSION: ICD-10-CM

## 2021-09-17 RX ORDER — AMLODIPINE BESYLATE 5 MG/1
TABLET ORAL
Qty: 180 TABLET | Refills: 3 | Status: ON HOLD | OUTPATIENT
Start: 2021-09-17 | End: 2022-03-07

## 2021-10-11 RX ORDER — IMIQUIMOD 12.5 MG/.25G
CREAM TOPICAL
COMMUNITY
Start: 2021-09-16 | End: 2022-02-13

## 2021-10-12 ENCOUNTER — OFFICE VISIT (OUTPATIENT)
Dept: FAMILY MEDICINE CLINIC | Facility: CLINIC | Age: 86
End: 2021-10-12
Payer: COMMERCIAL

## 2021-10-12 VITALS
DIASTOLIC BLOOD PRESSURE: 72 MMHG | WEIGHT: 141.6 LBS | HEIGHT: 59 IN | HEART RATE: 64 BPM | RESPIRATION RATE: 16 BRPM | SYSTOLIC BLOOD PRESSURE: 118 MMHG | BODY MASS INDEX: 28.55 KG/M2

## 2021-10-12 DIAGNOSIS — I10 ESSENTIAL HYPERTENSION: Primary | ICD-10-CM

## 2021-10-12 DIAGNOSIS — G63 POLYNEUROPATHY ASSOCIATED WITH UNDERLYING DISEASE (HCC): ICD-10-CM

## 2021-10-12 DIAGNOSIS — E11.42 CONTROLLED TYPE 2 DIABETES MELLITUS WITH DIABETIC POLYNEUROPATHY, WITHOUT LONG-TERM CURRENT USE OF INSULIN (HCC): ICD-10-CM

## 2021-10-12 DIAGNOSIS — N18.4 CKD (CHRONIC KIDNEY DISEASE), STAGE IV (HCC): ICD-10-CM

## 2021-10-12 DIAGNOSIS — E78.00 PURE HYPERCHOLESTEROLEMIA: ICD-10-CM

## 2021-10-12 DIAGNOSIS — M1A.00X0 IDIOPATHIC CHRONIC GOUT WITHOUT TOPHUS, UNSPECIFIED SITE: ICD-10-CM

## 2021-10-12 LAB — SL AMB POCT HEMOGLOBIN AIC: 5.1 (ref ?–6.5)

## 2021-10-12 PROCEDURE — 1160F RVW MEDS BY RX/DR IN RCRD: CPT | Performed by: FAMILY MEDICINE

## 2021-10-12 PROCEDURE — 99214 OFFICE O/P EST MOD 30 MIN: CPT | Performed by: FAMILY MEDICINE

## 2021-10-12 PROCEDURE — 83036 HEMOGLOBIN GLYCOSYLATED A1C: CPT | Performed by: FAMILY MEDICINE

## 2021-10-12 PROCEDURE — 1036F TOBACCO NON-USER: CPT | Performed by: FAMILY MEDICINE

## 2021-12-09 LAB
ALBUMIN ?TM MFR UR ELPH: 54 %
ALBUMIN SERPL ELPH-MCNC: 4 G/DL (ref 3.8–4.8)
ALBUMIN SERPL-MCNC: 4.2 G/DL (ref 3.6–5.1)
ALBUMIN/CREAT UR: 84 MCG/MG CREAT
ALBUMIN/GLOB SERPL: 1.8 (CALC) (ref 1–2.5)
ALP SERPL-CCNC: 112 U/L (ref 37–153)
ALPHA1 GLOB ?TM MFR UR ELPH: 5 %
ALPHA1 GLOB SERPL ELPH-MCNC: 0.3 G/DL (ref 0.2–0.3)
ALPHA2 GLOB ?TM MFR UR ELPH: 11 %
ALPHA2 GLOB SERPL ELPH-MCNC: 0.7 G/DL (ref 0.5–0.9)
ALT SERPL-CCNC: 13 U/L (ref 6–29)
AST SERPL-CCNC: 14 U/L (ref 10–35)
B-GLOBULIN ?TM MFR UR ELPH: 17 %
BASOPHILS # BLD AUTO: 20 CELLS/UL (ref 0–200)
BASOPHILS NFR BLD AUTO: 0.4 %
BETA1 GLOB SERPL ELPH-MCNC: 0.4 G/DL (ref 0.4–0.6)
BETA2 GLOB SERPL ELPH-MCNC: 0.3 G/DL (ref 0.2–0.5)
BILIRUB SERPL-MCNC: 0.5 MG/DL (ref 0.2–1.2)
BUN SERPL-MCNC: 57 MG/DL (ref 7–25)
BUN/CREAT SERPL: 24 (CALC) (ref 6–22)
CALCIUM SERPL-MCNC: 10.6 MG/DL (ref 8.6–10.4)
CALCIUM SERPL-MCNC: 10.6 MG/DL (ref 8.6–10.4)
CHLORIDE SERPL-SCNC: 111 MMOL/L (ref 98–110)
CO2 SERPL-SCNC: 26 MMOL/L (ref 20–32)
CREAT SERPL-MCNC: 2.35 MG/DL (ref 0.6–0.88)
CREAT UR-MCNC: 63 MG/DL (ref 20–275)
CREAT UR-MCNC: 63 MG/DL (ref 20–275)
EOSINOPHIL # BLD AUTO: 71 CELLS/UL (ref 15–500)
EOSINOPHIL NFR BLD AUTO: 1.4 %
ERYTHROCYTE [DISTWIDTH] IN BLOOD BY AUTOMATED COUNT: 12.4 % (ref 11–15)
FERRITIN SERPL-MCNC: 96 NG/ML (ref 16–288)
GAMMA GLOB ?TM MFR UR ELPH: 14 %
GAMMA GLOB SERPL ELPH-MCNC: 0.8 G/DL (ref 0.8–1.7)
GLOBULIN SER CALC-MCNC: 2.3 G/DL (CALC) (ref 1.9–3.7)
GLUCOSE SERPL-MCNC: 112 MG/DL (ref 65–99)
HCT VFR BLD AUTO: 32.9 % (ref 35–45)
HGB BLD-MCNC: 11.1 G/DL (ref 11.7–15.5)
IRON SATN MFR SERPL: 36 % (CALC) (ref 16–45)
IRON SERPL-MCNC: 106 MCG/DL (ref 45–160)
LYMPHOCYTES # BLD AUTO: 1280 CELLS/UL (ref 850–3900)
LYMPHOCYTES NFR BLD AUTO: 25.1 %
MCH RBC QN AUTO: 32.6 PG (ref 27–33)
MCHC RBC AUTO-ENTMCNC: 33.7 G/DL (ref 32–36)
MCV RBC AUTO: 96.8 FL (ref 80–100)
MICROALBUMIN UR-MCNC: 5.3 MG/DL
MONOCYTES # BLD AUTO: 393 CELLS/UL (ref 200–950)
MONOCYTES NFR BLD AUTO: 7.7 %
NEUTROPHILS # BLD AUTO: 3335 CELLS/UL (ref 1500–7800)
NEUTROPHILS NFR BLD AUTO: 65.4 %
PLATELET # BLD AUTO: 149 THOUSAND/UL (ref 140–400)
PMV BLD REES-ECKER: 13.8 FL (ref 7.5–12.5)
POTASSIUM SERPL-SCNC: 5 MMOL/L (ref 3.5–5.3)
PROT SERPL-MCNC: 6.5 G/DL (ref 6.1–8.1)
PROT SERPL-MCNC: 6.5 G/DL (ref 6.1–8.1)
PROT UR-MCNC: 20 MG/DL (ref 5–24)
PROT/CREAT UR: 0.32 MG/MG CREAT (ref 0.02–0.16)
PROT/CREAT UR: 317 MG/G CREAT (ref 21–161)
PTH-INTACT SERPL-MCNC: 245 PG/ML (ref 14–64)
RBC # BLD AUTO: 3.4 MILLION/UL (ref 3.8–5.1)
SL AMB EGFR AFRICAN AMERICAN: 20 ML/MIN/1.73M2
SL AMB EGFR NON AFRICAN AMERICAN: 17 ML/MIN/1.73M2
SODIUM SERPL-SCNC: 144 MMOL/L (ref 135–146)
TIBC SERPL-MCNC: 294 MCG/DL (CALC) (ref 250–450)
URATE SERPL-MCNC: 8.8 MG/DL (ref 2.5–7)
WBC # BLD AUTO: 5.1 THOUSAND/UL (ref 3.8–10.8)

## 2021-12-15 ENCOUNTER — OFFICE VISIT (OUTPATIENT)
Dept: NEPHROLOGY | Facility: CLINIC | Age: 86
End: 2021-12-15
Payer: COMMERCIAL

## 2021-12-15 VITALS
HEART RATE: 66 BPM | HEIGHT: 59 IN | WEIGHT: 139 LBS | BODY MASS INDEX: 28.02 KG/M2 | SYSTOLIC BLOOD PRESSURE: 125 MMHG | DIASTOLIC BLOOD PRESSURE: 60 MMHG

## 2021-12-15 DIAGNOSIS — E83.52 HYPERCALCEMIA: ICD-10-CM

## 2021-12-15 DIAGNOSIS — E21.0 PRIMARY HYPERPARATHYROIDISM (HCC): ICD-10-CM

## 2021-12-15 DIAGNOSIS — I10 HYPERTENSION, UNSPECIFIED TYPE: ICD-10-CM

## 2021-12-15 DIAGNOSIS — N28.1 COMPLEX RENAL CYST: ICD-10-CM

## 2021-12-15 DIAGNOSIS — N18.4 TYPE 2 DIABETES MELLITUS WITH STAGE 4 CHRONIC KIDNEY DISEASE, WITHOUT LONG-TERM CURRENT USE OF INSULIN (HCC): Primary | ICD-10-CM

## 2021-12-15 DIAGNOSIS — I12.9 HYPERTENSIVE CHRONIC KIDNEY DISEASE, UNSPECIFIED CKD STAGE: ICD-10-CM

## 2021-12-15 DIAGNOSIS — R33.9 URINARY RETENTION: ICD-10-CM

## 2021-12-15 DIAGNOSIS — N26.1 ATROPHIC KIDNEY, ACQUIRED: ICD-10-CM

## 2021-12-15 DIAGNOSIS — E11.22 TYPE 2 DIABETES MELLITUS WITH STAGE 4 CHRONIC KIDNEY DISEASE, WITHOUT LONG-TERM CURRENT USE OF INSULIN (HCC): Primary | ICD-10-CM

## 2021-12-15 DIAGNOSIS — N18.4 CHRONIC KIDNEY DISEASE, STAGE IV (SEVERE) (HCC): ICD-10-CM

## 2021-12-15 DIAGNOSIS — N28.1 ACQUIRED CYST OF KIDNEY: ICD-10-CM

## 2021-12-15 PROBLEM — N25.81 SECONDARY HYPERPARATHYROIDISM OF RENAL ORIGIN (HCC): Status: RESOLVED | Noted: 2018-07-24 | Resolved: 2021-12-15

## 2021-12-15 PROCEDURE — 1160F RVW MEDS BY RX/DR IN RCRD: CPT | Performed by: INTERNAL MEDICINE

## 2021-12-15 PROCEDURE — 99214 OFFICE O/P EST MOD 30 MIN: CPT | Performed by: INTERNAL MEDICINE

## 2021-12-15 PROCEDURE — 1036F TOBACCO NON-USER: CPT | Performed by: INTERNAL MEDICINE

## 2021-12-15 RX ORDER — CINACALCET 30 MG/1
30 TABLET, FILM COATED ORAL 3 TIMES WEEKLY
Qty: 36 TABLET | Refills: 3 | Status: SHIPPED | OUTPATIENT
Start: 2021-12-15

## 2021-12-15 RX ORDER — LISINOPRIL 10 MG/1
10 TABLET ORAL DAILY
Qty: 90 TABLET | Refills: 2 | Status: ON HOLD | OUTPATIENT
Start: 2021-12-15 | End: 2022-03-07

## 2021-12-28 ENCOUNTER — TELEPHONE (OUTPATIENT)
Dept: NEPHROLOGY | Facility: CLINIC | Age: 86
End: 2021-12-28

## 2021-12-30 ENCOUNTER — HOSPITAL ENCOUNTER (OUTPATIENT)
Dept: ULTRASOUND IMAGING | Facility: HOSPITAL | Age: 86
Discharge: HOME | End: 2021-12-30
Attending: INTERNAL MEDICINE
Payer: COMMERCIAL

## 2021-12-30 DIAGNOSIS — N26.1 ATROPHIC KIDNEY, ACQUIRED: ICD-10-CM

## 2021-12-30 DIAGNOSIS — N18.4 CHRONIC KIDNEY DISEASE, STAGE IV (SEVERE) (HCC): ICD-10-CM

## 2021-12-30 DIAGNOSIS — N18.4 TYPE 2 DIABETES MELLITUS WITH STAGE 4 CHRONIC KIDNEY DISEASE, WITHOUT LONG-TERM CURRENT USE OF INSULIN (HCC): ICD-10-CM

## 2021-12-30 DIAGNOSIS — E11.22 TYPE 2 DIABETES MELLITUS WITH STAGE 4 CHRONIC KIDNEY DISEASE, WITHOUT LONG-TERM CURRENT USE OF INSULIN (HCC): ICD-10-CM

## 2021-12-30 DIAGNOSIS — I12.9 HYPERTENSIVE CHRONIC KIDNEY DISEASE, UNSPECIFIED CKD STAGE: ICD-10-CM

## 2021-12-30 DIAGNOSIS — E21.0 PRIMARY HYPERPARATHYROIDISM (HCC): ICD-10-CM

## 2021-12-30 DIAGNOSIS — N28.1 COMPLEX RENAL CYST: ICD-10-CM

## 2021-12-30 DIAGNOSIS — E83.52 HYPERCALCEMIA: ICD-10-CM

## 2021-12-30 DIAGNOSIS — R33.9 URINARY RETENTION: ICD-10-CM

## 2021-12-30 PROCEDURE — 76770 US EXAM ABDO BACK WALL COMP: CPT

## 2022-01-01 ENCOUNTER — HOSPITAL ENCOUNTER (EMERGENCY)
Facility: HOSPITAL | Age: 87
Discharge: NURSING, REHAB OR LONG-TERM CARE | DRG: 603 | End: 2022-07-26
Attending: EMERGENCY MEDICINE
Payer: COMMERCIAL

## 2022-01-01 ENCOUNTER — TELEPHONE (OUTPATIENT)
Dept: FAMILY MEDICINE CLINIC | Facility: CLINIC | Age: 87
End: 2022-01-01

## 2022-01-01 ENCOUNTER — OFFICE VISIT (OUTPATIENT)
Dept: FAMILY MEDICINE CLINIC | Facility: CLINIC | Age: 87
End: 2022-01-01
Payer: COMMERCIAL

## 2022-01-01 ENCOUNTER — APPOINTMENT (OUTPATIENT)
Dept: LAB | Facility: CLINIC | Age: 87
End: 2022-01-01
Payer: COMMERCIAL

## 2022-01-01 ENCOUNTER — HOSPITAL ENCOUNTER (INPATIENT)
Facility: HOSPITAL | Age: 87
LOS: 4 days | Discharge: HOME | DRG: 603 | End: 2022-07-30
Attending: INTERNAL MEDICINE | Admitting: INTERNAL MEDICINE
Payer: COMMERCIAL

## 2022-01-01 ENCOUNTER — TELEPHONE (OUTPATIENT)
Dept: OBGYN CLINIC | Facility: HOSPITAL | Age: 87
End: 2022-01-01

## 2022-01-01 ENCOUNTER — APPOINTMENT (INPATIENT)
Dept: RADIOLOGY | Facility: HOSPITAL | Age: 87
DRG: 603 | End: 2022-01-01
Payer: COMMERCIAL

## 2022-01-01 ENCOUNTER — TRANSITIONAL CARE MANAGEMENT (OUTPATIENT)
Dept: FAMILY MEDICINE CLINIC | Facility: CLINIC | Age: 87
End: 2022-01-01

## 2022-01-01 VITALS
DIASTOLIC BLOOD PRESSURE: 64 MMHG | WEIGHT: 143.74 LBS | OXYGEN SATURATION: 96 % | RESPIRATION RATE: 16 BRPM | SYSTOLIC BLOOD PRESSURE: 145 MMHG | HEART RATE: 66 BPM | BODY MASS INDEX: 28.07 KG/M2 | TEMPERATURE: 97.6 F

## 2022-01-01 VITALS
SYSTOLIC BLOOD PRESSURE: 98 MMHG | HEIGHT: 61 IN | BODY MASS INDEX: 25.86 KG/M2 | WEIGHT: 137 LBS | DIASTOLIC BLOOD PRESSURE: 50 MMHG | HEART RATE: 64 BPM

## 2022-01-01 VITALS
SYSTOLIC BLOOD PRESSURE: 175 MMHG | TEMPERATURE: 98.3 F | RESPIRATION RATE: 18 BRPM | DIASTOLIC BLOOD PRESSURE: 79 MMHG | BODY MASS INDEX: 27 KG/M2 | WEIGHT: 143 LBS | HEART RATE: 68 BPM | OXYGEN SATURATION: 90 % | HEIGHT: 61 IN

## 2022-01-01 DIAGNOSIS — I12.9 HYPERTENSIVE CHRONIC KIDNEY DISEASE, UNSPECIFIED CKD STAGE: ICD-10-CM

## 2022-01-01 DIAGNOSIS — F32.9 REACTIVE DEPRESSION: ICD-10-CM

## 2022-01-01 DIAGNOSIS — E83.52 HYPERCALCEMIA: ICD-10-CM

## 2022-01-01 DIAGNOSIS — R60.0 EDEMA OF BOTH LOWER LEGS: ICD-10-CM

## 2022-01-01 DIAGNOSIS — N28.1 COMPLEX RENAL CYST: ICD-10-CM

## 2022-01-01 DIAGNOSIS — E21.0 PRIMARY HYPERPARATHYROIDISM (HCC): ICD-10-CM

## 2022-01-01 DIAGNOSIS — N18.4 TYPE 2 DIABETES MELLITUS WITH STAGE 4 CHRONIC KIDNEY DISEASE, WITHOUT LONG-TERM CURRENT USE OF INSULIN (HCC): ICD-10-CM

## 2022-01-01 DIAGNOSIS — L03.011 FELON OF FINGER OF RIGHT HAND: Primary | ICD-10-CM

## 2022-01-01 DIAGNOSIS — G63 POLYNEUROPATHY ASSOCIATED WITH UNDERLYING DISEASE (HCC): ICD-10-CM

## 2022-01-01 DIAGNOSIS — D63.1 ANEMIA IN STAGE 4 CHRONIC KIDNEY DISEASE (HCC): ICD-10-CM

## 2022-01-01 DIAGNOSIS — E87.0 HYPERNATREMIA: ICD-10-CM

## 2022-01-01 DIAGNOSIS — N18.4 ANEMIA IN STAGE 4 CHRONIC KIDNEY DISEASE (HCC): ICD-10-CM

## 2022-01-01 DIAGNOSIS — R33.9 URINARY RETENTION: ICD-10-CM

## 2022-01-01 DIAGNOSIS — N26.1 ATROPHIC KIDNEY, ACQUIRED: ICD-10-CM

## 2022-01-01 DIAGNOSIS — L03.011 CELLULITIS OF RIGHT INDEX FINGER: ICD-10-CM

## 2022-01-01 DIAGNOSIS — L98.8 SKIN PLAQUE: ICD-10-CM

## 2022-01-01 DIAGNOSIS — E11.22 TYPE 2 DIABETES MELLITUS WITH STAGE 4 CHRONIC KIDNEY DISEASE, WITHOUT LONG-TERM CURRENT USE OF INSULIN (HCC): ICD-10-CM

## 2022-01-01 DIAGNOSIS — Z76.89 ENCOUNTER FOR SUPPORT AND COORDINATION OF TRANSITION OF CARE: ICD-10-CM

## 2022-01-01 DIAGNOSIS — E44.0 MODERATE PROTEIN-CALORIE MALNUTRITION (HCC): ICD-10-CM

## 2022-01-01 DIAGNOSIS — L03.90 CELLULITIS: Primary | ICD-10-CM

## 2022-01-01 DIAGNOSIS — Z92.89 HISTORY OF RECENT HOSPITALIZATION: ICD-10-CM

## 2022-01-01 DIAGNOSIS — N18.4 CHRONIC KIDNEY DISEASE, STAGE IV (SEVERE) (HCC): ICD-10-CM

## 2022-01-01 DIAGNOSIS — L03.011 CELLULITIS OF RIGHT INDEX FINGER: Primary | ICD-10-CM

## 2022-01-01 LAB
ALBUMIN SERPL BCP-MCNC: 2.2 G/DL (ref 3.5–5)
ALBUMIN SERPL BCP-MCNC: 2.3 G/DL (ref 3.5–5)
ALBUMIN SERPL BCP-MCNC: 2.3 G/DL (ref 3.5–5)
ALBUMIN SERPL BCP-MCNC: 2.6 G/DL (ref 3.5–5)
ALP SERPL-CCNC: 102 U/L (ref 46–116)
ALP SERPL-CCNC: 106 U/L (ref 46–116)
ALP SERPL-CCNC: 106 U/L (ref 46–116)
ALP SERPL-CCNC: 108 U/L (ref 46–116)
ALT SERPL W P-5'-P-CCNC: 16 U/L (ref 12–78)
ALT SERPL W P-5'-P-CCNC: 7 U/L (ref 12–78)
ALT SERPL W P-5'-P-CCNC: 9 U/L (ref 12–78)
ALT SERPL W P-5'-P-CCNC: 9 U/L (ref 12–78)
ANION GAP SERPL CALCULATED.3IONS-SCNC: 10 MMOL/L (ref 4–13)
ANION GAP SERPL CALCULATED.3IONS-SCNC: 2 MMOL/L (ref 4–13)
ANION GAP SERPL CALCULATED.3IONS-SCNC: 4 MMOL/L (ref 4–13)
ANION GAP SERPL CALCULATED.3IONS-SCNC: 5 MMOL/L (ref 4–13)
ANION GAP SERPL CALCULATED.3IONS-SCNC: 7 MMOL/L (ref 4–13)
AST SERPL W P-5'-P-CCNC: 123 U/L (ref 5–45)
AST SERPL W P-5'-P-CCNC: 15 U/L (ref 5–45)
AST SERPL W P-5'-P-CCNC: 7 U/L (ref 5–45)
AST SERPL W P-5'-P-CCNC: 8 U/L (ref 5–45)
BACTERIA WND AEROBE CULT: ABNORMAL
BASOPHILS # BLD AUTO: 0.02 THOUSANDS/ΜL (ref 0–0.1)
BASOPHILS # BLD AUTO: 0.02 THOUSANDS/ΜL (ref 0–0.1)
BASOPHILS # BLD AUTO: 0.03 THOUSANDS/ΜL (ref 0–0.1)
BASOPHILS NFR BLD AUTO: 0 % (ref 0–1)
BILIRUB SERPL-MCNC: 0.3 MG/DL (ref 0.2–1)
BILIRUB SERPL-MCNC: 0.34 MG/DL (ref 0.2–1)
BILIRUB SERPL-MCNC: 0.52 MG/DL (ref 0.2–1)
BILIRUB SERPL-MCNC: 0.67 MG/DL (ref 0.2–1)
BUN SERPL-MCNC: 38 MG/DL (ref 5–25)
BUN SERPL-MCNC: 40 MG/DL (ref 5–25)
BUN SERPL-MCNC: 43 MG/DL (ref 5–25)
BUN SERPL-MCNC: 43 MG/DL (ref 5–25)
BUN SERPL-MCNC: 59 MG/DL (ref 5–25)
CALCIUM ALBUM COR SERPL-MCNC: 10.1 MG/DL (ref 8.3–10.1)
CALCIUM ALBUM COR SERPL-MCNC: 10.4 MG/DL (ref 8.3–10.1)
CALCIUM ALBUM COR SERPL-MCNC: 10.4 MG/DL (ref 8.3–10.1)
CALCIUM ALBUM COR SERPL-MCNC: 10.5 MG/DL (ref 8.3–10.1)
CALCIUM SERPL-MCNC: 8.5 MG/DL (ref 8.3–10.1)
CALCIUM SERPL-MCNC: 8.7 MG/DL (ref 8.3–10.1)
CALCIUM SERPL-MCNC: 9 MG/DL (ref 8.3–10.1)
CALCIUM SERPL-MCNC: 9.1 MG/DL (ref 8.3–10.1)
CALCIUM SERPL-MCNC: 9.3 MG/DL (ref 8.3–10.1)
CHLORIDE SERPL-SCNC: 111 MMOL/L (ref 96–108)
CHLORIDE SERPL-SCNC: 112 MMOL/L (ref 96–108)
CHLORIDE SERPL-SCNC: 112 MMOL/L (ref 96–108)
CHLORIDE SERPL-SCNC: 116 MMOL/L (ref 96–108)
CHLORIDE SERPL-SCNC: 118 MMOL/L (ref 96–108)
CO2 SERPL-SCNC: 18 MMOL/L (ref 21–32)
CO2 SERPL-SCNC: 20 MMOL/L (ref 21–32)
CO2 SERPL-SCNC: 23 MMOL/L (ref 21–32)
CO2 SERPL-SCNC: 25 MMOL/L (ref 21–32)
CO2 SERPL-SCNC: 26 MMOL/L (ref 21–32)
CREAT SERPL-MCNC: 2.3 MG/DL (ref 0.6–1.3)
CREAT SERPL-MCNC: 2.4 MG/DL (ref 0.6–1.3)
CREAT SERPL-MCNC: 2.54 MG/DL (ref 0.6–1.3)
CREAT SERPL-MCNC: 2.73 MG/DL (ref 0.6–1.3)
CREAT SERPL-MCNC: 3.1 MG/DL (ref 0.6–1.3)
CRP SERPL QL: 76.4 MG/L
EOSINOPHIL # BLD AUTO: 0.62 THOUSAND/ΜL (ref 0–0.61)
EOSINOPHIL # BLD AUTO: 0.67 THOUSAND/ΜL (ref 0–0.61)
EOSINOPHIL # BLD AUTO: 0.68 THOUSAND/ΜL (ref 0–0.61)
EOSINOPHIL NFR BLD AUTO: 7 % (ref 0–6)
EOSINOPHIL NFR BLD AUTO: 7 % (ref 0–6)
EOSINOPHIL NFR BLD AUTO: 8 % (ref 0–6)
ERYTHROCYTE [DISTWIDTH] IN BLOOD BY AUTOMATED COUNT: 15 % (ref 11.6–15.1)
ERYTHROCYTE [DISTWIDTH] IN BLOOD BY AUTOMATED COUNT: 15 % (ref 11.6–15.1)
ERYTHROCYTE [DISTWIDTH] IN BLOOD BY AUTOMATED COUNT: 15.3 % (ref 11.6–15.1)
ERYTHROCYTE [DISTWIDTH] IN BLOOD BY AUTOMATED COUNT: 15.5 % (ref 11.6–15.1)
ERYTHROCYTE [SEDIMENTATION RATE] IN BLOOD: 36 MM/HOUR (ref 0–29)
EST. AVERAGE GLUCOSE BLD GHB EST-MCNC: 120 MG/DL
GFR SERPL CREATININE-BSD FRML MDRD: 12 ML/MIN/1.73SQ M
GFR SERPL CREATININE-BSD FRML MDRD: 14 ML/MIN/1.73SQ M
GFR SERPL CREATININE-BSD FRML MDRD: 15 ML/MIN/1.73SQ M
GFR SERPL CREATININE-BSD FRML MDRD: 16 ML/MIN/1.73SQ M
GFR SERPL CREATININE-BSD FRML MDRD: 17 ML/MIN/1.73SQ M
GLUCOSE P FAST SERPL-MCNC: 129 MG/DL (ref 65–99)
GLUCOSE SERPL-MCNC: 106 MG/DL (ref 65–140)
GLUCOSE SERPL-MCNC: 113 MG/DL (ref 65–140)
GLUCOSE SERPL-MCNC: 114 MG/DL (ref 65–140)
GLUCOSE SERPL-MCNC: 116 MG/DL (ref 65–140)
GLUCOSE SERPL-MCNC: 117 MG/DL (ref 65–140)
GLUCOSE SERPL-MCNC: 117 MG/DL (ref 65–140)
GLUCOSE SERPL-MCNC: 133 MG/DL (ref 65–140)
GLUCOSE SERPL-MCNC: 135 MG/DL (ref 65–140)
GLUCOSE SERPL-MCNC: 136 MG/DL (ref 65–140)
GLUCOSE SERPL-MCNC: 136 MG/DL (ref 65–140)
GLUCOSE SERPL-MCNC: 138 MG/DL (ref 65–140)
GLUCOSE SERPL-MCNC: 139 MG/DL (ref 65–140)
GLUCOSE SERPL-MCNC: 142 MG/DL (ref 65–140)
GLUCOSE SERPL-MCNC: 152 MG/DL (ref 65–140)
GLUCOSE SERPL-MCNC: 183 MG/DL (ref 65–140)
GLUCOSE SERPL-MCNC: 211 MG/DL (ref 65–140)
GLUCOSE SERPL-MCNC: 82 MG/DL (ref 65–140)
GLUCOSE SERPL-MCNC: 91 MG/DL (ref 65–140)
GLUCOSE SERPL-MCNC: 98 MG/DL (ref 65–140)
GRAM STN SPEC: ABNORMAL
GRAM STN SPEC: ABNORMAL
HBA1C MFR BLD: 5.8 %
HCT VFR BLD AUTO: 27.3 % (ref 34.8–46.1)
HCT VFR BLD AUTO: 27.7 % (ref 34.8–46.1)
HCT VFR BLD AUTO: 29.4 % (ref 34.8–46.1)
HCT VFR BLD AUTO: 29.9 % (ref 34.8–46.1)
HGB BLD-MCNC: 7.9 G/DL (ref 11.5–15.4)
HGB BLD-MCNC: 8.3 G/DL (ref 11.5–15.4)
HGB BLD-MCNC: 8.6 G/DL (ref 11.5–15.4)
HGB BLD-MCNC: 8.8 G/DL (ref 11.5–15.4)
IMM GRANULOCYTES # BLD AUTO: 0.04 THOUSAND/UL (ref 0–0.2)
IMM GRANULOCYTES # BLD AUTO: 0.04 THOUSAND/UL (ref 0–0.2)
IMM GRANULOCYTES # BLD AUTO: 0.05 THOUSAND/UL (ref 0–0.2)
IMM GRANULOCYTES NFR BLD AUTO: 1 % (ref 0–2)
LACTATE SERPL-SCNC: 1.6 MMOL/L (ref 0.5–2)
LYMPHOCYTES # BLD AUTO: 1.06 THOUSANDS/ΜL (ref 0.6–4.47)
LYMPHOCYTES # BLD AUTO: 1.28 THOUSANDS/ΜL (ref 0.6–4.47)
LYMPHOCYTES # BLD AUTO: 1.35 THOUSANDS/ΜL (ref 0.6–4.47)
LYMPHOCYTES NFR BLD AUTO: 13 % (ref 14–44)
LYMPHOCYTES NFR BLD AUTO: 13 % (ref 14–44)
LYMPHOCYTES NFR BLD AUTO: 16 % (ref 14–44)
MAGNESIUM SERPL-MCNC: 2 MG/DL (ref 1.6–2.6)
MCH RBC QN AUTO: 29 PG (ref 26.8–34.3)
MCH RBC QN AUTO: 29.6 PG (ref 26.8–34.3)
MCH RBC QN AUTO: 29.6 PG (ref 26.8–34.3)
MCH RBC QN AUTO: 29.8 PG (ref 26.8–34.3)
MCHC RBC AUTO-ENTMCNC: 28.5 G/DL (ref 31.4–37.4)
MCHC RBC AUTO-ENTMCNC: 29.3 G/DL (ref 31.4–37.4)
MCHC RBC AUTO-ENTMCNC: 29.4 G/DL (ref 31.4–37.4)
MCHC RBC AUTO-ENTMCNC: 30.4 G/DL (ref 31.4–37.4)
MCV RBC AUTO: 101 FL (ref 82–98)
MCV RBC AUTO: 101 FL (ref 82–98)
MCV RBC AUTO: 102 FL (ref 82–98)
MCV RBC AUTO: 98 FL (ref 82–98)
MONOCYTES # BLD AUTO: 0.6 THOUSAND/ΜL (ref 0.17–1.22)
MONOCYTES # BLD AUTO: 0.64 THOUSAND/ΜL (ref 0.17–1.22)
MONOCYTES # BLD AUTO: 0.75 THOUSAND/ΜL (ref 0.17–1.22)
MONOCYTES NFR BLD AUTO: 6 % (ref 4–12)
MONOCYTES NFR BLD AUTO: 7 % (ref 4–12)
MONOCYTES NFR BLD AUTO: 9 % (ref 4–12)
NEUTROPHILS # BLD AUTO: 5.7 THOUSANDS/ΜL (ref 1.85–7.62)
NEUTROPHILS # BLD AUTO: 6.13 THOUSANDS/ΜL (ref 1.85–7.62)
NEUTROPHILS # BLD AUTO: 7.37 THOUSANDS/ΜL (ref 1.85–7.62)
NEUTS SEG NFR BLD AUTO: 66 % (ref 43–75)
NEUTS SEG NFR BLD AUTO: 72 % (ref 43–75)
NEUTS SEG NFR BLD AUTO: 73 % (ref 43–75)
NRBC BLD AUTO-RTO: 0 /100 WBCS
PHOSPHATE SERPL-MCNC: 3.3 MG/DL (ref 2.3–4.1)
PHOSPHATE SERPL-MCNC: 3.5 MG/DL (ref 2.3–4.1)
PLATELET # BLD AUTO: 231 THOUSANDS/UL (ref 149–390)
PLATELET # BLD AUTO: 236 THOUSANDS/UL (ref 149–390)
PLATELET # BLD AUTO: 245 THOUSANDS/UL (ref 149–390)
PLATELET # BLD AUTO: 263 THOUSANDS/UL (ref 149–390)
PMV BLD AUTO: 10.7 FL (ref 8.9–12.7)
PMV BLD AUTO: 10.8 FL (ref 8.9–12.7)
PMV BLD AUTO: 11.2 FL (ref 8.9–12.7)
PMV BLD AUTO: 11.7 FL (ref 8.9–12.7)
POTASSIUM SERPL-SCNC: 4.3 MMOL/L (ref 3.5–5.3)
POTASSIUM SERPL-SCNC: 4.6 MMOL/L (ref 3.5–5.3)
POTASSIUM SERPL-SCNC: 5 MMOL/L (ref 3.5–5.3)
PROT SERPL-MCNC: 5.6 G/DL (ref 6.4–8.4)
PROT SERPL-MCNC: 6.4 G/DL (ref 6.4–8.4)
PTH-INTACT SERPL-MCNC: 350.6 PG/ML (ref 18.4–80.1)
RBC # BLD AUTO: 2.72 MILLION/UL (ref 3.81–5.12)
RBC # BLD AUTO: 2.8 MILLION/UL (ref 3.81–5.12)
RBC # BLD AUTO: 2.91 MILLION/UL (ref 3.81–5.12)
RBC # BLD AUTO: 2.95 MILLION/UL (ref 3.81–5.12)
SODIUM SERPL-SCNC: 138 MMOL/L (ref 135–147)
SODIUM SERPL-SCNC: 138 MMOL/L (ref 135–147)
SODIUM SERPL-SCNC: 140 MMOL/L (ref 135–147)
SODIUM SERPL-SCNC: 145 MMOL/L (ref 135–147)
SODIUM SERPL-SCNC: 148 MMOL/L (ref 135–147)
URATE SERPL-MCNC: 7.9 MG/DL (ref 2–7.5)
VANCOMYCIN SERPL-MCNC: 13.6 UG/ML (ref 10–20)
WBC # BLD AUTO: 10.05 THOUSAND/UL (ref 4.31–10.16)
WBC # BLD AUTO: 14.34 THOUSAND/UL (ref 4.31–10.16)
WBC # BLD AUTO: 8.47 THOUSAND/UL (ref 4.31–10.16)
WBC # BLD AUTO: 8.53 THOUSAND/UL (ref 4.31–10.16)

## 2022-01-01 PROCEDURE — 80053 COMPREHEN METABOLIC PANEL: CPT

## 2022-01-01 PROCEDURE — 10061 I&D ABSCESS COMP/MULTIPLE: CPT | Performed by: EMERGENCY MEDICINE

## 2022-01-01 PROCEDURE — 84100 ASSAY OF PHOSPHORUS: CPT

## 2022-01-01 PROCEDURE — 99239 HOSP IP/OBS DSCHRG MGMT >30: CPT | Performed by: INTERNAL MEDICINE

## 2022-01-01 PROCEDURE — 99233 SBSQ HOSP IP/OBS HIGH 50: CPT | Performed by: INTERNAL MEDICINE

## 2022-01-01 PROCEDURE — 99284 EMERGENCY DEPT VISIT MOD MDM: CPT | Performed by: EMERGENCY MEDICINE

## 2022-01-01 PROCEDURE — NC001 PR NO CHARGE: Performed by: SURGERY

## 2022-01-01 PROCEDURE — 99285 EMERGENCY DEPT VISIT HI MDM: CPT

## 2022-01-01 PROCEDURE — 85025 COMPLETE CBC W/AUTO DIFF WBC: CPT | Performed by: INTERNAL MEDICINE

## 2022-01-01 PROCEDURE — 87070 CULTURE OTHR SPECIMN AEROBIC: CPT

## 2022-01-01 PROCEDURE — 99223 1ST HOSP IP/OBS HIGH 75: CPT | Performed by: INTERNAL MEDICINE

## 2022-01-01 PROCEDURE — 80053 COMPREHEN METABOLIC PANEL: CPT | Performed by: INTERNAL MEDICINE

## 2022-01-01 PROCEDURE — 97116 GAIT TRAINING THERAPY: CPT

## 2022-01-01 PROCEDURE — RECHECK: Performed by: INTERNAL MEDICINE

## 2022-01-01 PROCEDURE — 82948 REAGENT STRIP/BLOOD GLUCOSE: CPT

## 2022-01-01 PROCEDURE — 80048 BASIC METABOLIC PNL TOTAL CA: CPT

## 2022-01-01 PROCEDURE — 36415 COLL VENOUS BLD VENIPUNCTURE: CPT

## 2022-01-01 PROCEDURE — 97166 OT EVAL MOD COMPLEX 45 MIN: CPT

## 2022-01-01 PROCEDURE — 87186 SC STD MICRODIL/AGAR DIL: CPT

## 2022-01-01 PROCEDURE — 83036 HEMOGLOBIN GLYCOSYLATED A1C: CPT | Performed by: INTERNAL MEDICINE

## 2022-01-01 PROCEDURE — 0J9J0ZZ DRAINAGE OF RIGHT HAND SUBCUTANEOUS TISSUE AND FASCIA, OPEN APPROACH: ICD-10-PCS | Performed by: SURGERY

## 2022-01-01 PROCEDURE — 85025 COMPLETE CBC W/AUTO DIFF WBC: CPT

## 2022-01-01 PROCEDURE — 84550 ASSAY OF BLOOD/URIC ACID: CPT

## 2022-01-01 PROCEDURE — 96365 THER/PROPH/DIAG IV INF INIT: CPT

## 2022-01-01 PROCEDURE — 85025 COMPLETE CBC W/AUTO DIFF WBC: CPT | Performed by: PHYSICIAN ASSISTANT

## 2022-01-01 PROCEDURE — 87205 SMEAR GRAM STAIN: CPT

## 2022-01-01 PROCEDURE — 99232 SBSQ HOSP IP/OBS MODERATE 35: CPT | Performed by: PHYSICIAN ASSISTANT

## 2022-01-01 PROCEDURE — 99232 SBSQ HOSP IP/OBS MODERATE 35: CPT | Performed by: INTERNAL MEDICINE

## 2022-01-01 PROCEDURE — 99496 TRANSJ CARE MGMT HIGH F2F 7D: CPT | Performed by: FAMILY MEDICINE

## 2022-01-01 PROCEDURE — 83735 ASSAY OF MAGNESIUM: CPT | Performed by: INTERNAL MEDICINE

## 2022-01-01 PROCEDURE — 73130 X-RAY EXAM OF HAND: CPT

## 2022-01-01 PROCEDURE — 97163 PT EVAL HIGH COMPLEX 45 MIN: CPT

## 2022-01-01 PROCEDURE — 83605 ASSAY OF LACTIC ACID: CPT | Performed by: INTERNAL MEDICINE

## 2022-01-01 PROCEDURE — 85652 RBC SED RATE AUTOMATED: CPT | Performed by: ORTHOPAEDIC SURGERY

## 2022-01-01 PROCEDURE — 86140 C-REACTIVE PROTEIN: CPT | Performed by: ORTHOPAEDIC SURGERY

## 2022-01-01 PROCEDURE — 80202 ASSAY OF VANCOMYCIN: CPT | Performed by: INTERNAL MEDICINE

## 2022-01-01 PROCEDURE — 97535 SELF CARE MNGMENT TRAINING: CPT

## 2022-01-01 PROCEDURE — 80053 COMPREHEN METABOLIC PANEL: CPT | Performed by: PHYSICIAN ASSISTANT

## 2022-01-01 PROCEDURE — 84100 ASSAY OF PHOSPHORUS: CPT | Performed by: INTERNAL MEDICINE

## 2022-01-01 PROCEDURE — 97530 THERAPEUTIC ACTIVITIES: CPT

## 2022-01-01 PROCEDURE — 83970 ASSAY OF PARATHORMONE: CPT

## 2022-01-01 PROCEDURE — 96374 THER/PROPH/DIAG INJ IV PUSH: CPT

## 2022-01-01 PROCEDURE — 85027 COMPLETE CBC AUTOMATED: CPT

## 2022-01-01 RX ORDER — FERROUS SULFATE 325(65) MG
325 TABLET ORAL
Status: DISCONTINUED | OUTPATIENT
Start: 2022-01-01 | End: 2022-01-01 | Stop reason: HOSPADM

## 2022-01-01 RX ORDER — ALLOPURINOL 100 MG/1
50 TABLET ORAL DAILY
Status: DISCONTINUED | OUTPATIENT
Start: 2022-01-01 | End: 2022-01-01 | Stop reason: HOSPADM

## 2022-01-01 RX ORDER — HYDROMORPHONE HCL/PF 1 MG/ML
0.5 SYRINGE (ML) INJECTION EVERY 4 HOURS PRN
Status: DISCONTINUED | OUTPATIENT
Start: 2022-01-01 | End: 2022-01-01 | Stop reason: HOSPADM

## 2022-01-01 RX ORDER — FOLIC ACID 1 MG/1
1 TABLET ORAL DAILY
Qty: 30 TABLET | Refills: 0 | Status: SHIPPED | OUTPATIENT
Start: 2022-01-01 | End: 2022-01-01 | Stop reason: SDUPTHER

## 2022-01-01 RX ORDER — FUROSEMIDE 40 MG/1
40 TABLET ORAL DAILY
Status: DISCONTINUED | OUTPATIENT
Start: 2022-01-01 | End: 2022-01-01 | Stop reason: HOSPADM

## 2022-01-01 RX ORDER — ACETAMINOPHEN 325 MG/1
650 TABLET ORAL EVERY 6 HOURS PRN
Status: DISCONTINUED | OUTPATIENT
Start: 2022-01-01 | End: 2022-01-01 | Stop reason: HOSPADM

## 2022-01-01 RX ORDER — CEPHALEXIN 500 MG/1
500 CAPSULE ORAL EVERY 8 HOURS SCHEDULED
Qty: 69 CAPSULE | Refills: 0 | Status: SHIPPED | OUTPATIENT
Start: 2022-01-01 | End: 2022-01-01 | Stop reason: SDUPTHER

## 2022-01-01 RX ORDER — OXYCODONE HYDROCHLORIDE 5 MG/1
2.5 TABLET ORAL EVERY 6 HOURS PRN
Status: DISCONTINUED | OUTPATIENT
Start: 2022-01-01 | End: 2022-01-01 | Stop reason: HOSPADM

## 2022-01-01 RX ORDER — CEPHALEXIN 500 MG/1
500 CAPSULE ORAL EVERY 8 HOURS SCHEDULED
Status: DISCONTINUED | OUTPATIENT
Start: 2022-01-01 | End: 2022-01-01 | Stop reason: HOSPADM

## 2022-01-01 RX ORDER — FOLIC ACID 1 MG/1
1 TABLET ORAL DAILY
Status: DISCONTINUED | OUTPATIENT
Start: 2022-01-01 | End: 2022-01-01 | Stop reason: HOSPADM

## 2022-01-01 RX ORDER — LIDOCAINE HYDROCHLORIDE AND EPINEPHRINE 10; 10 MG/ML; UG/ML
5 INJECTION, SOLUTION INFILTRATION; PERINEURAL ONCE
Status: COMPLETED | OUTPATIENT
Start: 2022-01-01 | End: 2022-01-01

## 2022-01-01 RX ORDER — CINACALCET 30 MG/1
30 TABLET, FILM COATED ORAL 3 TIMES WEEKLY
Status: DISCONTINUED | OUTPATIENT
Start: 2022-01-01 | End: 2022-01-01 | Stop reason: HOSPADM

## 2022-01-01 RX ORDER — GABAPENTIN 400 MG/1
400 CAPSULE ORAL
Status: DISCONTINUED | OUTPATIENT
Start: 2022-01-01 | End: 2022-01-01 | Stop reason: HOSPADM

## 2022-01-01 RX ORDER — DEXTROSE AND SODIUM CHLORIDE 5; .45 G/100ML; G/100ML
75 INJECTION, SOLUTION INTRAVENOUS CONTINUOUS
Status: DISCONTINUED | OUTPATIENT
Start: 2022-01-01 | End: 2022-01-01

## 2022-01-01 RX ORDER — FENTANYL CITRATE 50 UG/ML
25 INJECTION, SOLUTION INTRAMUSCULAR; INTRAVENOUS EVERY 2 HOUR PRN
Status: DISCONTINUED | OUTPATIENT
Start: 2022-01-01 | End: 2022-01-01 | Stop reason: HOSPADM

## 2022-01-01 RX ORDER — DEXTROSE AND SODIUM CHLORIDE 5; .45 G/100ML; G/100ML
100 INJECTION, SOLUTION INTRAVENOUS CONTINUOUS
Status: DISCONTINUED | OUTPATIENT
Start: 2022-01-01 | End: 2022-01-01

## 2022-01-01 RX ORDER — ACETAMINOPHEN 325 MG/1
650 TABLET ORAL EVERY 6 HOURS PRN
Status: DISCONTINUED | OUTPATIENT
Start: 2022-01-01 | End: 2022-01-01

## 2022-01-01 RX ORDER — HEPARIN SODIUM 5000 [USP'U]/ML
5000 INJECTION, SOLUTION INTRAVENOUS; SUBCUTANEOUS EVERY 8 HOURS SCHEDULED
Status: DISCONTINUED | OUTPATIENT
Start: 2022-01-01 | End: 2022-01-01 | Stop reason: HOSPADM

## 2022-01-01 RX ORDER — SODIUM CHLORIDE 9 MG/ML
75 INJECTION, SOLUTION INTRAVENOUS CONTINUOUS
Status: DISCONTINUED | OUTPATIENT
Start: 2022-01-01 | End: 2022-01-01

## 2022-01-01 RX ORDER — OXYCODONE HYDROCHLORIDE 5 MG/1
5 TABLET ORAL EVERY 4 HOURS PRN
Status: DISCONTINUED | OUTPATIENT
Start: 2022-01-01 | End: 2022-01-01 | Stop reason: HOSPADM

## 2022-01-01 RX ORDER — CEFAZOLIN SODIUM 1 G/50ML
1000 SOLUTION INTRAVENOUS EVERY 12 HOURS
Status: DISCONTINUED | OUTPATIENT
Start: 2022-01-01 | End: 2022-01-01

## 2022-01-01 RX ORDER — MIRTAZAPINE 15 MG/1
7.5 TABLET, FILM COATED ORAL
Status: DISCONTINUED | OUTPATIENT
Start: 2022-01-01 | End: 2022-01-01 | Stop reason: HOSPADM

## 2022-01-01 RX ORDER — ASPIRIN 81 MG/1
81 TABLET, CHEWABLE ORAL DAILY
Status: DISCONTINUED | OUTPATIENT
Start: 2022-01-01 | End: 2022-01-01 | Stop reason: HOSPADM

## 2022-01-01 RX ORDER — COLCHICINE 0.6 MG/1
0.3 TABLET ORAL DAILY
Status: DISCONTINUED | OUTPATIENT
Start: 2022-01-01 | End: 2022-01-01 | Stop reason: HOSPADM

## 2022-01-01 RX ORDER — CITALOPRAM 10 MG/1
10 TABLET ORAL DAILY
Status: DISCONTINUED | OUTPATIENT
Start: 2022-01-01 | End: 2022-01-01 | Stop reason: HOSPADM

## 2022-01-01 RX ORDER — FOLIC ACID 1 MG/1
1 TABLET ORAL DAILY
Qty: 30 TABLET | Refills: 0 | Status: SHIPPED | OUTPATIENT
Start: 2022-01-01 | End: 2022-08-30

## 2022-01-01 RX ORDER — CEPHALEXIN 500 MG/1
500 CAPSULE ORAL EVERY 8 HOURS SCHEDULED
Qty: 69 CAPSULE | Refills: 0 | Status: SHIPPED | OUTPATIENT
Start: 2022-01-01 | End: 2022-08-22

## 2022-01-01 RX ORDER — ALLOPURINOL 100 MG/1
100 TABLET ORAL DAILY
Status: DISCONTINUED | OUTPATIENT
Start: 2022-01-01 | End: 2022-01-01 | Stop reason: HOSPADM

## 2022-01-01 RX ORDER — INSULIN LISPRO 100 [IU]/ML
1-5 INJECTION, SOLUTION INTRAVENOUS; SUBCUTANEOUS
Status: DISCONTINUED | OUTPATIENT
Start: 2022-01-01 | End: 2022-01-01 | Stop reason: HOSPADM

## 2022-01-01 RX ADMIN — CITALOPRAM HYDROBROMIDE 10 MG: 10 TABLET ORAL at 11:58

## 2022-01-01 RX ADMIN — CITALOPRAM HYDROBROMIDE 10 MG: 10 TABLET ORAL at 09:41

## 2022-01-01 RX ADMIN — MIRTAZAPINE 7.5 MG: 15 TABLET, FILM COATED ORAL at 21:12

## 2022-01-01 RX ADMIN — COLCHICINE 0.3 MG: 0.6 TABLET ORAL at 11:38

## 2022-01-01 RX ADMIN — GABAPENTIN 400 MG: 400 CAPSULE ORAL at 22:04

## 2022-01-01 RX ADMIN — FOLIC ACID 1 MG: 1 TABLET ORAL at 08:13

## 2022-01-01 RX ADMIN — DAPTOMYCIN 400 MG: 500 INJECTION, POWDER, LYOPHILIZED, FOR SOLUTION INTRAVENOUS at 01:13

## 2022-01-01 RX ADMIN — MIRTAZAPINE 7.5 MG: 15 TABLET, FILM COATED ORAL at 21:23

## 2022-01-01 RX ADMIN — OXYCODONE HYDROCHLORIDE 2.5 MG: 5 TABLET ORAL at 08:34

## 2022-01-01 RX ADMIN — CITALOPRAM HYDROBROMIDE 10 MG: 10 TABLET ORAL at 08:13

## 2022-01-01 RX ADMIN — CITALOPRAM HYDROBROMIDE 10 MG: 10 TABLET ORAL at 08:29

## 2022-01-01 RX ADMIN — ASPIRIN 81 MG CHEWABLE TABLET 81 MG: 81 TABLET CHEWABLE at 08:30

## 2022-01-01 RX ADMIN — ALLOPURINOL 50 MG: 100 TABLET ORAL at 08:30

## 2022-01-01 RX ADMIN — ASPIRIN 81 MG CHEWABLE TABLET 81 MG: 81 TABLET CHEWABLE at 08:13

## 2022-01-01 RX ADMIN — FUROSEMIDE 40 MG: 40 TABLET ORAL at 09:34

## 2022-01-01 RX ADMIN — MIRTAZAPINE 7.5 MG: 15 TABLET, FILM COATED ORAL at 22:44

## 2022-01-01 RX ADMIN — GABAPENTIN 400 MG: 400 CAPSULE ORAL at 21:23

## 2022-01-01 RX ADMIN — COLCHICINE 0.3 MG: 0.6 TABLET ORAL at 08:30

## 2022-01-01 RX ADMIN — ASPIRIN 81 MG CHEWABLE TABLET 81 MG: 81 TABLET CHEWABLE at 11:38

## 2022-01-01 RX ADMIN — LIDOCAINE HYDROCHLORIDE,EPINEPHRINE BITARTRATE 5 ML: 10; .01 INJECTION, SOLUTION INFILTRATION; PERINEURAL at 21:44

## 2022-01-01 RX ADMIN — CEFAZOLIN SODIUM 1000 MG: 1 SOLUTION INTRAVENOUS at 16:40

## 2022-01-01 RX ADMIN — HEPARIN SODIUM 5000 UNITS: 5000 INJECTION INTRAVENOUS; SUBCUTANEOUS at 06:27

## 2022-01-01 RX ADMIN — GABAPENTIN 400 MG: 400 CAPSULE ORAL at 22:44

## 2022-01-01 RX ADMIN — HYDROMORPHONE HYDROCHLORIDE 0.5 MG: 1 INJECTION, SOLUTION INTRAMUSCULAR; INTRAVENOUS; SUBCUTANEOUS at 11:38

## 2022-01-01 RX ADMIN — HYDROMORPHONE HYDROCHLORIDE 0.5 MG: 1 INJECTION, SOLUTION INTRAMUSCULAR; INTRAVENOUS; SUBCUTANEOUS at 18:03

## 2022-01-01 RX ADMIN — INSULIN LISPRO 1 UNITS: 100 INJECTION, SOLUTION INTRAVENOUS; SUBCUTANEOUS at 16:40

## 2022-01-01 RX ADMIN — FUROSEMIDE 40 MG: 40 TABLET ORAL at 09:42

## 2022-01-01 RX ADMIN — ALLOPURINOL 50 MG: 100 TABLET ORAL at 11:39

## 2022-01-01 RX ADMIN — OXYCODONE HYDROCHLORIDE 5 MG: 5 TABLET ORAL at 18:15

## 2022-01-01 RX ADMIN — DEXTROSE AND SODIUM CHLORIDE 75 ML/HR: 5; .45 INJECTION, SOLUTION INTRAVENOUS at 22:43

## 2022-01-01 RX ADMIN — SODIUM CHLORIDE 75 ML/HR: 9 INJECTION, SOLUTION INTRAVENOUS at 11:40

## 2022-01-01 RX ADMIN — HEPARIN SODIUM 5000 UNITS: 5000 INJECTION INTRAVENOUS; SUBCUTANEOUS at 14:15

## 2022-01-01 RX ADMIN — OXYCODONE HYDROCHLORIDE 5 MG: 5 TABLET ORAL at 00:35

## 2022-01-01 RX ADMIN — HEPARIN SODIUM 5000 UNITS: 5000 INJECTION INTRAVENOUS; SUBCUTANEOUS at 15:58

## 2022-01-01 RX ADMIN — CEFAZOLIN SODIUM 1000 MG: 1 SOLUTION INTRAVENOUS at 05:32

## 2022-01-01 RX ADMIN — FOLIC ACID 1 MG: 1 TABLET ORAL at 11:57

## 2022-01-01 RX ADMIN — CINACALCET 30 MG: 30 TABLET, FILM COATED ORAL at 09:42

## 2022-01-01 RX ADMIN — FUROSEMIDE 40 MG: 40 TABLET ORAL at 08:13

## 2022-01-01 RX ADMIN — ALLOPURINOL 50 MG: 100 TABLET ORAL at 09:42

## 2022-01-01 RX ADMIN — COLCHICINE 0.3 MG: 0.6 TABLET ORAL at 08:13

## 2022-01-01 RX ADMIN — ASPIRIN 81 MG CHEWABLE TABLET 81 MG: 81 TABLET CHEWABLE at 09:42

## 2022-01-01 RX ADMIN — VANCOMYCIN HYDROCHLORIDE 750 MG: 750 INJECTION, SOLUTION INTRAVENOUS at 00:00

## 2022-01-01 RX ADMIN — MIRTAZAPINE 7.5 MG: 15 TABLET, FILM COATED ORAL at 22:04

## 2022-01-01 RX ADMIN — COLCHICINE 0.3 MG: 0.6 TABLET ORAL at 09:42

## 2022-01-01 RX ADMIN — VANCOMYCIN HYDROCHLORIDE 1250 MG: 10 INJECTION, POWDER, LYOPHILIZED, FOR SOLUTION INTRAVENOUS at 22:43

## 2022-01-01 RX ADMIN — ACETAMINOPHEN 650 MG: 325 TABLET ORAL at 03:35

## 2022-01-01 RX ADMIN — OXYCODONE HYDROCHLORIDE 5 MG: 5 TABLET ORAL at 02:13

## 2022-01-01 RX ADMIN — HYDROMORPHONE HYDROCHLORIDE 0.5 MG: 1 INJECTION, SOLUTION INTRAMUSCULAR; INTRAVENOUS; SUBCUTANEOUS at 12:11

## 2022-01-01 RX ADMIN — OXYCODONE HYDROCHLORIDE 5 MG: 5 TABLET ORAL at 20:36

## 2022-01-01 RX ADMIN — HEPARIN SODIUM 5000 UNITS: 5000 INJECTION INTRAVENOUS; SUBCUTANEOUS at 21:23

## 2022-01-01 RX ADMIN — ACETAMINOPHEN 650 MG: 325 TABLET, FILM COATED ORAL at 05:25

## 2022-01-01 RX ADMIN — CEPHALEXIN 500 MG: 500 CAPSULE ORAL at 13:52

## 2022-01-01 RX ADMIN — ALLOPURINOL 100 MG: 100 TABLET ORAL at 09:34

## 2022-01-01 RX ADMIN — GABAPENTIN 400 MG: 400 CAPSULE ORAL at 21:12

## 2022-01-01 RX ADMIN — CEFAZOLIN SODIUM 1000 MG: 1 SOLUTION INTRAVENOUS at 18:13

## 2022-01-01 RX ADMIN — CEFAZOLIN SODIUM 1000 MG: 1 SOLUTION INTRAVENOUS at 06:27

## 2022-01-01 RX ADMIN — HEPARIN SODIUM 5000 UNITS: 5000 INJECTION INTRAVENOUS; SUBCUTANEOUS at 15:26

## 2022-01-01 RX ADMIN — ALLOPURINOL 50 MG: 100 TABLET ORAL at 08:13

## 2022-01-01 RX ADMIN — FENTANYL CITRATE 25 MCG: 50 INJECTION, SOLUTION INTRAMUSCULAR; INTRAVENOUS at 05:26

## 2022-01-01 RX ADMIN — HEPARIN SODIUM 5000 UNITS: 5000 INJECTION INTRAVENOUS; SUBCUTANEOUS at 22:07

## 2022-01-01 RX ADMIN — HEPARIN SODIUM 5000 UNITS: 5000 INJECTION INTRAVENOUS; SUBCUTANEOUS at 05:38

## 2022-01-01 RX ADMIN — HEPARIN SODIUM 5000 UNITS: 5000 INJECTION INTRAVENOUS; SUBCUTANEOUS at 21:12

## 2022-01-01 RX ADMIN — FERROUS SULFATE TAB 325 MG (65 MG ELEMENTAL FE) 325 MG: 325 (65 FE) TAB at 08:13

## 2022-01-01 RX ADMIN — DEXTROSE AND SODIUM CHLORIDE 100 ML/HR: 5; .45 INJECTION, SOLUTION INTRAVENOUS at 00:37

## 2022-01-01 RX ADMIN — VANCOMYCIN HYDROCHLORIDE 750 MG: 750 INJECTION, SOLUTION INTRAVENOUS at 00:37

## 2022-01-05 DIAGNOSIS — R33.9 URINARY RETENTION WITH INCOMPLETE BLADDER EMPTYING: Primary | ICD-10-CM

## 2022-01-11 ENCOUNTER — TELEPHONE (OUTPATIENT)
Dept: NEPHROLOGY | Facility: CLINIC | Age: 87
End: 2022-01-11

## 2022-01-11 NOTE — TELEPHONE ENCOUNTER
Spoke with patients daughter Jensen Sutton  Stated they will be following up with Urology before scheduling a follow up appointment with Dr Cheryl Fairchild  Patient's daughter stated she will call back when ready to schedule

## 2022-01-28 ENCOUNTER — OFFICE VISIT (OUTPATIENT)
Dept: FAMILY MEDICINE CLINIC | Facility: CLINIC | Age: 87
End: 2022-01-28
Payer: COMMERCIAL

## 2022-01-28 VITALS
DIASTOLIC BLOOD PRESSURE: 72 MMHG | TEMPERATURE: 97.2 F | HEART RATE: 68 BPM | BODY MASS INDEX: 27.82 KG/M2 | WEIGHT: 138 LBS | SYSTOLIC BLOOD PRESSURE: 118 MMHG | HEIGHT: 59 IN

## 2022-01-28 DIAGNOSIS — N30.01 ACUTE CYSTITIS WITH HEMATURIA: ICD-10-CM

## 2022-01-28 DIAGNOSIS — N18.4 CHRONIC KIDNEY DISEASE, STAGE IV (SEVERE) (HCC): ICD-10-CM

## 2022-01-28 DIAGNOSIS — R30.0 BURNING WITH URINATION: Primary | ICD-10-CM

## 2022-01-28 DIAGNOSIS — N18.4 TYPE 2 DIABETES MELLITUS WITH STAGE 4 CHRONIC KIDNEY DISEASE, WITHOUT LONG-TERM CURRENT USE OF INSULIN (HCC): ICD-10-CM

## 2022-01-28 DIAGNOSIS — E11.22 TYPE 2 DIABETES MELLITUS WITH STAGE 4 CHRONIC KIDNEY DISEASE, WITHOUT LONG-TERM CURRENT USE OF INSULIN (HCC): ICD-10-CM

## 2022-01-28 LAB
SL AMB  POCT GLUCOSE, UA: ABNORMAL
SL AMB LEUKOCYTE ESTERASE,UA: ABNORMAL
SL AMB POCT BILIRUBIN,UA: ABNORMAL
SL AMB POCT BLOOD,UA: ABNORMAL
SL AMB POCT CLARITY,UA: ABNORMAL
SL AMB POCT COLOR,UA: YELLOW
SL AMB POCT KETONES,UA: ABNORMAL
SL AMB POCT NITRITE,UA: ABNORMAL
SL AMB POCT PH,UA: 6
SL AMB POCT SPECIFIC GRAVITY,UA: 1.02
SL AMB POCT URINE PROTEIN: 300
SL AMB POCT UROBILINOGEN: 0.2

## 2022-01-28 PROCEDURE — 81002 URINALYSIS NONAUTO W/O SCOPE: CPT | Performed by: PHYSICIAN ASSISTANT

## 2022-01-28 PROCEDURE — 1036F TOBACCO NON-USER: CPT | Performed by: PHYSICIAN ASSISTANT

## 2022-01-28 PROCEDURE — 99214 OFFICE O/P EST MOD 30 MIN: CPT | Performed by: PHYSICIAN ASSISTANT

## 2022-01-28 PROCEDURE — 1160F RVW MEDS BY RX/DR IN RCRD: CPT | Performed by: PHYSICIAN ASSISTANT

## 2022-01-28 RX ORDER — NITROFURANTOIN 25; 75 MG/1; MG/1
100 CAPSULE ORAL 2 TIMES DAILY
Qty: 14 CAPSULE | Refills: 0 | Status: SHIPPED | OUTPATIENT
Start: 2022-01-28 | End: 2022-02-04

## 2022-01-28 NOTE — PATIENT INSTRUCTIONS
Problem List Items Addressed This Visit     None      Visit Diagnoses     Burning with urination    -  Primary    Relevant Orders    POCT urine dip

## 2022-01-28 NOTE — ASSESSMENT & PLAN NOTE
Urine sample today not enough to send out for culture but does show large amount of WBC and blood  Will treat imperically as pt has urinary retention issues which is a risk factor for UTI development  Macrobid 100 mg BID and has f/u per pt with uro on Monday  Does not have to continue cranberry juice for treatment

## 2022-01-28 NOTE — ASSESSMENT & PLAN NOTE
Patient has follow-up with Dr Hali Shay in the near future    Lab Results   Component Value Date    HGBA1C 5 1 10/12/2021

## 2022-01-28 NOTE — ASSESSMENT & PLAN NOTE
Lab Results   Component Value Date    EGFR 27 08/01/2018    EGFR 28 07/31/2018    EGFR 25 07/31/2018    CREATININE 2 35 (H) 12/06/2021    CREATININE 2 10 (H) 08/23/2021    CREATININE 1 93 (H) 03/09/2021   Patient continues with Urology and Nephrology and has an appoint with Urology next week

## 2022-01-28 NOTE — PROGRESS NOTES
Assessment and Plan:    Problem List Items Addressed This Visit        Endocrine    Type 2 diabetes mellitus with diabetic chronic kidney disease ([facility] [address] )     Patient has follow-up with Dr Justine Fitch in the near future  Lab Results   Component Value Date    HGBA1C 5 1 10/12/2021               Genitourinary    Chronic kidney disease, stage IV (severe) ([facility])     Lab Results   Component Value Date    EGFR 27 08/01/2018    EGFR 28 07/31/2018    EGFR 25 07/31/2018    CREATININE 2 35 (H) 12/06/2021    CREATININE 2 10 (H) 08/23/2021    CREATININE 1 93 (H) 03/09/2021   Patient continues with Urology and Nephrology and has an appoint with Urology next week  Acute cystitis with hematuria     Urine sample today not enough to send out for culture but does show large amount of WBC and blood  Will treat imperically as pt has urinary retention issues which is a risk factor for UTI development  Macrobid 100 mg BID and has f/u per pt with uro on Monday  Does not have to continue cranberry juice for treatment  Relevant Medications    nitrofurantoin (MACROBID) 100 mg capsule      Other Visit Diagnoses     Burning with urination    -  Primary    Relevant Medications    nitrofurantoin (MACROBID) 100 mg capsule    Other Relevant Orders    POCT urine dip (Completed)                 Diagnoses and all orders for this visit:    Burning with urination  -     POCT urine dip  -     nitrofurantoin (MACROBID) 100 mg capsule; Take 1 capsule (100 mg total) by mouth 2 (two) times a day for 7 days    Acute cystitis with hematuria  -     nitrofurantoin (MACROBID) 100 mg capsule; Take 1 capsule (100 mg total) by mouth 2 (two) times a day for 7 days    Type 2 diabetes mellitus with stage 4 chronic kidney disease, without long-term current use of insulin (HCC)    Chronic kidney disease, stage IV (severe) ([facility])              Subjective:      Patient ID: Dominick Liu is a 80 y o  female      CC:    Chief Complaint   Patient presents with   Percy Arteaga Possible UTI     Pt admits to burning - states this started 3 days ago  states she has had irritation for two weeks now  HPI:    Patient has a history near kidney failure she is seeing Nephrology and has Urology appointment on Monday  She has not gotten a urinary tract infection and long time but she does have urinary retention which leads to increased risk of recurrent urinary tract infection  A few days ago she started feeling not well with some frequency of urine burning with urination and some pelvic discomfort she says has gotten a little bit better since her symptoms started  No back pain fever nausea  She has been drinking a lot of fluids including cranberry juice  The following portions of the patient's history were reviewed and updated as appropriate: allergies, current medications, past family history, past medical history, past social history, past surgical history and problem list       Review of Systems   Constitutional: Negative  HENT: Negative  Eyes: Negative  Respiratory: Negative  Cardiovascular: Negative  Gastrointestinal: Negative  Endocrine: Negative  Genitourinary: Positive for decreased urine volume, dysuria, frequency and pelvic pain  Musculoskeletal: Negative  Skin: Negative  Allergic/Immunologic: Negative  Neurological: Negative  Hematological: Negative  Psychiatric/Behavioral: Negative  Data to review:       Objective:    Vitals:    01/28/22 1130   BP: 118/72   BP Location: Left arm   Patient Position: Sitting   Cuff Size: Adult   Pulse: 68   Temp: (!) 97 2 °F (36 2 °C)   TempSrc: Temporal   Weight: 62 6 kg (138 lb)   Height: 4' 11" (1 499 m)        Physical Exam  Vitals and nursing note reviewed  Constitutional:       Appearance: Normal appearance  She is well-developed  HENT:      Head: Normocephalic and atraumatic     Eyes:      General: Lids are normal       Conjunctiva/sclera: Conjunctivae normal       Pupils: Pupils are equal, round, and reactive to light  Cardiovascular:      Rate and Rhythm: Normal rate and regular rhythm  Heart sounds: No murmur heard  Pulmonary:      Effort: Pulmonary effort is normal       Breath sounds: Normal breath sounds  Abdominal:      General: Abdomen is protuberant  Bowel sounds are normal       Palpations: Abdomen is soft  Tenderness: There is no abdominal tenderness  There is no right CVA tenderness or left CVA tenderness  Skin:     General: Skin is warm and dry  Neurological:      General: No focal deficit present  Mental Status: She is alert  Coordination: Coordination is intact  Psychiatric:         Mood and Affect: Mood normal          Behavior: Behavior normal  Behavior is cooperative  Thought Content: Thought content normal          Judgment: Judgment normal            BMI Counseling: Body mass index is 27 87 kg/m²  The BMI is above normal  Nutrition recommendations include decreasing portion sizes, encouraging healthy choices of fruits and vegetables, decreasing fast food intake, consuming healthier snacks, limiting drinks that contain sugar, moderation in carbohydrate intake, increasing intake of lean protein, reducing intake of saturated and trans fat and reducing intake of cholesterol  Exercise recommendations include exercising 3-5 times per week  No pharmacotherapy was ordered  Rationale for BMI follow-up plan is due to patient being overweight or obese

## 2022-02-02 DIAGNOSIS — E78.2 MIXED HYPERLIPIDEMIA: ICD-10-CM

## 2022-02-02 RX ORDER — SIMVASTATIN 20 MG
TABLET ORAL
Qty: 90 TABLET | Refills: 1 | Status: SHIPPED | OUTPATIENT
Start: 2022-02-02

## 2022-02-03 ENCOUNTER — TELEPHONE (OUTPATIENT)
Dept: NEPHROLOGY | Facility: CLINIC | Age: 87
End: 2022-02-03

## 2022-02-13 PROBLEM — E79.0 ELEVATED URIC ACID IN BLOOD: Status: ACTIVE | Noted: 2022-01-01

## 2022-02-13 RX ORDER — CONJUGATED ESTROGENS 0.62 MG/G
CREAM VAGINAL
COMMUNITY
Start: 2022-01-31 | End: 2022-03-29

## 2022-02-15 ENCOUNTER — TELEPHONE (OUTPATIENT)
Dept: NEPHROLOGY | Facility: CLINIC | Age: 87
End: 2022-02-15

## 2022-02-15 ENCOUNTER — OFFICE VISIT (OUTPATIENT)
Dept: FAMILY MEDICINE CLINIC | Facility: CLINIC | Age: 87
End: 2022-02-15
Payer: COMMERCIAL

## 2022-02-15 VITALS
HEART RATE: 65 BPM | DIASTOLIC BLOOD PRESSURE: 74 MMHG | OXYGEN SATURATION: 99 % | SYSTOLIC BLOOD PRESSURE: 108 MMHG | BODY MASS INDEX: 26.37 KG/M2 | HEIGHT: 59 IN | WEIGHT: 130.8 LBS

## 2022-02-15 DIAGNOSIS — N18.4 CHRONIC KIDNEY DISEASE, STAGE IV (SEVERE) (HCC): ICD-10-CM

## 2022-02-15 DIAGNOSIS — F32.9 REACTIVE DEPRESSION: Primary | ICD-10-CM

## 2022-02-15 DIAGNOSIS — I10 ESSENTIAL HYPERTENSION: ICD-10-CM

## 2022-02-15 DIAGNOSIS — M1A.00X0 IDIOPATHIC CHRONIC GOUT WITHOUT TOPHUS, UNSPECIFIED SITE: ICD-10-CM

## 2022-02-15 DIAGNOSIS — N18.4 TYPE 2 DIABETES MELLITUS WITH STAGE 4 CHRONIC KIDNEY DISEASE, WITHOUT LONG-TERM CURRENT USE OF INSULIN (HCC): ICD-10-CM

## 2022-02-15 DIAGNOSIS — E11.22 TYPE 2 DIABETES MELLITUS WITH STAGE 4 CHRONIC KIDNEY DISEASE, WITHOUT LONG-TERM CURRENT USE OF INSULIN (HCC): ICD-10-CM

## 2022-02-15 PROCEDURE — 99214 OFFICE O/P EST MOD 30 MIN: CPT | Performed by: FAMILY MEDICINE

## 2022-02-15 PROCEDURE — 1160F RVW MEDS BY RX/DR IN RCRD: CPT | Performed by: FAMILY MEDICINE

## 2022-02-15 PROCEDURE — 1036F TOBACCO NON-USER: CPT | Performed by: FAMILY MEDICINE

## 2022-02-15 RX ORDER — CITALOPRAM 10 MG/1
10 TABLET ORAL DAILY
Qty: 30 TABLET | Refills: 1 | Status: ON HOLD | OUTPATIENT
Start: 2022-02-15 | End: 2022-03-09

## 2022-02-15 NOTE — PROGRESS NOTES
Assessment and Plan:    Mrs Grant Thomas presents today to follow-up on chronic medical problems, and also to review her emotional state from last visit  Since then, her daughter who was living in a nursing  facility, passed away  Since then, she has been feeling sad, tearful  She has no ambition  She sleeps more  She is not enjoying her usual past times  Her daughter Eugenia Hendrix is present for the appointment today and adds that her mother sometimes sits and stares at the walls    She admits that she sometimes has thoughts of death or dying, but not of hurting herself or ending her life  Her appetite has decreased and she has lost 8 lb since her last visit  1  Reactive depression, likely present for several months but now more acute  Discussed at length with patient and her daughter today  Patient is in agreement that her mood is depressed  Start citalopram 10 mg daily  Follow-up with me in 2-3 weeks  Explained to patient that this medication will take several weeks to reach full effect  She is to notify me sooner if she has any difficulty or side effects from the medication  Patient and her daughter voiced understanding of these instructions  2  Diabetes mellitus last hemoglobin A1c was normal at 5 1  She likely does not have diabetes any longer, especially with recent weight loss  Continue to monitor for now  She is currently not on any medication and is diet controlled  3  Chronic kidney disease- continue on lisinopril for renal prophylaxis  Continue to monitor kidney function and to avoid nephrotoxic agents including NSAIDs and contrast dye  She will follow-up with nephrology  4  Gout - no recent flares  Patient remains on colchicine  5  Hypertension - blood pressure well controlled  Continue amlodipine 5 mg daily and Lasix    Follow-up in 2-3 weeks  Subjective:      Patient ID: Jessica Marie is a 80 y o  female      CC:    Chief Complaint   Patient presents with    Follow-up    Hypertension    Diabetes    Weight Loss       HPI:    Mrs Morales Dear presents today to follow-up on chronic medical problems, and also to review her emotional state from last visit  Since then, her daughter who was living in a nursing  facility, passed away  Since then, she has been feeling sad, tearful  She has no ambition  She sleeps more  She is not enjoying her usual past times  Her daughter Vik Fermin is present for the appointment today and adds that her mother sometimes "sits and stares at the walls  "  She admits that she sometimes has thoughts of death or dying, but not of hurting herself or ending her life  Her appetite has decreased and she has lost 8 lb since her last visit  The following portions of the patient's history were reviewed and updated as appropriate: allergies, current medications, past family history, past medical history, past social history, past surgical history and problem list       Review of Systems   Constitutional: Negative for chills and fever  HENT: Negative for ear pain and sore throat  Eyes: Negative for pain and visual disturbance  Respiratory: Negative for cough and shortness of breath  Cardiovascular: Negative for chest pain and palpitations  Gastrointestinal: Negative for abdominal pain and vomiting  Genitourinary: Negative for dysuria and hematuria  Musculoskeletal: Negative for arthralgias and back pain  Skin: Negative for color change and rash  Neurological: Negative for seizures and syncope  Psychiatric/Behavioral: Positive for decreased concentration and dysphoric mood  Negative for agitation, behavioral problems, confusion, hallucinations, self-injury, sleep disturbance and suicidal ideas  The patient is nervous/anxious  The patient is not hyperactive  All other systems reviewed and are negative          Data to review:       Objective:    Vitals:    02/15/22 0920   BP: 108/74   BP Location: Right arm   Patient Position: Sitting Pulse: 65   SpO2: 99%   Weight: 59 3 kg (130 lb 12 8 oz)   Height: 4' 11" (1 499 m)        Physical Exam  Vitals and nursing note reviewed  Constitutional:       General: She is not in acute distress  Appearance: She is well-developed  She is not ill-appearing, toxic-appearing or diaphoretic  HENT:      Left Ear: External ear normal    Eyes:      General: No scleral icterus  Conjunctiva/sclera: Conjunctivae normal       Pupils: Pupils are equal, round, and reactive to light  Neck:      Vascular: No carotid bruit  Cardiovascular:      Rate and Rhythm: Normal rate and regular rhythm  Heart sounds: Normal heart sounds  No murmur heard  No friction rub  No gallop  Pulmonary:      Effort: Pulmonary effort is normal  No respiratory distress  Breath sounds: Normal breath sounds  No wheezing or rales  Musculoskeletal:         General: Normal range of motion  Cervical back: Normal range of motion and neck supple  Right lower leg: No edema  Left lower leg: No edema  Lymphadenopathy:      Cervical: No cervical adenopathy  Skin:     General: Skin is warm and dry  Coloration: Skin is not jaundiced  Neurological:      Mental Status: She is alert and oriented to person, place, and time  Psychiatric:         Thought Content: Thought content normal          Judgment: Judgment normal       Comments: Patient appears sad and tearful today, especially when talking about her daughter  She is pleasant and cooperative, answers questions appropriately  Her affect is otherwise normal and her eye contact is good

## 2022-02-21 ENCOUNTER — TELEPHONE (OUTPATIENT)
Dept: FAMILY MEDICINE CLINIC | Facility: CLINIC | Age: 87
End: 2022-02-21

## 2022-02-21 DIAGNOSIS — N30.01 ACUTE CYSTITIS WITH HEMATURIA: Primary | ICD-10-CM

## 2022-02-21 RX ORDER — NITROFURANTOIN 25; 75 MG/1; MG/1
100 CAPSULE ORAL 2 TIMES DAILY
Qty: 14 CAPSULE | Refills: 0 | Status: SHIPPED | OUTPATIENT
Start: 2022-02-21 | End: 2022-03-03 | Stop reason: HOSPADM

## 2022-02-21 NOTE — TELEPHONE ENCOUNTER
We were unable to collect a urine sample the lat time she had these symptoms a month ago so I would like to have her drop a sample off at the lab before she starts antibiotic that I will send in to the pharmacy

## 2022-02-21 NOTE — TELEPHONE ENCOUNTER
Patient s granddaughter called in stated that the patient bladder infection is back and is causing her a lot of discomfort  Patient granddaughter states she was just here last month for the same issue so she was wondering if she can receive a script for what she got last time  There were no more sick appointment for today  Please advise   Thank you

## 2022-02-23 ENCOUNTER — APPOINTMENT (EMERGENCY)
Dept: CT IMAGING | Facility: HOSPITAL | Age: 87
DRG: 683 | End: 2022-02-23
Payer: COMMERCIAL

## 2022-02-23 ENCOUNTER — HOSPITAL ENCOUNTER (INPATIENT)
Facility: HOSPITAL | Age: 87
LOS: 8 days | Discharge: NURSING, REHAB OR LONG-TERM CARE | DRG: 683 | End: 2022-03-03
Attending: EMERGENCY MEDICINE | Admitting: INTERNAL MEDICINE
Payer: COMMERCIAL

## 2022-02-23 DIAGNOSIS — N39.0 UTI (URINARY TRACT INFECTION): Primary | ICD-10-CM

## 2022-02-23 DIAGNOSIS — E87.5 HYPERKALEMIA: ICD-10-CM

## 2022-02-23 DIAGNOSIS — M25.579 ANKLE PAIN: ICD-10-CM

## 2022-02-23 DIAGNOSIS — N17.9 ACUTE KIDNEY INJURY (HCC): ICD-10-CM

## 2022-02-23 DIAGNOSIS — R62.7 ADULT FAILURE TO THRIVE: ICD-10-CM

## 2022-02-23 PROBLEM — N30.00 ACUTE CYSTITIS WITHOUT HEMATURIA: Status: ACTIVE | Noted: 2018-10-13

## 2022-02-23 PROBLEM — N18.4 ANEMIA IN STAGE 4 CHRONIC KIDNEY DISEASE (HCC): Status: ACTIVE | Noted: 2022-01-01

## 2022-02-23 PROBLEM — N18.9 ACUTE KIDNEY INJURY SUPERIMPOSED ON CKD (HCC): Status: ACTIVE | Noted: 2022-01-01

## 2022-02-23 PROBLEM — D63.1 ANEMIA IN STAGE 4 CHRONIC KIDNEY DISEASE (HCC): Status: ACTIVE | Noted: 2022-01-01

## 2022-02-23 PROBLEM — R33.9 URINARY RETENTION: Status: ACTIVE | Noted: 2022-01-01
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
## 2022-02-23 LAB
2HR DELTA HS TROPONIN: 2 NG/L
ALBUMIN SERPL BCP-MCNC: 2.8 G/DL (ref 3.5–5)
ALP SERPL-CCNC: 91 U/L (ref 46–116)
ALT SERPL W P-5'-P-CCNC: 22 U/L (ref 12–78)
ANION GAP SERPL CALCULATED.3IONS-SCNC: 9 MMOL/L (ref 4–13)
AST SERPL W P-5'-P-CCNC: 13 U/L (ref 5–45)
BASOPHILS # BLD AUTO: 0.02 THOUSANDS/ΜL (ref 0–0.1)
BASOPHILS NFR BLD AUTO: 0 % (ref 0–1)
BILIRUB SERPL-MCNC: 0.33 MG/DL (ref 0.2–1)
BILIRUB UR QL STRIP: NEGATIVE
BUN SERPL-MCNC: 89 MG/DL (ref 5–25)
CALCIUM ALBUM COR SERPL-MCNC: 10.9 MG/DL (ref 8.3–10.1)
CALCIUM SERPL-MCNC: 9.9 MG/DL (ref 8.3–10.1)
CARDIAC TROPONIN I PNL SERPL HS: 7 NG/L
CARDIAC TROPONIN I PNL SERPL HS: 9 NG/L
CHLORIDE SERPL-SCNC: 114 MMOL/L (ref 100–108)
CLARITY UR: ABNORMAL
CO2 SERPL-SCNC: 18 MMOL/L (ref 21–32)
COLOR UR: YELLOW
CREAT SERPL-MCNC: 2.77 MG/DL (ref 0.6–1.3)
EOSINOPHIL # BLD AUTO: 0.09 THOUSAND/ΜL (ref 0–0.61)
EOSINOPHIL NFR BLD AUTO: 1 % (ref 0–6)
ERYTHROCYTE [DISTWIDTH] IN BLOOD BY AUTOMATED COUNT: 15.1 % (ref 11.6–15.1)
GFR SERPL CREATININE-BSD FRML MDRD: 14 ML/MIN/1.73SQ M
GLUCOSE SERPL-MCNC: 104 MG/DL (ref 65–140)
GLUCOSE SERPL-MCNC: 228 MG/DL (ref 65–140)
GLUCOSE UR STRIP-MCNC: NEGATIVE MG/DL
HCT VFR BLD AUTO: 29.6 % (ref 34.8–46.1)
HGB BLD-MCNC: 9.6 G/DL (ref 11.5–15.4)
HGB UR QL STRIP.AUTO: ABNORMAL
IMM GRANULOCYTES # BLD AUTO: 0.04 THOUSAND/UL (ref 0–0.2)
IMM GRANULOCYTES NFR BLD AUTO: 1 % (ref 0–2)
KETONES UR STRIP-MCNC: NEGATIVE MG/DL
LEUKOCYTE ESTERASE UR QL STRIP: ABNORMAL
LYMPHOCYTES # BLD AUTO: 1.59 THOUSANDS/ΜL (ref 0.6–4.47)
LYMPHOCYTES NFR BLD AUTO: 22 % (ref 14–44)
MCH RBC QN AUTO: 32.3 PG (ref 26.8–34.3)
MCHC RBC AUTO-ENTMCNC: 32.4 G/DL (ref 31.4–37.4)
MCV RBC AUTO: 100 FL (ref 82–98)
MONOCYTES # BLD AUTO: 0.55 THOUSAND/ΜL (ref 0.17–1.22)
MONOCYTES NFR BLD AUTO: 8 % (ref 4–12)
NEUTROPHILS # BLD AUTO: 4.87 THOUSANDS/ΜL (ref 1.85–7.62)
NEUTS SEG NFR BLD AUTO: 68 % (ref 43–75)
NITRITE UR QL STRIP: NEGATIVE
NRBC BLD AUTO-RTO: 0 /100 WBCS
PH UR STRIP.AUTO: 6.5 [PH] (ref 4.5–8)
PLATELET # BLD AUTO: 203 THOUSANDS/UL (ref 149–390)
PMV BLD AUTO: 11.6 FL (ref 8.9–12.7)
POTASSIUM SERPL-SCNC: 6 MMOL/L (ref 3.5–5.3)
PROT SERPL-MCNC: 6.4 G/DL (ref 6.4–8.2)
PROT UR STRIP-MCNC: ABNORMAL MG/DL
RBC # BLD AUTO: 2.97 MILLION/UL (ref 3.81–5.12)
SODIUM SERPL-SCNC: 141 MMOL/L (ref 136–145)
SP GR UR STRIP.AUTO: >=1.03 (ref 1–1.03)
UROBILINOGEN UR QL STRIP.AUTO: 0.2 E.U./DL
WBC # BLD AUTO: 7.16 THOUSAND/UL (ref 4.31–10.16)

## 2022-02-23 PROCEDURE — 83540 ASSAY OF IRON: CPT | Performed by: NURSE PRACTITIONER

## 2022-02-23 PROCEDURE — 82948 REAGENT STRIP/BLOOD GLUCOSE: CPT

## 2022-02-23 PROCEDURE — 99285 EMERGENCY DEPT VISIT HI MDM: CPT

## 2022-02-23 PROCEDURE — 70450 CT HEAD/BRAIN W/O DYE: CPT

## 2022-02-23 PROCEDURE — G1004 CDSM NDSC: HCPCS

## 2022-02-23 PROCEDURE — 36415 COLL VENOUS BLD VENIPUNCTURE: CPT | Performed by: EMERGENCY MEDICINE

## 2022-02-23 PROCEDURE — 74176 CT ABD & PELVIS W/O CONTRAST: CPT

## 2022-02-23 PROCEDURE — 80053 COMPREHEN METABOLIC PANEL: CPT | Performed by: EMERGENCY MEDICINE

## 2022-02-23 PROCEDURE — 96375 TX/PRO/DX INJ NEW DRUG ADDON: CPT

## 2022-02-23 PROCEDURE — 96372 THER/PROPH/DIAG INJ SC/IM: CPT

## 2022-02-23 PROCEDURE — 99223 1ST HOSP IP/OBS HIGH 75: CPT | Performed by: NURSE PRACTITIONER

## 2022-02-23 PROCEDURE — 99291 CRITICAL CARE FIRST HOUR: CPT | Performed by: EMERGENCY MEDICINE

## 2022-02-23 PROCEDURE — 96361 HYDRATE IV INFUSION ADD-ON: CPT

## 2022-02-23 PROCEDURE — 83550 IRON BINDING TEST: CPT | Performed by: NURSE PRACTITIONER

## 2022-02-23 PROCEDURE — 82728 ASSAY OF FERRITIN: CPT | Performed by: NURSE PRACTITIONER

## 2022-02-23 PROCEDURE — 93005 ELECTROCARDIOGRAM TRACING: CPT

## 2022-02-23 PROCEDURE — 96374 THER/PROPH/DIAG INJ IV PUSH: CPT

## 2022-02-23 PROCEDURE — 84484 ASSAY OF TROPONIN QUANT: CPT | Performed by: EMERGENCY MEDICINE

## 2022-02-23 PROCEDURE — 85025 COMPLETE CBC W/AUTO DIFF WBC: CPT | Performed by: EMERGENCY MEDICINE

## 2022-02-23 RX ORDER — SIMETHICONE 80 MG
80 TABLET,CHEWABLE ORAL 4 TIMES DAILY PRN
Status: DISCONTINUED | OUTPATIENT
Start: 2022-02-23 | End: 2022-03-03 | Stop reason: HOSPADM

## 2022-02-23 RX ORDER — DEXTROSE MONOHYDRATE 25 G/50ML
50 INJECTION, SOLUTION INTRAVENOUS ONCE
Status: COMPLETED | OUTPATIENT
Start: 2022-02-23 | End: 2022-02-23

## 2022-02-23 RX ORDER — CITALOPRAM 20 MG/1
10 TABLET ORAL DAILY
Status: DISCONTINUED | OUTPATIENT
Start: 2022-02-24 | End: 2022-03-03 | Stop reason: HOSPADM

## 2022-02-23 RX ORDER — PRAVASTATIN SODIUM 40 MG
40 TABLET ORAL
Status: DISCONTINUED | OUTPATIENT
Start: 2022-02-24 | End: 2022-03-03 | Stop reason: HOSPADM

## 2022-02-23 RX ORDER — MAGNESIUM HYDROXIDE/ALUMINUM HYDROXICE/SIMETHICONE 120; 1200; 1200 MG/30ML; MG/30ML; MG/30ML
30 SUSPENSION ORAL EVERY 6 HOURS PRN
Status: DISCONTINUED | OUTPATIENT
Start: 2022-02-23 | End: 2022-03-03 | Stop reason: HOSPADM

## 2022-02-23 RX ORDER — CINACALCET 30 MG/1
30 TABLET, FILM COATED ORAL 3 TIMES WEEKLY
Status: DISCONTINUED | OUTPATIENT
Start: 2022-02-25 | End: 2022-03-03 | Stop reason: HOSPADM

## 2022-02-23 RX ORDER — AMLODIPINE BESYLATE 5 MG/1
5 TABLET ORAL 2 TIMES DAILY
Status: DISCONTINUED | OUTPATIENT
Start: 2022-02-23 | End: 2022-03-03 | Stop reason: HOSPADM

## 2022-02-23 RX ORDER — ACETAMINOPHEN 325 MG/1
650 TABLET ORAL EVERY 6 HOURS PRN
Status: DISCONTINUED | OUTPATIENT
Start: 2022-02-23 | End: 2022-03-03 | Stop reason: HOSPADM

## 2022-02-23 RX ORDER — ASPIRIN 81 MG/1
81 TABLET ORAL DAILY
Status: DISCONTINUED | OUTPATIENT
Start: 2022-02-24 | End: 2022-03-03 | Stop reason: HOSPADM

## 2022-02-23 RX ORDER — GABAPENTIN 300 MG/1
300 CAPSULE ORAL
Status: DISCONTINUED | OUTPATIENT
Start: 2022-02-23 | End: 2022-03-03 | Stop reason: HOSPADM

## 2022-02-23 RX ORDER — GINSENG 100 MG
1 CAPSULE ORAL 2 TIMES DAILY
Status: DISCONTINUED | OUTPATIENT
Start: 2022-02-23 | End: 2022-02-25

## 2022-02-23 RX ORDER — ONDANSETRON 2 MG/ML
4 INJECTION INTRAMUSCULAR; INTRAVENOUS EVERY 6 HOURS PRN
Status: DISCONTINUED | OUTPATIENT
Start: 2022-02-23 | End: 2022-03-03 | Stop reason: HOSPADM

## 2022-02-23 RX ORDER — DOCUSATE SODIUM 100 MG/1
100 CAPSULE, LIQUID FILLED ORAL DAILY
Status: DISCONTINUED | OUTPATIENT
Start: 2022-02-24 | End: 2022-03-03 | Stop reason: HOSPADM

## 2022-02-23 RX ORDER — CALCIUM CHLORIDE 100 MG/ML
1 INJECTION INTRAVENOUS; INTRAVENTRICULAR ONCE
Status: COMPLETED | OUTPATIENT
Start: 2022-02-23 | End: 2022-02-23

## 2022-02-23 RX ORDER — SODIUM CHLORIDE 9 MG/ML
75 INJECTION, SOLUTION INTRAVENOUS CONTINUOUS
Status: DISCONTINUED | OUTPATIENT
Start: 2022-02-23 | End: 2022-02-25

## 2022-02-23 RX ADMIN — SODIUM CHLORIDE 75 ML/HR: 9 INJECTION, SOLUTION INTRAVENOUS at 23:20

## 2022-02-23 RX ADMIN — ACETAMINOPHEN 650 MG: 325 TABLET, FILM COATED ORAL at 23:33

## 2022-02-23 RX ADMIN — INSULIN HUMAN 5 UNITS: 100 INJECTION, SOLUTION PARENTERAL at 19:32

## 2022-02-23 RX ADMIN — GABAPENTIN 300 MG: 300 CAPSULE ORAL at 23:33

## 2022-02-23 RX ADMIN — CALCIUM CHLORIDE 1 G: 100 INJECTION INTRAVENOUS; INTRAVENTRICULAR at 19:13

## 2022-02-23 RX ADMIN — AMLODIPINE BESYLATE 5 MG: 5 TABLET ORAL at 23:33

## 2022-02-23 RX ADMIN — CEFTRIAXONE SODIUM 1000 MG: 10 INJECTION, POWDER, FOR SOLUTION INTRAVENOUS at 23:23

## 2022-02-23 RX ADMIN — SODIUM BICARBONATE 50 MEQ: 84 INJECTION INTRAVENOUS at 19:34

## 2022-02-23 RX ADMIN — DEXTROSE MONOHYDRATE 50 ML: 500 INJECTION PARENTERAL at 19:29

## 2022-02-23 RX ADMIN — SODIUM CHLORIDE 1000 ML: 0.9 INJECTION, SOLUTION INTRAVENOUS at 17:43

## 2022-02-23 NOTE — ED PROVIDER NOTES
History  Chief Complaint   Patient presents with    Weakness - Generalized     pt c/o of generalized weakness and pt states she is unable to eat for the past several weeks  pt states she has been depressed since her daughter passed away last october  pt denies cp/sob/n/v/d or fevers     A 80year-old female with past history of diabetes, hyperlipidemia, hypertension and CKD; presents with generalized malaise and a decreased appetite that has gotten progressively worse for the past several weeks  Patient states symptoms initially began when she was diagnosed with a UTI (January 28th, treated with a course of Macrobid)  Patient completed the course of antibiotics as prescribed, however her symptoms persist   Patient states whenever she attempts to eat she develops significant nausea and dry heaving  Patient has not vomited  Patient also with generalized malaise and fatigue, stating she wants to sleep all day  Patient does report having two falls over the past week, however is unclear what caused her to fall  Patient has been ambulating with a cane  Patient otherwise denies fever, chills, chest pain, shortness of breath, abdominal pain, dysuria, diarrhea, focal weakness, paresthesias, peripheral edema and rashes  Triage also commented on possible depression, however when patient is questioned numbness she becomes upset stating "they keep telling me I am depressed but I really do not think I am"  Patient has been taking her antidepressant as prescribed  Patient denies SI and HI  Assessment & plan:  Generalized malaise, associated with decreased appetite  Patient noted to have significant weight loss  Patient no acute distress, does have dry mucous membranes  Mild lower abdominal tenderness  Will check labs, UA and CTAP for intra-abdominal pathology, recurrent UTI, electrolyte abnormality and renal impairment  Will also obtain CT head given frequent falls          History provided by:  Patient and medical records      Wt Readings from Last 3 Encounters:   02/23/22 58 1 kg (128 lb 1 4 oz)   02/15/22 59 3 kg (130 lb 12 8 oz)   01/28/22 62 6 kg (138 lb)       Prior to Admission Medications   Prescriptions Last Dose Informant Patient Reported? Taking?    Cholecalciferol (VITAMIN D3) 2000 units capsule  Self Yes No   Sig: Take 1 capsule by mouth daily 800 units daily    Patient not taking: Reported on 2/15/2022    Contour Test test strip  Self No No   Sig: TEST AS DIRECTED   Lancets (ONETOUCH ULTRASOFT) lancets  Self Yes No   Sig: by Does not apply route 2 (two) times a day   Premarin vaginal cream  Self Yes No   amLODIPine (NORVASC) 5 mg tablet  Self No No   Sig: TAKE 1 TABLET BY MOUTH TWICE A DAY   aspirin 81 MG tablet  Self Yes No   Sig: Take 1 tablet by mouth daily   cinacalcet (SENSIPAR) 30 mg tablet  Self No No   Sig: Take 1 tablet (30 mg total) by mouth 3 (three) times a week   citalopram (CeleXA) 10 mg tablet   No No   Sig: Take 1 tablet (10 mg total) by mouth daily   colchicine (COLCRYS) 0 6 mg tablet  Self No No   Sig: TAKE 1 TABLET BY MOUTH EVERY DAY   docusate sodium (COLACE) 100 mg capsule  Self Yes No   Sig: Take 100 mg by mouth daily   furosemide (LASIX) 20 mg tablet  Self No No   Sig: Take 1 tablet (20 mg total) by mouth daily   gabapentin (NEURONTIN) 300 mg capsule  Self No No   Sig: Take 1 capsule (300 mg total) by mouth daily at bedtime   lisinopril (ZESTRIL) 10 mg tablet  Self No No   Sig: Take 1 tablet (10 mg total) by mouth daily   nitrofurantoin (MACROBID) 100 mg capsule   No No   Sig: Take 1 capsule (100 mg total) by mouth 2 (two) times a day for 7 days   simvastatin (ZOCOR) 20 mg tablet  Self No No   Sig: TAKE 1 TABLET BY MOUTH EVERY DAY      Facility-Administered Medications: None       Past Medical History:   Diagnosis Date    Diabetes mellitus (HCC)     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[address] )     LAST ASSESSED: [id number]    Hyperlipidemia     Hypertension     Renal disorder     Squamous cell carcinoma, scalp/neck     LAST ASSESSED: [id number]       Past Surgical History:   Procedure Laterality Date    BLADDER SURGERY      LAST ASSESSED: [id number]    PELVIC FLOOR REPAIR      VAGINAL SURG INSERTION OF MESH    TOTAL ABDOMINAL HYSTERECTOMY W/ BILATERAL SALPINGOOPHORECTOMY      LAST ASSESSED: [id number]       Family History   Problem Relation Age of Onset    Hypertension Mother     Hypertension Father     Kidney disease Sister         CHRONIC    Alcohol abuse Family     Substance Abuse Family      I have reviewed and agree with the history as documented  E-Cigarette/Vaping    E-Cigarette Use Never User      E-Cigarette/Vaping Substances     Social History     Tobacco Use    Smoking status: Former Smoker    Smokeless tobacco: Never Used   Vaping Use    Vaping Use: Never used   Substance Use Topics    Alcohol use: Yes     Comment: SOCIAL    Drug use: No       Review of Systems   Constitutional: Positive for appetite change and unexpected weight change  Neurological: Positive for weakness (malaise)  All other systems reviewed and are negative  Physical Exam  Physical Exam  General Appearance: alert and oriented, nad, non toxic appearing  Skin:  Warm, dry, intact  HEENT: atraumatic, normocephalic  Dry mucous membranes  Neck: Supple, trachea midline  Cardiac: RRR; no murmurs, rub, gallops  Pulmonary: lungs CTAB; no wheezes, rales, rhonchi  Gastrointestinal: abdomen soft, mild lower abdominal tenderness, nondistended; no guarding or rebound tenderness; good bowel sounds, no mass or bruits  Extremities:  Extremities nontender with full ROM    No pedal edema, 2+ pulses; no calf tenderness, no clubbing, no cyanosis  Neuro:  no focal motor or sensory deficits, CN 2-12 grossly intact  Psych:  Normal mood and affect, normal judgement and insight      Vital Signs  ED Triage Vitals [02/23/22 1655]   Temperature Pulse Respirations Blood Pressure SpO2   98 1 °F (36 7 °C) 66 16 129/66 96 %      Temp Source Heart Rate Source Patient Position - Orthostatic VS BP Location FiO2 (%)   Oral Monitor Lying Right arm --      Pain Score       No Pain           Vitals:    02/23/22 1900 02/23/22 1930 02/23/22 2000 02/23/22 2045   BP: 164/99 (!) 164/106     Pulse: 60 62 64 66   Patient Position - Orthostatic VS: Lying Lying           Visual Acuity      ED Medications  Medications   ceftriaxone (ROCEPHIN) 1 g/50 mL in dextrose IVPB (has no administration in time range)   sodium chloride 0 9 % infusion (has no administration in time range)   acetaminophen (TYLENOL) tablet 650 mg (has no administration in time range)   ondansetron (ZOFRAN) injection 4 mg (has no administration in time range)   aluminum-magnesium hydroxide-simethicone (MYLANTA) oral suspension 30 mL (has no administration in time range)   simethicone (MYLICON) chewable tablet 80 mg (has no administration in time range)   bisacodyl (DULCOLAX) EC tablet 10 mg (has no administration in time range)   cefTRIAXone (ROCEPHIN) 1,000 mg in dextrose 5 % 50 mL IVPB (has no administration in time range)   enoxaparin (LOVENOX) subcutaneous injection 30 mg (has no administration in time range)   bacitracin topical ointment 1 small application (has no administration in time range)   insulin lispro (HumaLOG) 100 units/mL subcutaneous injection 1-5 Units (has no administration in time range)   sodium chloride 0 9 % bolus 1,000 mL (0 mL Intravenous Stopped 2/23/22 1912)   sodium bicarbonate 8 4 % injection 50 mEq (50 mEq Intravenous Given 2/23/22 1934)   insulin regular (HumuLIN R,NovoLIN R) injection 5 Units (5 Units Subcutaneous Given 2/23/22 1932)   dextrose 50 % IV solution 50 mL (50 mL Intravenous Given 2/23/22 1929)   calcium chloride 10 % injection 1 g (1 g Intravenous Given 2/23/22 1913)       Diagnostic Studies  Results Reviewed     Procedure Component Value Units Date/Time    Fingerstick Glucose (POCT) [[id number]]  (Abnormal) Collected: 02/23/22 2016    Lab Status: Final result Updated: 02/23/22 2017     POC Glucose 228 mg/dl     HS Troponin I 2hr [[id number]]  (Normal) Collected: 02/23/22 1945    Lab Status: Final result Specimen: Blood from Arm, Left Updated: 02/23/22 2010     hs TnI 2hr 9 ng/L      Delta 2hr hsTnI 2 ng/L     Urine culture [[id number]]     Lab Status: No result Specimen: Urine     Urine Macroscopic, POC [[id number]]  (Abnormal) Collected: 02/23/22 1920    Lab Status: Final result Specimen: Urine Updated: 02/23/22 1921     Color, UA Yellow     Clarity, UA Cloudy     pH, UA 6 5     Leukocytes, UA Small     Nitrite, UA Negative     Protein, UA 30 (1+) mg/dl      Glucose, UA Negative mg/dl      Ketones, UA Negative mg/dl      Urobilinogen, UA 0 2 E U /dl      Bilirubin, UA Negative     Blood, UA Trace     Specific Gravity, UA >=1 030    Narrative:      CLINITEK RESULT    Urine Microscopic [[id number]] Collected: 02/23/22 1920    Lab Status: No result Specimen: Urine     HS Troponin 0hr (reflex protocol) [[id number]]  (Normal) Collected: 02/23/22 1744    Lab Status: Final result Specimen: Blood from Arm, Left Updated: 02/23/22 1821     hs TnI 0hr 7 ng/L     Comprehensive metabolic panel [[id number]]  (Abnormal) Collected: 02/23/22 1744    Lab Status: Final result Specimen: Blood from Arm, Left Updated: 02/23/22 1817     Sodium 141 mmol/L      Potassium 6 0 mmol/L      Chloride 114 mmol/L      CO2 18 mmol/L      ANION GAP 9 mmol/L      BUN 89 mg/dL      Creatinine 2 77 mg/dL      Glucose 104 mg/dL      Calcium 9 9 mg/dL      Corrected Calcium 10 9 mg/dL      AST 13 U/L      ALT 22 U/L      Alkaline Phosphatase 91 U/L      Total Protein 6 4 g/dL      Albumin 2 8 g/dL      Total Bilirubin 0 33 mg/dL      eGFR 14 ml/min/1 73sq m     Narrative:      [facility] guidelines for Chronic Kidney Disease (CKD):     Stage 1 with normal or high GFR (GFR > 90 mL/min/1 73 square meters)    Stage 2 Mild CKD (GFR = 60-89 mL/min/1 73 square meters)   Stage 3A Moderate CKD (GFR = 45-59 mL/min/1 73 square meters)    Stage 3B Moderate CKD (GFR = 30-44 mL/min/1 73 square meters)    Stage 4 Severe CKD (GFR = 15-29 mL/min/1 73 square meters)    Stage 5 End Stage CKD (GFR <15 mL/min/1 73 square meters)  Note: GFR calculation is accurate only with a steady state creatinine    CBC and differential [[id number]]  (Abnormal) Collected: 02/23/22 1744    Lab Status: Final result Specimen: Blood from Arm, Left Updated: 02/23/22 1755     WBC 7 16 Thousand/uL      RBC 2 97 Million/uL      Hemoglobin 9 6 g/dL      Hematocrit 29 6 %       fL      MCH 32 3 pg      MCHC 32 4 g/dL      RDW 15 1 %      MPV 11 6 fL      Platelets 330 Thousands/uL      nRBC 0 /100 WBCs      Neutrophils Relative 68 %      Immat GRANS % 1 %      Lymphocytes Relative 22 %      Monocytes Relative 8 %      Eosinophils Relative 1 %      Basophils Relative 0 %      Neutrophils Absolute 4 87 Thousands/µL      Immature Grans Absolute 0 04 Thousand/uL      Lymphocytes Absolute 1 59 Thousands/µL      Monocytes Absolute 0 55 Thousand/µL      Eosinophils Absolute 0 09 Thousand/µL      Basophils Absolute 0 02 Thousands/µL                  CT head without contrast   Final Result by Ofelia Molina MD (02/23 1929)      No acute intracranial abnormality  Workstation performed: [id number]         CT abdomen pelvis wo contrast   Final Result by Ofelia Molina MD (02/23 1950)      Bladder wall thickening and perivesical inflammatory changes along with air noted in the bladder  Findings consistent with urinary tract infection              Workstation performed: [id number]                    Procedures  CriticalCare Time  Performed by: Priyanka Gomez DO  Authorized by: Priyanka Gomez DO     Critical care provider statement:     Critical care time (minutes):  30    Critical care time was exclusive of:  Separately billable procedures and treating other patients and teaching time    Critical care was necessary to treat or prevent imminent or life-threatening deterioration of the following conditions:  Metabolic crisis (hyperkalemia)    Critical care was time spent personally by me on the following activities:  Obtaining history from patient or surrogate, development of treatment plan with patient or surrogate, examination of patient, evaluation of patient's response to treatment, re-evaluation of patient's condition, ordering and review of radiographic studies, ordering and performing treatments and interventions, review of old charts and ordering and review of laboratory studies    I assumed direction of critical care for this patient from another provider in my specialty: no         ECG 12 Lead Documentation  Date/Time: today/date: 2/23/2022  Performed by: Chelita Pacheco    ECG reviewed by me, the ED Provider: yes    Patient location:  ED   Previous ECG:  Compared to current, no change   Rate:  62  ECG rate assessment: normal    Rhythm: sinus rhythm    Ectopy:  Isolated PAC    QRS axis:  Normal  Intervals: normal   Q waves: None   ST segments:  Normal  T waves: normal      Impression: NSR with isolated PAC, otherwise normal EKG           ED Course  ED Course as of 02/23/22 2143 Wed Feb 23, 2022   1803 Hemoglobin(!): 9 6  No recent labs for comparison    1820 Creatinine(!): 2 77  Slowly trending up over past few months, baseline previously < 2  Most recently 2 35   1821 Potassium(!): 6 0  Without EKG changes  Possibly secondary to medication and worsening renal function  Will treat with calcium, bicarb and insulin/dextrose with close glucose monitoring  1925 Leukocytes, UA(!): Small  Will await micro   1943 CT head without contrast  No acute intracranial abnormality  1956 CT abdomen pelvis wo contrast  Bladder wall thickening and perivesical inflammatory changes along with air noted in the bladder  Findings consistent with urinary tract infection  Will start antibiotics    Pt does not meet SIRS criteria   2020 POC Glucose(!): 228   2020 Patient updated on results, agreeable to admission for treatment of hyperkalemia, GAY and UTI  Patient does express again that she would like to be discharged to assisted living  Will discuss with SLIM  MDM    Disposition  Final diagnoses:   UTI (urinary tract infection)   Acute kidney injury ([facility] [address] )   Hyperkalemia     Time reflects when diagnosis was documented in both MDM as applicable and the Disposition within this note     Time User Action Codes Description Comment    2/23/2022  8:33 PM [place], [address] [N39 0] UTI (urinary tract infection)     2/23/2022  8:33 PM Luanne, Jasmyne Add [N17 9] Acute kidney injury ([facility] )     2/23/2022  8:33 PM Spring, [address] [E87 5] Hyperkalemia     2/23/2022  9:30 PM Terra Factor Add [R62 7] Adult failure to thrive       ED Disposition     ED Disposition Condition Date/Time Comment    Admit Stable Wed Feb 23, 2022  8:33 PM Case was discussed with ADAL and the patient's admission status was agreed to be Admission Status: inpatient status to the service of Dr Anil Nevarez   Follow-up Information    None         Current Discharge Medication List      CONTINUE these medications which have NOT CHANGED    Details   amLODIPine (NORVASC) 5 mg tablet TAKE 1 TABLET BY MOUTH TWICE A DAY  Qty: 180 tablet, Refills: 3    Associated Diagnoses: Essential hypertension      aspirin 81 MG tablet Take 1 tablet by mouth daily      Cholecalciferol (VITAMIN D3) 2000 units capsule Take 1 capsule by mouth daily 800 units daily       cinacalcet (SENSIPAR) 30 mg tablet Take 1 tablet (30 mg total) by mouth 3 (three) times a week  Qty: 36 tablet, Refills: 3    Associated Diagnoses: Chronic kidney disease, stage IV (severe) ([facility] [address] ); Hypertensive chronic kidney disease, unspecified CKD stage; Type 2 diabetes mellitus with stage 4 chronic kidney disease, without long-term current use of insulin ([facility] );  Atrophic kidney, acquired; Complex renal cyst; Primary hyperparathyroidism ([facility] ); Hypercalcemia; Urinary retention      citalopram (CeleXA) 10 mg tablet Take 1 tablet (10 mg total) by mouth daily  Qty: 30 tablet, Refills: 1    Associated Diagnoses: Reactive depression      colchicine (COLCRYS) 0 6 mg tablet TAKE 1 TABLET BY MOUTH EVERY DAY  Qty: 90 tablet, Refills: 3    Associated Diagnoses: Idiopathic chronic gout without tophus, unspecified site      Contour Test test strip TEST AS DIRECTED  Qty: 100 each, Refills: 3    Associated Diagnoses: Controlled type 2 diabetes mellitus with diabetic polyneuropathy, without long-term current use of insulin ([facility])      docusate sodium (COLACE) 100 mg capsule Take 100 mg by mouth daily      furosemide (LASIX) 20 mg tablet Take 1 tablet (20 mg total) by mouth daily  Qty: 90 tablet, Refills: 3    Associated Diagnoses: CKD (chronic kidney disease), stage IV ([place] [address] ); Hypertensive chronic kidney disease with stage 1 through stage 4 chronic kidney disease, or unspecified chronic kidney disease      gabapentin (NEURONTIN) 300 mg capsule Take 1 capsule (300 mg total) by mouth daily at bedtime  Qty: 90 capsule, Refills: 3    Associated Diagnoses: Idiopathic chronic gout without tophus, unspecified site      Lancets (ONETOUCH ULTRASOFT) lancets by Does not apply route 2 (two) times a day      lisinopril (ZESTRIL) 10 mg tablet Take 1 tablet (10 mg total) by mouth daily  Qty: 90 tablet, Refills: 2    Associated Diagnoses: Hypertension, unspecified type      nitrofurantoin (MACROBID) 100 mg capsule Take 1 capsule (100 mg total) by mouth 2 (two) times a day for 7 days  Qty: 14 capsule, Refills: 0    Associated Diagnoses: Acute cystitis with hematuria      Premarin vaginal cream       simvastatin (ZOCOR) 20 mg tablet TAKE 1 TABLET BY MOUTH EVERY DAY  Qty: 90 tablet, Refills: 1    Associated Diagnoses: Mixed hyperlipidemia             No discharge procedures on file      PDMP Review     None          ED Provider  Electronically Signed by           Leonor Bynum,   02/23/22 0625

## 2022-02-24 LAB
ANION GAP SERPL CALCULATED.3IONS-SCNC: 9 MMOL/L (ref 4–13)
ATRIAL RATE: 64 BPM
BACTERIA UR QL AUTO: ABNORMAL /HPF
BASOPHILS # BLD AUTO: 0.02 THOUSANDS/ΜL (ref 0–0.1)
BASOPHILS NFR BLD AUTO: 0 % (ref 0–1)
BUN SERPL-MCNC: 78 MG/DL (ref 5–25)
CALCIUM SERPL-MCNC: 9.9 MG/DL (ref 8.3–10.1)
CARDIAC TROPONIN I PNL SERPL HS: 10 NG/L (ref 8–18)
CHLORIDE SERPL-SCNC: 117 MMOL/L (ref 100–108)
CO2 SERPL-SCNC: 16 MMOL/L (ref 21–32)
CREAT SERPL-MCNC: 2.64 MG/DL (ref 0.6–1.3)
EOSINOPHIL # BLD AUTO: 0.19 THOUSAND/ΜL (ref 0–0.61)
EOSINOPHIL NFR BLD AUTO: 3 % (ref 0–6)
ERYTHROCYTE [DISTWIDTH] IN BLOOD BY AUTOMATED COUNT: 15.2 % (ref 11.6–15.1)
FERRITIN SERPL-MCNC: 221 NG/ML (ref 8–388)
GFR SERPL CREATININE-BSD FRML MDRD: 14 ML/MIN/1.73SQ M
GLUCOSE SERPL-MCNC: 102 MG/DL (ref 65–140)
GLUCOSE SERPL-MCNC: 107 MG/DL (ref 65–140)
GLUCOSE SERPL-MCNC: 150 MG/DL (ref 65–140)
GLUCOSE SERPL-MCNC: 153 MG/DL (ref 65–140)
GLUCOSE SERPL-MCNC: 156 MG/DL (ref 65–140)
GLUCOSE SERPL-MCNC: 160 MG/DL (ref 65–140)
GLUCOSE SERPL-MCNC: 191 MG/DL (ref 65–140)
HCT VFR BLD AUTO: 28.8 % (ref 34.8–46.1)
HGB BLD-MCNC: 9.1 G/DL (ref 11.5–15.4)
IMM GRANULOCYTES # BLD AUTO: 0.04 THOUSAND/UL (ref 0–0.2)
IMM GRANULOCYTES NFR BLD AUTO: 1 % (ref 0–2)
IRON SATN MFR SERPL: 20 % (ref 15–50)
IRON SERPL-MCNC: 40 UG/DL (ref 50–170)
LYMPHOCYTES # BLD AUTO: 1.51 THOUSANDS/ΜL (ref 0.6–4.47)
LYMPHOCYTES NFR BLD AUTO: 24 % (ref 14–44)
MCH RBC QN AUTO: 31.1 PG (ref 26.8–34.3)
MCHC RBC AUTO-ENTMCNC: 31.6 G/DL (ref 31.4–37.4)
MCV RBC AUTO: 98 FL (ref 82–98)
MONOCYTES # BLD AUTO: 0.45 THOUSAND/ΜL (ref 0.17–1.22)
MONOCYTES NFR BLD AUTO: 7 % (ref 4–12)
NEUTROPHILS # BLD AUTO: 4.02 THOUSANDS/ΜL (ref 1.85–7.62)
NEUTS SEG NFR BLD AUTO: 65 % (ref 43–75)
NON-SQ EPI CELLS URNS QL MICRO: ABNORMAL /HPF
NRBC BLD AUTO-RTO: 0 /100 WBCS
OTHER STN SPEC: ABNORMAL
P AXIS: -25 DEGREES
PLATELET # BLD AUTO: 178 THOUSANDS/UL (ref 149–390)
PMV BLD AUTO: 11.4 FL (ref 8.9–12.7)
POTASSIUM SERPL-SCNC: 5.5 MMOL/L (ref 3.5–5.3)
PR INTERVAL: 150 MS
QRS AXIS: -15 DEGREES
QRSD INTERVAL: 80 MS
QT INTERVAL: 412 MS
QTC INTERVAL: 418 MS
RBC # BLD AUTO: 2.93 MILLION/UL (ref 3.81–5.12)
RBC #/AREA URNS AUTO: ABNORMAL /HPF
SODIUM SERPL-SCNC: 142 MMOL/L (ref 136–145)
T WAVE AXIS: 59 DEGREES
TIBC SERPL-MCNC: 202 UG/DL (ref 250–450)
VENTRICULAR RATE: 62 BPM
WBC # BLD AUTO: 6.23 THOUSAND/UL (ref 4.31–10.16)
WBC #/AREA URNS AUTO: ABNORMAL /HPF

## 2022-02-24 PROCEDURE — 87086 URINE CULTURE/COLONY COUNT: CPT

## 2022-02-24 PROCEDURE — 81001 URINALYSIS AUTO W/SCOPE: CPT

## 2022-02-24 PROCEDURE — 80048 BASIC METABOLIC PNL TOTAL CA: CPT | Performed by: NURSE PRACTITIONER

## 2022-02-24 PROCEDURE — 87086 URINE CULTURE/COLONY COUNT: CPT | Performed by: EMERGENCY MEDICINE

## 2022-02-24 PROCEDURE — 82948 REAGENT STRIP/BLOOD GLUCOSE: CPT

## 2022-02-24 PROCEDURE — 97166 OT EVAL MOD COMPLEX 45 MIN: CPT

## 2022-02-24 PROCEDURE — 93010 ELECTROCARDIOGRAM REPORT: CPT

## 2022-02-24 PROCEDURE — 84484 ASSAY OF TROPONIN QUANT: CPT | Performed by: PHYSICIAN ASSISTANT

## 2022-02-24 PROCEDURE — 99232 SBSQ HOSP IP/OBS MODERATE 35: CPT | Performed by: HOSPITALIST

## 2022-02-24 PROCEDURE — 97163 PT EVAL HIGH COMPLEX 45 MIN: CPT

## 2022-02-24 PROCEDURE — 85025 COMPLETE CBC W/AUTO DIFF WBC: CPT | Performed by: NURSE PRACTITIONER

## 2022-02-24 PROCEDURE — 92610 EVALUATE SWALLOWING FUNCTION: CPT

## 2022-02-24 RX ADMIN — AMLODIPINE BESYLATE 5 MG: 5 TABLET ORAL at 18:19

## 2022-02-24 RX ADMIN — INSULIN LISPRO 1 UNITS: 100 INJECTION, SOLUTION INTRAVENOUS; SUBCUTANEOUS at 00:18

## 2022-02-24 RX ADMIN — ACETAMINOPHEN 650 MG: 325 TABLET, FILM COATED ORAL at 16:47

## 2022-02-24 RX ADMIN — DOCUSATE SODIUM 100 MG: 100 CAPSULE ORAL at 09:18

## 2022-02-24 RX ADMIN — INSULIN LISPRO 1 UNITS: 100 INJECTION, SOLUTION INTRAVENOUS; SUBCUTANEOUS at 11:31

## 2022-02-24 RX ADMIN — CITALOPRAM HYDROBROMIDE 10 MG: 20 TABLET ORAL at 09:12

## 2022-02-24 RX ADMIN — BACITRACIN 1 SMALL APPLICATION: 500 OINTMENT TOPICAL at 09:13

## 2022-02-24 RX ADMIN — ASPIRIN 81 MG: 81 TABLET, COATED ORAL at 09:12

## 2022-02-24 RX ADMIN — BACITRACIN 1 SMALL APPLICATION: 500 OINTMENT TOPICAL at 00:18

## 2022-02-24 RX ADMIN — ENOXAPARIN SODIUM 30 MG: 30 INJECTION, SOLUTION INTRAVENOUS; SUBCUTANEOUS at 09:14

## 2022-02-24 RX ADMIN — PRAVASTATIN SODIUM 40 MG: 40 TABLET ORAL at 16:49

## 2022-02-24 RX ADMIN — GABAPENTIN 300 MG: 300 CAPSULE ORAL at 22:12

## 2022-02-24 RX ADMIN — AMLODIPINE BESYLATE 5 MG: 5 TABLET ORAL at 09:13

## 2022-02-24 RX ADMIN — INSULIN LISPRO 1 UNITS: 100 INJECTION, SOLUTION INTRAVENOUS; SUBCUTANEOUS at 16:49

## 2022-02-24 RX ADMIN — BACITRACIN 1 SMALL APPLICATION: 500 OINTMENT TOPICAL at 18:20

## 2022-02-24 RX ADMIN — CEFTRIAXONE SODIUM 1000 MG: 10 INJECTION, POWDER, FOR SOLUTION INTRAVENOUS at 22:12

## 2022-02-24 RX ADMIN — INSULIN LISPRO 1 UNITS: 100 INJECTION, SOLUTION INTRAVENOUS; SUBCUTANEOUS at 22:13

## 2022-02-24 NOTE — SPEECH THERAPY NOTE
Speech Language/Pathology  Speech/Language Pathology  Assessment    Patient Name: Tiana CHRISTIANSEN Date: 2/24/2022     Problem List  Principal Problem:    Acute kidney injury superimposed on CKD [facility])  Active Problems:    Pure hypercholesterolemia    Acute cystitis without hematuria    Type 2 diabetes mellitus with diabetic chronic kidney disease ([facility] )    Primary hyperparathyroidism ([facility] )    Urinary retention    Anemia in stage 4 chronic kidney disease (HCC)    Hyperkalemia    Adult failure to thrive    Fall at home, initial encounter    Past Medical History  Past Medical History:   Diagnosis Date    Diabetes mellitus ([facility] )     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 ([facility] )     LAST ASSESSED: [id number]    Hyperlipidemia     Hypertension     Renal disorder     Squamous cell carcinoma, scalp/neck     LAST ASSESSED: [id number]     Past Surgical History  Past Surgical History:   Procedure Laterality Date    BLADDER SURGERY      LAST ASSESSED: [id number]    PELVIC FLOOR REPAIR      VAGINAL SURG INSERTION OF MESH    TOTAL ABDOMINAL HYSTERECTOMY W/ BILATERAL SALPINGOOPHORECTOMY      LAST ASSESSED: [id number]       Bedside Swallow Evaluation:    Summary:  Pt presents w/ poor appetite  No oral or pharyngeal s/s dyspha ia w/ limited PO  States she is not hungry and that the thought of eating sometimes makes her sick  States she gags when she eats food  She took liquids wnl today (coffee w/ ST "this coffee is good") reportedly had the broth from the soup and some pudding WNL per daughter  Today is the pt's birthday  I asked if cake would go down ok  She said it might  Stated she likes thai food cake  Ordered her a piece w/ whipped cream/decaf coffee  Granddaughter stated she is sensitive to caffeine  Pt took a few small bites of the thai food cake  No gag  Mastication and transfer were wnl  Swallow prompt  Laryngeal rise WNL  Pt was eating while conversing w/ grand daughter   After a small amt she stated she had enough  Pt and grand daughter were talking about the recent death of the pt's daughter/grandaukylie's mother  Question if poor intake may be partially due to this, vs GI vs UTI  No s/s w/ liquids or small amt soft solid  Family/pt are stating that the pt is going to [employer] at d/c  The pt's sister is there  Recommendations:  Diet: regular as tolerated/offer pt more choices  Liquid: thin  Meds:as tolerated  She denied any difficulty swallowing pills  Supervision: encourage intake  Positioning:Upright  Strategies: Pt to take PO/Meds only when fully alert and upright  Oral care  Therapy Prognosis: ? favorable  Prognosis considerations: poor intake  Frequency: ? F/u 1-2xs    Consider consult w/:  Nutrition following    H&P/Admit info/ pertinent provider notes: (PMH noted above)  Chief Complaint:    "So very tired, can't eat, peeing hurts"  History of Present Illness:  Desmond Other is a 80 y o  female with PMH DM ,HTN, CKD IV who presents with c/o dysuria, fatigue, weakness, ambulatory dysfunction and poor appetite  Reports poor oral intake over the last few weeks and weight loss  States her granddaughter brought her berries and Ensure so she ate that 2 days ago  States she fell a few days ago  Resides at home alone, ambulates with cane  Requesting placement to rehab or assisted living facility  Special Studies:  IMPRESSION:  Bladder wall thickening and perivesical inflammatory changes along with air noted in the bladder  Findings consistent with urinary tract infection  Previous VBS:  none    Goal(s):  Dysphagia LTG  -Patient will demonstrate safe and effective oral intake (without overt s/s significant oral/pharyngeal dysphagia including s/s penetration or aspiration) for the highest appropriate diet level  1 Pt will tolerate least restrictive diet w/out s/s aspiration or oral/pharyngeal difficulties     2 Pt will will effectively masticate and transfer solids w/out s/s dysphagia/aspiration  3 Pt will tolerate thin liquids w/out s/s aspiration       Patient's goal: none stated    Did the pt report pain?no  If yes, was nursing notified/was it addressed?n/a    Reason for consult:  R/o aspiration  Determine safest and least restrictive diet  poor intake  weight loss   Food allergies:  none  Current diet:  regular  Premorbid diet[de-identified]  Regular and tolerating PO well up until recently per daughter  O2 requirement:  none  Voice/Speech:  Wnl, [place], wears a L hrg  aid  Social:  Home alone, family supportive  Follows commands:  yes                   Cognitive Status:  A&O appears younger than stated age  Oral [facility] exam:  Dentition:partial natural  Labial strength and ROM:+  Lingual strength and ROM:+  Mandibular strength and ROM:+  Secretion management:+  Volitional cough:+  Volitional swallow:+    Results d/w:  Pt, family

## 2022-02-24 NOTE — ASSESSMENT & PLAN NOTE
· Reports malaise, fatigue, loss of appetite and weight loss  ·  Nutrition consult  ·  PT OT consult - for placement

## 2022-02-24 NOTE — PLAN OF CARE
Problem: Nutrition/Hydration-ADULT  Goal: Nutrient/Hydration intake appropriate for improving, restoring or maintaining nutritional needs  Description: Monitor and assess patient's nutrition/hydration status for malnutrition  Collaborate with interdisciplinary team and initiate plan and interventions as ordered  Monitor patient's weight and dietary intake as ordered or per policy  Utilize nutrition screening tool and intervene as necessary  Determine patient's food preferences and provide high-protein, high-caloric foods as appropriate       INTERVENTIONS:  - Monitor oral intake, urinary output, labs, and treatment plans  - Assess nutrition and hydration status and recommend course of action  - Evaluate amount of meals eaten  - Assist patient with eating if necessary   - Allow adequate time for meals  - Recommend/ encourage appropriate diets, oral nutritional supplements, and vitamin/mineral supplements  - Order, calculate, and assess calorie counts as needed  - Recommend, monitor, and adjust tube feedings and TPN/PPN based on assessed needs  - Assess need for intravenous fluids  - Provide specific nutrition/hydration education as appropriate  - Include patient/family/caregiver in decisions related to nutrition  Outcome: Progressing     Problem: Potential for Falls  Goal: Patient will remain free of falls  Description: INTERVENTIONS:  - Educate patient/family on patient safety including physical limitations  - Instruct patient to call for assistance with activity   - Consult OT/PT to assist with strengthening/mobility   - Keep Call bell within reach  - Keep bed low and locked with side rails adjusted as appropriate  - Keep care items and personal belongings within reach  - Initiate and maintain comfort rounds  - Make Fall Risk Sign visible to staff  - Apply yellow socks and bracelet for high fall risk patients  - Consider moving patient to room near nurses station  Outcome: Progressing     Problem: MOBILITY - ADULT  Goal: Maintain or return to baseline ADL function  Description: INTERVENTIONS:  -  Assess patient's ability to carry out ADLs; assess patient's baseline for ADL function and identify physical deficits which impact ability to perform ADLs (bathing, care of mouth/teeth, toileting, grooming, dressing, etc )  - Assess/evaluate cause of self-care deficits   - Assess range of motion  - Assess patient's mobility; develop plan if impaired  - Assess patient's need for assistive devices and provide as appropriate  - Encourage maximum independence but intervene and supervise when necessary  - Involve family in performance of ADLs  - Assess for home care needs following discharge   - Consider OT consult to assist with ADL evaluation and planning for discharge  - Provide patient education as appropriate  Outcome: Progressing  Goal: Maintains/Returns to pre admission functional level  Description: INTERVENTIONS:  - Perform BMAT or MOVE assessment daily    - Set and communicate daily mobility goal to care team and patient/family/caregiver     - Collaborate with rehabilitation services on mobility goals if consulted  - Out of bed for toileting  - Record patient progress and toleration of activity level   Outcome: Progressing     Problem: Prexisting or High Potential for Compromised Skin Integrity  Goal: Skin integrity is maintained or improved  Description: INTERVENTIONS:  - Identify patients at risk for skin breakdown  - Assess and monitor skin integrity  - Assess and monitor nutrition and hydration status  - Monitor labs   - Assess for incontinence   - Turn and reposition patient  - Assist with mobility/ambulation  - Relieve pressure over bony prominences  - Avoid friction and shearing  - Provide appropriate hygiene as needed including keeping skin clean and dry  - Evaluate need for skin moisturizer/barrier cream  - Collaborate with interdisciplinary team   - Patient/family teaching  - Consider wound care consult   Outcome: Progressing     Problem: PAIN - ADULT  Goal: Verbalizes/displays adequate comfort level or baseline comfort level  Description: Interventions:  - Encourage patient to monitor pain and request assistance  - Assess pain using appropriate pain scale  - Administer analgesics based on type and severity of pain and evaluate response  - Implement non-pharmacological measures as appropriate and evaluate response  - Consider cultural and social influences on pain and pain management  - Notify physician/advanced practitioner if interventions unsuccessful or patient reports new pain  Outcome: Progressing     Problem: INFECTION - ADULT  Goal: Absence or prevention of progression during hospitalization  Description: INTERVENTIONS:  - Assess and monitor for signs and symptoms of infection  - Monitor lab/diagnostic results  - Monitor all insertion sites, i e  indwelling lines, tubes, and drains  - Monitor endotracheal if appropriate and nasal secretions for changes in amount and color  - [place] appropriate cooling/warming therapies per order  - Administer medications as ordered  - Instruct and encourage patient and family to use good hand hygiene technique  - Identify and instruct in appropriate isolation precautions for identified infection/condition  Outcome: Progressing     Problem: SAFETY ADULT  Goal: Patient will remain free of falls  Description: INTERVENTIONS:  - Educate patient/family on patient safety including physical limitations  - Instruct patient to call for assistance with activity   - Consult OT/PT to assist with strengthening/mobility   - Keep Call bell within reach  - Keep bed low and locked with side rails adjusted as appropriate  - Keep care items and personal belongings within reach  - Initiate and maintain comfort rounds  - Make Fall Risk Sign visible to staff  - Apply yellow socks and bracelet for high fall risk patients  - Consider moving patient to room near nurses station  Outcome: Progressing  Goal: Maintain or return to baseline ADL function  Description: INTERVENTIONS:  -  Assess patient's ability to carry out ADLs; assess patient's baseline for ADL function and identify physical deficits which impact ability to perform ADLs (bathing, care of mouth/teeth, toileting, grooming, dressing, etc )  - Assess/evaluate cause of self-care deficits   - Assess range of motion  - Assess patient's mobility; develop plan if impaired  - Assess patient's need for assistive devices and provide as appropriate  - Encourage maximum independence but intervene and supervise when necessary  - Involve family in performance of ADLs  - Assess for home care needs following discharge   - Consider OT consult to assist with ADL evaluation and planning for discharge  - Provide patient education as appropriate  Outcome: Progressing  Goal: Maintains/Returns to pre admission functional level  Description: INTERVENTIONS:  - Perform BMAT or MOVE assessment daily    - Set and communicate daily mobility goal to care team and patient/family/caregiver     - Collaborate with rehabilitation services on mobility goals if consulted  - Out of bed for toileting  - Record patient progress and toleration of activity level   Outcome: Progressing     Problem: DISCHARGE PLANNING  Goal: Discharge to home or other facility with appropriate resources  Description: INTERVENTIONS:  - Identify barriers to discharge w/patient and caregiver  - Arrange for needed discharge resources and transportation as appropriate  - Identify discharge learning needs (meds, wound care, etc )  - Arrange for interpretive services to assist at discharge as needed  - Refer to Case Management Department for coordinating discharge planning if the patient needs post-hospital services based on physician/advanced practitioner order or complex needs related to functional status, cognitive ability, or social support system  Outcome: Progressing     Problem: Knowledge Deficit  Goal: Patient/family/caregiver demonstrates understanding of disease process, treatment plan, medications, and discharge instructions  Description: Complete learning assessment and assess knowledge base  Interventions:  - Provide teaching at level of understanding  - Provide teaching via preferred learning methods  Outcome: Progressing     Problem: NEUROSENSORY - ADULT  Goal: Achieves stable or improved neurological status  Description: INTERVENTIONS  - Monitor and report changes in neurological status  - Monitor vital signs such as temperature, blood pressure, glucose, and any other labs ordered   - Initiate measures to prevent increased intracranial pressure  - Monitor for seizure activity and implement precautions if appropriate      Outcome: Progressing  Goal: Achieves maximal functionality and self care  Description: INTERVENTIONS  - Monitor swallowing and airway patency with patient fatigue and changes in neurological status  - Encourage and assist patient to increase activity and self care     - Encourage visually impaired, hearing impaired and aphasic patients to use assistive/communication devices  Outcome: Progressing     Problem: CARDIOVASCULAR - ADULT  Goal: Maintains optimal cardiac output and hemodynamic stability  Description: INTERVENTIONS:  - Monitor I/O, vital signs and rhythm  - Monitor for S/S and trends of decreased cardiac output  - Administer and titrate ordered vasoactive medications to optimize hemodynamic stability  - Assess quality of pulses, skin color and temperature  - Assess for signs of decreased coronary artery perfusion  - Instruct patient to report change in severity of symptoms  Outcome: Progressing  Goal: Absence of cardiac dysrhythmias or at baseline rhythm  Description: INTERVENTIONS:  - Continuous cardiac monitoring, vital signs, obtain 12 lead EKG if ordered  - Administer antiarrhythmic and heart rate control medications as ordered  - Monitor electrolytes and administer replacement therapy as ordered  Outcome: Progressing     Problem: RESPIRATORY - ADULT  Goal: Achieves optimal ventilation and oxygenation  Description: INTERVENTIONS:  - Assess for changes in respiratory status  - Assess for changes in mentation and behavior  - Position to facilitate oxygenation and minimize respiratory effort  - Oxygen administered by appropriate delivery if ordered  - Initiate smoking cessation education as indicated  - Encourage broncho-pulmonary hygiene including cough, deep breathe, Incentive Spirometry  - Assess the need for suctioning and aspirate as needed  - Assess and instruct to report SOB or any respiratory difficulty  - Respiratory Therapy support as indicated  Outcome: Progressing     Problem: GASTROINTESTINAL - ADULT  Goal: Minimal or absence of nausea and/or vomiting  Description: INTERVENTIONS:  - Administer IV fluids if ordered to ensure adequate hydration  - Maintain NPO status until nausea and vomiting are resolved  - Nasogastric tube if ordered  - Administer ordered antiemetic medications as needed  - Provide nonpharmacologic comfort measures as appropriate  - Advance diet as tolerated, if ordered  - Consider nutrition services referral to assist patient with adequate nutrition and appropriate food choices  Outcome: Progressing  Goal: Maintains or returns to baseline bowel function  Description: INTERVENTIONS:  - Assess bowel function  - Encourage oral fluids to ensure adequate hydration  - Administer IV fluids if ordered to ensure adequate hydration  - Administer ordered medications as needed  - Encourage mobilization and activity  - Consider nutritional services referral to assist patient with adequate nutrition and appropriate food choices  Outcome: Progressing  Goal: Maintains adequate nutritional intake  Description: INTERVENTIONS:  - Monitor percentage of each meal consumed  - Identify factors contributing to decreased intake, treat as appropriate  - Assist with meals as needed  - Monitor I&O, weight, and lab values if indicated  - Obtain nutrition services referral as needed  Outcome: Progressing  Goal: Oral mucous membranes remain intact  Description: INTERVENTIONS  - Assess oral mucosa and hygiene practices  - Implement preventative oral hygiene regimen  - Implement oral medicated treatments as ordered  - Initiate Nutrition services referral as needed  Outcome: Progressing     Problem: GENITOURINARY - ADULT  Goal: Maintains or returns to baseline urinary function  Description: INTERVENTIONS:  - Assess urinary function  - Encourage oral fluids to ensure adequate hydration if ordered  - Administer IV fluids as ordered to ensure adequate hydration  - Administer ordered medications as needed  - Offer frequent toileting  - Follow urinary retention protocol if ordered  Outcome: Progressing  Goal: Absence of urinary retention  Description: INTERVENTIONS:  - Assess patients ability to void and empty bladder  - Monitor I/O  - Bladder scan as needed  - Discuss with physician/AP medications to alleviate retention as needed  - Discuss catheterization for long term situations as appropriate  Outcome: Progressing

## 2022-02-24 NOTE — ASSESSMENT & PLAN NOTE
Lab Results   Component Value Date    HGBA1C 5 1 10/12/2021     · DM2 off meds, managed with diet    Add sliding scale insulin and ADA diet    Recent Labs     02/23/22 2016   POCGLU 228*       Blood Sugar Average: Last 72 hrs:  (P) 228

## 2022-02-24 NOTE — ASSESSMENT & PLAN NOTE
Lab Results   Component Value Date    EGFR 14 02/24/2022    EGFR 14 02/23/2022    EGFR 27 08/01/2018    CREATININE 2 64 (H) 02/24/2022    CREATININE 2 77 (H) 02/23/2022    CREATININE 2 35 (H) 12/06/2021     · Creatinine 2 7    Last creatinine in December 2 3  slightly improved   · Will provide IV hydration and hold furosemide and lisinopril (IV infiltrated)   · Monitor BMP

## 2022-02-24 NOTE — UTILIZATION REVIEW
Initial Clinical Review    Admission: Date/Time/Statement:   Admission Orders (From admission, onward)     Ordered        02/23/22 2033  Inpatient Admission  Once                      Orders Placed This Encounter   Procedures    Inpatient Admission     Standing Status:   Standing     Number of Occurrences:   1     Order Specific Question:   Level of Care     Answer:   Med Surg [16]     Order Specific Question:   Estimated length of stay     Answer:   More than 2 Midnights     Order Specific Question:   Certification     Answer:   I certify that inpatient services are medically necessary for this patient for a duration of greater than two midnights  See H&P and MD Progress Notes for additional information about the patient's course of treatment  ED Arrival Information     Expected Arrival Acuity    - 2/23/2022 16:49 Urgent         Means of arrival Escorted by Service Admission type    Ambulance [facility] Hospitalist Urgent         Arrival complaint    Weakness-Generalized        Chief Complaint   Patient presents with    Weakness - Generalized     pt c/o of generalized weakness and pt states she is unable to eat for the past several weeks  pt states she has been depressed since her daughter passed away last october  pt denies cp/sob/n/v/d or fevers       Initial Presentation:    80  Y O female presents to ED via EMS from home  With fatigue, weakness, dysuria, poor appetite and weight loss for past few weeks  PMH is  HTN, DM, CKD stage IV and anemia  States she fell a few days prior to admission  Requesting placement, lives alone  U/A  Shows positive leukocytes, negative nitrite  Ct scan shows air in bladder/inflammatory changes  Ct head negative  Creatinine elevated on admission  Admit  Ip with GAY on  CKD, fall at home, hyperkalemia and failure to thrive and plan is  Monitor labs, IVF, PT/OT, hold home lasix and lisinopril, tele, local wound care to  Abrasion LUE and KARINE          Date:    2/24 Day 2:   Complains of dysuria  Denies abdominal or flank pain  Continue KARINE  Monitor labs  Continue  PT/OT  Likely  Needs placement on discharge  ED Triage Vitals [02/23/22 1655]   Temperature Pulse Respirations Blood Pressure SpO2   98 1 °F (36 7 °C) 66 16 129/66 96 %      Temp Source Heart Rate Source Patient Position - Orthostatic VS BP Location FiO2 (%)   Oral Monitor Lying Right arm --      Pain Score       No Pain          Wt Readings from Last 1 Encounters:   02/23/22 58 1 kg (128 lb 1 4 oz)     Additional Vital Signs:   -- -- -- -- -- -- -- Sitting    02/24/22 08:59:10 -- 61 -- 149/61 90 97 % -- --   02/24/22 07:11:57 -- 56 -- 122/54 77 98 % -- --   02/24/22 03:41:01 97 3 °F (36 3 °C) Abnormal  50 Abnormal  -- 121/45 Abnormal  70 98 % -- --   02/23/22 23:20:04 97 2 °F (36 2 °C) Abnormal  63 18 153/67 96 92 % -- --   02/23/22 2045 -- 66 20 -- -- 100 % -- --   02/23/22 2000 -- 64 20 -- -- 100 % -- --   02/23/22 1930 -- 62 18 164/106 Abnormal  129 98 % None (Room air) Lying   02/23/22 1900 -- 60 18 164/99 -- 95 % None (Room air) Lying   02/23/22 1655 98 1 °F (36 7 °C) 66 16 129/66 -- 96 % None (Room air) Lying       Pertinent Labs/Diagnostic Test Results:   CT head without contrast   Final Result by Afsaneh Sánchez MD (02/23 1929)      No acute intracranial abnormality  Workstation performed: [id number]         CT abdomen pelvis wo contrast   Final Result by Afsaneh Sánchez MD (02/23 1950)      Bladder wall thickening and perivesical inflammatory changes along with air noted in the bladder  Findings consistent with urinary tract infection              Workstation performed: [id number]               Results from last 7 days   Lab Units 02/24/22  0646 02/23/22  1744   WBC Thousand/uL 6 23 7 16   HEMOGLOBIN g/dL 9 1* 9 6*   HEMATOCRIT % 28 8* 29 6*   PLATELETS Thousands/uL 178 203   NEUTROS ABS Thousands/µL 4 02 4 87         Results from last 7 days   Lab Units 02/24/22  0646 02/23/22  1744   SODIUM mmol/L 142 141   POTASSIUM mmol/L 5 5* 6 0*   CHLORIDE mmol/L 117* 114*   CO2 mmol/L 16* 18*   ANION GAP mmol/L 9 9   BUN mg/dL 78* 89*   CREATININE mg/dL 2 64* 2 77*   EGFR ml/min/1 73sq m 14 14   CALCIUM mg/dL 9 9 9 9     Results from last 7 days   Lab Units 02/23/22  1744   AST U/L 13   ALT U/L 22   ALK PHOS U/L 91   TOTAL PROTEIN g/dL 6 4   ALBUMIN g/dL 2 8*   TOTAL BILIRUBIN mg/dL 0 33     Results from last 7 days   Lab Units 02/24/22  1119 02/24/22  0717 02/24/22  0018 02/23/22  2329 02/23/22  2016   POC GLUCOSE mg/dl 150* 107 153* 156* 228*     Results from last 7 days   Lab Units 02/24/22  0646 02/23/22  1744   GLUCOSE RANDOM mg/dL 102 104               Results from last 7 days   Lab Units 02/23/22  1945 02/23/22  1744   HS TNI 0HR ng/L  --  7   HS TNI 2HR ng/L 9  --    HSTNI D2 ng/L 2  --                Results from last 7 days   Lab Units 02/23/22  1744   FERRITIN ng/mL 221                         Results from last 7 days   Lab Units 02/24/22  0119 02/23/22  1920   CLARITY UA   --  Cloudy   COLOR UA   --  Yellow   SPEC GRAV UA   --  >=1 030   PH UA   --  6 5   GLUCOSE UA mg/dl  --  Negative   KETONES UA mg/dl  --  Negative   BLOOD UA   --  Trace*   PROTEIN UA mg/dl  --  30 (1+)*   NITRITE UA   --  Negative   BILIRUBIN UA   --  Negative   UROBILINOGEN UA E U /dl  --  0 2   LEUKOCYTES UA   --  Small*   WBC UA /hpf Innumerable*  --    RBC UA /hpf 4-10*  --    BACTERIA UA /hpf Innumerable*  --    EPITHELIAL CELLS WET PREP /hpf Occasional  --              ED Treatment:   Medication Administration from 02/23/2022 1649 to 02/23/2022 2126       Date/Time Order Dose Route Action Comments     02/23/2022 1912 sodium chloride 0 9 % bolus 1,000 mL 0 mL Intravenous Stopped      02/23/2022 1743 sodium chloride 0 9 % bolus 1,000 mL 1,000 mL Intravenous New Bag      02/23/2022 1934 sodium bicarbonate 8 4 % injection 50 mEq 50 mEq Intravenous Given      02/23/2022 1932 insulin regular (HumuLIN R,NovoLIN R) injection 5 Units 5 Units Subcutaneous Given waiting for meds from pharmacy     02/23/2022 1929 dextrose 50 % IV solution 50 mL 50 mL Intravenous Given      02/23/2022 1913 calcium chloride 10 % injection 1 g 1 g Intravenous Given         Present on Admission:   Urinary retention   Pure hypercholesterolemia   Acute kidney injury superimposed on CKD ([facility] )   Anemia in stage 4 chronic kidney disease (HCC)   Hyperkalemia   Adult failure to thrive   Type 2 diabetes mellitus with diabetic chronic kidney disease ([place] [address] )   Primary hyperparathyroidism (HCC)      Admitting Diagnosis: Hyperkalemia [E87 5]  UTI (urinary tract infection) [N39 0]  Weakness generalized [R53 1]  Acute kidney injury ([place] [address] ) [N17 9]  Age/Sex: 80 y o  female  Admission Orders:  Scheduled Medications:  amLODIPine, 5 mg, Oral, BID  aspirin, 81 mg, Oral, Daily  bacitracin, 1 small application, Topical, BID  cefTRIAXone, 1,000 mg, Intravenous, Once  cefTRIAXone, 1,000 mg, Intravenous, Q24H  [START ON 2/25/2022] cinacalcet, 30 mg, Oral, Once per day on Mon Wed Fri  citalopram, 10 mg, Oral, Daily  docusate sodium, 100 mg, Oral, Daily  enoxaparin, 30 mg, Subcutaneous, Daily  gabapentin, 300 mg, Oral, HS  insulin lispro, 1-5 Units, Subcutaneous, 4x Daily (AC & HS)  pravastatin, 40 mg, Oral, Daily With Dinner      Continuous IV Infusions:  sodium chloride, 75 mL/hr, Intravenous, Continuous      PRN Meds:  acetaminophen, 650 mg, Oral, Q6H PRN  aluminum-magnesium hydroxide-simethicone, 30 mL, Oral, Q6H PRN  bisacodyl, 10 mg, Oral, Daily PRN  ondansetron, 4 mg, Intravenous, Q6H PRN  simethicone, 80 mg, Oral, 4x Daily PRN        IP CONSULT TO CASE MANAGEMENT  IP CONSULT TO NUTRITION SERVICES    Network Utilization Review Department  ATTENTION: Please call with any questions or concerns to [phone] and carefully listen to the prompts so that you are directed to the right person   All voicemails are confidential   Gregory Stover all requests for admission clinical reviews, approved or denied determinations and any other requests to dedicated fax number below belonging to the campus where the patient is receiving treatment   List of dedicated fax numbers for the Facilities:  [address] [place] [address] DENIALS (Administrative/Medical Necessity) [phone]   [address] (Maternity/NICU/Pediatrics) [phone]   [address] [place] [address]  [address] [place] [address] [id number] [address] [place] [facility] [place] [address] [phone]

## 2022-02-24 NOTE — ASSESSMENT & PLAN NOTE
·  CT shows bladder wall thickening and perivesical inflammatory changes along with air noted in the bladder  Findings consistent with UTI  · Patient reports she was recently treated for UTI with Macrobid  · UA with positive leukocytes, negative for nitrites    Urine culture requested  · Will start IV ceftriaxone

## 2022-02-24 NOTE — ASSESSMENT & PLAN NOTE
· Reports fall at home, denies head strike  Resides at home alone, requesting placement in assisted living  · CT head negative for acute intracranial abnormality  · LUE abrasion;   Local wound care with bacitracin b i d   ·  PT OT consult  · Case management consult for disposition

## 2022-02-24 NOTE — ASSESSMENT & PLAN NOTE
· Reports fall at home, denies head strike  Resides at home alone, requesting placement in assisted living  · CT head negative for acute intracranial abnormality  · LUE abrasion;   Local wound care with bacitracin b i d   ·  PT OT consult - for post hospital rehab  case management consult for disposition

## 2022-02-24 NOTE — ASSESSMENT & PLAN NOTE
· Reports malaise, fatigue, loss of appetite and weight loss  ·  Nutrition consult  ·  PT OT consult

## 2022-02-24 NOTE — CASE MANAGEMENT
Case Management Assessment    Patient name Soniya Tamayo  Location [address] [place] [address] /E5 MS [phone] MRN [id number]  : 1927 Date 2022       Current Admission Date: 2022  Current Admission Diagnosis:Acute kidney injury superimposed on CKD [facility])   Patient Active Problem List    Diagnosis Date Noted    Urinary retention 2022    Acute kidney injury superimposed on CKD ([facility] ) 2022    Anemia in stage 4 chronic kidney disease ([facility] ) 2022    Hyperkalemia 2022    Adult failure to thrive 2022    Fall at home, initial encounter 2022    Elevated uric acid in blood 2022    Primary hyperparathyroidism ([facility] ) 12/15/2021    Hypertensive chronic kidney disease 12/15/2021    Type 2 diabetes mellitus with diabetic chronic kidney disease ([facility] ) 2020    Fecal smearing 10/24/2019    Generalized edema 10/08/2019    Bowel habit changes 10/01/2019    Rectal discharge 10/01/2019    Acute cystitis without hematuria 10/13/2018    Vertebral anomaly 2018    Acquired cyst of kidney 2017    Chronic kidney disease, stage IV (severe) ([facility] ) 2017    Atrophic kidney, acquired 2017    Retinal hole of right eye 2017    Essential hypertension 2017    Pure hypercholesterolemia 2017    Controlled type 2 diabetes mellitus with diabetic polyneuropathy ([facility] ) 2017    Allergic rhinitis 2017    Vitamin D insufficiency 2016    Bilateral deafness 2016    Peripheral neuropathy 2015    Gait disturbance 2014    Gout 2014    Ectropion 2013    Borderline glaucoma 2013      LOS (days): 1  Geometric Mean LOS (GMLOS) (days): 3 10  Days to GMLOS:2 4     OBJECTIVE:    Risk of Unplanned Readmission Score: 17         Current admission status: Inpatient       Preferred Pharmacy:   [facility]/pharmacy #[id number]- [facility]STOPHER LOOMIS - [address]  Phone: [phone] Fax: [phone]    Primary Care Provider: Haleigh Penny MD    Primary Insurance: [employer] REP  Secondary Insurance:     ASSESSMENT:  Spoke with pt who states PTA she was living alone in a 2 story home  Pt's bed/ bath is on the first floor   Pt ambulates with a cane and performs ADLs and personal care independently  PCP verified   RX verified  Pt denies hx with SNF/HHC/ Drug abuse/ ETOH use  Pt has a POA - daughter Sanjay Hernadez and her son  Pt has a living will  Discussed dc plan with pt  Pt would like to go to STR per therapy recommendation  Pt does not have a preference  Multiple referrals sent via ecin       DC plan: STR- pending referral responses / wcv vs 0 [facility] [address] Agents    There are no active Health Care Agents on file                        Patient Information  Admitted from[de-identified] Home  Mental Status: Alert  During Assessment patient was accompanied by: Not accompanied during assessment  Assessment information provided by[de-identified] Patient  Primary Caregiver: Self  Support Systems: [facility] Yordan Flavio De Jesus of Residence: [address] [place] [address] do you live in?: geraldine  Type of Current Residence: 2 story home  Upon entering residence, is there a bedroom on the main floor (no further steps)?: Yes  Upon entering residence, is there a bathroom on the main floor (no further steps)?: Yes  In the last 12 months, was there a time when you did not have a steady place to sleep or slept in a shelter (including now)?: No    Activities of Daily Living Prior to Admission  Functional Status: Independent  Completes ADLs independently?: Yes  Ambulates independently?: Yes  Does patient use assisted devices?: Yes  Assisted Devices (DME) used: Straight Cane  Does patient currently own DME?: Yes  What DME does the patient currently own?: Straight Cane  Does patient have a history of Outpatient Therapy (PT/OT)?: No  Does the patient have a history of Short-Term Rehab?: No  Does patient have a history of HHC?: No  Does patient currently have [facility]?: No         Patient Information Continued  Income Source: Pension/alf  Does patient have prescription coverage?: Yes  Does patient receive dialysis treatments?: No  Does patient have a history of substance abuse?: No         Means of Transportation  Means of Transport to Appts[de-identified] Family transport  In the past 12 months, has lack of transportation kept you from medical appointments or from getting medications?: No  Was application for public transport provided?: N/A

## 2022-02-24 NOTE — PHYSICAL THERAPY NOTE
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 ([facility] )     LAST ASSESSED: 17AUG2016    Hyperlipidemia     Hypertension     Renal disorder     Squamous cell carcinoma, scalp/neck     LAST ASSESSED: [id number]         Past Surgical History:   Procedure Laterality Date    BLADDER SURGERY      LAST ASSESSED: 17AUG2016    PELVIC FLOOR REPAIR      VAGINAL SURG INSERTION OF MESH    TOTAL ABDOMINAL HYSTERECTOMY W/ BILATERAL SALPINGOOPHORECTOMY      LAST ASSESSED: 17AUG2016 02/24/22 1153   PT Last Visit   PT Visit Date 02/24/22   Note Type   Note type Evaluation   Pain Assessment   Pain Score No Pain   Restrictions/Precautions   Weight Bearing Precautions Per Order No   Other Precautions Chair Alarm; Bed Alarm;Multiple lines; Fall Risk;Hard of hearing  ([place])   Home Living   Type of [address] [place] [address] Two level; Able to live on main level with bedroom/bathroom;Stairs to enter with rails  (2 NI  First floor set up)   [employer]  (and tub shower  )   Bathroom Toilet Raised  (with bidet)   Bathroom Equipment   (none)   [address] Walker;Cane   Additional Comments resides alone in home with 1st floor set up  Daughter and granddtr visit often to provide support  Prior Function   Level of [place] Independent with ADLs and functional mobility; Needs assistance with IADLs   Lives With Alone   Receives Help From Family   ADL Assistance Independent   IADLs Independent   Falls in the last 6 months 1 to 4  (2)   Vocational Retired   Comments I PTA without AD use  Per daughter within last 2 weeks was still doing cleaning  Progressive weakness wiht decreased appetite and weakness       General Additional Pertinent History Pt is 79 y/o female admitted with UTI, weakness, ambulatory dysfunction, poor apetite  Acute cystitis  PT consulted  Up with assist orders  Family/Caregiver Present No   Cognition   Overall Cognitive Status WFL   Attention Within functional limits   Orientation Level Oriented X4   Following Commands Follows one step commands without difficulty   Comments Pleasant  [place]  Subjective   Subjective Daughter at bedside to report much weaker than baseline  Pt reports feeling weak  RUE Assessment   RUE Assessment WFL  (grossly at least 3+/5)   LUE Assessment   LUE Assessment WFL  (grossly at least 3+/5)   RLE Assessment   RLE Assessment X  (groslsy 3+/5)   LLE Assessment   LLE Assessment X  (grossly 3+/5)   Coordination   Movements are Fluid and Coordinated 0   Coordination and Movement Description tremulous at times  Bed Mobility   Additional Comments received sitting OOB in chair  Transfers   Sit to Stand 3  Moderate assistance   Additional items Assist x 1; Increased time required;Verbal cues;Armrests   Stand to Sit 4  Minimal assistance   Additional items Assist x 1; Increased time required;Verbal cues;Armrests   Additional Comments cues for hand placement  Increased time to acheive upright stance  Ambulation/Elevation   Gait pattern Improper Weight shift;Decreased foot clearance; Inconsistent leny; Short stride; Excessively slow; Foward flexed   Gait Assistance 4  Minimal assist   Additional items Assist x 1;Verbal cues; Tactile cues   Assistive Device Rolling walker   Distance Amb with RW 3 ft forward, then 3 ft back to chair  + shakey, weak  Balance   Static Sitting Fair +   Dynamic Sitting Fair   Static Standing Fair -   Dynamic Standing Poor +   Ambulatory Poor +   Endurance Deficit   Endurance Deficit Yes   Endurance Deficit Description fatigue, weakness     Activity Tolerance   Activity Tolerance Patient limited by fatigue;Treatment limited secondary to medical complications (Comment)   Medical Staff Made Aware NurseSunitha  Nurse Made Aware yes   Assessment   Prognosis Good   Problem List Decreased strength;Decreased endurance; Impaired balance;Decreased mobility; Decreased coordination; Impaired hearing   Assessment Cole Schilling is a 80 y o  female with PMH DM ,HTN, CKD IV who presents with c/o dysuria, fatigue, weakness, ambulatory dysfunction and poor appetite  Admitted with GAY on CKD, fall, failure to thrive, hyperkalemia, acute cystitis  PT consulted  Up with assist orders  Prior to admission resides alone in home with first floor set up and 2 NI  Independent without AD use prior to admission but with progressive weakness at home and fall  Currently presents with functional limitations related to decreased activity tolerance, balance, posture, coordination, functional mobility, strength and locomotion requiring more restrictive AD use of RW support  [facility] for transfers  [place] for ambulation few feet at bedside  Tremulous  Risk for falls given impairments  Will benefit from skilled PT in order to progress and optimize functional outcomes, facilitating return to independence  The patient's AM-PAC Basic Mobility Inpatient Short Form Raw Score is 15  A Raw score of less than or equal to 16 suggests the patient may benefit from discharge to post-acute rehabilitation services  Please also refer to the recommendation of the Physical Therapist for safe discharge planning  At this time STR is recommended  Barriers to Discharge Decreased caregiver support   Barriers to Discharge Comments lives alone   Goals   Patient Goals get better   STG Expiration Date 03/06/22   Short Term Goal #1 10 days:1)  Pt will perform bed mobility with Julián demonstrating appropriate technique 100% of the time in order to improve function  2)  Perform all transfers with Julián demonstrating safe and appropriate technique 100% of the time in order to improve ability to negotiate safely in home environment  3) Amb with least restrictive AD > 150'x1 with mod I in order to demonstrate ability to negotiate in home environment  4)  Improve overall strength and balance 1/2 grade in order to optimize ability to perform functional tasks and reduce fall risk  5) Increase activity tolerance to 30 minutes in order to improve endurance to functional tasks  6)  Negotiate stairs using most appropriate technique and S in order to be able to negotiate safely into home environment  7) PT for ongoing patient and family/caregiver education, DME needs and d/c planning in order to promote highest level of function in least restrictive environment  Plan   Treatment/Interventions Functional transfer training;LE strengthening/ROM; Elevations; Therapeutic exercise; Endurance training;Patient/family training;Equipment eval/education; Bed mobility;Gait training; Compensatory technique education;Continued evaluation;Spoke to nursing;Family   PT Frequency 3-5x/wk   Recommendation   PT Discharge Recommendation Post acute rehabilitation services   Equipment Recommended Neil Ceballos  (has)   [address] walker   Scott Katnicolas [address]   Turning in Bed Without Bedrails 3   Lying on Back to Sitting on Edge of Flat Bed 3   Moving Bed to Chair 2   Standing Up From Chair 2   Walk in Room 3   Climb 3-5 Stairs 2   Basic Mobility Inpatient Raw Score 15   Basic Mobility Standardized Score 36 97   Highest Level Of Mobility   JH-HLM Goal 4: Move to chair/commode   JH-HLM Highest Level of Mobility 4: Move to chair/commode   JH-HLM Goal Achieved Yes   End of Consult   Patient Position at End of Consult Bedside chair;Bed/Chair alarm activated; All needs within reach     Hx/personal factors: co-morbidities, dec caregiver support, home alone, advanced age, mutliple lines, telemetry, use of AD, h/o of falls, fall risk and assist w/ ADL's  Examination: dec mobility, dec balance, dec endurance, dec amb, risk for falls, assessed body system, balance, endurance, amb, D/C disposition & fall risk, impairements in locomotion, musculoskeletal, balance, endurance, posture, coordination  Clinical: unpredictable (ongoing medical status, abnormal lab values and risk for falls), chair/bed alarm, decreased activity tolerance compared to baseline, increased assist compared to baseline     Complexity: high      Priscella Avers, PT

## 2022-02-24 NOTE — ASSESSMENT & PLAN NOTE
Lab Results   Component Value Date    HGBA1C 5 1 10/12/2021     · DM2 off meds, managed with diet    Add sliding scale insulin and ADA diet    Recent Labs     02/23/22  2329 02/24/22  0018 02/24/22  0717 02/24/22  1119   POCGLU 156* 153* 107 150*       Blood Sugar Average: Last 72 hrs:  (P) 158 8

## 2022-02-24 NOTE — PLAN OF CARE
Problem: OCCUPATIONAL THERAPY ADULT  Goal: Performs self-care activities at highest level of function for planned discharge setting  See evaluation for individualized goals  Description: Treatment Interventions: ADL retraining,UE strengthening/ROM,Functional transfer training,Endurance training,Cognitive reorientation,Equipment evaluation/education,Patient/family training,Compensatory technique education,Activityengagement,Energy conservation          See flowsheet documentation for full assessment, interventions and recommendations  Note: Limitation: Decreased ADL status,Decreased UE strength,Decreased cognition,Decreased Safe judgement during ADL,Decreased endurance,Decreased high-level ADLs,Decreased self-care trans  Prognosis: Good  Assessment: Pt is a 80 y o  female seen for OT evaluation s/p admit to [facility] on 2/23/2022 w/ fatigue, poor oral intake, weakness Acute kidney injury superimposed on CKD ([facility] )  CT head: No acute intracranial abnormality  Comorbidities affecting pt's functional performance at time of assessment include: DM II, HTN, CKD IV, acute cystitis w/o hematuria, urinary retention, CKD IV, failure to thrive   Personal factors affecting pt at time of IE include:limited home support, difficulty performing ADLS, difficulty performing IADLS  and limited insight into deficits, recent falls, daughter passed away in October  Prior to admission, pt was living alone and reports independent w/ ADLs, independent w/ functional transfer and mobility w/ SPC, independent w/ cooking/cleaning/laundry (up until 2 weeks ago), assist transport   Upon evaluation: Pt requires MIN assist supine>sit bed mobility w/ increased time to complete, MIN assist sit<>stand w/ VCs for hand placement and positioning, MIN assist x1 transfer to toilet and min assist steadying toileting hygiene, MIN assist UB ADLs, MOD assist LB ADLS 2* the following deficits impacting occupational performance: decreased strength and endurance, impaired balance, impaired activity tolerance, fall risk, decreased insight into deficits, multiple lines, dyspnea on exertion (88-94% on room air), flat affect  Pt to benefit from continued skilled OT tx while in the hospital to address deficits as defined above and maximize level of functional independence w ADL's and functional mobility  Occupational Performance areas to address include: grooming, bathing/shower, toilet hygiene, dressing, medication management, health maintenance, functional mobility, clothing management, cleaning and meal prep, formal cognitive assessment, EC education  From OT standpoint, recommendation at time of d/c would be short term rehab  The patient's raw score on the AM-PAC Daily Activity inpatient short form is 17, standardized score is 37 26, less than 39 4  Patients at this level are likely to benefit from discharge to post-acute rehabilitation services  Please refer to the recommendation of the Occupational Therapist for safe discharge planning       OT Discharge Recommendation: Post acute rehabilitation services  OT - OK to Discharge:  (to rehab when medically stable)

## 2022-02-24 NOTE — ASSESSMENT & PLAN NOTE
·  CT shows bladder wall thickening and perivesical inflammatory changes along with air noted in the bladder  Findings consistent with UTI  · Patient reports she was recently treated for UTI with Macrobid  · UA with positive leukocytes, negative for nitrites    Urine culture pending  · cont IV ceftriaxone

## 2022-02-24 NOTE — PROGRESS NOTES
[address] [facility]  Progress Note - Bj Art 2/24/1927, 80 y o  female MRN: [id number]  Unit/Bed#: E5 -01 Encounter: [id number]  Primary Care Provider: Nithin Samano MD   Date and time admitted to hospital: 2/23/2022  4:49 PM    * Acute kidney injury superimposed on CKD [facility])  Assessment & Plan  Lab Results   Component Value Date    EGFR 14 02/24/2022    EGFR 14 02/23/2022    EGFR 27 08/01/2018    CREATININE 2 64 (H) 02/24/2022    CREATININE 2 77 (H) 02/23/2022    CREATININE 2 35 (H) 12/06/2021     · Creatinine 2 7  Last creatinine in December 2 3  slightly improved   · Will provide IV hydration and hold furosemide and lisinopril (IV infiltrated)   · Monitor BMP    Fall at home, initial encounter  Assessment & Plan  · Reports fall at home, denies head strike  Resides at home alone, requesting placement in assisted living  · CT head negative for acute intracranial abnormality  · LUE abrasion; Local wound care with bacitracin b i d   ·  PT OT consult - for post hospital rehab  case management consult for disposition    Adult failure to thrive  Assessment & Plan  · Reports malaise, fatigue, loss of appetite and weight loss  ·  Nutrition consult  ·  PT OT consult - for placement     Hyperkalemia  Assessment & Plan  · Hyperkalemia in setting of GAY  Treated in ED with hyperkalemia cocktail  · Hold lisinopril  · Monitor on telemetry  · Monitor BMP    Anemia in stage 4 chronic kidney disease (HCC)  Assessment & Plan  · Hg 9 6  Last labs in 2018  Studies consistent with anemia of chronic disease   · Monitor CBC    Urinary retention  Assessment & Plan  ·  Reports urinary retention, recently treated for UTI     ·  UA negative for cystitis  ·  Monitor with urinary retention protocol    Primary hyperparathyroidism ([address] )  Assessment & Plan  ·  Continue Sensipar MWF    Type 2 diabetes mellitus with diabetic chronic kidney disease [facility])  Assessment & Plan  Lab Results   Component Value Date    HGBA1C 5 1 10/12/2021     · DM2 off meds, managed with diet  Add sliding scale insulin and ADA diet    Recent Labs     22  2329 22  0018 22  0717 22  1119   POCGLU 156* 153* 107 150*       Blood Sugar Average: Last 72 hrs:  (P) 158 8    Acute cystitis without hematuria  Assessment & Plan  ·  CT shows bladder wall thickening and perivesical inflammatory changes along with air noted in the bladder  Findings consistent with UTI  · Patient reports she was recently treated for UTI with Macrobid  · UA with positive leukocytes, negative for nitrites  Urine culture pending  · cont IV ceftriaxone      Pure hypercholesterolemia  Assessment & Plan  ·  Continue statin         VTE  Prophylaxis:   Pharmacologic: in place    Patient Centered Rounds: I have performed bedside rounds with nursing staff today  Discussions with Specialists or Other Care Team Provider: case management    Education and Discussions with Family / Patient: pt      Current Length of Stay: 1 day(s)    Current Patient Status: Inpatient        Code Status: Level 3 - DNAR and DNI      Subjective:   Reports dysuria  Denies abd pain  Denies flank pain   Denies fever  Otherwise no complaints    Patient is seen and examined at bedside  All other ROS are negative  Objective:     Vitals:   Temp (24hrs), Av 5 °F (36 4 °C), Min:97 2 °F (36 2 °C), Max:98 1 °F (36 7 °C)    Temp:  [97 2 °F (36 2 °C)-98 1 °F (36 7 °C)] 97 3 °F (36 3 °C)  HR:  [50-66] 61  Resp:  [16-20] 18  BP: (121-164)/() 149/61  SpO2:  [92 %-100 %] 97 %  Body mass index is 25 02 kg/m²  Input and Output Summary (last 24 hours):        Intake/Output Summary (Last 24 hours) at 2022 1439  Last data filed at 2022 1101  Gross per 24 hour   Intake 1300 ml   Output 400 ml   Net 900 ml       Physical Exam:       GEN: No acute distress, elderly female  comfortable  HEEENT: No JVD, PERRLA, no scleral icterus  RESP: Lungs clear to auscultation bilaterally  CV: RRR, +s1/s2   ABD: SOFT NON TENDER, POSITIVE BOWEL SOUNDS, NO DISTENTION  Negative for flank tenderness  PSYCH: CALM  NEURO:  NO FOCAL DEFICITS  SKIN: NO RASH  EXTREM: NO EDEMA    Additional Data:     Labs:    Results from last 7 days   Lab Units 02/24/22  0646   WBC Thousand/uL 6 23   HEMOGLOBIN g/dL 9 1*   HEMATOCRIT % 28 8*   PLATELETS Thousands/uL 178   NEUTROS PCT % 65   LYMPHS PCT % 24   MONOS PCT % 7   EOS PCT % 3     Results from last 7 days   Lab Units 02/24/22  0646 02/23/22  1744 02/23/22  1744   SODIUM mmol/L 142   < > 141   POTASSIUM mmol/L 5 5*   < > 6 0*   CHLORIDE mmol/L 117*   < > 114*   CO2 mmol/L 16*   < > 18*   BUN mg/dL 78*   < > 89*   CREATININE mg/dL 2 64*   < > 2 77*   ANION GAP mmol/L 9   < > 9   CALCIUM mg/dL 9 9   < > 9 9   ALBUMIN g/dL  --   --  2 8*   TOTAL BILIRUBIN mg/dL  --   --  0 33   ALK PHOS U/L  --   --  91   ALT U/L  --   --  22   AST U/L  --   --  13   GLUCOSE RANDOM mg/dL 102   < > 104    < > = values in this interval not displayed  Results from last 7 days   Lab Units 02/24/22  1119 02/24/22  0717 02/24/22  0018 02/23/22  2329 02/23/22 2016   POC GLUCOSE mg/dl 150* 107 153* 156* 228*                   * I Have Reviewed All Lab Data Listed Above  Imaging:     No results found for this or any previous visit  Results for orders placed during the hospital encounter of 07/31/18    XR chest 2 views    Narrative  CHEST    INDICATION:   chest pain  Chest pain  Shortness of breath  COMPARISON:  Chest radiographs January 30, 2017    EXAM PERFORMED/VIEWS:  XR CHEST PA & LATERAL  Images: 2    FINDINGS:    The heart is mildly enlarged  Atherosclerotic changes in the aorta  The lungs are clear  No pneumothorax or pleural effusion  Osseous structures appear within normal limits for patient age  Impression  No acute cardiopulmonary disease          Workstation performed: [id number]      *I have reviewed all imaging reports listed above      Recent Cultures (last 7 days):           Last 24 Hours Medication List:   Current Facility-Administered Medications   Medication Dose Route Frequency Provider Last Rate    acetaminophen  650 mg Oral Q6H PRN [place], CRNP      aluminum-magnesium hydroxide-simethicone  30 mL Oral Q6H PRN Stevenson [place], CRNP      amLODIPine  5 mg Oral BID Stevenson [place], CRNP      aspirin  81 mg Oral Daily Stevenson [place], CRNP      bacitracin  1 small application Topical BID Stevenson [place], CRNP      bisacodyl  10 mg Oral Daily PRN Stevenson [place], CRNP      cefTRIAXone  1,000 mg Intravenous Once [employer], DO      cefTRIAXone  1,000 mg Intravenous Q24H Stevenson [place], CRNP 1,000 mg (02/23/22 2323)   Lior Burrell [START ON 2/25/2022] cinacalcet  30 mg Oral Once per day on Mon Wed Fri Stevenson [place], CRNP      citalopram  10 mg Oral Daily Stevenson [place], CRNP      docusate sodium  100 mg Oral Daily Stevenson [place], CRNP      enoxaparin  30 mg Subcutaneous Daily Stevenson [place], CRNP      gabapentin  300 mg Oral HS Stevenson [place], CRNP      insulin lispro  1-5 Units Subcutaneous 4x Daily (AC & HS) Stevenson [place], CRNP      ondansetron  4 mg Intravenous Q6H PRN Stevenson [place], CRNP      pravastatin  40 mg Oral Daily With [employer], CRNP      simethicone  80 mg Oral 4x Daily PRN Stevenson [place], CRNP      sodium chloride  75 mL/hr Intravenous Continuous Stevenson [place], CRNP 75 mL/hr (02/23/22 2320)        Today, Patient Was Seen By: Tyrese Seugra MD    ** Please Note: Dictation voice to text software may have been used in the creation of this document   **

## 2022-02-24 NOTE — ASSESSMENT & PLAN NOTE
· Hyperkalemia in setting of GAY     Treated in ED with hyperkalemia cocktail  · Hold lisinopril  · Monitor on telemetry  · Monitor BMP

## 2022-02-24 NOTE — PLAN OF CARE
Problem: PHYSICAL THERAPY ADULT  Goal: Performs mobility at highest level of function for planned discharge setting  See evaluation for individualized goals  Description: Treatment/Interventions: Functional transfer training,LE strengthening/ROM,Elevations,Therapeutic exercise,Endurance training,Patient/family training,Equipment eval/education,Bed mobility,Gait training,Compensatory technique education,Continued evaluation,Spoke to nursing,Family  Equipment Recommended: Yesi Ojeda (jerad)       See flowsheet documentation for full assessment, interventions and recommendations  Note: Prognosis: Good  Problem List: Decreased strength,Decreased endurance,Impaired balance,Decreased mobility,Decreased coordination,Impaired hearing  Assessment: Margaret Rangel is a 80 y o  female with PMH DM ,HTN, CKD IV who presents with c/o dysuria, fatigue, weakness, ambulatory dysfunction and poor appetite  Admitted with GAY on CKD, fall, failure to thrive, hyperkalemia, acute cystitis  PT consulted  Up with assist orders  Prior to admission resides alone in home with first floor set up and 2 NI  Independent without AD use prior to admission but with progressive weakness at home and fall  Currently presents with functional limitations related to decreased activity tolerance, balance, posture, coordination, functional mobility, strength and locomotion requiring more restrictive AD use of RW support  [address] [place] for transfers  [place] for ambulation few feet at bedside  Tremulous  Risk for falls given impairments  Will benefit from skilled PT in order to progress and optimize functional outcomes, facilitating return to independence  The patient's AM-PAC Basic Mobility Inpatient Short Form Raw Score is 15  A Raw score of less than or equal to 16 suggests the patient may benefit from discharge to post-acute rehabilitation services  Please also refer to the recommendation of the Physical Therapist for safe discharge planning   At this time STR is recommended  Barriers to Discharge: Decreased caregiver support  Barriers to Discharge Comments: lives alone     PT Discharge Recommendation: Post acute rehabilitation services          See flowsheet documentation for full assessment

## 2022-02-24 NOTE — PLAN OF CARE
Problem: Nutrition/Hydration-ADULT  Goal: Nutrient/Hydration intake appropriate for improving, restoring or maintaining nutritional needs  Description: Monitor and assess patient's nutrition/hydration status for malnutrition  Collaborate with interdisciplinary team and initiate plan and interventions as ordered  Monitor patient's weight and dietary intake as ordered or per policy  Utilize nutrition screening tool and intervene as necessary  Determine patient's food preferences and provide high-protein, high-caloric foods as appropriate       INTERVENTIONS:  - Monitor oral intake, urinary output, labs, and treatment plans  - Assess nutrition and hydration status and recommend course of action  - Evaluate amount of meals eaten  - Assist patient with eating if necessary   - Allow adequate time for meals  - Recommend/ encourage appropriate diets, oral nutritional supplements, and vitamin/mineral supplements  - Order, calculate, and assess calorie counts as needed  - Recommend, monitor, and adjust tube feedings and TPN/PPN based on assessed needs  - Assess need for intravenous fluids  - Provide specific nutrition/hydration education as appropriate  - Include patient/family/caregiver in decisions related to nutrition  2/24/2022 0146 by Eliza Mckeon RN  Outcome: Progressing  2/24/2022 0145 by Eliza Mckeon RN  Outcome: Progressing     Problem: Potential for Falls  Goal: Patient will remain free of falls  Description: INTERVENTIONS:  - Educate patient/family on patient safety including physical limitations  - Instruct patient to call for assistance with activity   - Consult OT/PT to assist with strengthening/mobility   - Keep Call bell within reach  - Keep bed low and locked with side rails adjusted as appropriate  - Keep care items and personal belongings within reach  - Initiate and maintain comfort rounds  - Make Fall Risk Sign visible to staff  - Offer Toileting every Hours, in advance of need  - Initiate/Maintain alarm  - Obtain necessary fall risk management equipment:   - Apply yellow socks and bracelet for high fall risk patients  - Consider moving patient to room near nurses station  2/24/2022 0146 by Alysia Nettles RN  Outcome: Progressing  2/24/2022 0145 by Alysia Nettles RN  Outcome: Progressing     Problem: MOBILITY - ADULT  Goal: Maintain or return to baseline ADL function  Description: INTERVENTIONS:  -  Assess patient's ability to carry out ADLs; assess patient's baseline for ADL function and identify physical deficits which impact ability to perform ADLs (bathing, care of mouth/teeth, toileting, grooming, dressing, etc )  - Assess/evaluate cause of self-care deficits   - Assess range of motion  - Assess patient's mobility; develop plan if impaired  - Assess patient's need for assistive devices and provide as appropriate  - Encourage maximum independence but intervene and supervise when necessary  - Involve family in performance of ADLs  - Assess for home care needs following discharge   - Consider OT consult to assist with ADL evaluation and planning for discharge  - Provide patient education as appropriate  2/24/2022 0146 by Alysia Nettles RN  Outcome: Progressing  2/24/2022 0145 by Alysia Nettles RN  Outcome: Progressing  Goal: Maintains/Returns to pre admission functional level  Description: INTERVENTIONS:  - Perform BMAT or MOVE assessment daily    - Set and communicate daily mobility goal to care team and patient/family/caregiver  - Collaborate with rehabilitation services on mobility goals if consulted  - Perform Range of Motion times a day  - Reposition patient every  hours    - Dangle patient  times a day  - Stand patient  times a day  - Ambulate patient  times a day  - Out of bed to chair  times a day   - Out of bed for meals  times a day  - Out of bed for toileting  - Record patient progress and toleration of activity level   2/24/2022 0146 by Alysia Nettles RN  Outcome: Progressing  2/24/2022 0145 by Eliza Mckeon RN  Outcome: Progressing     Problem: Prexisting or High Potential for Compromised Skin Integrity  Goal: Skin integrity is maintained or improved  Description: INTERVENTIONS:  - Identify patients at risk for skin breakdown  - Assess and monitor skin integrity  - Assess and monitor nutrition and hydration status  - Monitor labs   - Assess for incontinence   - Turn and reposition patient  - Assist with mobility/ambulation  - Relieve pressure over bony prominences  - Avoid friction and shearing  - Provide appropriate hygiene as needed including keeping skin clean and dry  - Evaluate need for skin moisturizer/barrier cream  - Collaborate with interdisciplinary team   - Patient/family teaching  - Consider wound care consult   2/24/2022 0146 by Eliza Mckeon RN  Outcome: Progressing  2/24/2022 0145 by Eliza Mckeon RN  Outcome: Progressing     Problem: PAIN - ADULT  Goal: Verbalizes/displays adequate comfort level or baseline comfort level  Description: Interventions:  - Encourage patient to monitor pain and request assistance  - Assess pain using appropriate pain scale  - Administer analgesics based on type and severity of pain and evaluate response  - Implement non-pharmacological measures as appropriate and evaluate response  - Consider cultural and social influences on pain and pain management  - Notify physician/advanced practitioner if interventions unsuccessful or patient reports new pain  Outcome: Progressing     Problem: INFECTION - ADULT  Goal: Absence or prevention of progression during hospitalization  Description: INTERVENTIONS:  - Assess and monitor for signs and symptoms of infection  - Monitor lab/diagnostic results  - Monitor all insertion sites, i e  indwelling lines, tubes, and drains  - Monitor endotracheal if appropriate and nasal secretions for changes in amount and color  - [place] appropriate cooling/warming therapies per order  - Administer medications as ordered  - Instruct and encourage patient and family to use good hand hygiene technique  - Identify and instruct in appropriate isolation precautions for identified infection/condition  Outcome: Progressing     Problem: SAFETY ADULT  Goal: Patient will remain free of falls  Description: INTERVENTIONS:  - Educate patient/family on patient safety including physical limitations  - Instruct patient to call for assistance with activity   - Consult OT/PT to assist with strengthening/mobility   - Keep Call bell within reach  - Keep bed low and locked with side rails adjusted as appropriate  - Keep care items and personal belongings within reach  - Initiate and maintain comfort rounds  - Make Fall Risk Sign visible to staff  - Offer Toileting every Hours, in advance of need  - Initiate/Maintain alarm  - Obtain necessary fall risk management equipment:   - Apply yellow socks and bracelet for high fall risk patients  - Consider moving patient to room near nurses station  2/24/2022 0146 by Sunny Oreilly RN  Outcome: Progressing  2/24/2022 0145 by Sunny Oreilly RN  Outcome: Progressing  Goal: Maintain or return to baseline ADL function  Description: INTERVENTIONS:  -  Assess patient's ability to carry out ADLs; assess patient's baseline for ADL function and identify physical deficits which impact ability to perform ADLs (bathing, care of mouth/teeth, toileting, grooming, dressing, etc )  - Assess/evaluate cause of self-care deficits   - Assess range of motion  - Assess patient's mobility; develop plan if impaired  - Assess patient's need for assistive devices and provide as appropriate  - Encourage maximum independence but intervene and supervise when necessary  - Involve family in performance of ADLs  - Assess for home care needs following discharge   - Consider OT consult to assist with ADL evaluation and planning for discharge  - Provide patient education as appropriate  2/24/2022 0146 by Sunny Oreilly RN  Outcome: Progressing  2/24/2022 0145 by Berry Bowman RN  Outcome: Progressing  Goal: Maintains/Returns to pre admission functional level  Description: INTERVENTIONS:  - Perform BMAT or MOVE assessment daily    - Set and communicate daily mobility goal to care team and patient/family/caregiver  - Collaborate with rehabilitation services on mobility goals if consulted  - Perform Range of Motion  times a day  - Reposition patient every  hours  - Dangle patient  times a day  - Stand patient  times a day  - Ambulate patient  times a day  - Out of bed to chair times a day   - Out of bed for meals  times a day  - Out of bed for toileting  - Record patient progress and toleration of activity level   2/24/2022 0146 by Berry Bowman RN  Outcome: Progressing  2/24/2022 0145 by Berry Bowman RN  Outcome: Progressing     Problem: DISCHARGE PLANNING  Goal: Discharge to home or other facility with appropriate resources  Description: INTERVENTIONS:  - Identify barriers to discharge w/patient and caregiver  - Arrange for needed discharge resources and transportation as appropriate  - Identify discharge learning needs (meds, wound care, etc )  - Arrange for interpretive services to assist at discharge as needed  - Refer to Case Management Department for coordinating discharge planning if the patient needs post-hospital services based on physician/advanced practitioner order or complex needs related to functional status, cognitive ability, or social support system  Outcome: Progressing     Problem: Knowledge Deficit  Goal: Patient/family/caregiver demonstrates understanding of disease process, treatment plan, medications, and discharge instructions  Description: Complete learning assessment and assess knowledge base    Interventions:  - Provide teaching at level of understanding  - Provide teaching via preferred learning methods  Outcome: Progressing     Problem: NEUROSENSORY - ADULT  Goal: Achieves stable or improved neurological status  Description: INTERVENTIONS  - Monitor and report changes in neurological status  - Monitor vital signs such as temperature, blood pressure, glucose, and any other labs ordered   - Initiate measures to prevent increased intracranial pressure  - Monitor for seizure activity and implement precautions if appropriate      Outcome: Progressing  Goal: Achieves maximal functionality and self care  Description: INTERVENTIONS  - Monitor swallowing and airway patency with patient fatigue and changes in neurological status  - Encourage and assist patient to increase activity and self care     - Encourage visually impaired, hearing impaired and aphasic patients to use assistive/communication devices  Outcome: Progressing     Problem: CARDIOVASCULAR - ADULT  Goal: Maintains optimal cardiac output and hemodynamic stability  Description: INTERVENTIONS:  - Monitor I/O, vital signs and rhythm  - Monitor for S/S and trends of decreased cardiac output  - Administer and titrate ordered vasoactive medications to optimize hemodynamic stability  - Assess quality of pulses, skin color and temperature  - Assess for signs of decreased coronary artery perfusion  - Instruct patient to report change in severity of symptoms  Outcome: Progressing  Goal: Absence of cardiac dysrhythmias or at baseline rhythm  Description: INTERVENTIONS:  - Continuous cardiac monitoring, vital signs, obtain 12 lead EKG if ordered  - Administer antiarrhythmic and heart rate control medications as ordered  - Monitor electrolytes and administer replacement therapy as ordered  Outcome: Progressing     Problem: RESPIRATORY - ADULT  Goal: Achieves optimal ventilation and oxygenation  Description: INTERVENTIONS:  - Assess for changes in respiratory status  - Assess for changes in mentation and behavior  - Position to facilitate oxygenation and minimize respiratory effort  - Oxygen administered by appropriate delivery if ordered  - Initiate smoking cessation education as indicated  - Encourage broncho-pulmonary hygiene including cough, deep breathe, Incentive Spirometry  - Assess the need for suctioning and aspirate as needed  - Assess and instruct to report SOB or any respiratory difficulty  - Respiratory Therapy support as indicated  Outcome: Progressing     Problem: GASTROINTESTINAL - ADULT  Goal: Minimal or absence of nausea and/or vomiting  Description: INTERVENTIONS:  - Administer IV fluids if ordered to ensure adequate hydration  - Maintain NPO status until nausea and vomiting are resolved  - Nasogastric tube if ordered  - Administer ordered antiemetic medications as needed  - Provide nonpharmacologic comfort measures as appropriate  - Advance diet as tolerated, if ordered  - Consider nutrition services referral to assist patient with adequate nutrition and appropriate food choices  Outcome: Progressing  Goal: Maintains or returns to baseline bowel function  Description: INTERVENTIONS:  - Assess bowel function  - Encourage oral fluids to ensure adequate hydration  - Administer IV fluids if ordered to ensure adequate hydration  - Administer ordered medications as needed  - Encourage mobilization and activity  - Consider nutritional services referral to assist patient with adequate nutrition and appropriate food choices  Outcome: Progressing  Goal: Maintains adequate nutritional intake  Description: INTERVENTIONS:  - Monitor percentage of each meal consumed  - Identify factors contributing to decreased intake, treat as appropriate  - Assist with meals as needed  - Monitor I&O, weight, and lab values if indicated  - Obtain nutrition services referral as needed  Outcome: Progressing  Goal: Oral mucous membranes remain intact  Description: INTERVENTIONS  - Assess oral mucosa and hygiene practices  - Implement preventative oral hygiene regimen  - Implement oral medicated treatments as ordered  - Initiate Nutrition services referral as needed  Outcome: Progressing     Problem: GENITOURINARY - ADULT  Goal: Maintains or returns to baseline urinary function  Description: INTERVENTIONS:  - Assess urinary function  - Encourage oral fluids to ensure adequate hydration if ordered  - Administer IV fluids as ordered to ensure adequate hydration  - Administer ordered medications as needed  - Offer frequent toileting  - Follow urinary retention protocol if ordered  Outcome: Progressing  Goal: Absence of urinary retention  Description: INTERVENTIONS:  - Assess patients ability to void and empty bladder  - Monitor I/O  - Bladder scan as needed  - Discuss with physician/AP medications to alleviate retention as needed  - Discuss catheterization for long term situations as appropriate  Outcome: Progressing     Problem: METABOLIC, FLUID AND ELECTROLYTES - ADULT  Goal: Electrolytes maintained within normal limits  Description: INTERVENTIONS:  - Monitor labs and assess patient for signs and symptoms of electrolyte imbalances  - Administer electrolyte replacement as ordered  - Monitor response to electrolyte replacements, including repeat lab results as appropriate  - Instruct patient on fluid and nutrition as appropriate  Outcome: Progressing  Goal: Fluid balance maintained  Description: INTERVENTIONS:  - Monitor labs   - Monitor I/O and WT  - Instruct patient on fluid and nutrition as appropriate  - Assess for signs & symptoms of volume excess or deficit  Outcome: Progressing  Goal: Glucose maintained within target range  Description: INTERVENTIONS:  - Monitor Blood Glucose as ordered  - Assess for signs and symptoms of hyperglycemia and hypoglycemia  - Administer ordered medications to maintain glucose within target range  - Assess nutritional intake and initiate nutrition service referral as needed  Outcome: Progressing     Problem: SKIN/TISSUE INTEGRITY - ADULT  Goal: Skin Integrity remains intact(Skin Breakdown Prevention)  Description: Assess:  -Perform [place] assessment every   -Clean and moisturize skin every   -Inspect skin when repositioning, toileting, and assisting with ADLS  -Assess under medical devices such as  every   -Assess extremities for adequate circulation and sensation     Bed Management:  -Have minimal linens on bed & keep smooth, unwrinkled  -Change linens as needed when moist or perspiring  -Avoid sitting or lying in one position for more than  hours while in bed  -Keep HOB at degrees     Toileting:  -Offer bedside commode  -Assess for incontinence every   -Use incontinent care products after each incontinent episode such as     Activity:  -Mobilize patient  times a day  -Encourage activity and walks on unit  -Encourage or provide ROM exercises   -Turn and reposition patient every  Hours  -Use appropriate equipment to lift or move patient in bed  -Instruct/ Assist with weight shifting every  when out of bed in chair  -Consider limitation of chair time hour intervals    Skin Care:  -Avoid use of baby powder, tape, friction and shearing, hot water or constrictive clothing  -Relieve pressure over bony prominences using   -Do not massage red bony areas    Next Steps:  -Teach patient strategies to minimize risks such as   -Consider consults to  interdisciplinary teams such as   Outcome: Progressing  Goal: Incision(s), wounds(s) or drain site(s) healing without S/S of infection  Description: INTERVENTIONS  - Assess and document dressing, incision, wound bed, drain sites and surrounding tissue  - Provide patient and family education  - Perform skin care/dressing changes every   Outcome: Progressing     Problem: HEMATOLOGIC - ADULT  Goal: Maintains hematologic stability  Description: INTERVENTIONS  - Assess for signs and symptoms of bleeding or hemorrhage  - Monitor labs  - Administer supportive blood products/factors as ordered and appropriate  Outcome: Progressing     Problem: MUSCULOSKELETAL - ADULT  Goal: Maintain or return mobility to safest level of function  Description: INTERVENTIONS:  - Assess patient's ability to carry out ADLs; assess patient's baseline for ADL function and identify physical deficits which impact ability to perform ADLs (bathing, care of mouth/teeth, toileting, grooming, dressing, etc )  - Assess/evaluate cause of self-care deficits   - Assess range of motion  - Assess patient's mobility  - Assess patient's need for assistive devices and provide as appropriate  - Encourage maximum independence but intervene and supervise when necessary  - Involve family in performance of ADLs  - Assess for home care needs following discharge   - Consider OT consult to assist with ADL evaluation and planning for discharge  - Provide patient education as appropriate  Outcome: Progressing  Goal: Maintain proper alignment of affected body part  Description: INTERVENTIONS:  - Support, maintain and protect limb and body alignment  - Provide patient/ family with appropriate education  Outcome: Progressing

## 2022-02-24 NOTE — MALNUTRITION/BMI
This medical record reflects one or more clinical indicators suggestive of malnutrition and/or morbid obesity  Malnutrition Findings:   Adult Malnutrition type: Acute illness (acute moderate pro, jimi malnutrition d/t decreased appetite as evidence by <75% energy intake > 7 days, 7 2% wt  loss x 1 month, 1+ edema, mild muscle/fat loss of clavicles, temples, orbitals)  Adult Degree of Malnutrition: Malnutrition of moderate degree (treated with oral diet and supplements, consider appetite stimulant if po doesn't improve)  Malnutrition Characteristics: Inadequate energy,Weight loss,Fluid accumulation,Fat loss,Muscle loss    BMI Findings: Body mass index is 25 02 kg/m²  See Nutrition note dated 2/24/22 for additional details  Completed nutrition assessment is viewable in the nutrition documentation

## 2022-02-24 NOTE — H&P
Jose D 45 2/24/1927, 80 y o  female MRN: [id number]  Unit/Bed#: E5 -01 Encounter: [id number]  Primary Care Provider: Ten Valenzuela MD   Date and time admitted to hospital: 2/23/2022  4:49 PM    * Acute kidney injury superimposed on CKD [facility])  Assessment & Plan  Lab Results   Component Value Date    EGFR 14 02/23/2022    EGFR 27 08/01/2018    EGFR 28 07/31/2018    CREATININE 2 77 (H) 02/23/2022    CREATININE 2 35 (H) 12/06/2021    CREATININE 2 10 (H) 08/23/2021     · Creatinine 2 7  Last creatinine in 2018 was 2 3  · Will provide IV hydration and hold furosemide and lisinopril  · Monitor BMP    Fall at home, initial encounter  Assessment & Plan  · Reports fall at home, denies head strike  Resides at home alone, requesting placement in assisted living  · CT head negative for acute intracranial abnormality  · LUE abrasion; Local wound care with bacitracin b i d   ·  PT OT consult  · Case management consult for disposition    Adult failure to thrive  Assessment & Plan  · Reports malaise, fatigue, loss of appetite and weight loss  ·  Nutrition consult  ·  PT OT consult    Hyperkalemia  Assessment & Plan  · Hyperkalemia in setting of GAY  Treated in ED with hyperkalemia cocktail  · Hold lisinopril  · Monitor on telemetry  · Monitor BMP    Anemia in stage 4 chronic kidney disease (HCC)  Assessment & Plan  · Hg 9 6  Last labs in 2018  Will obtain iron panel  · Monitor CBC    Urinary retention  Assessment & Plan  ·  Reports urinary retention, recently treated for UTI  ·  UA negative for cystitis  ·  Monitor with urinary retention protocol    Acute cystitis without hematuria  Assessment & Plan  ·  CT shows bladder wall thickening and perivesical inflammatory changes along with air noted in the bladder  Findings consistent with UTI  · Patient reports she was recently treated for UTI with Macrobid  · UA with positive leukocytes, negative for nitrites    Urine culture requested  · Will start IV ceftriaxone      Pure hypercholesterolemia  Assessment & Plan  ·  Continue statin    Primary hyperparathyroidism ([address] )  Assessment & Plan  ·  Continue Sensipar MWF    Type 2 diabetes mellitus with diabetic chronic kidney disease [facility])  Assessment & Plan  Lab Results   Component Value Date    HGBA1C 5 1 10/12/2021     · DM2 off meds, managed with diet  Add sliding scale insulin and ADA diet    Recent Labs     02/23/22 2016   POCGLU 228*       Blood Sugar Average: Last 72 hrs:  (P) 228    VTE Prophylaxis: Heparin  / sequential compression device   Code Status: DNR/I  POLST: POLST is not applicable to this patient  Discussion with family:     Anticipated Length of Stay:  Patient will be admitted on an Inpatient basis with an anticipated length of stay of  > 2 midnights  Justification for Hospital Stay:  Fall, ambulatory dysfunction, anorexia, UTI    Total Time for Visit, including Counseling / Coordination of Care: 60 minutes  Greater than 50% of this total time spent on direct patient counseling and coordination of care  Chief Complaint:    "So very tired, can't eat, peeing hurts"    History of Present Illness:    Jim Brown is a 80 y o  female with PMH DM ,HTN, CKD IV who presents with c/o dysuria, fatigue, weakness, ambulatory dysfunction and poor appetite  Reports poor oral intake over the last few weeks and weight loss  States her granddaughter brought her berries and Ensure so she ate that 2 days ago  States she fell a few days ago  Resides at home alone, ambulates with cane  Requesting placement to rehab or assisted living facility  Review of Systems:    Review of Systems   Constitutional: Positive for appetite change, fatigue and unexpected weight change  Poor appetite, weight loss   HENT: Negative  Eyes: Negative for visual disturbance  Respiratory: Negative  Cardiovascular: Negative  Gastrointestinal: Negative      Genitourinary: Positive for decreased urine volume, difficulty urinating and dysuria  Musculoskeletal: Positive for gait problem  Skin: Negative  Neurological: Positive for weakness  Psychiatric/Behavioral: Negative  Past Medical and Surgical History:     Past Medical History:   Diagnosis Date    Diabetes mellitus ([facility] [address] )     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  Authorizing Provider   amLODIPine (NORVASC) 5 mg tablet TAKE 1 TABLET BY MOUTH TWICE A DAY 9/17/21   Bandar Vargas DO   aspirin 81 MG tablet Take 1 tablet by mouth daily 6/27/14   Historical Provider, MD   Cholecalciferol (VITAMIN D3) 2000 units capsule Take 1 capsule by mouth daily 800 units daily   Patient not taking: Reported on 2/15/2022  12/22/17   Historical Provider, MD   cinacalcet (SENSIPAR) 30 mg tablet Take 1 tablet (30 mg total) by mouth 3 (three) times a week 12/15/21   Bandar Vargas DO   citalopram (CeleXA) 10 mg tablet Take 1 tablet (10 mg total) by mouth daily 2/15/22   Anne Wu MD   colchicine (COLCRYS) 0 6 mg tablet TAKE 1 TABLET BY MOUTH EVERY DAY 5/19/21   Suzette Brambila PA-C   Contour Test test strip TEST AS DIRECTED 8/17/20   Aria Vicente PA-C   docusate sodium (COLACE) 100 mg capsule Take 100 mg by mouth daily    Historical Provider, MD   furosemide (LASIX) 20 mg tablet Take 1 tablet (20 mg total) by mouth daily 8/3/21   Anne Wu MD   gabapentin (NEURONTIN) 300 mg capsule Take 1 capsule (300 mg total) by mouth daily at bedtime 8/3/21   Buzz Bill MD   Lancets [facility] ULTRASOFT) lancets by Does not apply route 2 (two) times a day 11/16/11   Historical Provider, MD   lisinopril (ZESTRIL) 10 mg tablet Take 1 tablet (10 mg total) by mouth daily 12/15/21   Glen Pickett DO   nitrofurantoin (MACROBID) 100 mg capsule Take 1 capsule (100 mg total) by mouth 2 (two) times a day for 7 days 2/21/22 2/28/22  Emeka Jose PA-C   Premarin vaginal cream  1/31/22   Historical Provider, MD   simvastatin (ZOCOR) 20 mg tablet TAKE 1 TABLET BY MOUTH EVERY DAY 2/2/22   Glen Pickett DO     I have reviewed home medications using allscripts  Allergies: Allergies   Allergen Reactions    Influenza Virus Vaccine      Other reaction(s): HX of Guillain-[place] Syndrome    Sulfa Antibiotics        Social History:     Marital Status:    Occupation: retired  Patient Pre-hospital Living Situation:  Resides at home alone  Patient Pre-hospital Level of Mobility:  cane  Patient Pre-hospital Diet Restrictions:   Substance Use History:   Social History     Substance and Sexual Activity   Alcohol Use Yes    Comment: SOCIAL     Social History     Tobacco Use   Smoking Status Former Smoker   Smokeless Tobacco Never Used     Social History     Substance and Sexual Activity   Drug Use No       Family History:    Family History   Problem Relation Age of Onset    Hypertension Mother     Hypertension Father     Kidney disease Sister         CHRONIC    Alcohol abuse Family     Substance Abuse Family        Physical Exam:     Vitals:   Blood Pressure: (!) 164/106 (02/23/22 1930)  Pulse: 66 (02/23/22 2045)  Temperature: 98 1 °F (36 7 °C) (02/23/22 1655)  Temp Source: Oral (02/23/22 1655)  Respirations: 20 (02/23/22 2045)  Weight - Scale: 58 1 kg (128 lb 1 4 oz) (02/23/22 1655)  SpO2: 100 % (02/23/22 2045)    Physical Exam  Constitutional:       General: She is not in acute distress       Appearance: Normal appearance  She is not ill-appearing, toxic-appearing or diaphoretic  Comments: Under weight   HENT:      Head: Normocephalic and atraumatic  Ears:      Comments: Hard of hearing     Nose: No congestion or rhinorrhea  Mouth/Throat:      Mouth: Mucous membranes are dry  Eyes:      Extraocular Movements: Extraocular movements intact  Conjunctiva/sclera: Conjunctivae normal    Cardiovascular:      Rate and Rhythm: Normal rate and regular rhythm  Heart sounds: Normal heart sounds  Pulmonary:      Effort: Pulmonary effort is normal       Breath sounds: Normal breath sounds  Abdominal:      General: Bowel sounds are normal       Palpations: Abdomen is soft  Tenderness: There is abdominal tenderness  Comments: Tenderness over pelvic region   Musculoskeletal:      Right lower leg: No edema  Left lower leg: No edema  Skin:     General: Skin is dry  Coloration: Skin is pale  Comments: Excoriation left upper arm   Neurological:      Mental Status: She is alert and oriented to person, place, and time  Psychiatric:         Mood and Affect: Mood normal          Behavior: Behavior normal          Thought Content: Thought content normal          Judgment: Judgment normal        Additional Data:     Lab Results: I have personally reviewed pertinent reports        Results from last 7 days   Lab Units 02/23/22  1744   WBC Thousand/uL 7 16   HEMOGLOBIN g/dL 9 6*   HEMATOCRIT % 29 6*   PLATELETS Thousands/uL 203   NEUTROS PCT % 68   LYMPHS PCT % 22   MONOS PCT % 8   EOS PCT % 1     Results from last 7 days   Lab Units 02/23/22  1744   SODIUM mmol/L 141   POTASSIUM mmol/L 6 0*   CHLORIDE mmol/L 114*   CO2 mmol/L 18*   BUN mg/dL 89*   CREATININE mg/dL 2 77*   ANION GAP mmol/L 9   CALCIUM mg/dL 9 9   ALBUMIN g/dL 2 8*   TOTAL BILIRUBIN mg/dL 0 33   ALK PHOS U/L 91   ALT U/L 22   AST U/L 13   GLUCOSE RANDOM mg/dL 104         Results from last 7 days   Lab Units 02/23/22  2016   POC GLUCOSE mg/dl 228*               Imaging: I have personally reviewed pertinent reports  CT head without contrast   Final Result by Troy Mojica MD (02/23 1929)      No acute intracranial abnormality  Workstation performed: [id number]         CT abdomen pelvis wo contrast   Final Result by Troy Mojica MD (02/23 1950)      Bladder wall thickening and perivesical inflammatory changes along with air noted in the bladder  Findings consistent with urinary tract infection  Workstation performed: [id number]             EKG, Pathology, and Other Studies Reviewed on Admission:   · ct    Allscripts / Epic Records Reviewed: Yes     ** Please Note: This note has been constructed using a voice recognition system   **

## 2022-02-24 NOTE — ASSESSMENT & PLAN NOTE
Lab Results   Component Value Date    EGFR 14 02/23/2022    EGFR 27 08/01/2018    EGFR 28 07/31/2018    CREATININE 2 77 (H) 02/23/2022    CREATININE 2 35 (H) 12/06/2021    CREATININE 2 10 (H) 08/23/2021     · Creatinine 2 7  Last creatinine in 2018 was 2 3     · Will provide IV hydration and hold furosemide and lisinopril  · Monitor BMP

## 2022-02-24 NOTE — OCCUPATIONAL THERAPY NOTE
Occupational Therapy Evaluation     Patient Name: Devonte Gallardo  EMAXX'P Date: 2/24/2022  Problem List  Principal Problem:    Acute kidney injury superimposed on CKD [facility])  Active Problems:    Pure hypercholesterolemia    Acute cystitis without hematuria    Type 2 diabetes mellitus with diabetic chronic kidney disease ([facility] )    Primary hyperparathyroidism (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 ([place] [address] )     LAST ASSESSED: 17AUG2016    Hyperlipidemia     Hypertension     Renal disorder     Squamous cell carcinoma, scalp/neck     LAST ASSESSED: [id number]     Past Surgical History  Past Surgical History:   Procedure Laterality Date    BLADDER SURGERY      LAST ASSESSED: 17AUG2016    PELVIC FLOOR REPAIR      VAGINAL SURG INSERTION OF MESH    TOTAL ABDOMINAL HYSTERECTOMY W/ BILATERAL SALPINGOOPHORECTOMY      LAST ASSESSED: 17AUG2016 02/24/22 0853   OT Last Visit   OT Visit Date 02/24/22   Note Type   Note type Evaluation   Restrictions/Precautions   Weight Bearing Precautions Per Order No   Other Precautions Chair Alarm; Bed Alarm; Fall Risk;Multiple lines;Hard of hearing  (felisa)   Pain Assessment   Pain Assessment Tool 0-10   Pain Score No Pain   Home Living   Type of Home House   Home Layout Two level;Performs ADLs on one level; Able to live on main level with bedroom/bathroom;Stairs to enter with rails  (2 NI, first floor setup)   Bathroom Shower/Tub Walk-in shower  (and tub shower)   Bathroom Toilet Raised  (w/ bidet)   Bathroom Equipment   (no DME)   [address] Walker;Cane   Additional Comments pt reports 1st floor setup at home has a local daughter and granddaughter who visit often and take her grocery shopping and assist w/ medication management   Prior Function   Level of [place] Independent with ADLs and functional mobility; Needs assistance with IADLs   Lives With Alone   Receives Help From Family   ADL Assistance Independent   IADLs Independent   Falls in the last 6 months 1 to 4  (2)   Vocational Retired   Comments pt reports 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, 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; reports independent w/ cooking/cleaning/laundry up until the last 2 weeks and reports had difficulty and having no appetite   Lifestyle   Autonomy per pt independent w/ ADLs, independent w/ functional transfers and mobility w/ SPC, independent w/ IADLs   Reciprocal Relationships daughter and granddaughters   Service to Others homemaker   Intrinsic Gratification doing crossword puzzles, reading the paper   Subjective   Subjective " I am doing, okay, It is my birthday"   ADL   Where Assessed Chair   Eating Assistance 5  Supervision/Setup   Grooming Assistance 4  Minimal Assistance   Grooming Deficit Setup;Steadying;Supervision/safety;Verbal cueing; Increased time to complete;Standing with assistive device; Wash/dry hands;Brushing hair   UB Bathing Assistance 4  Minimal Assistance   LB Bathing Assistance 3  Moderate Assistance   [address] 4  Minimal [address] 3  Moderate [facility] Deficit Setup;Steadying;Verbal cueing;Supervison/safety; Increased time to complete;Grab bar use;Clothing management up;Perineal hygiene;Clothing management down  (min assist steadying while completing hygiene)   Functional Assistance 4  Minimal Assistance   Bed Mobility   Supine to Sit 4  Minimal assistance   Additional items Assist x 1; Increased time required;LE management;Verbal cues; Bedrails;HOB elevated   Additional Comments increased time to complete   Transfers   Sit to Stand 4  Minimal assistance   Additional items Assist x 1; Increased time required;Verbal cues;Armrests; Bedrails   Stand to Sit 4  Minimal assistance   Additional items Assist x 1; Increased time required;Verbal cues;Armrests   Toilet transfer 4  Minimal assistance   Additional items Assist x 1; Increased time required;Verbal cues;Standard toilet  (grab bar use)   Additional Comments VCs for hand placement and positioning   Functional Mobility   Functional Mobility 4  Minimal assistance   Additional Comments assist x1 w/ cues for safety and slight tremulous   Additional items Rolling walker   Balance   Static Sitting Fair +   Dynamic Sitting Fair   Static Standing Fair -   Dynamic Standing Poor +   Ambulatory Poor +   Activity Tolerance   Activity Tolerance Patient limited by fatigue;Treatment limited secondary to medical complications (Comment)   Medical Staff Made Aware NSG student and instructor present end of session as IV draining   Nurse Made Aware appropriate to see per Chaya VARGAS Assessment   RUE Assessment WFL  (3+/5)   LUE Assessment   LUE Assessment WFL  (3+/5, bandage in place from a fall)   Hand Function   Gross Motor Coordination Functional   Fine Motor Coordination Functional   Sensation   Light Touch No apparent deficits   Proprioception   Proprioception No apparent deficits   Vision-Basic Assessment   Current Vision No visual deficits   Vision - Complex Assessment   Ocular Range of Motion WFL   Acuity Able to read clock/calendar on wall without difficulty   Perception   Inattention/Neglect Appears intact   Cognition   Overall Cognitive Status [facility]   Arousal/Participation Alert; Cooperative   Attention Within functional limits   Orientation Level Oriented X4   Memory Within functional limits;Decreased short term memory  (questionable STM deficits)   Following Commands Follows one step commands without difficulty   Comments pt engages in appropriate conversations, pleasant and cooperative   Assessment   Limitation Decreased ADL status; Decreased UE strength;Decreased cognition;Decreased Safe judgement during ADL;Decreased endurance;Decreased high-level ADLs; Decreased self-care trans   Prognosis Good   Assessment Pt is a 80 y o  female seen for OT evaluation s/p admit to [facility] on 2/23/2022 w/ fatigue, poor oral intake, weakness Acute kidney injury superimposed on CKD ([facility] [address] )  CT head: No acute intracranial abnormality  Comorbidities affecting pt's functional performance at time of assessment include: DM II, HTN, CKD IV, acute cystitis w/o hematuria, urinary retention, CKD IV, failure to thrive   Personal factors affecting pt at time of IE include:limited home support, difficulty performing ADLS, difficulty performing IADLS  and limited insight into deficits, recent falls, daughter passed away in October  Prior to admission, pt was living alone and reports independent w/ ADLs, independent w/ functional transfer and mobility w/ SPC, independent w/ cooking/cleaning/laundry (up until 2 weeks ago), assist transport  Upon evaluation: Pt requires MIN assist supine>sit bed mobility w/ increased time to complete, MIN assist sit<>stand w/ VCs for hand placement and positioning, MIN assist x1 transfer to toilet and min assist steadying toileting hygiene, MIN assist UB ADLs, MOD assist LB ADLS 2* the following deficits impacting occupational performance: decreased strength and endurance, impaired balance, impaired activity tolerance, fall risk, decreased insight into deficits, multiple lines, dyspnea on exertion (88-94% on room air), flat affect  Pt to benefit from continued skilled OT tx while in the hospital to address deficits as defined above and maximize level of functional independence w ADL's and functional mobility  Occupational Performance areas to address include: grooming, bathing/shower, toilet hygiene, dressing, medication management, health maintenance, functional mobility, clothing management, cleaning and meal prep, formal cognitive assessment, EC education   From OT standpoint, recommendation at time of d/c would be short term rehab  The patient's raw score on the AM-PAC Daily Activity inpatient short form is 17, standardized score is 37 26, less than 39 4  Patients at this level are likely to benefit from discharge to post-acute rehabilitation services  Please refer to the recommendation of the Occupational Therapist for safe discharge planning  Goals   Patient Goals "to get better"   LTG Time Frame 10-14   Long Term Goal please see below goals   Plan   Treatment Interventions ADL retraining;UE strengthening/ROM; Functional transfer training; Endurance training;Cognitive reorientation;Equipment evaluation/education;Patient/family training; Compensatory technique education; Activityengagement; Energy conservation   Goal Expiration Date 03/10/22   OT Frequency 3-5x/wk   Recommendation   OT Discharge Recommendation Post acute rehabilitation services   OT - OK to Discharge   (to rehab when medically stable)   AM-PAC Daily Activity Inpatient   Lower Body Dressing 2   Bathing 2   Toileting 3   Upper Body Dressing 3   Grooming 3   Eating 4   Daily Activity Raw Score 17   Daily Activity Standardized Score (Calc for Raw Score >=11) 37 26   AM-[facility] Applied Cognition Inpatient   Following a Speech/Presentation 4   Understanding Ordinary Conversation 3   Taking Medications 3   Remembering Where Things Are Placed or Put Away 3   Remembering List of 4-5 Errands 3   Taking Care of Complicated Tasks 3   Applied Cognition Raw Score 19   Applied Cognition Standardized Score 39 77   Modified [place] Scale   Modified [place] Scale 4     Occupational Therapy Goals to be met in 10-14 days:  1) Pt will improve activity tolerance to G for 30 min txment sessions to enhance ADLs  2) Pt will complete ADLs/self care w/ mod I  3) Pt will complete toileting w/ mod I w/ G hygiene/thoroughness using DME PRN  4) Pt will improve functional transfers on/off all surfaces using DME PRN w/ G balance/safety including toileting w/ mod I  5) Pt will improve fx'l mobility during I/ADl/leisure tasks using DME PRN w/ g balance/safety w/ mod I  6) Pt will engage in ongoing cognitive assessment w/ G participation to A w/ safe d/c planning/recommendations  7) Pt will demonstrate G carryover of pt/caregiver education and training as appropriate w/ mod I  w/ G tolerance  8) Pt will engage in depression screen/leisure interest checklist w/ G participation to monitor s/s depression and ID 3 positive coping strategies to A w/ emotional regulation and management  9) Pt will demonstrate 100% carryover of E C  techniques w/ mod I t/o fx'l I/ADL/leisure tasks w/o cues s/p skilled education  10) Pt will demonstrate improved bed mobility to MOD I to enhance ADLs  11) Pt will engage in activity configuration activity w/ G participation and mod I to increase time management skills and improve participation in a structured routine to improve overall quality of life  12) Pt will demonstrate improved standing tolerance to 3-5 minutes during functional tasks w/ Good - dynamic standing balance to enhance ADL performance  13) Pt will demonstrate improved b/l UE strength by 1 MMT grade to enhance ADLS and functional transfers  14) Pt will recall 3 fall prevention education strategies to enhance safety in home and prevent further falls  Documentation completed by: Debbie Laird MS, OTR/L

## 2022-02-24 NOTE — ASSESSMENT & PLAN NOTE
·  Reports urinary retention, recently treated for UTI     ·  UA negative for cystitis  ·  Monitor with urinary retention protocol

## 2022-02-25 PROBLEM — N25.81 SECONDARY HYPERPARATHYROIDISM OF RENAL ORIGIN (HCC): Status: ACTIVE | Noted: 2021-01-01

## 2022-02-25 PROBLEM — E44.0 MODERATE PROTEIN-CALORIE MALNUTRITION (HCC): Status: ACTIVE | Noted: 2022-01-01

## 2022-02-25 LAB
ALBUMIN SERPL BCP-MCNC: 2.3 G/DL (ref 3.5–5)
ALP SERPL-CCNC: 78 U/L (ref 46–116)
ALT SERPL W P-5'-P-CCNC: 14 U/L (ref 12–78)
ANION GAP SERPL CALCULATED.3IONS-SCNC: 9 MMOL/L (ref 4–13)
AST SERPL W P-5'-P-CCNC: 12 U/L (ref 5–45)
BACTERIA UR CULT: NORMAL
BACTERIA UR CULT: NORMAL
BASOPHILS # BLD AUTO: 0.02 THOUSANDS/ΜL (ref 0–0.1)
BASOPHILS NFR BLD AUTO: 0 % (ref 0–1)
BILIRUB SERPL-MCNC: 0.18 MG/DL (ref 0.2–1)
BUN SERPL-MCNC: 82 MG/DL (ref 5–25)
CALCIUM ALBUM COR SERPL-MCNC: 10.5 MG/DL (ref 8.3–10.1)
CALCIUM SERPL-MCNC: 9.1 MG/DL (ref 8.3–10.1)
CHLORIDE SERPL-SCNC: 114 MMOL/L (ref 100–108)
CO2 SERPL-SCNC: 16 MMOL/L (ref 21–32)
CREAT SERPL-MCNC: 2.76 MG/DL (ref 0.6–1.3)
EOSINOPHIL # BLD AUTO: 0.21 THOUSAND/ΜL (ref 0–0.61)
EOSINOPHIL NFR BLD AUTO: 3 % (ref 0–6)
ERYTHROCYTE [DISTWIDTH] IN BLOOD BY AUTOMATED COUNT: 15.4 % (ref 11.6–15.1)
GFR SERPL CREATININE-BSD FRML MDRD: 14 ML/MIN/1.73SQ M
GLUCOSE SERPL-MCNC: 105 MG/DL (ref 65–140)
GLUCOSE SERPL-MCNC: 113 MG/DL (ref 65–140)
GLUCOSE SERPL-MCNC: 129 MG/DL (ref 65–140)
GLUCOSE SERPL-MCNC: 89 MG/DL (ref 65–140)
GLUCOSE SERPL-MCNC: 90 MG/DL (ref 65–140)
GLUCOSE SERPL-MCNC: 94 MG/DL (ref 65–140)
HCT VFR BLD AUTO: 27.4 % (ref 34.8–46.1)
HGB BLD-MCNC: 8.7 G/DL (ref 11.5–15.4)
IMM GRANULOCYTES # BLD AUTO: 0.05 THOUSAND/UL (ref 0–0.2)
IMM GRANULOCYTES NFR BLD AUTO: 1 % (ref 0–2)
LYMPHOCYTES # BLD AUTO: 1.96 THOUSANDS/ΜL (ref 0.6–4.47)
LYMPHOCYTES NFR BLD AUTO: 29 % (ref 14–44)
MCH RBC QN AUTO: 31.4 PG (ref 26.8–34.3)
MCHC RBC AUTO-ENTMCNC: 31.8 G/DL (ref 31.4–37.4)
MCV RBC AUTO: 99 FL (ref 82–98)
MONOCYTES # BLD AUTO: 0.65 THOUSAND/ΜL (ref 0.17–1.22)
MONOCYTES NFR BLD AUTO: 10 % (ref 4–12)
NEUTROPHILS # BLD AUTO: 3.82 THOUSANDS/ΜL (ref 1.85–7.62)
NEUTS SEG NFR BLD AUTO: 57 % (ref 43–75)
NRBC BLD AUTO-RTO: 0 /100 WBCS
PLATELET # BLD AUTO: 108 THOUSANDS/UL (ref 149–390)
PMV BLD AUTO: 12.7 FL (ref 8.9–12.7)
POTASSIUM SERPL-SCNC: 5.8 MMOL/L (ref 3.5–5.3)
PROT SERPL-MCNC: 5.5 G/DL (ref 6.4–8.2)
RBC # BLD AUTO: 2.77 MILLION/UL (ref 3.81–5.12)
SODIUM SERPL-SCNC: 139 MMOL/L (ref 136–145)
WBC # BLD AUTO: 6.71 THOUSAND/UL (ref 4.31–10.16)

## 2022-02-25 PROCEDURE — 85025 COMPLETE CBC W/AUTO DIFF WBC: CPT | Performed by: HOSPITALIST

## 2022-02-25 PROCEDURE — 97116 GAIT TRAINING THERAPY: CPT

## 2022-02-25 PROCEDURE — 99232 SBSQ HOSP IP/OBS MODERATE 35: CPT | Performed by: INTERNAL MEDICINE

## 2022-02-25 PROCEDURE — 97530 THERAPEUTIC ACTIVITIES: CPT

## 2022-02-25 PROCEDURE — 82948 REAGENT STRIP/BLOOD GLUCOSE: CPT

## 2022-02-25 PROCEDURE — 92526 ORAL FUNCTION THERAPY: CPT

## 2022-02-25 PROCEDURE — 80053 COMPREHEN METABOLIC PANEL: CPT | Performed by: HOSPITALIST

## 2022-02-25 PROCEDURE — 97110 THERAPEUTIC EXERCISES: CPT

## 2022-02-25 RX ORDER — FUROSEMIDE 10 MG/ML
40 INJECTION INTRAMUSCULAR; INTRAVENOUS ONCE
Status: DISCONTINUED | OUTPATIENT
Start: 2022-02-25 | End: 2022-02-25

## 2022-02-25 RX ORDER — SODIUM BICARBONATE 650 MG/1
650 TABLET ORAL
Status: DISCONTINUED | OUTPATIENT
Start: 2022-02-25 | End: 2022-02-25

## 2022-02-25 RX ORDER — HEPARIN SODIUM 5000 [USP'U]/ML
5000 INJECTION, SOLUTION INTRAVENOUS; SUBCUTANEOUS EVERY 8 HOURS SCHEDULED
Status: DISCONTINUED | OUTPATIENT
Start: 2022-02-26 | End: 2022-03-03 | Stop reason: HOSPADM

## 2022-02-25 RX ORDER — FUROSEMIDE 10 MG/ML
20 INJECTION INTRAMUSCULAR; INTRAVENOUS ONCE
Status: COMPLETED | OUTPATIENT
Start: 2022-02-25 | End: 2022-02-25

## 2022-02-25 RX ADMIN — DOCUSATE SODIUM 100 MG: 100 CAPSULE ORAL at 10:10

## 2022-02-25 RX ADMIN — FUROSEMIDE 20 MG: 10 INJECTION, SOLUTION INTRAMUSCULAR; INTRAVENOUS at 17:07

## 2022-02-25 RX ADMIN — ENOXAPARIN SODIUM 30 MG: 30 INJECTION, SOLUTION INTRAVENOUS; SUBCUTANEOUS at 10:10

## 2022-02-25 RX ADMIN — SODIUM BICARBONATE 50 ML/HR: 84 INJECTION, SOLUTION INTRAVENOUS at 12:58

## 2022-02-25 RX ADMIN — ASPIRIN 81 MG: 81 TABLET, COATED ORAL at 10:10

## 2022-02-25 RX ADMIN — BACITRACIN 1 SMALL APPLICATION: 500 OINTMENT TOPICAL at 10:10

## 2022-02-25 RX ADMIN — SODIUM CHLORIDE 75 ML/HR: 9 INJECTION, SOLUTION INTRAVENOUS at 01:49

## 2022-02-25 RX ADMIN — CEFTRIAXONE 1000 MG: 1 INJECTION, POWDER, FOR SOLUTION INTRAMUSCULAR; INTRAVENOUS at 22:17

## 2022-02-25 RX ADMIN — CITALOPRAM HYDROBROMIDE 10 MG: 20 TABLET ORAL at 10:10

## 2022-02-25 RX ADMIN — GABAPENTIN 300 MG: 300 CAPSULE ORAL at 22:16

## 2022-02-25 RX ADMIN — PRAVASTATIN SODIUM 40 MG: 40 TABLET ORAL at 17:04

## 2022-02-25 RX ADMIN — AMLODIPINE BESYLATE 5 MG: 5 TABLET ORAL at 17:07

## 2022-02-25 RX ADMIN — CINACALCET 30 MG: 30 TABLET, FILM COATED ORAL at 10:10

## 2022-02-25 RX ADMIN — AMLODIPINE BESYLATE 5 MG: 5 TABLET ORAL at 10:10

## 2022-02-25 NOTE — WOUND OSTOMY CARE
Consult Note - Wound   Soniya Tamayo 80 y o  female MRN: [id number]  Unit/Bed#: E5 -01 Encounter: [id number]      History and Present Illness:  80year old female presented to the hospital with ambulatory dysfunction, dysuria, fatigue, weakness, and poor appetite  Patient's history significant for DM, HTN, Guillain-[place], squamous cell cancer of head/neck  Assessment Findings:   Patient agreeable to assessment  She is able to stand with assist x 1 and walker  Continent of bowel and bladder  Bilateral buttocks, sacrum, and heels intact with blanchable erythema  1   Partial thickness skin tear to left upper arm--beefy red wound bed without skin flap  Cristela-wound fragile with ecchymosis  Scant serosanguinous drainage  See flowsheet for wound details  Wound Care Plan:   1-Hydraguard lotion to bilateral buttocks, sacrum, and heels three times daily and as needed  2-Elevate heels off of bed/chair surface to offload pressure  3-Offloading air cushion in chair when out of bed  4-Moisturize skin daily with skin nourishing cream   5-Encourage/remind patient to turn/reposition every 2 hours while in bed and weight shift frequently while in chair for pressure re-distribution on skin  Provide assistance when needed  6-Left upper arm tear--cleanse with normal saline, pat dry  Apply Hydrogel to wound bed  Cover with adaptic (non-adherent oil emulsion dressing) and ABD  Wrap with kunal  Change dressing every other day  Wound care team to follow  Plan of care reviewed with primary RN       Wound 02/24/22 Skin Tear Skin tear Arm Left;Upper (Active)   Wound Image   02/25/22 1052   Wound Description Beefy red;Pink 02/25/22 1052   Cristela-wound Assessment Purple;Fragile 02/25/22 1052   Wound Length (cm) 2 cm 02/25/22 1052   Wound Width (cm) 7 5 cm 02/25/22 1052   Wound Depth (cm) 0 1 cm 02/25/22 1052   Wound Surface Area (cm^2) 15 cm^2 02/25/22 1052   Wound Volume (cm^3) 1 5 cm^3 02/25/22 1052   Calculated Wound Volume (cm^3) 1 5 cm^3 02/25/22 1052   Drainage Amount Scant 02/25/22 1052   Drainage Description Serosanguineous 02/25/22 1052   Non-staged Wound Description Partial thickness 02/25/22 1052   Treatments Cleansed;Irrigation with NSS 02/25/22 1052   Dressing Hydrogel;Non adherent;ABD 02/25/22 1052   Dressing Changed Changed 02/25/22 1052   Patient Tolerance Tolerated well 02/25/22 1052   Dressing Status Clean;Dry; Intact 02/25/22 1005 [facility] BSN, RN, [employer]

## 2022-02-25 NOTE — PLAN OF CARE
Problem: Nutrition/Hydration-ADULT  Goal: Nutrient/Hydration intake appropriate for improving, restoring or maintaining nutritional needs  Description: Monitor and assess patient's nutrition/hydration status for malnutrition  Collaborate with interdisciplinary team and initiate plan and interventions as ordered  Monitor patient's weight and dietary intake as ordered or per policy  Utilize nutrition screening tool and intervene as necessary  Determine patient's food preferences and provide high-protein, high-caloric foods as appropriate       INTERVENTIONS:  - Monitor oral intake, urinary output, labs, and treatment plans  - Assess nutrition and hydration status and recommend course of action  - Evaluate amount of meals eaten  - Assist patient with eating if necessary   - Allow adequate time for meals  - Recommend/ encourage appropriate diets, oral nutritional supplements, and vitamin/mineral supplements  - Order, calculate, and assess calorie counts as needed  - Recommend, monitor, and adjust tube feedings and TPN/PPN based on assessed needs  - Assess need for intravenous fluids  - Provide specific nutrition/hydration education as appropriate  - Include patient/family/caregiver in decisions related to nutrition  Outcome: Progressing     Problem: Potential for Falls  Goal: Patient will remain free of falls  Description: INTERVENTIONS:  - Educate patient/family on patient safety including physical limitations  - Instruct patient to call for assistance with activity   - Consult OT/PT to assist with strengthening/mobility   - Keep Call bell within reach  - Keep bed low and locked with side rails adjusted as appropriate  - Keep care items and personal belongings within reach  - Initiate and maintain comfort rounds  - Make Fall Risk Sign visible to staff  - Offer Toileting every Hours, in advance of need  - Initiate/Maintain alarm  - Obtain necessary fall risk management equipment:   - Apply yellow socks and bracelet for high fall risk patients  - Consider moving patient to room near nurses station  Outcome: Progressing     Problem: MOBILITY - ADULT  Goal: Maintain or return to baseline ADL function  Description: INTERVENTIONS:  -  Assess patient's ability to carry out ADLs; assess patient's baseline for ADL function and identify physical deficits which impact ability to perform ADLs (bathing, care of mouth/teeth, toileting, grooming, dressing, etc )  - Assess/evaluate cause of self-care deficits   - Assess range of motion  - Assess patient's mobility; develop plan if impaired  - Assess patient's need for assistive devices and provide as appropriate  - Encourage maximum independence but intervene and supervise when necessary  - Involve family in performance of ADLs  - Assess for home care needs following discharge   - Consider OT consult to assist with ADL evaluation and planning for discharge  - Provide patient education as appropriate  Outcome: Progressing  Goal: Maintains/Returns to pre admission functional level  Description: INTERVENTIONS:  - Perform BMAT or MOVE assessment daily    - Set and communicate daily mobility goal to care team and patient/family/caregiver  - Collaborate with rehabilitation services on mobility goals if consulted  - Perform Range of Motion times a day  - Reposition patient every  hours    - Dangle patient times a day  - Stand patient times a day  - Ambulate patient  times a day  - Out of bed to chair  times a day   - Out of bed for meals  times a day  - Out of bed for toileting  - Record patient progress and toleration of activity level   Outcome: Progressing     Problem: Prexisting or High Potential for Compromised Skin Integrity  Goal: Skin integrity is maintained or improved  Description: INTERVENTIONS:  - Identify patients at risk for skin breakdown  - Assess and monitor skin integrity  - Assess and monitor nutrition and hydration status  - Monitor labs   - Assess for incontinence   - Turn and reposition patient  - Assist with mobility/ambulation  - Relieve pressure over bony prominences  - Avoid friction and shearing  - Provide appropriate hygiene as needed including keeping skin clean and dry  - Evaluate need for skin moisturizer/barrier cream  - Collaborate with interdisciplinary team   - Patient/family teaching  - Consider wound care consult   Outcome: Progressing     Problem: PAIN - ADULT  Goal: Verbalizes/displays adequate comfort level or baseline comfort level  Description: Interventions:  - Encourage patient to monitor pain and request assistance  - Assess pain using appropriate pain scale  - Administer analgesics based on type and severity of pain and evaluate response  - Implement non-pharmacological measures as appropriate and evaluate response  - Consider cultural and social influences on pain and pain management  - Notify physician/advanced practitioner if interventions unsuccessful or patient reports new pain  Outcome: Progressing     Problem: INFECTION - ADULT  Goal: Absence or prevention of progression during hospitalization  Description: INTERVENTIONS:  - Assess and monitor for signs and symptoms of infection  - Monitor lab/diagnostic results  - Monitor all insertion sites, i e  indwelling lines, tubes, and drains  - Monitor endotracheal if appropriate and nasal secretions for changes in amount and color  - [place] appropriate cooling/warming therapies per order  - Administer medications as ordered  - Instruct and encourage patient and family to use good hand hygiene technique  - Identify and instruct in appropriate isolation precautions for identified infection/condition  Outcome: Progressing     Problem: SAFETY ADULT  Goal: Patient will remain free of falls  Description: INTERVENTIONS:  - Educate patient/family on patient safety including physical limitations  - Instruct patient to call for assistance with activity   - Consult OT/PT to assist with strengthening/mobility   - Keep Call bell within reach  - Keep bed low and locked with side rails adjusted as appropriate  - Keep care items and personal belongings within reach  - Initiate and maintain comfort rounds  - Make Fall Risk Sign visible to staff  - Offer Toileting every  Hours, in advance of need  - Initiate/Maintain alarm  - Obtain necessary fall risk management equipment:   - Apply yellow socks and bracelet for high fall risk patients  - Consider moving patient to room near nurses station  Outcome: Progressing  Goal: Maintain or return to baseline ADL function  Description: INTERVENTIONS:  -  Assess patient's ability to carry out ADLs; assess patient's baseline for ADL function and identify physical deficits which impact ability to perform ADLs (bathing, care of mouth/teeth, toileting, grooming, dressing, etc )  - Assess/evaluate cause of self-care deficits   - Assess range of motion  - Assess patient's mobility; develop plan if impaired  - Assess patient's need for assistive devices and provide as appropriate  - Encourage maximum independence but intervene and supervise when necessary  - Involve family in performance of ADLs  - Assess for home care needs following discharge   - Consider OT consult to assist with ADL evaluation and planning for discharge  - Provide patient education as appropriate  Outcome: Progressing  Goal: Maintains/Returns to pre admission functional level  Description: INTERVENTIONS:  - Perform BMAT or MOVE assessment daily    - Set and communicate daily mobility goal to care team and patient/family/caregiver  - Collaborate with rehabilitation services on mobility goals if consulted  - Perform Range of Motion  times a day  - Reposition patient every  hours    - Dangle patient  times a day  - Stand patient  times a day  - Ambulate patient times a day  - Out of bed to chair  times a day   - Out of bed for meals  times a day  - Out of bed for toileting  - Record patient progress and toleration of activity level Outcome: Progressing     Problem: DISCHARGE PLANNING  Goal: Discharge to home or other facility with appropriate resources  Description: INTERVENTIONS:  - Identify barriers to discharge w/patient and caregiver  - Arrange for needed discharge resources and transportation as appropriate  - Identify discharge learning needs (meds, wound care, etc )  - Arrange for interpretive services to assist at discharge as needed  - Refer to Case Management Department for coordinating discharge planning if the patient needs post-hospital services based on physician/advanced practitioner order or complex needs related to functional status, cognitive ability, or social support system  Outcome: Progressing     Problem: Knowledge Deficit  Goal: Patient/family/caregiver demonstrates understanding of disease process, treatment plan, medications, and discharge instructions  Description: Complete learning assessment and assess knowledge base  Interventions:  - Provide teaching at level of understanding  - Provide teaching via preferred learning methods  Outcome: Progressing     Problem: NEUROSENSORY - ADULT  Goal: Achieves stable or improved neurological status  Description: INTERVENTIONS  - Monitor and report changes in neurological status  - Monitor vital signs such as temperature, blood pressure, glucose, and any other labs ordered   - Initiate measures to prevent increased intracranial pressure  - Monitor for seizure activity and implement precautions if appropriate      Outcome: Progressing  Goal: Achieves maximal functionality and self care  Description: INTERVENTIONS  - Monitor swallowing and airway patency with patient fatigue and changes in neurological status  - Encourage and assist patient to increase activity and self care     - Encourage visually impaired, hearing impaired and aphasic patients to use assistive/communication devices  Outcome: Progressing     Problem: CARDIOVASCULAR - ADULT  Goal: Maintains optimal cardiac output and hemodynamic stability  Description: INTERVENTIONS:  - Monitor I/O, vital signs and rhythm  - Monitor for S/S and trends of decreased cardiac output  - Administer and titrate ordered vasoactive medications to optimize hemodynamic stability  - Assess quality of pulses, skin color and temperature  - Assess for signs of decreased coronary artery perfusion  - Instruct patient to report change in severity of symptoms  Outcome: Progressing  Goal: Absence of cardiac dysrhythmias or at baseline rhythm  Description: INTERVENTIONS:  - Continuous cardiac monitoring, vital signs, obtain 12 lead EKG if ordered  - Administer antiarrhythmic and heart rate control medications as ordered  - Monitor electrolytes and administer replacement therapy as ordered  Outcome: Progressing     Problem: RESPIRATORY - ADULT  Goal: Achieves optimal ventilation and oxygenation  Description: INTERVENTIONS:  - Assess for changes in respiratory status  - Assess for changes in mentation and behavior  - Position to facilitate oxygenation and minimize respiratory effort  - Oxygen administered by appropriate delivery if ordered  - Initiate smoking cessation education as indicated  - Encourage broncho-pulmonary hygiene including cough, deep breathe, Incentive Spirometry  - Assess the need for suctioning and aspirate as needed  - Assess and instruct to report SOB or any respiratory difficulty  - Respiratory Therapy support as indicated  Outcome: Progressing     Problem: GASTROINTESTINAL - ADULT  Goal: Minimal or absence of nausea and/or vomiting  Description: INTERVENTIONS:  - Administer IV fluids if ordered to ensure adequate hydration  - Maintain NPO status until nausea and vomiting are resolved  - Nasogastric tube if ordered  - Administer ordered antiemetic medications as needed  - Provide nonpharmacologic comfort measures as appropriate  - Advance diet as tolerated, if ordered  - Consider nutrition services referral to assist patient with adequate nutrition and appropriate food choices  Outcome: Progressing  Goal: Maintains or returns to baseline bowel function  Description: INTERVENTIONS:  - Assess bowel function  - Encourage oral fluids to ensure adequate hydration  - Administer IV fluids if ordered to ensure adequate hydration  - Administer ordered medications as needed  - Encourage mobilization and activity  - Consider nutritional services referral to assist patient with adequate nutrition and appropriate food choices  Outcome: Progressing  Goal: Maintains adequate nutritional intake  Description: INTERVENTIONS:  - Monitor percentage of each meal consumed  - Identify factors contributing to decreased intake, treat as appropriate  - Assist with meals as needed  - Monitor I&O, weight, and lab values if indicated  - Obtain nutrition services referral as needed  Outcome: Progressing  Goal: Oral mucous membranes remain intact  Description: INTERVENTIONS  - Assess oral mucosa and hygiene practices  - Implement preventative oral hygiene regimen  - Implement oral medicated treatments as ordered  - Initiate Nutrition services referral as needed  Outcome: Progressing     Problem: GENITOURINARY - ADULT  Goal: Maintains or returns to baseline urinary function  Description: INTERVENTIONS:  - Assess urinary function  - Encourage oral fluids to ensure adequate hydration if ordered  - Administer IV fluids as ordered to ensure adequate hydration  - Administer ordered medications as needed  - Offer frequent toileting  - Follow urinary retention protocol if ordered  Outcome: Progressing  Goal: Absence of urinary retention  Description: INTERVENTIONS:  - Assess patients ability to void and empty bladder  - Monitor I/O  - Bladder scan as needed  - Discuss with physician/AP medications to alleviate retention as needed  - Discuss catheterization for long term situations as appropriate  Outcome: Progressing     Problem: METABOLIC, FLUID AND ELECTROLYTES - ADULT  Goal: Electrolytes maintained within normal limits  Description: INTERVENTIONS:  - Monitor labs and assess patient for signs and symptoms of electrolyte imbalances  - Administer electrolyte replacement as ordered  - Monitor response to electrolyte replacements, including repeat lab results as appropriate  - Instruct patient on fluid and nutrition as appropriate  Outcome: Progressing  Goal: Fluid balance maintained  Description: INTERVENTIONS:  - Monitor labs   - Monitor I/O and WT  - Instruct patient on fluid and nutrition as appropriate  - Assess for signs & symptoms of volume excess or deficit  Outcome: Progressing  Goal: Glucose maintained within target range  Description: INTERVENTIONS:  - Monitor Blood Glucose as ordered  - Assess for signs and symptoms of hyperglycemia and hypoglycemia  - Administer ordered medications to maintain glucose within target range  - Assess nutritional intake and initiate nutrition service referral as needed  Outcome: Progressing     Problem: SKIN/TISSUE INTEGRITY - ADULT  Goal: Skin Integrity remains intact(Skin Breakdown Prevention)  Description: Assess:  -Perform [place] assessment every   -Clean and moisturize skin every   -Inspect skin when repositioning, toileting, and assisting with ADLS  -Assess under medical devices such as every   -Assess extremities for adequate circulation and sensation     Bed Management:  -Have minimal linens on bed & keep smooth, unwrinkled  -Change linens as needed when moist or perspiring  -Avoid sitting or lying in one position for more than  hours while in bed  -Keep HOB at degrees     Toileting:  -Offer bedside commode  -Assess for incontinence every   -Use incontinent care products after each incontinent episode such as     Activity:  -Mobilize patient times a day  -Encourage activity and walks on unit  -Encourage or provide ROM exercises   -Turn and reposition patient every  Hours  -Use appropriate equipment to lift or move patient in bed  -Instruct/ Assist with weight shifting every when out of bed in chair  -Consider limitation of chair time hour intervals    Skin Care:  -Avoid use of baby powder, tape, friction and shearing, hot water or constrictive clothing  -Relieve pressure over bony prominences using   -Do not massage red bony areas    Next Steps:  -Teach patient strategies to minimize risks such as    -Consider consults to  interdisciplinary teams such as   Outcome: Progressing  Goal: Incision(s), wounds(s) or drain site(s) healing without S/S of infection  Description: INTERVENTIONS  - Assess and document dressing, incision, wound bed, drain sites and surrounding tissue  - Provide patient and family education  - Perform skin care/dressing changes every   Outcome: Progressing     Problem: HEMATOLOGIC - ADULT  Goal: Maintains hematologic stability  Description: INTERVENTIONS  - Assess for signs and symptoms of bleeding or hemorrhage  - Monitor labs  - Administer supportive blood products/factors as ordered and appropriate  Outcome: Progressing     Problem: MUSCULOSKELETAL - ADULT  Goal: Maintain or return mobility to safest level of function  Description: INTERVENTIONS:  - Assess patient's ability to carry out ADLs; assess patient's baseline for ADL function and identify physical deficits which impact ability to perform ADLs (bathing, care of mouth/teeth, toileting, grooming, dressing, etc )  - Assess/evaluate cause of self-care deficits   - Assess range of motion  - Assess patient's mobility  - Assess patient's need for assistive devices and provide as appropriate  - Encourage maximum independence but intervene and supervise when necessary  - Involve family in performance of ADLs  - Assess for home care needs following discharge   - Consider OT consult to assist with ADL evaluation and planning for discharge  - Provide patient education as appropriate  Outcome: Progressing  Goal: Maintain proper alignment of affected body part  Description: INTERVENTIONS:  - Support, maintain and protect limb and body alignment  - Provide patient/ family with appropriate education  Outcome: Progressing

## 2022-02-25 NOTE — ASSESSMENT & PLAN NOTE
Lab Results   Component Value Date    EGFR 14 02/25/2022    EGFR 14 02/24/2022    EGFR 14 02/23/2022    CREATININE 2 76 (H) 02/25/2022    CREATININE 2 64 (H) 02/24/2022    CREATININE 2 77 (H) 02/23/2022       · Acute on chronic kidney disease with metabolic acidosis, hyperkalemia  · Will provide bicarbonate GTT  · Monitor kidney function closely  · Monitor postvoid residuals

## 2022-02-25 NOTE — PHYSICAL THERAPY NOTE
PHYSICAL THERAPY NOTE          Patient Name: Desmond Briones  [facility]'S Date: 2/25/2022 02/25/22 1007   Note Type   Note Type Treatment   Pain Assessment   Pain Assessment Tool 0-10   Pain Score No Pain   Restrictions/Precautions   Other Precautions Chair Alarm; Bed Alarm;Hard of hearing; Fall Risk   General   Family/Caregiver Present No   Cognition   Overall Cognitive Status WFL   Arousal/Participation Alert; Responsive; Cooperative   Attention Within functional limits   Orientation Level Oriented to person;Oriented to place;Oriented to time;Oriented to situation  (pt reported year being 2021 initally then 2022)   Memory Decreased short term memory   Following Commands Follows one step commands without difficulty   Subjective   Subjective I feel much better today  Bed Mobility   Supine to Sit 4  Minimal assistance   Additional items Assist x 1;HOB elevated; Bedrails; Increased time required;Verbal cues   Transfers   Sit to Stand 4  Minimal assistance   Additional items Assist x 1; Increased time required;Verbal cues   Stand to Sit 4  Minimal assistance   Additional items Assist x 1; Increased time required;Verbal cues   Toilet transfer 4  Minimal assistance   Additional items Assist x 1; Increased time required;Verbal cues;Standard toilet   Ambulation/Elevation   Gait pattern Excessively slow; Short stride; Foward flexed   Gait Assistance 4  Minimal assist   Additional items Assist x 1;Verbal cues   Assistive Device Rolling walker   Distance 15' x1, 12' x1, 35' x2   Balance   Static Sitting Good   Dynamic Sitting Fair +   Static Standing Fair -   Dynamic Standing Poor +   Ambulatory Poor +   Endurance Deficit   Endurance Deficit Description fatigue   Activity Tolerance   Activity Tolerance Patient limited by fatigue   Exercises   Hip Flexion Sitting;AROM; Bilateral  (x 12 reps)   Hip Abduction Sitting;10 reps;AROM; Bilateral   Hip Adduction Sitting;10 reps;AROM; Bilateral   Knee AROM Long Arc Quad Sitting;10 reps;AROM   Ankle Pumps Sitting;10 reps;AROM; Bilateral   Assessment   Prognosis Good   Problem List Decreased endurance; Impaired balance;Decreased mobility; Impaired hearing;Decreased skin integrity; Decreased coordination;Decreased strength   Assessment Pt seen for PT treatment session and progression of mobility  Pt  Offers no co pain  Pt reports feeling better  Pt  Is requiring min assist x1 for transfers, bed mobility and ambulation on level tile surfaces due to decreased endurance, balance, gait deviations overall strength and mobility  Pt  Cues to remain within walker bounds with turns and to maintain close distance to walker at all times required  Gait deviations of short strides, decreased foot clearance and step to gait progressng to step through gait with improved foot clearance  Pt progressed with ambulation distances to 12', 15' and 35' x2  Pt requires min assist x1 for toilet transfers and cg close supervision while performing hand washing and drying at sink  Pt  Performs seated b/l le arom exercises without difficulty  Pt remained seated in recliner at conclusion of PT session  Chair alarm activated  Given impairments pt remains at increased risk for falls  Continue to recommend STR at d/c in order to optimize functional outcomes and mobility  Goals   Patient Goals to go to and [facility] Expiration Date 03/06/22   Plan   Treatment/Interventions Functional transfer training;LE strengthening/ROM; Therapeutic exercise; Endurance training;Patient/family training;Equipment eval/education; Bed mobility;Gait training;Cognitive reorientation;Spoke to nursing   PT Frequency 3-5x/wk   Recommendation   PT Discharge Recommendation Post acute rehabilitation services   AM-PAC Basic Mobility Inpatient   Turning in Bed Without Bedrails 3   Lying on Back to Sitting on Edge of Flat Bed 3   Moving Bed to Chair 3   Standing Up From Chair 3   Walk in Room 3   Climb 3-5 Stairs 2   Basic Mobility Inpatient Raw Score 17   Basic Mobility Standardized Score 39 67   Highest Level Of Mobility   -[facility] Goal 5: Stand one or more mins   -[facility] Highest Level of Mobility 6: Walk 10 steps or more   -[facility] Goal Achieved Yes   End of Consult   Patient Position at End of Consult Bedside chair;Bed/Chair alarm activated; All needs within reach   [place]ie, [place]

## 2022-02-25 NOTE — ASSESSMENT & PLAN NOTE
·  CT shows bladder wall thickening and perivesical inflammatory changes along with air noted in the bladder  · Empirical IV antibiotics  · Continue 3 dose of IV ceftriaxone  · Follow-up on cultures  · Supportive care

## 2022-02-25 NOTE — SPEECH THERAPY NOTE
Speech Language/Pathology    Speech/Language Pathology Progress Note    Patient Name: Margaret Rangel  WWEQD'N Date: 2/25/2022     Problem List  Principal Problem:    Acute kidney injury superimposed on CKD [facility])  Active Problems:    Pure hypercholesterolemia    Acute cystitis without hematuria    Type 2 diabetes mellitus with diabetic chronic kidney disease ([facility] [address] )    Primary hyperparathyroidism ([facility] )    Urinary retention    Anemia in stage 4 chronic kidney disease (HCC)    Hyperkalemia    Adult failure to thrive    Fall at home, initial encounter    Moderate protein-calorie malnutrition ([facility] )       Past Medical History  Past Medical History:   Diagnosis Date    Diabetes mellitus ([facility] )     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 ([place] [address] )     LAST ASSESSED: 17AUG2016    Hyperlipidemia     Hypertension     Renal disorder     Squamous cell carcinoma, scalp/neck     LAST ASSESSED: [id number]        Past Surgical History  Past Surgical History:   Procedure Laterality Date    BLADDER SURGERY      LAST ASSESSED: 17AUG2016    PELVIC FLOOR REPAIR      VAGINAL SURG INSERTION OF MESH    TOTAL ABDOMINAL HYSTERECTOMY W/ BILATERAL SALPINGOOPHORECTOMY      LAST ASSESSED: 17AUG2016         Subjective:  Pt was alert stated " I'm feeling good!"  Objective:  Pt was seen at lunch for f/u dysphagia therapy  Positioned upright and alert  Feeding self cheeseburger, french fries  Thin liquids were taken by straw  Mastication was prolonged but functional  Bolus formation, control and transfer were WNL  Swallows were prompt  Laryngeal rise appeared adequate  No overt s/s of aspiration  Pt stated she had more of an appetite than she's had in days  Assessment:  Pt tolerating current diet  No overt s/s of aspiration  Plan/Recommendations:  Continue with regular and thin  Encourage intake  ST will d/c

## 2022-02-25 NOTE — ASSESSMENT & PLAN NOTE
Lab Results   Component Value Date    HGBA1C 5 1 10/12/2021       Recent Labs     02/24/22  2103 02/25/22  0801 02/25/22  1133 02/25/22  1143   POCGLU 160* 89 129 113       Blood Sugar Average: Last 72 hrs:  (P) 147 6     Monitor Accu-Cheks  Avoid hypoglycemia  Hypoglycemia protocol in place

## 2022-02-25 NOTE — ASSESSMENT & PLAN NOTE
Malnutrition Findings:   Adult Malnutrition type: Acute illness (acute moderate pro, jimi malnutrition d/t decreased appetite as evidence by <75% energy intake > 7 days, 7 2% wt  loss x 1 month, 1+ edema, mild muscle/fat loss of clavicles, temples, orbitals)  Adult Degree of Malnutrition: Malnutrition of moderate degree (treated with oral diet and supplements, consider appetite stimulant if po doesn't improve)    BMI Findings: Body mass index is 25 02 kg/m²

## 2022-02-25 NOTE — PROGRESS NOTES
[facility]  Progress Note - Jared Osman 2/24/1927, 80 y o  female MRN: [id number]  Unit/Bed#: E5 -01 Encounter: [id number]  Primary Care Provider: Charlene Aguilar MD   Date and time admitted to hospital: 2/23/2022  4:49 PM    * Acute kidney injury superimposed on CKD [facility])  Assessment & Plan  Lab Results   Component Value Date    EGFR 14 02/25/2022    EGFR 14 02/24/2022    EGFR 14 02/23/2022    CREATININE 2 76 (H) 02/25/2022    CREATININE 2 64 (H) 02/24/2022    CREATININE 2 77 (H) 02/23/2022       · Acute on chronic kidney disease with metabolic acidosis, hyperkalemia  · Will provide bicarbonate GTT  · Monitor kidney function closely  · Monitor postvoid residuals      Moderate protein-calorie malnutrition ([address] )  Assessment & Plan  Malnutrition Findings:   Adult Malnutrition type: Acute illness (acute moderate pro, jimi malnutrition d/t decreased appetite as evidence by <75% energy intake > 7 days, 7 2% wt  loss x 1 month, 1+ edema, mild muscle/fat loss of clavicles, temples, orbitals)  Adult Degree of Malnutrition: Malnutrition of moderate degree (treated with oral diet and supplements, consider appetite stimulant if po doesn't improve)    BMI Findings: Body mass index is 25 02 kg/m²  Fall at home, initial encounter  [address] at home  At risk for falls  Safe ambulation  Fall precautions  Physical therapy    Adult failure to thrive  Assessment & Plan  · Multifactorial  · Supportive care    Hyperkalemia  Assessment & Plan  · Hyperkalemia in setting of GAY  Treated in ED with hyperkalemia cocktail  · Bicarb GTT  · Continue to hold lisinopril  · Monitor closely    Anemia in stage 4 chronic kidney disease (HCC)  Assessment & Plan  · Monitor hemoglobin    Urinary retention  Assessment & Plan  ·  Reports urinary retention, recently treated for UTI     ·  UA negative for cystitis  ·  Monitor with urinary retention protocol    Secondary hyperparathyroidism of renal origin [facility])  Assessment & Plan  ·  Continue Sensipar MWF    Type 2 diabetes mellitus with diabetic chronic kidney disease [facility])  Assessment & Plan  Lab Results   Component Value Date    HGBA1C 5 1 10/12/2021       Recent Labs     22  2103 22  0801 22  1133 22  1143   POCGLU 160* 89 129 113       Blood Sugar Average: Last 72 hrs:  (P) 147 6     Monitor Accu-Cheks  Avoid hypoglycemia  Hypoglycemia protocol in place    Acute cystitis without hematuria  Assessment & Plan  ·  CT shows bladder wall thickening and perivesical inflammatory changes along with air noted in the bladder  · Empirical IV antibiotics  · Continue 3 dose of IV ceftriaxone  · Follow-up on cultures  · Supportive care    Pure hypercholesterolemia  Assessment & Plan  ·  Continue statin            VTE Pharmacologic Prophylaxis: VTE Score: 5 High Risk (Score >/= 5) - Pharmacological DVT Prophylaxis Ordered: enoxaparin (Lovenox)  Sequential Compression Devices Ordered  Patient Centered Rounds: I performed bedside rounds with nursing staff today  Discussions with Specialists or Other Care Team Provider:  Nephrology    Education and Discussions with Family / Patient: Discussed the patient, daughter at bedside updated in detail questions answered  Time Spent for Care: 30 minutes  More than 50% of total time spent on counseling and coordination of care as described above      Current Length of Stay: 2 day(s)  Current Patient Status: Inpatient   Certification Statement: The patient will continue to require additional inpatient hospital stay due to As outlined  Discharge Plan: Awaiting clinical and symptomatic improvement    Code Status: Level 3 - DNAR and DNI    Subjective:     Comfortably sitting up in chair  Reports feeling better  Appetite improving  History chart labs medications reviewed  Daughter at bedside updated    Objective:     Vitals:   Temp (24hrs), Av 3 °F (36 3 °C), Min:97 1 °F (36 2 °C), Max:97 4 °F (36 3 °C)    Temp:  [97 1 °F (36 2 °C)-97 4 °F (36 3 °C)] 97 1 °F (36 2 °C)  HR:  [55-65] 55  Resp:  [17-18] 17  BP: (116-142)/(51-60) 131/53  SpO2:  [98 %-99 %] 99 %  Body mass index is 25 02 kg/m²  Input and Output Summary (last 24 hours):      Intake/Output Summary (Last 24 hours) at 2/25/2022 1426  Last data filed at 2/25/2022 1315  Gross per 24 hour   Intake 2140 ml   Output 15 ml   Net 2125 ml       Physical Exam:   Physical Exam     Comfortably in bed  Features of protein calorie malnutrition noted  Neck supple  Lungs diminished breath sounds bilateral  Heart sounds S1-S2 noted  Abdomen soft  Awake alert obeys simple commands  No pedal edema  No rash    Additional Data:     Labs:  Results from last 7 days   Lab Units 02/25/22  0449   WBC Thousand/uL 6 71   HEMOGLOBIN g/dL 8 7*   HEMATOCRIT % 27 4*   PLATELETS Thousands/uL 108*   NEUTROS PCT % 57   LYMPHS PCT % 29   MONOS PCT % 10   EOS PCT % 3     Results from last 7 days   Lab Units 02/25/22  0449   SODIUM mmol/L 139   POTASSIUM mmol/L 5 8*   CHLORIDE mmol/L 114*   CO2 mmol/L 16*   BUN mg/dL 82*   CREATININE mg/dL 2 76*   ANION GAP mmol/L 9   CALCIUM mg/dL 9 1   ALBUMIN g/dL 2 3*   TOTAL BILIRUBIN mg/dL 0 18*   ALK PHOS U/L 78   ALT U/L 14   AST U/L 12   GLUCOSE RANDOM mg/dL 94         Results from last 7 days   Lab Units 02/25/22  1143 02/25/22  1133 02/25/22  0801 02/24/22  2103 02/24/22  1610 02/24/22  1119 02/24/22  0717 02/24/22  0018 02/23/22  2329 02/23/22 2016   POC GLUCOSE mg/dl 113 129 89 160* 191* 150* 107 153* 156* 228*               Lines/Drains:  Invasive Devices  Report    Peripheral Intravenous Line            Peripheral IV 02/24/22 Right;Ventral (anterior) Forearm <1 day                      Imaging: Reviewed radiology reports from this admission including: abdominal/pelvic CT and CT head    Recent Cultures (last 7 days):   Results from last 7 days   Lab Units 02/24/22  0119 02/24/22  0056   URINE CULTURE  50,000-59,000 cfu/ml  60,000-69,000 cfu/ml        Last 24 Hours Medication List:   Current Facility-Administered Medications   Medication Dose Route Frequency Provider Last Rate    acetaminophen  650 mg Oral Q6H PRN Nava Grade, CRNP      aluminum-magnesium hydroxide-simethicone  30 mL Oral Q6H PRN Nava Grade, CRNP      amLODIPine  5 mg Oral BID Nava Grade, CRNP      aspirin  81 mg Oral Daily Nava Grade, CRNP      bacitracin  1 small application Topical BID Nava Grade, CRNP      bisacodyl  10 mg Oral Daily PRN Nava Grade, CRNP      cefTRIAXone  1,000 mg Intravenous Once [employer], DO      cefTRIAXone  1,000 mg Intravenous Q24H Cristy Pitts MD      cinacalcet  30 mg Oral Once per day on Mon Wed Fri Nava Grade, CRNP      citalopram  10 mg Oral Daily Nava Grade, CRNP      docusate sodium  100 mg Oral Daily Nava Grade, CRNP      enoxaparin  30 mg Subcutaneous Daily Nava Grade, CRNP      furosemide  20 mg Intravenous Once Cristy Pitts MD      gabapentin  300 mg Oral HS Nava Grade, CRNP      insulin lispro  1-5 Units Subcutaneous 4x Daily (AC & HS) Nava Grade, CRNP      ondansetron  4 mg Intravenous Q6H PRN Nava Grade, CRNP      pravastatin  40 mg Oral Daily With [employer], CRNP      simethicone  80 mg Oral 4x Daily PRN Nava Grade, CRNP      sodium bicarbonate infusion  50 mL/hr Intravenous Continuous Cristy Pitts MD 50 mL/hr (02/25/22 1259)        Today, Patient Was Seen By: Cristy Pitts MD    **Please Note: This note may have been constructed using a voice recognition system  **

## 2022-02-25 NOTE — CASE MANAGEMENT
Case Management Progress Note    Patient name Vilma Mcgraw  [facility] [address] [facility] [phone] MRN [id number]  : 1927 Date 2022       LOS (days): 2  Geometric Mean LOS (GMLOS) (days): 3 10  Days to GMLOS:1 5        OBJECTIVE:        Current admission status: Inpatient  Preferred Pharmacy:   [facility] [address] [place] [address]  Phone: [phone] Fax: [phone]    Primary Care Provider: Elva Waldrop MD    Primary Insurance: [facility]  Secondary Insurance:     PROGRESS NOTE:    PT/OT recommending rehab, referrals sent to [facility] and [address] [place] [address] [facility] is reviewing, [facility] unable to accept unvaccinated patients  CM spoke with patient regardig vaccination status, patient states 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

## 2022-02-25 NOTE — PLAN OF CARE
Problem: PHYSICAL THERAPY ADULT  Goal: Performs mobility at highest level of function for planned discharge setting  See evaluation for individualized goals  Description: Treatment/Interventions: Functional transfer training,LE strengthening/ROM,Elevations,Therapeutic exercise,Endurance training,Patient/family training,Equipment eval/education,Bed mobility,Gait training,Compensatory technique education,Continued evaluation,Spoke to nursing,Family  Equipment Recommended: Colin Capps (jerad)       See flowsheet documentation for full assessment, interventions and recommendations  Outcome: Progressing  Note: Prognosis: Good  Problem List: Decreased endurance,Impaired balance,Decreased mobility,Impaired hearing,Decreased skin integrity,Decreased coordination,Decreased strength  Assessment: Pt seen for PT treatment session and progression of mobility  Pt  Offers no co pain  Pt reports feeling better  Pt  Is requiring min assist x1 for transfers, bed mobility and ambulation on level tile surfaces due to decreased endurance, balance, gait deviations overall strength and mobility  Pt  Cues to remain within walker bounds with turns and to maintain close distance to walker at all times required  Gait deviations of short strides, decreased foot clearance and step to gait progressng to step through gait with improved foot clearance  Pt progressed with ambulation distances to 12', 15' and 35' x2  Pt requires min assist x1 for toilet transfers and cg close supervision while performing hand washing and drying at sink  Pt  Performs seated b/l le arom exercises without difficulty  Pt remained seated in recliner at conclusion of PT session  Chair alarm activated  Given impairments pt remains at increased risk for falls  Continue to recommend STR at d/c in order to optimize functional outcomes and mobility      Barriers to Discharge: Decreased caregiver support  Barriers to Discharge Comments: lives alone     PT Discharge Recommendation: Post acute rehabilitation services          See flowsheet documentation for full assessment

## 2022-02-25 NOTE — ASSESSMENT & PLAN NOTE
· Hyperkalemia in setting of GAY     Treated in ED with hyperkalemia cocktail  · Bicarb GTT  · Continue to hold lisinopril  · Monitor closely

## 2022-02-26 LAB
ANION GAP SERPL CALCULATED.3IONS-SCNC: 6 MMOL/L (ref 4–13)
BUN SERPL-MCNC: 65 MG/DL (ref 5–25)
CALCIUM SERPL-MCNC: 8.6 MG/DL (ref 8.3–10.1)
CHLORIDE SERPL-SCNC: 112 MMOL/L (ref 100–108)
CO2 SERPL-SCNC: 23 MMOL/L (ref 21–32)
CREAT SERPL-MCNC: 2.25 MG/DL (ref 0.6–1.3)
GFR SERPL CREATININE-BSD FRML MDRD: 18 ML/MIN/1.73SQ M
GLUCOSE SERPL-MCNC: 114 MG/DL (ref 65–140)
GLUCOSE SERPL-MCNC: 117 MG/DL (ref 65–140)
GLUCOSE SERPL-MCNC: 120 MG/DL (ref 65–140)
GLUCOSE SERPL-MCNC: 89 MG/DL (ref 65–140)
GLUCOSE SERPL-MCNC: 90 MG/DL (ref 65–140)
MAGNESIUM SERPL-MCNC: 2.2 MG/DL (ref 1.6–2.6)
PHOSPHATE SERPL-MCNC: 3.4 MG/DL (ref 2.3–4.1)
POTASSIUM SERPL-SCNC: 5.2 MMOL/L (ref 3.5–5.3)
SODIUM SERPL-SCNC: 141 MMOL/L (ref 136–145)

## 2022-02-26 PROCEDURE — 83735 ASSAY OF MAGNESIUM: CPT | Performed by: INTERNAL MEDICINE

## 2022-02-26 PROCEDURE — 99232 SBSQ HOSP IP/OBS MODERATE 35: CPT | Performed by: INTERNAL MEDICINE

## 2022-02-26 PROCEDURE — 80048 BASIC METABOLIC PNL TOTAL CA: CPT | Performed by: INTERNAL MEDICINE

## 2022-02-26 PROCEDURE — 84100 ASSAY OF PHOSPHORUS: CPT | Performed by: INTERNAL MEDICINE

## 2022-02-26 PROCEDURE — 82948 REAGENT STRIP/BLOOD GLUCOSE: CPT

## 2022-02-26 RX ORDER — SODIUM BICARBONATE 650 MG/1
1300 TABLET ORAL
Status: DISCONTINUED | OUTPATIENT
Start: 2022-02-26 | End: 2022-02-27

## 2022-02-26 RX ADMIN — DOCUSATE SODIUM 100 MG: 100 CAPSULE ORAL at 08:53

## 2022-02-26 RX ADMIN — CITALOPRAM HYDROBROMIDE 10 MG: 20 TABLET ORAL at 08:53

## 2022-02-26 RX ADMIN — ASPIRIN 81 MG: 81 TABLET, COATED ORAL at 08:53

## 2022-02-26 RX ADMIN — SODIUM BICARBONATE 650 MG TABLET 1300 MG: at 17:42

## 2022-02-26 RX ADMIN — HEPARIN SODIUM 5000 UNITS: 5000 INJECTION INTRAVENOUS; SUBCUTANEOUS at 05:08

## 2022-02-26 RX ADMIN — AMLODIPINE BESYLATE 5 MG: 5 TABLET ORAL at 17:42

## 2022-02-26 RX ADMIN — PRAVASTATIN SODIUM 40 MG: 40 TABLET ORAL at 17:43

## 2022-02-26 RX ADMIN — GABAPENTIN 300 MG: 300 CAPSULE ORAL at 21:19

## 2022-02-26 RX ADMIN — HEPARIN SODIUM 5000 UNITS: 5000 INJECTION INTRAVENOUS; SUBCUTANEOUS at 13:29

## 2022-02-26 RX ADMIN — SODIUM BICARBONATE 650 MG TABLET 1300 MG: at 13:29

## 2022-02-26 RX ADMIN — AMLODIPINE BESYLATE 5 MG: 5 TABLET ORAL at 08:53

## 2022-02-26 RX ADMIN — HEPARIN SODIUM 5000 UNITS: 5000 INJECTION INTRAVENOUS; SUBCUTANEOUS at 21:19

## 2022-02-26 NOTE — PROGRESS NOTES
[address] [facility]  Progress Note - Ya Luis 2/24/1927, 80 y o  female MRN: [id number]  Unit/Bed#: E5 -01 Encounter: [id number]  Primary Care Provider: Nessa Baig MD   Date and time admitted to hospital: 2/23/2022  4:49 PM    * Acute kidney injury superimposed on CKD [facility])  Assessment & Plan  Lab Results   Component Value Date    EGFR 18 02/26/2022    EGFR 14 02/25/2022    EGFR 14 02/24/2022    CREATININE 2 25 (H) 02/26/2022    CREATININE 2 76 (H) 02/25/2022    CREATININE 2 64 (H) 02/24/2022       · Acute on chronic kidney disease stage 4  · Acidosis, hyperkalemia improved with IV bicarbonate GTT  · Will place on p o  Bicarbonate tablet 1300 b i d   · Monitor kidney function closely  · Avoid nephrotoxins  · Monitor postvoid residuals  · Outpatient Nephrology follow-up recommended    Moderate protein-calorie malnutrition ([address] )  Assessment & Plan  Malnutrition Findings:   Adult Malnutrition type: Acute illness (acute moderate pro, jimi malnutrition d/t decreased appetite as evidence by <75% energy intake > 7 days, 7 2% wt  loss x 1 month, 1+ edema, mild muscle/fat loss of clavicles, temples, orbitals)  Adult Degree of Malnutrition: Malnutrition of moderate degree (treated with oral diet and supplements, consider appetite stimulant if po doesn't improve)    BMI Findings: Body mass index is 25 02 kg/m²  Fall at home, initial encounter  [address] at home  At risk for falls  Safe ambulation  Fall precautions  Physical therapy    Adult failure to thrive  Assessment & Plan  · Multifactorial  · Supportive care    Hyperkalemia  Assessment & Plan  · Hyperkalemia in the setting of acute kidney injury/chronic kidney disease stage 4  · Improved   · Monitor closely    Anemia in stage 4 chronic kidney disease (HCC)  Assessment & Plan  · Monitor hemoglobin    Urinary retention  Assessment & Plan  ·  Reports urinary retention, recently treated for UTI     ·  UA negative for cystitis  ·  Monitor with urinary retention protocol    Secondary hyperparathyroidism of renal origin [facility])  Assessment & Plan  ·  Continue Sensipar MWF    Type 2 diabetes mellitus with diabetic chronic kidney disease [facility])  Assessment & Plan  Lab Results   Component Value Date    HGBA1C 5 1 10/12/2021       Recent Labs     02/25/22  1608 02/25/22  2116 02/26/22  0702 02/26/22  1111   POCGLU 90 105 90 114       Blood Sugar Average: Last 72 hrs:  (P) 133 [id number]     Monitor Accu-Cheks  Avoid hypoglycemia  Hypoglycemia protocol in place    Acute cystitis without hematuria  Assessment & Plan  ·  CT shows bladder wall thickening and perivesical inflammatory changes along with air noted in the bladder  · Received empirical IV ceftriaxone 3 doses  · Monitor closely    Pure hypercholesterolemia  Assessment & Plan  ·  Continue statin          VTE Pharmacologic Prophylaxis: VTE Score: 5 High Risk (Score >/= 5) - Pharmacological DVT Prophylaxis Ordered: heparin  Sequential Compression Devices Ordered  Patient Centered Rounds: I performed bedside rounds with nursing staff today  Discussions with Specialists or Other Care Team Provider:     Education and Discussions with Family / Patient: Discussed with the patient, granddaughter at bedside updated in detail questions answered  Time Spent for Care: 30 minutes  More than 50% of total time spent on counseling and coordination of care as described above      Current Length of Stay: 3 day(s)  Current Patient Status: Inpatient   Certification Statement: The patient will continue to require additional inpatient hospital stay due to As outlined  Discharge Plan: Pending placement case management following    Code Status: Level 3 - DNAR and DNI    Subjective:     Comfortably sitting up in chair  Reports feeling better today  Encouraged out of bed into chair  Encourage incentive spirometry  Appetite improving  Granddaughter at bedside    Objective:     Vitals:   Temp (24hrs), Av 7 °F (36 5 °C), Min:97 4 °F (36 3 °C), Max:98 °F (36 7 °C)    Temp:  [97 4 °F (36 3 °C)-98 °F (36 7 °C)] 97 7 °F (36 5 °C)  HR:  [53-64] 53  Resp:  [19] 19  BP: (125-130)/(49-70) 125/70  SpO2:  [97 %-99 %] 97 %  Body mass index is 25 02 kg/m²  Input and Output Summary (last 24 hours): Intake/Output Summary (Last 24 hours) at 2022 1350  Last data filed at 2022 1332  Gross per 24 hour   Intake --   Output 1150 ml   Net -1150 ml       Physical Exam:   Physical Exam     Sitting up in chair  Neck supple  Lungs diminished breath sounds bilaterally  No additional sounds  Heart sounds S1-S2 noted  Abdomen soft  Awake alert obeys simple commands  No pedal edema  No rash    Additional Data:     Labs:  Results from last 7 days   Lab Units 22  0449   WBC Thousand/uL 6 71   HEMOGLOBIN g/dL 8 7*   HEMATOCRIT % 27 4*   PLATELETS Thousands/uL 108*   NEUTROS PCT % 57   LYMPHS PCT % 29   MONOS PCT % 10   EOS PCT % 3     Results from last 7 days   Lab Units 22  0525 22  0449 22  0449   SODIUM mmol/L 141   < > 139   POTASSIUM mmol/L 5 2   < > 5 8*   CHLORIDE mmol/L 112*   < > 114*   CO2 mmol/L 23   < > 16*   BUN mg/dL 65*   < > 82*   CREATININE mg/dL 2 25*   < > 2 76*   ANION GAP mmol/L 6   < > 9   CALCIUM mg/dL 8 6   < > 9 1   ALBUMIN g/dL  --   --  2 3*   TOTAL BILIRUBIN mg/dL  --   --  0 18*   ALK PHOS U/L  --   --  78   ALT U/L  --   --  14   AST U/L  --   --  12   GLUCOSE RANDOM mg/dL 89   < > 94    < > = values in this interval not displayed           Results from last 7 days   Lab Units 22  1111 22  0702 22  2116 22  1608 22  1143 22  1133 22  0801 22  2103 22  1610 22  1119 22  0717 22  0018   POC GLUCOSE mg/dl 114 90 105 90 113 129 89 160* 191* 150* 107 153*               Lines/Drains:  Invasive Devices  Report    Peripheral Intravenous Line            Peripheral IV 22 Right;Ventral (anterior) Forearm 1 day                      Imaging: No pertinent imaging reviewed  Recent Cultures (last 7 days):   Results from last 7 days   Lab Units 02/24/22  0119 02/24/22  0056   URINE CULTURE  50,000-59,000 cfu/ml  60,000-69,000 cfu/ml        Last 24 Hours Medication List:   Current Facility-Administered Medications   Medication Dose Route Frequency Provider Last Rate    acetaminophen  650 mg Oral Q6H PRN Omega Crank, CRNP      aluminum-magnesium hydroxide-simethicone  30 mL Oral Q6H PRN Omega Crank, CRNP      amLODIPine  5 mg Oral BID Omega Crank, CRNP      aspirin  81 mg Oral Daily Omega Crank, CRNP      bisacodyl  10 mg Oral Daily PRN Omega Crank, CRNP      cefTRIAXone  1,000 mg Intravenous Once [employer], DO      cinacalcet  30 mg Oral Once per day on Mon Wed Fri Omega Crank, CRNP      citalopram  10 mg Oral Daily Omega Crank, CRNP      docusate sodium  100 mg Oral Daily Omega Crank, CRNP      gabapentin  300 mg Oral HS [place] Crank, CRNP      heparin (porcine)  5,000 Units Subcutaneous Q8H [address] Abhay Valladares MD      insulin lispro  1-5 Units Subcutaneous 4x Daily (AC & HS) Omega Crank, CRNP      ondansetron  4 mg Intravenous Q6H PRN Omega Crank, CRNP      pravastatin  40 mg Oral Daily With [employer], CRNP      simethicone  80 mg Oral 4x Daily PRN Omega Crank, CRNP      sodium bicarbonate  1,300 mg Oral BID after meals Abhay Valladares MD          Today, Patient Was Seen By: Abhay Valladares MD    **Please Note: This note may have been constructed using a voice recognition system  **

## 2022-02-26 NOTE — ASSESSMENT & PLAN NOTE
· Hyperkalemia in the setting of acute kidney injury/chronic kidney disease stage 4  · Improved   · Monitor closely

## 2022-02-26 NOTE — PLAN OF CARE
Problem: Nutrition/Hydration-ADULT  Goal: Nutrient/Hydration intake appropriate for improving, restoring or maintaining nutritional needs  Description: Monitor and assess patient's nutrition/hydration status for malnutrition  Collaborate with interdisciplinary team and initiate plan and interventions as ordered  Monitor patient's weight and dietary intake as ordered or per policy  Utilize nutrition screening tool and intervene as necessary  Determine patient's food preferences and provide high-protein, high-caloric foods as appropriate       INTERVENTIONS:  - Monitor oral intake, urinary output, labs, and treatment plans  - Assess nutrition and hydration status and recommend course of action  - Evaluate amount of meals eaten  - Assist patient with eating if necessary   - Allow adequate time for meals  - Recommend/ encourage appropriate diets, oral nutritional supplements, and vitamin/mineral supplements  - Order, calculate, and assess calorie counts as needed  - Recommend, monitor, and adjust tube feedings and TPN/PPN based on assessed needs  - Assess need for intravenous fluids  - Provide specific nutrition/hydration education as appropriate  - Include patient/family/caregiver in decisions related to nutrition  Outcome: Progressing     Problem: Potential for Falls  Goal: Patient will remain free of falls  Description: INTERVENTIONS:  - Educate patient/family on patient safety including physical limitations  - Instruct patient to call for assistance with activity   - Consult OT/PT to assist with strengthening/mobility   - Keep Call bell within reach  - Keep bed low and locked with side rails adjusted as appropriate  - Keep care items and personal belongings within reach  - Initiate and maintain comfort rounds  - Make Fall Risk Sign visible to staff  - Offer Toileting every 2 Hours, in advance of need  - Initiate/Maintain bed alarm  - Obtain necessary fall risk management equipment:   - Apply yellow socks and bracelet for high fall risk patients  - Consider moving patient to room near nurses station  Outcome: Progressing     Problem: MOBILITY - ADULT  Goal: Maintain or return to baseline ADL function  Description: INTERVENTIONS:  -  Assess patient's ability to carry out ADLs; assess patient's baseline for ADL function and identify physical deficits which impact ability to perform ADLs (bathing, care of mouth/teeth, toileting, grooming, dressing, etc )  - Assess/evaluate cause of self-care deficits   - Assess range of motion  - Assess patient's mobility; develop plan if impaired  - Assess patient's need for assistive devices and provide as appropriate  - Encourage maximum independence but intervene and supervise when necessary  - Involve family in performance of ADLs  - Assess for home care needs following discharge   - Consider OT consult to assist with ADL evaluation and planning for discharge  - Provide patient education as appropriate  Outcome: Progressing  Goal: Maintains/Returns to pre admission functional level  Description: INTERVENTIONS:  - Perform BMAT or MOVE assessment daily    - Set and communicate daily mobility goal to care team and patient/family/caregiver     - Collaborate with rehabilitation services on mobility goals if consulted  - Out of bed for toileting  - Record patient progress and toleration of activity level   Outcome: Progressing     Problem: Prexisting or High Potential for Compromised Skin Integrity  Goal: Skin integrity is maintained or improved  Description: INTERVENTIONS:  - Identify patients at risk for skin breakdown  - Assess and monitor skin integrity  - Assess and monitor nutrition and hydration status  - Monitor labs   - Assess for incontinence   - Turn and reposition patient  - Assist with mobility/ambulation  - Relieve pressure over bony prominences  - Avoid friction and shearing  - Provide appropriate hygiene as needed including keeping skin clean and dry  - Evaluate need for skin moisturizer/barrier cream  - Collaborate with interdisciplinary team   - Patient/family teaching  - Consider wound care consult   Outcome: Progressing     Problem: PAIN - ADULT  Goal: Verbalizes/displays adequate comfort level or baseline comfort level  Description: Interventions:  - Encourage patient to monitor pain and request assistance  - Assess pain using appropriate pain scale  - Administer analgesics based on type and severity of pain and evaluate response  - Implement non-pharmacological measures as appropriate and evaluate response  - Consider cultural and social influences on pain and pain management  - Notify physician/advanced practitioner if interventions unsuccessful or patient reports new pain  Outcome: Progressing     Problem: Knowledge Deficit  Goal: Patient/family/caregiver demonstrates understanding of disease process, treatment plan, medications, and discharge instructions  Description: Complete learning assessment and assess knowledge base    Interventions:  - Provide teaching at level of understanding  - Provide teaching via preferred learning methods  Outcome: Progressing     Problem: DISCHARGE PLANNING  Goal: Discharge to home or other facility with appropriate resources  Description: INTERVENTIONS:  - Identify barriers to discharge w/patient and caregiver  - Arrange for needed discharge resources and transportation as appropriate  - Identify discharge learning needs (meds, wound care, etc )  - Arrange for interpretive services to assist at discharge as needed  - Refer to Case Management Department for coordinating discharge planning if the patient needs post-hospital services based on physician/advanced practitioner order or complex needs related to functional status, cognitive ability, or social support system  Outcome: Progressing     Problem: Knowledge Deficit  Goal: Patient/family/caregiver demonstrates understanding of disease process, treatment plan, medications, and discharge instructions  Description: Complete learning assessment and assess knowledge base  Interventions:  - Provide teaching at level of understanding  - Provide teaching via preferred learning methods  Outcome: Progressing     Problem: NEUROSENSORY - ADULT  Goal: Achieves stable or improved neurological status  Description: INTERVENTIONS  - Monitor and report changes in neurological status  - Monitor vital signs such as temperature, blood pressure, glucose, and any other labs ordered   - Initiate measures to prevent increased intracranial pressure  - Monitor for seizure activity and implement precautions if appropriate      Outcome: Progressing  Goal: Achieves maximal functionality and self care  Description: INTERVENTIONS  - Monitor swallowing and airway patency with patient fatigue and changes in neurological status  - Encourage and assist patient to increase activity and self care     - Encourage visually impaired, hearing impaired and aphasic patients to use assistive/communication devices  Outcome: Progressing     Problem: RESPIRATORY - ADULT  Goal: Achieves optimal ventilation and oxygenation  Description: INTERVENTIONS:  - Assess for changes in respiratory status  - Assess for changes in mentation and behavior  - Position to facilitate oxygenation and minimize respiratory effort  - Oxygen administered by appropriate delivery if ordered  - Initiate smoking cessation education as indicated  - Encourage broncho-pulmonary hygiene including cough, deep breathe, Incentive Spirometry  - Assess the need for suctioning and aspirate as needed  - Assess and instruct to report SOB or any respiratory difficulty  - Respiratory Therapy support as indicated  Outcome: Progressing     Problem: GASTROINTESTINAL - ADULT  Goal: Minimal or absence of nausea and/or vomiting  Description: INTERVENTIONS:  - Administer IV fluids if ordered to ensure adequate hydration  - Maintain NPO status until nausea and vomiting are resolved  - Nasogastric tube if ordered  - Administer ordered antiemetic medications as needed  - Provide nonpharmacologic comfort measures as appropriate  - Advance diet as tolerated, if ordered  - Consider nutrition services referral to assist patient with adequate nutrition and appropriate food choices  Outcome: Progressing  Goal: Maintains or returns to baseline bowel function  Description: INTERVENTIONS:  - Assess bowel function  - Encourage oral fluids to ensure adequate hydration  - Administer IV fluids if ordered to ensure adequate hydration  - Administer ordered medications as needed  - Encourage mobilization and activity  - Consider nutritional services referral to assist patient with adequate nutrition and appropriate food choices  Outcome: Progressing  Goal: Maintains adequate nutritional intake  Description: INTERVENTIONS:  - Monitor percentage of each meal consumed  - Identify factors contributing to decreased intake, treat as appropriate  - Assist with meals as needed  - Monitor I&O, weight, and lab values if indicated  - Obtain nutrition services referral as needed  Outcome: Progressing  Goal: Oral mucous membranes remain intact  Description: INTERVENTIONS  - Assess oral mucosa and hygiene practices  - Implement preventative oral hygiene regimen  - Implement oral medicated treatments as ordered  - Initiate Nutrition services referral as needed  Outcome: Progressing     Problem: GENITOURINARY - ADULT  Goal: Maintains or returns to baseline urinary function  Description: INTERVENTIONS:  - Assess urinary function  - Encourage oral fluids to ensure adequate hydration if ordered  - Administer IV fluids as ordered to ensure adequate hydration  - Administer ordered medications as needed  - Offer frequent toileting  - Follow urinary retention protocol if ordered  Outcome: Progressing  Goal: Absence of urinary retention  Description: INTERVENTIONS:  - Assess patients ability to void and empty bladder  - Monitor I/O  - Bladder scan as needed  - Discuss with physician/AP medications to alleviate retention as needed  - Discuss catheterization for long term situations as appropriate  Outcome: Progressing     Problem: SKIN/TISSUE INTEGRITY - ADULT  Goal: Skin Integrity remains intact(Skin Breakdown Prevention)  Description: Assess:  -Perform [place] assessment every shift  -Assess extremities for adequate circulation and sensation     Bed Management:  -Have minimal linens on bed & keep smooth, unwrinkled  -Change linens as needed when moist or perspiring  -Avoid sitting or lying in one position for more than 2 hours while in bed  -Keep HOB at 30 degrees     Toileting:  -Offer bedside commode  -Assess for incontinence every shift    Activity:  -Encourage activity and walks on unit  -Encourage or provide ROM exercises   -Turn and reposition patient every 2 Hours  -Use appropriate equipment to lift or move patient in bed    Skin Care:  -Avoid use of baby powder, tape, friction and shearing, hot water or constrictive clothing  -Relieve pressure over bony prominences using pillows  -Do not massage red bony areas    Outcome: Progressing  Goal: Incision(s), wounds(s) or drain site(s) healing without S/S of infection  Description: INTERVENTIONS  - Assess and document dressing, incision, wound bed, drain sites and surrounding tissue  - Provide patient and family education  Outcome: Progressing     Problem: MUSCULOSKELETAL - ADULT  Goal: Maintain or return mobility to safest level of function  Description: INTERVENTIONS:  - Assess patient's ability to carry out ADLs; assess patient's baseline for ADL function and identify physical deficits which impact ability to perform ADLs (bathing, care of mouth/teeth, toileting, grooming, dressing, etc )  - Assess/evaluate cause of self-care deficits   - Assess range of motion  - Assess patient's mobility  - Assess patient's need for assistive devices and provide as appropriate  - Encourage maximum independence but intervene and supervise when necessary  - Involve family in performance of ADLs  - Assess for home care needs following discharge   - Consider OT consult to assist with ADL evaluation and planning for discharge  - Provide patient education as appropriate  Outcome: Progressing  Goal: Maintain proper alignment of affected body part  Description: INTERVENTIONS:  - Support, maintain and protect limb and body alignment  - Provide patient/ family with appropriate education  Outcome: Progressing

## 2022-02-26 NOTE — ASSESSMENT & PLAN NOTE
·  CT shows bladder wall thickening and perivesical inflammatory changes along with air noted in the bladder  · Received empirical IV ceftriaxone 3 doses  · Monitor closely

## 2022-02-26 NOTE — PLAN OF CARE
Problem: Nutrition/Hydration-ADULT  Goal: Nutrient/Hydration intake appropriate for improving, restoring or maintaining nutritional needs  Description: Monitor and assess patient's nutrition/hydration status for malnutrition  Collaborate with interdisciplinary team and initiate plan and interventions as ordered  Monitor patient's weight and dietary intake as ordered or per policy  Utilize nutrition screening tool and intervene as necessary  Determine patient's food preferences and provide high-protein, high-caloric foods as appropriate       INTERVENTIONS:  - Monitor oral intake, urinary output, labs, and treatment plans  - Assess nutrition and hydration status and recommend course of action  - Evaluate amount of meals eaten  - Assist patient with eating if necessary   - Allow adequate time for meals  - Recommend/ encourage appropriate diets, oral nutritional supplements, and vitamin/mineral supplements  - Order, calculate, and assess calorie counts as needed  - Recommend, monitor, and adjust tube feedings and TPN/PPN based on assessed needs  - Assess need for intravenous fluids  - Provide specific nutrition/hydration education as appropriate  - Include patient/family/caregiver in decisions related to nutrition  Outcome: Progressing     Problem: Potential for Falls  Goal: Patient will remain free of falls  Description: INTERVENTIONS:  - Educate patient/family on patient safety including physical limitations  - Instruct patient to call for assistance with activity   - Consult OT/PT to assist with strengthening/mobility   - Keep Call bell within reach  - Keep bed low and locked with side rails adjusted as appropriate  - Keep care items and personal belongings within reach  - Initiate and maintain comfort rounds  - Apply yellow socks and bracelet for high fall risk patients  - Consider moving patient to room near nurses station  Outcome: Progressing     Problem: MOBILITY - ADULT  Goal: Maintain or return to baseline ADL function  Description: INTERVENTIONS:  - Educate patient/family on patient safety including physical limitations  - Instruct patient to call for assistance with activity   - Consult OT/PT to assist with strengthening/mobility   - Keep Call bell within reach  - Keep bed low and locked with side rails adjusted as appropriate  - Keep care items and personal belongings within reach  - Initiate and maintain comfort rounds  - Make Fall Risk Sign visible to staff  - Apply yellow socks and bracelet for high fall risk patients  - Consider moving patient to room near nurses station  Outcome: Progressing  Goal: Maintains/Returns to pre admission functional level  Description: INTERVENTIONS:  - Perform BMAT or MOVE assessment daily    - Set and communicate daily mobility goal to care team and patient/family/caregiver     - Collaborate with rehabilitation services on mobility goals if consulted  - Record patient progress and toleration of activity level   Outcome: Progressing     Problem: Prexisting or High Potential for Compromised Skin Integrity  Goal: Skin integrity is maintained or improved  Description: INTERVENTIONS:  - Identify patients at risk for skin breakdown  - Assess and monitor skin integrity  - Assess and monitor nutrition and hydration status  - Monitor labs   - Assess for incontinence   - Turn and reposition patient  - Assist with mobility/ambulation  - Relieve pressure over bony prominences  - Avoid friction and shearing  - Provide appropriate hygiene as needed including keeping skin clean and dry  - Evaluate need for skin moisturizer/barrier cream  - Collaborate with interdisciplinary team   - Patient/family teaching  - Consider wound care consult   Outcome: Progressing     Problem: PAIN - ADULT  Goal: Verbalizes/displays adequate comfort level or baseline comfort level  Description: Interventions:  - Encourage patient to monitor pain and request assistance  - Assess pain using appropriate pain scale  - Administer analgesics based on type and severity of pain and evaluate response  - Implement non-pharmacological measures as appropriate and evaluate response  - Consider cultural and social influences on pain and pain management  - Notify physician/advanced practitioner if interventions unsuccessful or patient reports new pain  Outcome: Progressing     Problem: INFECTION - ADULT  Goal: Absence or prevention of progression during hospitalization  Description: INTERVENTIONS:  - Assess and monitor for signs and symptoms of infection  - Monitor lab/diagnostic results  - Monitor all insertion sites, i e  indwelling lines, tubes, and drains  - Monitor endotracheal if appropriate and nasal secretions for changes in amount and color  - [place] appropriate cooling/warming therapies per order  - Administer medications as ordered  - Instruct and encourage patient and family to use good hand hygiene technique  - Identify and instruct in appropriate isolation precautions for identified infection/condition  Outcome: Progressing     Problem: SAFETY ADULT  Goal: Patient will remain free of falls  Description: INTERVENTIONS:  - Educate patient/family on patient safety including physical limitations  - Instruct patient to call for assistance with activity   - Consult OT/PT to assist with strengthening/mobility   - Keep Call bell within reach  - Keep bed low and locked with side rails adjusted as appropriate  - Keep care items and personal belongings within reach  - Initiate and maintain comfort rounds  - Apply yellow socks and bracelet for high fall risk patients  - Consider moving patient to room near nurses station  Outcome: Progressing  Goal: Maintain or return to baseline ADL function  Description: INTERVENTIONS:  - Educate patient/family on patient safety including physical limitations  - Instruct patient to call for assistance with activity   - Consult OT/PT to assist with strengthening/mobility   - Keep Call bell within reach  - Keep bed low and locked with side rails adjusted as appropriate  - Keep care items and personal belongings within reach  - Initiate and maintain comfort rounds  - Make Fall Risk Sign visible to staff  - Apply yellow socks and bracelet for high fall risk patients  - Consider moving patient to room near nurses station       Problem: DISCHARGE PLANNING  Goal: Discharge to home or other facility with appropriate resources  Description: INTERVENTIONS:  - Identify barriers to discharge w/patient and caregiver  - Arrange for needed discharge resources and transportation as appropriate  - Identify discharge learning needs (meds, wound care, etc )  - Arrange for interpretive services to assist at discharge as needed  - Refer to Case Management Department for coordinating discharge planning if the patient needs post-hospital services based on physician/advanced practitioner order or complex needs related to functional status, cognitive ability, or social support system  Outcome: Progressing     Problem: Knowledge Deficit  Goal: Patient/family/caregiver demonstrates understanding of disease process, treatment plan, medications, and discharge instructions  Description: Complete learning assessment and assess knowledge base    Interventions:  - Provide teaching at level of understanding  - Provide teaching via preferred learning methods  Outcome: Progressing     Problem: NEUROSENSORY - ADULT  Goal: Achieves stable or improved neurological status  Description: INTERVENTIONS  - Monitor and report changes in neurological status  - Monitor vital signs such as temperature, blood pressure, glucose, and any other labs ordered   - Initiate measures to prevent increased intracranial pressure  - Monitor for seizure activity and implement precautions if appropriate      Outcome: Progressing  Goal: Achieves maximal functionality and self care  Description: INTERVENTIONS  - Monitor swallowing and airway patency with patient fatigue and changes in neurological status  - Encourage and assist patient to increase activity and self care     - Encourage visually impaired, hearing impaired and aphasic patients to use assistive/communication devices  Outcome: Progressing     Problem: CARDIOVASCULAR - ADULT  Goal: Maintains optimal cardiac output and hemodynamic stability  Description: INTERVENTIONS:  - Monitor I/O, vital signs and rhythm  - Monitor for S/S and trends of decreased cardiac output  - Administer and titrate ordered vasoactive medications to optimize hemodynamic stability  - Assess quality of pulses, skin color and temperature  - Assess for signs of decreased coronary artery perfusion  - Instruct patient to report change in severity of symptoms  Outcome: Progressing  Goal: Absence of cardiac dysrhythmias or at baseline rhythm  Description: INTERVENTIONS:  - Continuous cardiac monitoring, vital signs, obtain 12 lead EKG if ordered  - Administer antiarrhythmic and heart rate control medications as ordered  - Monitor electrolytes and administer replacement therapy as ordered  Outcome: Progressing     Problem: RESPIRATORY - ADULT  Goal: Achieves optimal ventilation and oxygenation  Description: INTERVENTIONS:  - Assess for changes in respiratory status  - Assess for changes in mentation and behavior  - Position to facilitate oxygenation and minimize respiratory effort  - Oxygen administered by appropriate delivery if ordered  - Initiate smoking cessation education as indicated  - Encourage broncho-pulmonary hygiene including cough, deep breathe, Incentive Spirometry  - Assess the need for suctioning and aspirate as needed  - Assess and instruct to report SOB or any respiratory difficulty  - Respiratory Therapy support as indicated  Outcome: Progressing     Problem: GASTROINTESTINAL - ADULT  Goal: Minimal or absence of nausea and/or vomiting  Description: INTERVENTIONS:  - Administer IV fluids if ordered to ensure adequate hydration  - Maintain NPO status until nausea and vomiting are resolved  - Nasogastric tube if ordered  - Administer ordered antiemetic medications as needed  - Provide nonpharmacologic comfort measures as appropriate  - Advance diet as tolerated, if ordered  - Consider nutrition services referral to assist patient with adequate nutrition and appropriate food choices  Outcome: Progressing  Goal: Maintains or returns to baseline bowel function  Description: INTERVENTIONS:  - Assess bowel function  - Encourage oral fluids to ensure adequate hydration  - Administer IV fluids if ordered to ensure adequate hydration  - Administer ordered medications as needed  - Encourage mobilization and activity  - Consider nutritional services referral to assist patient with adequate nutrition and appropriate food choices  Outcome: Progressing  Goal: Maintains adequate nutritional intake  Description: INTERVENTIONS:  - Monitor percentage of each meal consumed  - Identify factors contributing to decreased intake, treat as appropriate  - Assist with meals as needed  - Monitor I&O, weight, and lab values if indicated  - Obtain nutrition services referral as needed  Outcome: Progressing  Goal: Oral mucous membranes remain intact  Description: INTERVENTIONS  - Assess oral mucosa and hygiene practices  - Implement preventative oral hygiene regimen  - Implement oral medicated treatments as ordered  - Initiate Nutrition services referral as needed  Outcome: Progressing     Problem: GENITOURINARY - ADULT  Goal: Maintains or returns to baseline urinary function  Description: INTERVENTIONS:  - Assess urinary function  - Encourage oral fluids to ensure adequate hydration if ordered  - Administer IV fluids as ordered to ensure adequate hydration  - Administer ordered medications as needed  - Offer frequent toileting  - Follow urinary retention protocol if ordered  Outcome: Progressing  Goal: Absence of urinary retention  Description: INTERVENTIONS:  - Assess patients ability to void and empty bladder  - Monitor I/O  - Bladder scan as needed  - Discuss with physician/AP medications to alleviate retention as needed  - Discuss catheterization for long term situations as appropriate  Outcome: Progressing     Problem: METABOLIC, FLUID AND ELECTROLYTES - ADULT  Goal: Electrolytes maintained within normal limits  Description: INTERVENTIONS:  - Monitor labs and assess patient for signs and symptoms of electrolyte imbalances  - Administer electrolyte replacement as ordered  - Monitor response to electrolyte replacements, including repeat lab results as appropriate  - Instruct patient on fluid and nutrition as appropriate  Outcome: Progressing  Goal: Fluid balance maintained  Description: INTERVENTIONS:  - Monitor labs   - Monitor I/O and WT  - Instruct patient on fluid and nutrition as appropriate  - Assess for signs & symptoms of volume excess or deficit  Outcome: Progressing  Goal: Glucose maintained within target range  Description: INTERVENTIONS:  - Monitor Blood Glucose as ordered  - Assess for signs and symptoms of hyperglycemia and hypoglycemia  - Administer ordered medications to maintain glucose within target range  - Assess nutritional intake and initiate nutrition service referral as needed  Outcome: Progressing           Problem: HEMATOLOGIC - ADULT  Goal: Maintains hematologic stability  Description: INTERVENTIONS  - Assess for signs and symptoms of bleeding or hemorrhage  - Monitor labs  - Administer supportive blood products/factors as ordered and appropriate  Outcome: Progressing     Problem: MUSCULOSKELETAL - ADULT  Goal: Maintain or return mobility to safest level of function  Description: INTERVENTIONS:  - Assess patient's ability to carry out ADLs; assess patient's baseline for ADL function and identify physical deficits which impact ability to perform ADLs (bathing, care of mouth/teeth, toileting, grooming, dressing, etc )  - Assess/evaluate cause of self-care deficits   - Assess range of motion  - Assess patient's mobility  - Assess patient's need for assistive devices and provide as appropriate  - Encourage maximum independence but intervene and supervise when necessary  - Involve family in performance of ADLs  - Assess for home care needs following discharge   - Consider OT consult to assist with ADL evaluation and planning for discharge  - Provide patient education as appropriate  Outcome: Progressing  Goal: Maintain proper alignment of affected body part  Description: INTERVENTIONS:  - Support, maintain and protect limb and body alignment  - Provide patient/ family with appropriate education  Outcome: Progressing

## 2022-02-26 NOTE — ASSESSMENT & PLAN NOTE
Lab Results   Component Value Date    EGFR 18 02/26/2022    EGFR 14 02/25/2022    EGFR 14 02/24/2022    CREATININE 2 25 (H) 02/26/2022    CREATININE 2 76 (H) 02/25/2022    CREATININE 2 64 (H) 02/24/2022       · Acute on chronic kidney disease stage 4  · Acidosis, hyperkalemia improved with IV bicarbonate GTT  · Will place on p o   Bicarbonate tablet 1300 b i d   · Monitor kidney function closely  · Avoid nephrotoxins  · Monitor postvoid residuals  · Outpatient Nephrology follow-up recommended

## 2022-02-27 LAB
ANION GAP SERPL CALCULATED.3IONS-SCNC: 10 MMOL/L (ref 4–13)
BASOPHILS # BLD AUTO: 0.01 THOUSANDS/ΜL (ref 0–0.1)
BASOPHILS NFR BLD AUTO: 0 % (ref 0–1)
BUN SERPL-MCNC: 64 MG/DL (ref 5–25)
CALCIUM SERPL-MCNC: 8.8 MG/DL (ref 8.3–10.1)
CHLORIDE SERPL-SCNC: 113 MMOL/L (ref 100–108)
CO2 SERPL-SCNC: 23 MMOL/L (ref 21–32)
CREAT SERPL-MCNC: 2.33 MG/DL (ref 0.6–1.3)
EOSINOPHIL # BLD AUTO: 0.11 THOUSAND/ΜL (ref 0–0.61)
EOSINOPHIL NFR BLD AUTO: 2 % (ref 0–6)
ERYTHROCYTE [DISTWIDTH] IN BLOOD BY AUTOMATED COUNT: 15.5 % (ref 11.6–15.1)
GFR SERPL CREATININE-BSD FRML MDRD: 17 ML/MIN/1.73SQ M
GLUCOSE SERPL-MCNC: 107 MG/DL (ref 65–140)
GLUCOSE SERPL-MCNC: 108 MG/DL (ref 65–140)
GLUCOSE SERPL-MCNC: 140 MG/DL (ref 65–140)
GLUCOSE SERPL-MCNC: 142 MG/DL (ref 65–140)
GLUCOSE SERPL-MCNC: 96 MG/DL (ref 65–140)
HCT VFR BLD AUTO: 24.6 % (ref 34.8–46.1)
HGB BLD-MCNC: 7.9 G/DL (ref 11.5–15.4)
IMM GRANULOCYTES # BLD AUTO: 0.04 THOUSAND/UL (ref 0–0.2)
IMM GRANULOCYTES NFR BLD AUTO: 1 % (ref 0–2)
LYMPHOCYTES # BLD AUTO: 1.3 THOUSANDS/ΜL (ref 0.6–4.47)
LYMPHOCYTES NFR BLD AUTO: 26 % (ref 14–44)
MCH RBC QN AUTO: 31.5 PG (ref 26.8–34.3)
MCHC RBC AUTO-ENTMCNC: 32.1 G/DL (ref 31.4–37.4)
MCV RBC AUTO: 98 FL (ref 82–98)
MONOCYTES # BLD AUTO: 0.39 THOUSAND/ΜL (ref 0.17–1.22)
MONOCYTES NFR BLD AUTO: 8 % (ref 4–12)
NEUTROPHILS # BLD AUTO: 3.15 THOUSANDS/ΜL (ref 1.85–7.62)
NEUTS SEG NFR BLD AUTO: 63 % (ref 43–75)
NRBC BLD AUTO-RTO: 0 /100 WBCS
PLATELET # BLD AUTO: 126 THOUSANDS/UL (ref 149–390)
PMV BLD AUTO: 12 FL (ref 8.9–12.7)
POTASSIUM SERPL-SCNC: 4.4 MMOL/L (ref 3.5–5.3)
RBC # BLD AUTO: 2.51 MILLION/UL (ref 3.81–5.12)
SODIUM SERPL-SCNC: 146 MMOL/L (ref 136–145)
WBC # BLD AUTO: 5 THOUSAND/UL (ref 4.31–10.16)

## 2022-02-27 PROCEDURE — 82948 REAGENT STRIP/BLOOD GLUCOSE: CPT

## 2022-02-27 PROCEDURE — 99232 SBSQ HOSP IP/OBS MODERATE 35: CPT | Performed by: INTERNAL MEDICINE

## 2022-02-27 PROCEDURE — 80048 BASIC METABOLIC PNL TOTAL CA: CPT | Performed by: INTERNAL MEDICINE

## 2022-02-27 PROCEDURE — 85025 COMPLETE CBC W/AUTO DIFF WBC: CPT | Performed by: INTERNAL MEDICINE

## 2022-02-27 RX ORDER — SODIUM BICARBONATE 650 MG/1
650 TABLET ORAL
Status: DISCONTINUED | OUTPATIENT
Start: 2022-02-27 | End: 2022-03-03 | Stop reason: HOSPADM

## 2022-02-27 RX ADMIN — AMLODIPINE BESYLATE 5 MG: 5 TABLET ORAL at 16:52

## 2022-02-27 RX ADMIN — PRAVASTATIN SODIUM 40 MG: 40 TABLET ORAL at 15:58

## 2022-02-27 RX ADMIN — AMLODIPINE BESYLATE 5 MG: 5 TABLET ORAL at 08:44

## 2022-02-27 RX ADMIN — ASPIRIN 81 MG: 81 TABLET, COATED ORAL at 08:45

## 2022-02-27 RX ADMIN — CITALOPRAM HYDROBROMIDE 10 MG: 20 TABLET ORAL at 08:45

## 2022-02-27 RX ADMIN — HEPARIN SODIUM 5000 UNITS: 5000 INJECTION INTRAVENOUS; SUBCUTANEOUS at 22:19

## 2022-02-27 RX ADMIN — SODIUM BICARBONATE 650 MG TABLET 1300 MG: at 08:44

## 2022-02-27 RX ADMIN — SODIUM BICARBONATE 650 MG TABLET 650 MG: at 16:52

## 2022-02-27 RX ADMIN — HEPARIN SODIUM 5000 UNITS: 5000 INJECTION INTRAVENOUS; SUBCUTANEOUS at 05:57

## 2022-02-27 RX ADMIN — DOCUSATE SODIUM 100 MG: 100 CAPSULE ORAL at 08:44

## 2022-02-27 RX ADMIN — HEPARIN SODIUM 5000 UNITS: 5000 INJECTION INTRAVENOUS; SUBCUTANEOUS at 15:58

## 2022-02-27 RX ADMIN — GABAPENTIN 300 MG: 300 CAPSULE ORAL at 22:19

## 2022-02-27 NOTE — ASSESSMENT & PLAN NOTE
Lab Results   Component Value Date    HGBA1C 5 1 10/12/2021       Recent Labs     02/26/22  1534 02/26/22  2124 02/27/22  0740 02/27/22  1120   POCGLU 117 120 108 142*       Blood Sugar Average: Last 72 hrs:  (P) 123 625     Monitor Accu-Cheks  Avoid hypoglycemia  Hypoglycemia protocol in place

## 2022-02-27 NOTE — PROGRESS NOTES
S[facility] 48  Progress Note - Fabienne Graver 2/24/1927, 80 y o  female MRN: [id number]  Unit/Bed#: E5 -01 Encounter: [id number]  Primary Care Provider: Wendi Glynn MD   Date and time admitted to hospital: 2/23/2022  4:49 PM    * Acute kidney injury superimposed on CKD [facility])  Assessment & Plan  Lab Results   Component Value Date    EGFR 17 02/27/2022    EGFR 18 02/26/2022    EGFR 14 02/25/2022    CREATININE 2 33 (H) 02/27/2022    CREATININE 2 25 (H) 02/26/2022    CREATININE 2 76 (H) 02/25/2022       · Acute on chronic kidney disease stage 4  · Acidosis, hyperkalemia improved with IV bicarbonate GTT  · Continue bicarbonate tablets  · Monitor kidney function closely  · Avoid nephrotoxins  · Monitor postvoid residuals  · Outpatient Nephrology follow-up recommended    Moderate protein-calorie malnutrition ([address] )  Assessment & Plan  Malnutrition Findings:   Adult Malnutrition type: Acute illness (acute moderate pro, jimi malnutrition d/t decreased appetite as evidence by <75% energy intake > 7 days, 7 2% wt  loss x 1 month, 1+ edema, mild muscle/fat loss of clavicles, temples, orbitals)  Adult Degree of Malnutrition: Malnutrition of moderate degree (treated with oral diet and supplements, consider appetite stimulant if po doesn't improve)    BMI Findings: Body mass index is 25 02 kg/m²  Fall at home, initial encounter  [address] at home  At risk for falls  Safe ambulation  Fall precautions  Physical therapy    Adult failure to thrive  Assessment & Plan  · Multifactorial  · Supportive care    Hyperkalemia  Assessment & Plan  · Hyperkalemia in the setting of acute kidney injury/chronic kidney disease stage 4  · Improved   · Monitor closely    Anemia in stage 4 chronic kidney disease (HCC)  Assessment & Plan  · Monitor hemoglobin    Urinary retention  Assessment & Plan  ·  Reports urinary retention, recently treated for UTI     ·  UA negative for cystitis  ·  Monitor with urinary retention protocol    Secondary hyperparathyroidism of renal origin [facility])  Assessment & Plan  ·  Continue Sensipar MWF    Type 2 diabetes mellitus with diabetic chronic kidney disease [facility])  Assessment & Plan  Lab Results   Component Value Date    HGBA1C 5 1 10/12/2021       Recent Labs     22  1534 22  2124 22  0740 22  1120   POCGLU 117 120 108 142*       Blood Sugar Average: Last 72 hrs:  (P) 123 625     Monitor Accu-Cheks  Avoid hypoglycemia  Hypoglycemia protocol in place    Acute cystitis without hematuria  Assessment & Plan  ·  CT shows bladder wall thickening and perivesical inflammatory changes along with air noted in the bladder  · Received empirical IV ceftriaxone 3 doses  · Monitor closely    Pure hypercholesterolemia  Assessment & Plan  ·  Continue statin        VTE Pharmacologic Prophylaxis: VTE Score: 5 High Risk (Score >/= 5) - Pharmacological DVT Prophylaxis Ordered: heparin  Sequential Compression Devices Ordered  Patient Centered Rounds: I performed bedside rounds with nursing staff today  Discussions with Specialists or Other Care Team Provider:     Education and Discussions with Family / Patient: patient, updated daughter Inez López in detail, questions answered  Time Spent for Care: 30 minutes  More than 50% of total time spent on counseling and coordination of care as described above      Current Length of Stay: 4 day(s)  Current Patient Status: Inpatient   Certification Statement: The patient will continue to require additional inpatient hospital stay due to As outlined  Discharge Plan: Pending placement case management following    Code Status: Level 3 - DNAR and DNI    Subjective:     Comfortably sitting up in chair  Reports feeling better  Appetite fair  Encouraged ambulation with assistance - discussed with RN      Objective:     Vitals:   Temp (24hrs), Av 3 °F (36 8 °C), Min:98 2 °F (36 8 °C), Max:98 4 °F (36 9 °C)    Temp:  [98 2 °F (36 8 °C)-98 4 °F (36 9 °C)] 98 2 °F (36 8 °C)  HR:  [65-76] 71  Resp:  [18-19] 19  BP: (119-138)/(65-88) 138/74  SpO2:  [95 %-100 %] 95 %  Body mass index is 25 02 kg/m²  Input and Output Summary (last 24 hours): Intake/Output Summary (Last 24 hours) at 2/27/2022 1303  Last data filed at 2/27/2022 0855  Gross per 24 hour   Intake 600 ml   Output 625 ml   Net -25 ml       Physical Exam:   Physical Exam     Comfortably sitting up in chair  Neck supple  Lungs diminished breath sounds bilateral  No additional sounds  Heart sounds S1-S2 noted  Abdomen soft nontender  Awake alert obeys simple commands  No pedal edema  No rash    Additional Data:     Labs:  Results from last 7 days   Lab Units 02/27/22  0538   WBC Thousand/uL 5 00   HEMOGLOBIN g/dL 7 9*   HEMATOCRIT % 24 6*   PLATELETS Thousands/uL 126*   NEUTROS PCT % 63   LYMPHS PCT % 26   MONOS PCT % 8   EOS PCT % 2     Results from last 7 days   Lab Units 02/27/22  0538 02/26/22  0525 02/25/22  0449   SODIUM mmol/L 146*   < > 139   POTASSIUM mmol/L 4 4   < > 5 8*   CHLORIDE mmol/L 113*   < > 114*   CO2 mmol/L 23   < > 16*   BUN mg/dL 64*   < > 82*   CREATININE mg/dL 2 33*   < > 2 76*   ANION GAP mmol/L 10   < > 9   CALCIUM mg/dL 8 8   < > 9 1   ALBUMIN g/dL  --   --  2 3*   TOTAL BILIRUBIN mg/dL  --   --  0 18*   ALK PHOS U/L  --   --  78   ALT U/L  --   --  14   AST U/L  --   --  12   GLUCOSE RANDOM mg/dL 96   < > 94    < > = values in this interval not displayed           Results from last 7 days   Lab Units 02/27/22  1120 02/27/22  0740 02/26/22  2124 02/26/22  1534 02/26/22  1111 02/26/22  0702 02/25/22  2116 02/25/22  1608 02/25/22  1143 02/25/22  1133 02/25/22  0801 02/24/22  2103   POC GLUCOSE mg/dl 142* 108 120 117 114 90 105 90 113 129 89 160*               Lines/Drains:  Invasive Devices  Report    Peripheral Intravenous Line            Peripheral IV 02/24/22 Right;Ventral (anterior) Forearm 2 days                      Imaging: No pertinent imaging reviewed  Recent Cultures (last 7 days):   Results from last 7 days   Lab Units 02/24/22  0119 02/24/22  0056   URINE CULTURE  50,000-59,000 cfu/ml  60,000-69,000 cfu/ml        Last 24 Hours Medication List:   Current Facility-Administered Medications   Medication Dose Route Frequency Provider Last Rate    acetaminophen  650 mg Oral Q6H PRN Relda [employer], CRNP      aluminum-magnesium hydroxide-simethicone  30 mL Oral Q6H PRN Relda [employer], CRNP      amLODIPine  5 mg Oral BID Relda [employer], CRNP      aspirin  81 mg Oral Daily Relda [employer], CRNP      bisacodyl  10 mg Oral Daily PRN Relda [employer], CRNP      cefTRIAXone  1,000 mg Intravenous Once [employer], DO      cinacalcet  30 mg Oral Once per day on Mon Wed Fri Relda Qualia, CRNP      citalopram  10 mg Oral Daily Relda Qualia, CRNP      docusate sodium  100 mg Oral Daily Relda [employer], CRNP      gabapentin  300 mg Oral HS Relda [employer], CRNP      heparin (porcine)  5,000 Units Subcutaneous Q8H [address] Omid Matthew MD      insulin lispro  1-5 Units Subcutaneous 4x Daily (AC & HS) Relda [employer], CRNP      ondansetron  4 mg Intravenous Q6H PRN Relda [employer], CRNP      pravastatin  40 mg Oral Daily With [employer], CRNP      simethicone  80 mg Oral 4x Daily PRN Relda Qualia, CRNP      sodium bicarbonate  650 mg Oral BID after meals Omid Matthew MD          Today, Patient Was Seen By: Omid Matthew MD    **Please Note: This note may have been constructed using a voice recognition system  **

## 2022-02-27 NOTE — ASSESSMENT & PLAN NOTE
Lab Results   Component Value Date    EGFR 17 02/27/2022    EGFR 18 02/26/2022    EGFR 14 02/25/2022    CREATININE 2 33 (H) 02/27/2022    CREATININE 2 25 (H) 02/26/2022    CREATININE 2 76 (H) 02/25/2022       · Acute on chronic kidney disease stage 4  · Acidosis, hyperkalemia improved with IV bicarbonate GTT  · Continue bicarbonate tablets  · Monitor kidney function closely  · Avoid nephrotoxins  · Monitor postvoid residuals  · Outpatient Nephrology follow-up recommended

## 2022-02-27 NOTE — PLAN OF CARE
Problem: Nutrition/Hydration-ADULT  Goal: Nutrient/Hydration intake appropriate for improving, restoring or maintaining nutritional needs  Description: Monitor and assess patient's nutrition/hydration status for malnutrition  Collaborate with interdisciplinary team and initiate plan and interventions as ordered  Monitor patient's weight and dietary intake as ordered or per policy  Utilize nutrition screening tool and intervene as necessary  Determine patient's food preferences and provide high-protein, high-caloric foods as appropriate       INTERVENTIONS:  - Monitor oral intake, urinary output, labs, and treatment plans  - Assess nutrition and hydration status and recommend course of action  - Evaluate amount of meals eaten  - Assist patient with eating if necessary   - Allow adequate time for meals  - Recommend/ encourage appropriate diets, oral nutritional supplements, and vitamin/mineral supplements  - Order, calculate, and assess calorie counts as needed  - Recommend, monitor, and adjust tube feedings and TPN/PPN based on assessed needs  - Assess need for intravenous fluids  - Provide specific nutrition/hydration education as appropriate  - Include patient/family/caregiver in decisions related to nutrition  2/27/2022 0907 by Breanne Watts RN  Outcome: Progressing  2/27/2022 0905 by Breanne Watts RN  Outcome: Progressing  2/27/2022 0900 by Breanne Watts RN  Outcome: Progressing     Problem: Potential for Falls  Goal: Patient will remain free of falls  Description: INTERVENTIONS:  - Educate patient/family on patient safety including physical limitations  - Instruct patient to call for assistance with activity   - Consult OT/PT to assist with strengthening/mobility   - Keep Call bell within reach  - Keep bed low and locked with side rails adjusted as appropriate  - Keep care items and personal belongings within reach  - Initiate and maintain comfort rounds  - Apply yellow socks and bracelet for high fall risk patients  - Consider moving patient to room near nurses station  2/27/2022 0907 by Ines Camarillo RN  Outcome: Progressing  2/27/2022 0905 by Ines Camarillo RN  Outcome: Progressing  2/27/2022 0900 by Ines Camarillo RN  Outcome: Progressing     Problem: MOBILITY - ADULT  Goal: Maintain or return to baseline ADL function  Description: INTERVENTIONS:  - Educate patient/family on patient safety including physical limitations  - Instruct patient to call for assistance with activity   - Consult OT/PT to assist with strengthening/mobility   - Keep Call bell within reach  - Keep bed low and locked with side rails adjusted as appropriate  - Keep care items and personal belongings within reach  - Initiate and maintain comfort rounds  - Make Fall Risk Sign visible to staff  - Apply yellow socks and bracelet for high fall risk patients  - Consider moving patient to room near nurses station  2/27/2022 0907 by Ines Camarillo RN  Outcome: Progressing  2/27/2022 0905 by Ines Camarillo RN  Outcome: Progressing  2/27/2022 0900 by Ines Camarillo RN  Outcome: Progressing  Goal: Maintains/Returns to pre admission functional level  Description: INTERVENTIONS:  - Perform BMAT or MOVE assessment daily    - Set and communicate daily mobility goal to care team and patient/family/caregiver       - Out of bed for toileting  - Record patient progress and toleration of activity level   2/27/2022 0907 by Ines Camarillo RN  Outcome: Progressing  2/27/2022 0905 by Ines Camarillo RN  Outcome: Progressing  2/27/2022 0900 by Ines Camarillo RN  Outcome: Progressing     Problem: Prexisting or High Potential for Compromised Skin Integrity  Goal: Skin integrity is maintained or improved  Description: INTERVENTIONS:  - Identify patients at risk for skin breakdown  - Assess and monitor skin integrity  - Assess and monitor nutrition and hydration status  - Monitor labs   - Assess for incontinence   - Turn and reposition patient  - Assist with mobility/ambulation  - Relieve pressure over bony prominences  - Avoid friction and shearing  - Provide appropriate hygiene as needed including keeping skin clean and dry  - Evaluate need for skin moisturizer/barrier cream  - Collaborate with interdisciplinary team   - Patient/family teaching  - Consider wound care consult   2/27/2022 0907 by Nacho Mendez RN  Outcome: Progressing  2/27/2022 0905 by Nacho Mendez RN  Outcome: Progressing  2/27/2022 0900 by Nacho Mendez RN  Outcome: Progressing     Problem: PAIN - ADULT  Goal: Verbalizes/displays adequate comfort level or baseline comfort level  Description: Interventions:  - Encourage patient to monitor pain and request assistance  - Assess pain using appropriate pain scale  - Administer analgesics based on type and severity of pain and evaluate response  - Implement non-pharmacological measures as appropriate and evaluate response  - Consider cultural and social influences on pain and pain management  - Notify physician/advanced practitioner if interventions unsuccessful or patient reports new pain  2/27/2022 0907 by Nacho Mendez RN  Outcome: Progressing  2/27/2022 0905 by Nacho Mendez RN  Outcome: Progressing  2/27/2022 0900 by Nacho Mendez RN  Outcome: Progressing     Problem: INFECTION - ADULT  Goal: Absence or prevention of progression during hospitalization  Description: INTERVENTIONS:  - Assess and monitor for signs and symptoms of infection  - Monitor lab/diagnostic results  - Monitor all insertion sites, i e  indwelling lines, tubes, and drains  - Monitor endotracheal if appropriate and nasal secretions for changes in amount and color  - [place] appropriate cooling/warming therapies per order  - Administer medications as ordered  - Instruct and encourage patient and family to use good hand hygiene technique  - Identify and instruct in appropriate isolation precautions for identified infection/condition  2/27/2022 0907 by Kuldeep Santiago RN  Outcome: Progressing  2/27/2022 0905 by Kuldeep Santiago RN  Outcome: Progressing  2/27/2022 0900 by Kuldeep Santiago RN  Outcome: Progressing     Problem: SAFETY ADULT  Goal: Patient will remain free of falls  Description: INTERVENTIONS:  - Educate patient/family on patient safety including physical limitations  - Instruct patient to call for assistance with activity   - Consult OT/PT to assist with strengthening/mobility   - Keep Call bell within reach  - Keep bed low and locked with side rails adjusted as appropriate  - Keep care items and personal belongings within reach  - Initiate and maintain comfort rounds  - Apply yellow socks and bracelet for high fall risk patients  - Consider moving patient to room near nurses station  2/27/2022 0907 by Kuldeep Santiago RN  Outcome: Progressing  2/27/2022 0905 by Kuldeep Santiago RN  Outcome: Progressing  2/27/2022 0900 by Kuldeep Santiago RN  Outcome: Progressing  Goal: Maintain or return to baseline ADL function  Description: INTERVENTIONS:  - Educate patient/family on patient safety including physical limitations  - Instruct patient to call for assistance with activity   - Consult OT/PT to assist with strengthening/mobility   - Keep Call bell within reach  - Keep bed low and locked with side rails adjusted as appropriate  - Keep care items and personal belongings within reach  - Initiate and maintain comfort rounds  - Make Fall Risk Sign visible to staff  - Apply yellow socks and bracelet for high fall risk patients  - Consider moving patient to room near nurses station  2/27/2022 0907 by Kuldeep Santiago RN  Outcome: Progressing  2/27/2022 0905 by Kuldeep Santiago RN  Outcome: Progressing  2/27/2022 0900 by Kuldeep Santiago RN  Outcome: Progressing    2/27/2022 0905 by Cindy Edwards ALICIA Guzman  Outcome: Progressing  2/27/2022 0900 by Tracy Cardenas RN  Outcome: Progressing     Problem: DISCHARGE PLANNING  Goal: Discharge to home or other facility with appropriate resources  Description: INTERVENTIONS:  - Identify barriers to discharge w/patient and caregiver  - Arrange for needed discharge resources and transportation as appropriate  - Identify discharge learning needs (meds, wound care, etc )  - Arrange for interpretive services to assist at discharge as needed  - Refer to Case Management Department for coordinating discharge planning if the patient needs post-hospital services based on physician/advanced practitioner order or complex needs related to functional status, cognitive ability, or social support system  2/27/2022 0907 by Tracy Cardenas RN  Outcome: Progressing  2/27/2022 0905 by Tracy Cardenas RN  Outcome: Progressing  2/27/2022 0900 by Tracy Cardenas RN  Outcome: Progressing     Problem: Knowledge Deficit  Goal: Patient/family/caregiver demonstrates understanding of disease process, treatment plan, medications, and discharge instructions  Description: Complete learning assessment and assess knowledge base    Interventions:  - Provide teaching at level of understanding  - Provide teaching via preferred learning methods  2/27/2022 0907 by Tracy Cardenas RN  Outcome: Progressing  2/27/2022 0905 by Tracy Cardenas RN  Outcome: Progressing  2/27/2022 0900 by Tracy Cardenas RN  Outcome: Progressing     Problem: NEUROSENSORY - ADULT  Goal: Achieves stable or improved neurological status  Description: INTERVENTIONS  - Monitor and report changes in neurological status  - Monitor vital signs such as temperature, blood pressure, glucose, and any other labs ordered   - Initiate measures to prevent increased intracranial pressure  - Monitor for seizure activity and implement precautions if appropriate      2/27/2022 0907 by José Luis Mueller ALICIA Guzman  Outcome: Progressing  2/27/2022 0905 by Frankie Garduno RN  Outcome: Progressing  2/27/2022 0900 by Frankie Garduno RN  Outcome: Progressing  Goal: Achieves maximal functionality and self care  Description: INTERVENTIONS  - Monitor swallowing and airway patency with patient fatigue and changes in neurological status  - Encourage and assist patient to increase activity and self care     - Encourage visually impaired, hearing impaired and aphasic patients to use assistive/communication devices  2/27/2022 0907 by Frankie Garduno RN  Outcome: Progressing  2/27/2022 0905 by Frankie Garduno RN  Outcome: Progressing  2/27/2022 0900 by Frankie Garduno RN  Outcome: Progressing     Problem: CARDIOVASCULAR - ADULT  Goal: Maintains optimal cardiac output and hemodynamic stability  Description: INTERVENTIONS:  - Monitor I/O, vital signs and rhythm  - Monitor for S/S and trends of decreased cardiac output  - Administer and titrate ordered vasoactive medications to optimize hemodynamic stability  - Assess quality of pulses, skin color and temperature  - Assess for signs of decreased coronary artery perfusion  - Instruct patient to report change in severity of symptoms  2/27/2022 0907 by Frankie Garduno RN  Outcome: Progressing  2/27/2022 0905 by Frankie Garduno RN  Outcome: Progressing  2/27/2022 0900 by Frankie Garduno RN  Outcome: Progressing  Goal: Absence of cardiac dysrhythmias or at baseline rhythm  Description: INTERVENTIONS:  - Continuous cardiac monitoring, vital signs, obtain 12 lead EKG if ordered  - Administer antiarrhythmic and heart rate control medications as ordered  - Monitor electrolytes and administer replacement therapy as ordered  2/27/2022 0907 by Frankie Garduno RN  Outcome: Progressing  2/27/2022 0905 by Frankie Garduno RN  Outcome: Progressing  2/27/2022 0900 by Frankie Garduno RN  Outcome: Progressing Problem: METABOLIC, FLUID AND ELECTROLYTES - ADULT  Goal: Electrolytes maintained within normal limits  Description: INTERVENTIONS:  - Monitor labs and assess patient for signs and symptoms of electrolyte imbalances  - Administer electrolyte replacement as ordered  - Monitor response to electrolyte replacements, including repeat lab results as appropriate  - Instruct patient on fluid and nutrition as appropriate  2/27/2022 0907 by Edith Cardenas RN  Outcome: Progressing  2/27/2022 0905 by Edith Cardenas RN  Outcome: Progressing  2/27/2022 0900 by Edith Cardenas RN  Outcome: Progressing  Goal: Fluid balance maintained  Description: INTERVENTIONS:  - Monitor labs   - Monitor I/O and WT  - Instruct patient on fluid and nutrition as appropriate  - Assess for signs & symptoms of volume excess or deficit  2/27/2022 0907 by Edith Cardenas RN  Outcome: Progressing  2/27/2022 0905 by Edith Cardenas RN  Outcome: Progressing  2/27/2022 0900 by Edith Cardenas RN  Outcome: Progressing  Goal: Glucose maintained within target range  Description: INTERVENTIONS:  - Monitor Blood Glucose as ordered  - Assess for signs and symptoms of hyperglycemia and hypoglycemia  - Administer ordered medications to maintain glucose within target range  - Assess nutritional intake and initiate nutrition service referral as needed  2/27/2022 0907 by Edith Cardenas RN  Outcome: Progressing  2/27/2022 0905 by Edith Cardenas RN  Outcome: Progressing  2/27/2022 0900 by Edith Cardenas RN  Outcome: Progressing     Problem: SKIN/TISSUE INTEGRITY - ADULT  Goal: Skin Integrity remains intact(Skin Breakdown Prevention)  Description: Assess:  -Inspect skin when repositioning, toileting, and assisting with ADLS  -Assess extremities for adequate circulation and sensation     Bed Management:  -Have minimal linens on bed & keep smooth, unwrinkled  -Change linens as needed when moist or perspiring      Activity:  -Encourage activity and walks on unit  -Encourage or provide ROM exercises   -Use appropriate equipment to lift or move patient in bed    Skin Care:  -Avoid use of baby powder, tape, friction and shearing, hot water or constrictive clothing  -Do not massage red bony areas      2/27/2022 0907 by Chinedu Naranjo RN  Outcome: Progressing  2/27/2022 0905 by Chinedu Naranjo RN  Outcome: Progressing  2/27/2022 0900 by Chinedu Naranjo RN  Outcome: Progressing  Goal: Incision(s), wounds(s) or drain site(s) healing without S/S of infection  Description: INTERVENTIONS  - Assess and document dressing, incision, wound bed, drain sites and surrounding tissue  - Provide patient and family education    2/27/2022 0907 by Chinedu Naranjo RN  Outcome: Progressing  2/27/2022 0905 by Chinedu Naranjo RN  Outcome: Progressing  2/27/2022 0900 by Chinedu Naranjo RN  Outcome: Progressing     Problem: MUSCULOSKELETAL - ADULT  Goal: Maintain or return mobility to safest level of function  Description: INTERVENTIONS:  - Assess patient's ability to carry out ADLs; assess patient's baseline for ADL function and identify physical deficits which impact ability to perform ADLs (bathing, care of mouth/teeth, toileting, grooming, dressing, etc )  - Assess/evaluate cause of self-care deficits   - Assess range of motion  - Assess patient's mobility  - Assess patient's need for assistive devices and provide as appropriate  - Encourage maximum independence but intervene and supervise when necessary  - Involve family in performance of ADLs  - Assess for home care needs following discharge   - Consider OT consult to assist with ADL evaluation and planning for discharge  - Provide patient education as appropriate  2/27/2022 0907 by Chinedu Naranjo RN  Outcome: Progressing  2/27/2022 0905 by Chinedu Naranjo RN  Outcome: Progressing  2/27/2022 0900 by Chinedu Naranjo RN  Outcome: Progressing  Goal: Maintain proper alignment of affected body part  Description: INTERVENTIONS:  - Support, maintain and protect limb and body alignment  - Provide patient/ family with appropriate education  2/27/2022 0907 by Juan Mtz RN  Outcome: Progressing  2/27/2022 0905 by Juan Mtz RN  Outcome: Progressing  2/27/2022 0900 by Juan Mtz RN  Outcome: Progressing

## 2022-02-27 NOTE — PLAN OF CARE
Problem: Nutrition/Hydration-ADULT  Goal: Nutrient/Hydration intake appropriate for improving, restoring or maintaining nutritional needs  Description: Monitor and assess patient's nutrition/hydration status for malnutrition  Collaborate with interdisciplinary team and initiate plan and interventions as ordered  Monitor patient's weight and dietary intake as ordered or per policy  Utilize nutrition screening tool and intervene as necessary  Determine patient's food preferences and provide high-protein, high-caloric foods as appropriate       INTERVENTIONS:  - Monitor oral intake, urinary output, labs, and treatment plans  - Assess nutrition and hydration status and recommend course of action  - Evaluate amount of meals eaten  - Assist patient with eating if necessary   - Allow adequate time for meals  - Recommend/ encourage appropriate diets, oral nutritional supplements, and vitamin/mineral supplements  - Order, calculate, and assess calorie counts as needed  - Recommend, monitor, and adjust tube feedings and TPN/PPN based on assessed needs  - Assess need for intravenous fluids  - Provide specific nutrition/hydration education as appropriate  - Include patient/family/caregiver in decisions related to nutrition  Outcome: Progressing     Problem: Potential for Falls  Goal: Patient will remain free of falls  Description: INTERVENTIONS:  - Educate patient/family on patient safety including physical limitations  - Instruct patient to call for assistance with activity   - Consult OT/PT to assist with strengthening/mobility   - Keep Call bell within reach  - Keep bed low and locked with side rails adjusted as appropriate  - Keep care items and personal belongings within reach  - Initiate and maintain comfort rounds  - Apply yellow socks and bracelet for high fall risk patients  - Consider moving patient to room near nurses station  Outcome: Progressing     Problem: MOBILITY - ADULT  Goal: Maintain or return to baseline ADL function  Description: INTERVENTIONS:  - Educate patient/family on patient safety including physical limitations  - Instruct patient to call for assistance with activity   - Consult OT/PT to assist with strengthening/mobility   - Keep Call bell within reach  - Keep bed low and locked with side rails adjusted as appropriate  - Keep care items and personal belongings within reach  - Initiate and maintain comfort rounds  - Make Fall Risk Sign visible to staff  - Apply yellow socks and bracelet for high fall risk patients  - Consider moving patient to room near nurses station  Outcome: Progressing  Goal: Maintains/Returns to pre admission functional level  Description: INTERVENTIONS:  - Perform BMAT or MOVE assessment daily    - Set and communicate daily mobility goal to care team and patient/family/caregiver  - Collaborate with rehabilitation services on mobility goals if consulted  - Perform Range of Motion  times a day  - Reposition patient every  hours    - Dangle patient  times a day  - Stand patient  times a day  - Ambulate patient  times a day  - Out of bed to chair  times a day   - Out of bed for meals  times a day  - Out of bed for toileting  - Record patient progress and toleration of activity level   Outcome: Progressing     Problem: Prexisting or High Potential for Compromised Skin Integrity  Goal: Skin integrity is maintained or improved  Description: INTERVENTIONS:  - Identify patients at risk for skin breakdown  - Assess and monitor skin integrity  - Assess and monitor nutrition and hydration status  - Monitor labs   - Assess for incontinence   - Turn and reposition patient  - Assist with mobility/ambulation  - Relieve pressure over bony prominences  - Avoid friction and shearing  - Provide appropriate hygiene as needed including keeping skin clean and dry  - Evaluate need for skin moisturizer/barrier cream  - Collaborate with interdisciplinary team   - Patient/family teaching  - Consider wound care consult   Outcome: Progressing     Problem: PAIN - ADULT  Goal: Verbalizes/displays adequate comfort level or baseline comfort level  Description: Interventions:  - Encourage patient to monitor pain and request assistance  - Assess pain using appropriate pain scale  - Administer analgesics based on type and severity of pain and evaluate response  - Implement non-pharmacological measures as appropriate and evaluate response  - Consider cultural and social influences on pain and pain management  - Notify physician/advanced practitioner if interventions unsuccessful or patient reports new pain  Outcome: Progressing     Problem: INFECTION - ADULT  Goal: Absence or prevention of progression during hospitalization  Description: INTERVENTIONS:  - Assess and monitor for signs and symptoms of infection  - Monitor lab/diagnostic results  - Monitor all insertion sites, i e  indwelling lines, tubes, and drains  - Monitor endotracheal if appropriate and nasal secretions for changes in amount and color  - [place] appropriate cooling/warming therapies per order  - Administer medications as ordered  - Instruct and encourage patient and family to use good hand hygiene technique  - Identify and instruct in appropriate isolation precautions for identified infection/condition  Outcome: Progressing     Problem: SAFETY ADULT  Goal: Patient will remain free of falls  Description: INTERVENTIONS:  - Educate patient/family on patient safety including physical limitations  - Instruct patient to call for assistance with activity   - Consult OT/PT to assist with strengthening/mobility   - Keep Call bell within reach  - Keep bed low and locked with side rails adjusted as appropriate  - Keep care items and personal belongings within reach  - Initiate and maintain comfort rounds  - Apply yellow socks and bracelet for high fall risk patients  - Consider moving patient to room near nurses station  Outcome: Progressing  Goal: Maintain or return to baseline ADL function  Description: INTERVENTIONS:  - Educate patient/family on patient safety including physical limitations  - Instruct patient to call for assistance with activity   - Consult OT/PT to assist with strengthening/mobility   - Keep Call bell within reach  - Keep bed low and locked with side rails adjusted as appropriate  - Keep care items and personal belongings within reach  - Initiate and maintain comfort rounds  - Make Fall Risk Sign visible to staff  - Apply yellow socks and bracelet for high fall risk patients  - Consider moving patient to room near nurses station  Outcome: Progressing  Goal: Maintains/Returns to pre admission functional level  Description: INTERVENTIONS:  - Perform BMAT or MOVE assessment daily    - Set and communicate daily mobility goal to care team and patient/family/caregiver  - Collaborate with rehabilitation services on mobility goals if consulted  - Perform Range of Motion  times a day  - Reposition patient every hours    - Dangle patient  times a day  - Stand patient  times a day  - Ambulate patient times a day  - Out of bed to chair times a day   - Out of bed for meals times a day  - Out of bed for toileting  - Record patient progress and toleration of activity level   Outcome: Progressing     Problem: DISCHARGE PLANNING  Goal: Discharge to home or other facility with appropriate resources  Description: INTERVENTIONS:  - Identify barriers to discharge w/patient and caregiver  - Arrange for needed discharge resources and transportation as appropriate  - Identify discharge learning needs (meds, wound care, etc )  - Arrange for interpretive services to assist at discharge as needed  - Refer to Case Management Department for coordinating discharge planning if the patient needs post-hospital services based on physician/advanced practitioner order or complex needs related to functional status, cognitive ability, or social support system  Outcome: Progressing     Problem: Knowledge Deficit  Goal: Patient/family/caregiver demonstrates understanding of disease process, treatment plan, medications, and discharge instructions  Description: Complete learning assessment and assess knowledge base  Interventions:  - Provide teaching at level of understanding  - Provide teaching via preferred learning methods  Outcome: Progressing     Problem: NEUROSENSORY - ADULT  Goal: Achieves stable or improved neurological status  Description: INTERVENTIONS  - Monitor and report changes in neurological status  - Monitor vital signs such as temperature, blood pressure, glucose, and any other labs ordered   - Initiate measures to prevent increased intracranial pressure  - Monitor for seizure activity and implement precautions if appropriate      Outcome: Progressing  Goal: Achieves maximal functionality and self care  Description: INTERVENTIONS  - Monitor swallowing and airway patency with patient fatigue and changes in neurological status  - Encourage and assist patient to increase activity and self care     - Encourage visually impaired, hearing impaired and aphasic patients to use assistive/communication devices  Outcome: Progressing     Problem: CARDIOVASCULAR - ADULT  Goal: Maintains optimal cardiac output and hemodynamic stability  Description: INTERVENTIONS:  - Monitor I/O, vital signs and rhythm  - Monitor for S/S and trends of decreased cardiac output  - Administer and titrate ordered vasoactive medications to optimize hemodynamic stability  - Assess quality of pulses, skin color and temperature  - Assess for signs of decreased coronary artery perfusion  - Instruct patient to report change in severity of symptoms  Outcome: Progressing  Goal: Absence of cardiac dysrhythmias or at baseline rhythm  Description: INTERVENTIONS:  - Continuous cardiac monitoring, vital signs, obtain 12 lead EKG if ordered  - Administer antiarrhythmic and heart rate control medications as ordered  - Monitor electrolytes and administer replacement therapy as ordered  Outcome: Progressing     Problem: RESPIRATORY - ADULT  Goal: Achieves optimal ventilation and oxygenation  Description: INTERVENTIONS:  - Assess for changes in respiratory status  - Assess for changes in mentation and behavior  - Position to facilitate oxygenation and minimize respiratory effort  - Oxygen administered by appropriate delivery if ordered  - Initiate smoking cessation education as indicated  - Encourage broncho-pulmonary hygiene including cough, deep breathe, Incentive Spirometry  - Assess the need for suctioning and aspirate as needed  - Assess and instruct to report SOB or any respiratory difficulty  - Respiratory Therapy support as indicated  Outcome: Progressing     Problem: GASTROINTESTINAL - ADULT  Goal: Minimal or absence of nausea and/or vomiting  Description: INTERVENTIONS:  - Administer IV fluids if ordered to ensure adequate hydration  - Maintain NPO status until nausea and vomiting are resolved  - Nasogastric tube if ordered  - Administer ordered antiemetic medications as needed  - Provide nonpharmacologic comfort measures as appropriate  - Advance diet as tolerated, if ordered  - Consider nutrition services referral to assist patient with adequate nutrition and appropriate food choices  Outcome: Progressing  Goal: Maintains or returns to baseline bowel function  Description: INTERVENTIONS:  - Assess bowel function  - Encourage oral fluids to ensure adequate hydration  - Administer IV fluids if ordered to ensure adequate hydration  - Administer ordered medications as needed  - Encourage mobilization and activity  - Consider nutritional services referral to assist patient with adequate nutrition and appropriate food choices  Outcome: Progressing  Goal: Maintains adequate nutritional intake  Description: INTERVENTIONS:  - Monitor percentage of each meal consumed  - Identify factors contributing to decreased intake, treat as appropriate  - Assist with meals as needed  - Monitor I&O, weight, and lab values if indicated  - Obtain nutrition services referral as needed  Outcome: Progressing  Goal: Oral mucous membranes remain intact  Description: INTERVENTIONS  - Assess oral mucosa and hygiene practices  - Implement preventative oral hygiene regimen  - Implement oral medicated treatments as ordered  - Initiate Nutrition services referral as needed  Outcome: Progressing     Problem: GENITOURINARY - ADULT  Goal: Maintains or returns to baseline urinary function  Description: INTERVENTIONS:  - Assess urinary function  - Encourage oral fluids to ensure adequate hydration if ordered  - Administer IV fluids as ordered to ensure adequate hydration  - Administer ordered medications as needed  - Offer frequent toileting  - Follow urinary retention protocol if ordered  Outcome: Progressing  Goal: Absence of urinary retention  Description: INTERVENTIONS:  - Assess patients ability to void and empty bladder  - Monitor I/O  - Bladder scan as needed  - Discuss with physician/AP medications to alleviate retention as needed  - Discuss catheterization for long term situations as appropriate  Outcome: Progressing     Problem: METABOLIC, FLUID AND ELECTROLYTES - ADULT  Goal: Electrolytes maintained within normal limits  Description: INTERVENTIONS:  - Monitor labs and assess patient for signs and symptoms of electrolyte imbalances  - Administer electrolyte replacement as ordered  - Monitor response to electrolyte replacements, including repeat lab results as appropriate  - Instruct patient on fluid and nutrition as appropriate  Outcome: Progressing  Goal: Fluid balance maintained  Description: INTERVENTIONS:  - Monitor labs   - Monitor I/O and WT  - Instruct patient on fluid and nutrition as appropriate  - Assess for signs & symptoms of volume excess or deficit  Outcome: Progressing  Goal: Glucose maintained within target range  Description: INTERVENTIONS:  - Monitor Blood Glucose as ordered  - Assess for signs and symptoms of hyperglycemia and hypoglycemia  - Administer ordered medications to maintain glucose within target range  - Assess nutritional intake and initiate nutrition service referral as needed  Outcome: Progressing     Problem: SKIN/TISSUE INTEGRITY - ADULT  Goal: Skin Integrity remains intact(Skin Breakdown Prevention)  Description: Assess:  -Perform [place] assessment every   -Clean and moisturize skin every   -Inspect skin when repositioning, toileting, and assisting with ADLS  -Assess under medical devices such as every   -Assess extremities for adequate circulation and sensation     Bed Management:  -Have minimal linens on bed & keep smooth, unwrinkled  -Change linens as needed when moist or perspiring  -Avoid sitting or lying in one position for more than hours while in bed  -Keep HOB at degrees     Toileting:  -Offer bedside commode  -Assess for incontinence every   -Use incontinent care products after each incontinent episode such as     Activity:  -Mobilize patient times a day  -Encourage activity and walks on unit  -Encourage or provide ROM exercises   -Turn and reposition patient every  Hours  -Use appropriate equipment to lift or move patient in bed  -Instruct/ Assist with weight shifting every  when out of bed in chair  -Consider limitation of chair time  hour intervals    Skin Care:  -Avoid use of baby powder, tape, friction and shearing, hot water or constrictive clothing  -Relieve pressure over bony prominences using   -Do not massage red bony areas    Next Steps:  -Teach patient strategies to minimize risks such as   -Consider consults to  interdisciplinary teams such as   Outcome: Progressing  Goal: Incision(s), wounds(s) or drain site(s) healing without S/S of infection  Description: INTERVENTIONS  - Assess and document dressing, incision, wound bed, drain sites and surrounding tissue  - Provide patient and family education  - Perform skin care/dressing changes every   Outcome: Progressing     Problem: HEMATOLOGIC - ADULT  Goal: Maintains hematologic stability  Description: INTERVENTIONS  - Assess for signs and symptoms of bleeding or hemorrhage  - Monitor labs  - Administer supportive blood products/factors as ordered and appropriate  Outcome: Progressing     Problem: MUSCULOSKELETAL - ADULT  Goal: Maintain or return mobility to safest level of function  Description: INTERVENTIONS:  - Assess patient's ability to carry out ADLs; assess patient's baseline for ADL function and identify physical deficits which impact ability to perform ADLs (bathing, care of mouth/teeth, toileting, grooming, dressing, etc )  - Assess/evaluate cause of self-care deficits   - Assess range of motion  - Assess patient's mobility  - Assess patient's need for assistive devices and provide as appropriate  - Encourage maximum independence but intervene and supervise when necessary  - Involve family in performance of ADLs  - Assess for home care needs following discharge   - Consider OT consult to assist with ADL evaluation and planning for discharge  - Provide patient education as appropriate  Outcome: Progressing  Goal: Maintain proper alignment of affected body part  Description: INTERVENTIONS:  - Support, maintain and protect limb and body alignment  - Provide patient/ family with appropriate education  Outcome: Progressing

## 2022-02-28 PROBLEM — R50.9 LOW GRADE FEVER: Status: ACTIVE | Noted: 2022-01-01

## 2022-02-28 LAB
ANION GAP SERPL CALCULATED.3IONS-SCNC: 9 MMOL/L (ref 4–13)
BUN SERPL-MCNC: 63 MG/DL (ref 5–25)
CALCIUM SERPL-MCNC: 9 MG/DL (ref 8.3–10.1)
CHLORIDE SERPL-SCNC: 108 MMOL/L (ref 100–108)
CO2 SERPL-SCNC: 23 MMOL/L (ref 21–32)
CREAT SERPL-MCNC: 2.27 MG/DL (ref 0.6–1.3)
GFR SERPL CREATININE-BSD FRML MDRD: 17 ML/MIN/1.73SQ M
GLUCOSE SERPL-MCNC: 109 MG/DL (ref 65–140)
GLUCOSE SERPL-MCNC: 125 MG/DL (ref 65–140)
GLUCOSE SERPL-MCNC: 134 MG/DL (ref 65–140)
GLUCOSE SERPL-MCNC: 148 MG/DL (ref 65–140)
GLUCOSE SERPL-MCNC: 93 MG/DL (ref 65–140)
HCT VFR BLD AUTO: 25.1 % (ref 34.8–46.1)
HGB BLD-MCNC: 8.2 G/DL (ref 11.5–15.4)
POTASSIUM SERPL-SCNC: 4.5 MMOL/L (ref 3.5–5.3)
SODIUM SERPL-SCNC: 140 MMOL/L (ref 136–145)

## 2022-02-28 PROCEDURE — 82948 REAGENT STRIP/BLOOD GLUCOSE: CPT

## 2022-02-28 PROCEDURE — 97530 THERAPEUTIC ACTIVITIES: CPT

## 2022-02-28 PROCEDURE — 99232 SBSQ HOSP IP/OBS MODERATE 35: CPT | Performed by: INTERNAL MEDICINE

## 2022-02-28 PROCEDURE — 85014 HEMATOCRIT: CPT | Performed by: INTERNAL MEDICINE

## 2022-02-28 PROCEDURE — 97535 SELF CARE MNGMENT TRAINING: CPT

## 2022-02-28 PROCEDURE — 80048 BASIC METABOLIC PNL TOTAL CA: CPT | Performed by: INTERNAL MEDICINE

## 2022-02-28 PROCEDURE — 85018 HEMOGLOBIN: CPT | Performed by: INTERNAL MEDICINE

## 2022-02-28 RX ADMIN — AMLODIPINE BESYLATE 5 MG: 5 TABLET ORAL at 08:56

## 2022-02-28 RX ADMIN — DOCUSATE SODIUM 100 MG: 100 CAPSULE ORAL at 08:56

## 2022-02-28 RX ADMIN — SODIUM BICARBONATE 650 MG TABLET 650 MG: at 08:55

## 2022-02-28 RX ADMIN — HEPARIN SODIUM 5000 UNITS: 5000 INJECTION INTRAVENOUS; SUBCUTANEOUS at 22:00

## 2022-02-28 RX ADMIN — CITALOPRAM HYDROBROMIDE 10 MG: 20 TABLET ORAL at 08:55

## 2022-02-28 RX ADMIN — HEPARIN SODIUM 5000 UNITS: 5000 INJECTION INTRAVENOUS; SUBCUTANEOUS at 13:22

## 2022-02-28 RX ADMIN — ASPIRIN 81 MG: 81 TABLET, COATED ORAL at 08:56

## 2022-02-28 RX ADMIN — CINACALCET 30 MG: 30 TABLET, FILM COATED ORAL at 08:55

## 2022-02-28 RX ADMIN — GABAPENTIN 300 MG: 300 CAPSULE ORAL at 22:00

## 2022-02-28 RX ADMIN — ACETAMINOPHEN 650 MG: 325 TABLET, FILM COATED ORAL at 08:56

## 2022-02-28 RX ADMIN — HEPARIN SODIUM 5000 UNITS: 5000 INJECTION INTRAVENOUS; SUBCUTANEOUS at 05:57

## 2022-02-28 RX ADMIN — PRAVASTATIN SODIUM 40 MG: 40 TABLET ORAL at 16:51

## 2022-02-28 RX ADMIN — SODIUM BICARBONATE 650 MG TABLET 650 MG: at 16:51

## 2022-02-28 NOTE — PLAN OF CARE
Problem: OCCUPATIONAL THERAPY ADULT  Goal: Performs self-care activities at highest level of function for planned discharge setting  See evaluation for individualized goals  Description: Treatment Interventions: ADL retraining,UE strengthening/ROM,Functional transfer training,Endurance training,Cognitive reorientation,Equipment evaluation/education,Patient/family training,Compensatory technique education,Activityengagement,Energy conservation          See flowsheet documentation for full assessment, interventions and recommendations  Outcome: Progressing  Note: Limitation: Decreased ADL status,Decreased UE strength,Decreased cognition,Decreased Safe judgement during ADL,Decreased endurance,Decreased high-level ADLs,Decreased self-care trans  Prognosis: Good  Assessment: Pt seen for skilled OT session focused on ADLs, toileting, functional transfers and mobility, bed mobiltiy and UE exercises  Pt w/ mIN assist sit>stand from chair w/ cues for hand placement, pt w/ min assist x1 functional mobility w/ Rw to bathroom and min assist transfer on/off toilet w/ grab bar use  Pt w/ MIN assist steadying for toileting hygiene and clothing management  Pt w/ contact guard assist grooming at sink  Pt w/ setup UB Bathing and Dressing while seated  Pt w/ MOD assist LB Bathing and Dressing: min assist steadying support w/ RW and cues for safety assist to wash feet and bottom and pericare due to decreased strength and dynamic standing balance  Pt w/ assist to don/doff slippers 2* impaired functional reach  Pt w/ MIN assist sit>supine bed mobility w/ increased time to complete  Pt completed b/l UE exercises seen above to increase strength and endurance for ADLs, functional transfers and mobility  Pt upright in bed end of session w/ all needs met   Pt continues to be limited due to increased pain, impaired balance, impaired functional reach, decreased strength and endurance, fall risk, multiple lines, impaired activity tolerance all causing a decline in ADLs, functional transfers and mobility  Recommend STR when medically stable  Will continue to follow to address OT POC  The patient's raw score on the AM-PAC Daily Activity inpatient short form is 17, standardized score is 37 26, less than 39 4  Patients at this level are likely to benefit from discharge to post-acute rehabilitation services  Please refer to the recommendation of the Occupational Therapist for safe discharge planning       OT Discharge Recommendation: Post acute rehabilitation services  OT - OK to Discharge: Yes (to rehab when medically stable)

## 2022-02-28 NOTE — PROGRESS NOTES
[facility]  Progress Note - Nadir Durham 2/24/1927, 80 y o  female MRN: [id number]  Unit/Bed#: E5 -01 Encounter: [id number]  Primary Care Provider: Eldon Thompson MD   Date and time admitted to hospital: 2/23/2022  4:49 PM    * Acute kidney injury superimposed on CKD [facility])  Assessment & Plan  Lab Results   Component Value Date    EGFR 17 02/28/2022    EGFR 17 02/27/2022    EGFR 18 02/26/2022    CREATININE 2 27 (H) 02/28/2022    CREATININE 2 33 (H) 02/27/2022    CREATININE 2 25 (H) 02/26/2022       · Acute on chronic kidney disease stage 4  · Acidosis, hyperkalemia improved with IV bicarbonate GTT  · Continue bicarbonate tablets  · Monitor kidney function closely  · Avoid nephrotoxins  · Monitor postvoid residuals  · Outpatient Nephrology follow-up recommended    Low grade fever  Assessment & Plan  · Temperature 100 2 this morning  · Patient denies any signs of upper/lower respiratory tract infection, no GI or  issues  · Recheck temperature was within normal range  · Continue to monitor    Moderate protein-calorie malnutrition ([address] )  Assessment & Plan  Malnutrition Findings:   Adult Malnutrition type: Acute illness (acute moderate pro, jimi malnutrition d/t decreased appetite as evidence by <75% energy intake > 7 days, 7 2% wt  loss x 1 month, 1+ edema, mild muscle/fat loss of clavicles, temples, orbitals)  Adult Degree of Malnutrition: Malnutrition of moderate degree (treated with oral diet and supplements, consider appetite stimulant if po doesn't improve)    BMI Findings: Body mass index is 25 02 kg/m²         Fall at home, initial encounter  [address] at home  At risk for falls  Safe ambulation  Fall precautions  Physical therapy    Adult failure to thrive  Assessment & Plan  · Multifactorial  · Supportive care    Hyperkalemia  Assessment & Plan  · Hyperkalemia in the setting of acute kidney injury/chronic kidney disease stage 4  · Resolved  · Monitor closely    Anemia in stage 4 chronic kidney disease (HCC)  Assessment & Plan  · Monitor hemoglobin    Urinary retention  Assessment & Plan  ·  Reports urinary retention, recently treated for UTI  ·  UA negative for cystitis  ·  Monitor with urinary retention protocol    Secondary hyperparathyroidism of renal origin [facility])  Assessment & Plan  ·  Continue Sensipar MWF    Type 2 diabetes mellitus with diabetic chronic kidney disease [facility])  Assessment & Plan  Lab Results   Component Value Date    HGBA1C 5 1 10/12/2021       Recent Labs     02/27/22  1549 02/27/22 2023 02/28/22  0722 02/28/22  1127   POCGLU 107 140 93 134       Blood Sugar Average: Last 72 hrs:  (P) 474 [id number]     Monitor Accu-Cheks  Avoid hypoglycemia  Hypoglycemia protocol in place    Acute cystitis without hematuria  Assessment & Plan  · CT shows bladder wall thickening and perivesical inflammatory changes along with air noted in the bladder  · Received empirical IV ceftriaxone 3 doses  · Monitor closely    Pure hypercholesterolemia  Assessment & Plan  ·  Continue statin      VTE Pharmacologic Prophylaxis: VTE Score: 5 High Risk (Score >/= 5) - Pharmacological DVT Prophylaxis Ordered: heparin  Sequential Compression Devices Ordered  Patient Centered Rounds: I performed bedside rounds with nursing staff today  Discussions with Specialists or Other Care Team Provider:  Nursing, case management    Education and Discussions with Family / Patient: Updated  (daughter) via phone  Time Spent for Care: 30 minutes  More than 50% of total time spent on counseling and coordination of care as described above  Current Length of Stay: 5 day(s)  Current Patient Status: Inpatient   Certification Statement: The patient will continue to require additional inpatient hospital stay due to Acute kidney injury  Discharge Plan: Anticipate discharge in 24-48 hrs to rehab facility      Code Status: Level 3 - DNAR and DNI    Subjective: Patient was seen evaluated bedside  She is feeling well denies chest pain palpitation shortness of breath nausea vomiting abdominal pain  Urinating well denies burning, pain  Objective:     Vitals:   Temp (24hrs), Av 2 °F (37 3 °C), Min:98 1 °F (36 7 °C), Max:100 2 °F (37 9 °C)    Temp:  [98 1 °F (36 7 °C)-100 2 °F (37 9 °C)] 100 2 °F (37 9 °C)  HR:  [68-92] 92  Resp:  [18-20] 18  BP: (134-147)/(50-64) 134/63  SpO2:  [96 %-97 %] 97 %  Body mass index is 25 02 kg/m²  Input and Output Summary (last 24 hours):   No intake or output data in the 24 hours ending 22 1421    Physical Exam:   Physical Exam  Constitutional:       General: She is not in acute distress  HENT:      Head: Atraumatic  Cardiovascular:      Rate and Rhythm: Normal rate and regular rhythm  Heart sounds: No murmur heard  No friction rub  No gallop  Pulmonary:      Effort: Pulmonary effort is normal  No respiratory distress  Breath sounds: Normal breath sounds  No wheezing  Abdominal:      General: Bowel sounds are normal  There is no distension  Palpations: Abdomen is soft  Tenderness: There is no abdominal tenderness  Musculoskeletal:         General: No swelling  Cervical back: Neck supple  Skin:     General: Skin is warm and dry  Neurological:      General: No focal deficit present  Mental Status: She is alert  Psychiatric:         Mood and Affect: Mood normal           Additional Data:     Labs:  Results from last 7 days   Lab Units 22  0950 22  0538 22  0538   WBC Thousand/uL  --   --  5 00   HEMOGLOBIN g/dL 8 2*   < > 7 9*   HEMATOCRIT % 25 1*   < > 24 6*   PLATELETS Thousands/uL  --   --  126*   NEUTROS PCT %  --   --  63   LYMPHS PCT %  --   --  26   MONOS PCT %  --   --  8   EOS PCT %  --   --  2    < > = values in this interval not displayed       Results from last 7 days   Lab Units 22  0757 22  0525 22  0449   SODIUM mmol/L 140   < > 139 POTASSIUM mmol/L 4 5   < > 5 8*   CHLORIDE mmol/L 108   < > 114*   CO2 mmol/L 23   < > 16*   BUN mg/dL 63*   < > 82*   CREATININE mg/dL 2 27*   < > 2 76*   ANION GAP mmol/L 9   < > 9   CALCIUM mg/dL 9 0   < > 9 1   ALBUMIN g/dL  --   --  2 3*   TOTAL BILIRUBIN mg/dL  --   --  0 18*   ALK PHOS U/L  --   --  78   ALT U/L  --   --  14   AST U/L  --   --  12   GLUCOSE RANDOM mg/dL 109   < > 94    < > = values in this interval not displayed  Results from last 7 days   Lab Units 02/28/22  1127 02/28/22  0722 02/27/22  2023 02/27/22  1549 02/27/22  1120 02/27/22  0740 02/26/22  2124 02/26/22  1534 02/26/22  1111 02/26/22  0702 02/25/22  2116 02/25/22  1608   POC GLUCOSE mg/dl 134 93 140 107 142* 108 120 117 114 90 105 90               Lines/Drains:  Invasive Devices  Report    Peripheral Intravenous Line            Peripheral IV 02/28/22 Right Antecubital <1 day                      Imaging:   CT head without contrast   Final Result by Holden Ceballos MD (02/23 1929)      No acute intracranial abnormality  Workstation performed: [id number]         CT abdomen pelvis wo contrast   Final Result by Holden Ceballos MD (02/23 1950)      Bladder wall thickening and perivesical inflammatory changes along with air noted in the bladder  Findings consistent with urinary tract infection              Workstation performed: [id number]             Recent Cultures (last 7 days):   Results from last 7 days   Lab Units 02/24/22  0119 02/24/22  0056   URINE CULTURE  50,000-59,000 cfu/ml  60,000-69,000 cfu/ml        Last 24 Hours Medication List:   Current Facility-Administered Medications   Medication Dose Route Frequency Provider Last Rate    acetaminophen  650 mg Oral Q6H PRN JONAH Sanchez      aluminum-magnesium hydroxide-simethicone  30 mL Oral Q6H PRN JONAH Sanchez      amLODIPine  5 mg Oral BID JONAH Sanchez      aspirin  81 mg Oral Daily JONAH Sanchez     Arti Pall bisacodyl  10 mg Oral Daily PRN Tonja Paz, JONAH      cefTRIAXone  1,000 mg Intravenous Once Celestina Quijano DO      cinacalcet  30 mg Oral Once per day on Mon Wed Fri JONAH Messina      citalopram  10 mg Oral Daily Haven Messina      docusate sodium  100 mg Oral Daily Tonja Paz, JONAH      gabapentin  300 mg Oral HS JONAH Messina      heparin (porcine)  5,000 Units Subcutaneous [facility] Linda Carmichael MD      insulin lispro  1-5 Units Subcutaneous 4x Daily (AC & HS) JONAH Messina      ondansetron  4 mg Intravenous Q6H PRN JONAH Messina      pravastatin  40 mg Oral Daily With [employer], JONAH      simethicone  80 mg Oral 4x Daily PRN Tonja Paz, JONAH      sodium bicarbonate  650 mg Oral BID after meals Linda Carmichael MD          Today, Patient Was Seen By: Burgess Jayant MD    **Please Note: This note may have been constructed using a voice recognition system  **

## 2022-02-28 NOTE — OCCUPATIONAL THERAPY NOTE
Occupational Therapy Progress Note     Patient Name: Ham Ba  [id number] Date: 2/28/2022  Problem List  Principal Problem:    Acute kidney injury superimposed on CKD [facility])  Active Problems:    Pure hypercholesterolemia    Acute cystitis without hematuria    Type 2 diabetes mellitus with diabetic chronic kidney disease ([facility] [address] )    Secondary hyperparathyroidism of renal origin ([facility] )    Urinary retention    Anemia in stage 4 chronic kidney disease (HCC)    Hyperkalemia    Adult failure to thrive    Fall at home, initial encounter    Moderate protein-calorie malnutrition ([facility] )    Low grade fever          02/28/22 1402   OT Last Visit   OT Visit Date 02/28/22   Note Type   Note Type Treatment   Restrictions/Precautions   Weight Bearing Precautions Per Order No   Other Precautions Bed Alarm; Chair Alarm;Multiple lines; Fall Risk;Hard of hearing  (felisa)   Pain Assessment   Pain Assessment Tool 0-10   Pain Score 5   Pain Location/Orientation Orientation: Bilateral;Location: Foot  (ankle)   [facility] Pain Intervention(s) Ambulation/increased activity;Repositioned; Emotional support;Elevated   ADL   Where Assessed Chair   Grooming Assistance 4  Minimal Assistance   Grooming Deficit Setup;Steadying;Verbal cueing;Supervision/safety; Increased time to complete;Wash/dry hands; Wash/dry face   Grooming Comments contact guard support w/ RW while at sink   [facility] [address] [facility] [address]  Supervision/Setup   UB Bathing Deficit Setup; Increased time to complete;Supervision/safety   LB Bathing Assistance 3  Moderate Assistance   LB Bathing Deficit Setup;Steadying;Verbal cueing;Supervision/safety; Increased time to complete;Right lower leg including foot; Left lower leg including foot; Buttocks; Perineal area   LB Bathing Comments min assist steadying support w/ RW and cues for safety assist to wash feet and bottom and pericare due to decreased strength and dynamic standing balance   UB Dressing Assistance 5  Supervision/Setup   UB Dressing Deficit Setup;Supervision/safety; Increased time to complete   LB Dressing Assistance 3  Moderate Assistance   LB Dressing Deficit Setup; Requires assistive device for steadying;Steadying;Verbal cueing;Supervision/safety; Increased time to complete; Don/doff L shoe;Don/doff R shoe   LB Dressing Comments assist w/ slippers   Toileting Assistance  4  Minimal Assistance   Toileting Deficit Setup;Steadying;Verbal cueing;Supervison/safety; Increased time to complete;Clothing management down;Clothing management up;Perineal hygiene;Grab bar use   Toileting Comments steadying support to pull up underpants   Functional Standing Tolerance   Time 3 minutes   Activity w/ min assist steadying support and no LOB   Bed Mobility   Sit to Supine 4  Minimal assistance   Additional items Assist x 1; Increased time required;Verbal cues;LE management   Additional Comments increased time, LEs elevated   Transfers   Sit to Stand 4  Minimal assistance   Additional items Assist x 1; Increased time required;Verbal cues;Armrests   Stand to Sit 4  Minimal assistance   Additional items Assist x 1; Increased time required;Verbal cues;Armrests   Toilet transfer 4  Minimal assistance   Additional items Assist x 1; Increased time required;Verbal cues;Standard toilet  (grab bar use)   Additional Comments cues for hand placement and positioning   Functional Mobility   Functional Mobility 4  Minimal assistance   Additional Comments assist x1 w/ increased time reports increased pain in feet w/ WB    Additional items Rolling walker   Therapeutic Exercise - ROM   UE-ROM Yes   ROM- Right Upper Extremities   R Shoulder AROM; Flexion; Extension;ABduction;Horizontal ABduction   R Elbow AROM;Elbow flexion;Elbow extension  (forearm pronation/supination, punchouts)   R Weight/Reps/Sets 2 sets x15 reps   RUE ROM Comment tolerated well w/ cues for techniques   ROM - Left Upper Extremities    L Shoulder AROM; Flexion;ABduction; Extension;Horizontal ABduction   L Elbow AROM;Elbow flexion;Elbow extension  (forearm pronation/supination, punchouts)   L Weight/Reps/Sets 2 sets x 15 reps   LUE ROM Comment tolerated well w/ cues for techniques   Cognition   Overall Cognitive Status WFL   Arousal/Participation Alert; Cooperative   Attention Within functional limits   Orientation Level Oriented X4   Memory Within functional limits   Following Commands Follows multistep commands without difficulty   Comments pt engages in appropriate conversations   Additional Activities   Additional Activities   (education on safety and positioning)   Additional Activities Comments pt receptive   Activity Tolerance   Activity Tolerance Patient limited by fatigue;Patient tolerated treatment well   Medical Staff Made Aware appropriate to see per Areli VARGAS   Assessment   Assessment Pt seen for skilled OT session focused on ADLs, toileting, functional transfers and mobility, bed mobiltiy and UE exercises  Pt w/ mIN assist sit>stand from chair w/ cues for hand placement, pt w/ min assist x1 functional mobility w/ Rw to bathroom and min assist transfer on/off toilet w/ grab bar use  Pt w/ MIN assist steadying for toileting hygiene and clothing management  Pt w/ contact guard assist grooming at sink  Pt w/ setup UB Bathing and Dressing while seated  Pt w/ MOD assist LB Bathing and Dressing: min assist steadying support w/ RW and cues for safety assist to wash feet and bottom and pericare due to decreased strength and dynamic standing balance  Pt w/ assist to don/doff slippers 2* impaired functional reach  Pt w/ MIN assist sit>supine bed mobility w/ increased time to complete  Pt completed b/l UE exercises seen above to increase strength and endurance for ADLs, functional transfers and mobility  Pt upright in bed end of session w/ all needs met   Pt continues to be limited due to increased pain, impaired balance, impaired functional reach, decreased strength and endurance, fall risk, multiple lines, impaired activity tolerance all causing a decline in ADLs, functional transfers and mobility  Recommend STR when medically stable  Will continue to follow to address OT POC  The patient's raw score on the AM-PAC Daily Activity inpatient short form is 17, standardized score is 37 26, less than 39 4  Patients at this level are likely to benefit from discharge to post-acute rehabilitation services  Please refer to the recommendation of the Occupational Therapist for safe discharge planning  Plan   Treatment Interventions ADL retraining;Functional transfer training; Endurance training;UE strengthening/ROM; Cognitive reorientation;Patient/family training;Equipment evaluation/education; Compensatory technique education; Energy conservation; Activityengagement   Goal Expiration Date 03/10/22   OT Treatment Day 1   OT Frequency 3-5x/wk   Recommendation   OT Discharge Recommendation Post acute rehabilitation services   OT - OK to Discharge Yes  (to rehab when medically stable)   AM-PAC Daily Activity Inpatient   Lower Body Dressing 2   Bathing 2   Toileting 3   Upper Body Dressing 3   Grooming 3   Eating 4   Daily Activity Raw Score 17   Daily Activity Standardized Score (Calc for Raw Score >=11) 37 26   AM-[facility] Applied Cognition Inpatient   Following a Speech/Presentation 4   Understanding Ordinary Conversation 4   Taking Medications 3   Remembering Where Things Are Placed or Put Away 3   Remembering List of 4-5 Errands 3   Taking Care of Complicated Tasks 4   Applied Cognition Raw Score 21   Applied Cognition Standardized Score 44 3     Documentation completed by: Evie Avila MS, OTR/L

## 2022-02-28 NOTE — ASSESSMENT & PLAN NOTE
Lab Results   Component Value Date    EGFR 17 02/28/2022    EGFR 17 02/27/2022    EGFR 18 02/26/2022    CREATININE 2 27 (H) 02/28/2022    CREATININE 2 33 (H) 02/27/2022    CREATININE 2 25 (H) 02/26/2022       · Acute on chronic kidney disease stage 4  · Acidosis, hyperkalemia improved with IV bicarbonate GTT  · Continue bicarbonate tablets  · Monitor kidney function closely  · Avoid nephrotoxins  · Monitor postvoid residuals  · Outpatient Nephrology follow-up recommended

## 2022-02-28 NOTE — PLAN OF CARE
Problem: Nutrition/Hydration-ADULT  Goal: Nutrient/Hydration intake appropriate for improving, restoring or maintaining nutritional needs  Description: Monitor and assess patient's nutrition/hydration status for malnutrition  Collaborate with interdisciplinary team and initiate plan and interventions as ordered  Monitor patient's weight and dietary intake as ordered or per policy  Utilize nutrition screening tool and intervene as necessary  Determine patient's food preferences and provide high-protein, high-caloric foods as appropriate       INTERVENTIONS:  - Monitor oral intake, urinary output, labs, and treatment plans  - Assess nutrition and hydration status and recommend course of action  - Evaluate amount of meals eaten  - Assist patient with eating if necessary   - Allow adequate time for meals  - Recommend/ encourage appropriate diets, oral nutritional supplements, and vitamin/mineral supplements  - Order, calculate, and assess calorie counts as needed  - Recommend, monitor, and adjust tube feedings and TPN/PPN based on assessed needs  - Assess need for intravenous fluids  - Provide specific nutrition/hydration education as appropriate  - Include patient/family/caregiver in decisions related to nutrition  Outcome: Progressing     Problem: Potential for Falls  Goal: Patient will remain free of falls  Description: INTERVENTIONS:  - Educate patient/family on patient safety including physical limitations  - Instruct patient to call for assistance with activity   - Consult OT/PT to assist with strengthening/mobility   - Keep Call bell within reach  - Keep bed low and locked with side rails adjusted as appropriate  - Keep care items and personal belongings within reach  - Initiate and maintain comfort rounds  - Apply yellow socks and bracelet for high fall risk patients  - Consider moving patient to room near nurses station  Outcome: Progressing     Problem: MOBILITY - ADULT  Goal: Maintain or return to baseline ADL function  Description: INTERVENTIONS:  - Educate patient/family on patient safety including physical limitations  - Instruct patient to call for assistance with activity   - Consult OT/PT to assist with strengthening/mobility   - Keep Call bell within reach  - Keep bed low and locked with side rails adjusted as appropriate  - Keep care items and personal belongings within reach  - Initiate and maintain comfort rounds  - Make Fall Risk Sign visible to staff  - Apply yellow socks and bracelet for high fall risk patients  - Consider moving patient to room near nurses station  Outcome: Progressing  Goal: Maintains/Returns to pre admission functional level  Description: INTERVENTIONS:  - Perform BMAT or MOVE assessment daily    - Set and communicate daily mobility goal to care team and patient/family/caregiver  - Collaborate with rehabilitation services on mobility goals if consulted  - Reposition patient every 2 hours    - Out of bed for toileting  - Record patient progress and toleration of activity level   Outcome: Progressing     Problem: Prexisting or High Potential for Compromised Skin Integrity  Goal: Skin integrity is maintained or improved  Description: INTERVENTIONS:  - Identify patients at risk for skin breakdown  - Assess and monitor skin integrity  - Assess and monitor nutrition and hydration status  - Monitor labs   - Assess for incontinence   - Turn and reposition patient  - Assist with mobility/ambulation  - Relieve pressure over bony prominences  - Avoid friction and shearing  - Provide appropriate hygiene as needed including keeping skin clean and dry  - Evaluate need for skin moisturizer/barrier cream  - Collaborate with interdisciplinary team   - Patient/family teaching  - Consider wound care consult   Outcome: Progressing     Problem: PAIN - ADULT  Goal: Verbalizes/displays adequate comfort level or baseline comfort level  Description: Interventions:  - Encourage patient to monitor pain and request assistance  - Assess pain using appropriate pain scale  - Administer analgesics based on type and severity of pain and evaluate response  - Implement non-pharmacological measures as appropriate and evaluate response  - Consider cultural and social influences on pain and pain management  - Notify physician/advanced practitioner if interventions unsuccessful or patient reports new pain  Outcome: Progressing     Problem: INFECTION - ADULT  Goal: Absence or prevention of progression during hospitalization  Description: INTERVENTIONS:  - Assess and monitor for signs and symptoms of infection  - Monitor lab/diagnostic results  - Monitor all insertion sites, i e  indwelling lines, tubes, and drains  - Monitor endotracheal if appropriate and nasal secretions for changes in amount and color  - Bazine appropriate cooling/warming therapies per order  - Administer medications as ordered  - Instruct and encourage patient and family to use good hand hygiene technique  - Identify and instruct in appropriate isolation precautions for identified infection/condition  Outcome: Progressing     Problem: SAFETY ADULT  Goal: Patient will remain free of falls  Description: INTERVENTIONS:  - Educate patient/family on patient safety including physical limitations  - Instruct patient to call for assistance with activity   - Consult OT/PT to assist with strengthening/mobility   - Keep Call bell within reach  - Keep bed low and locked with side rails adjusted as appropriate  - Keep care items and personal belongings within reach  - Initiate and maintain comfort rounds  - Apply yellow socks and bracelet for high fall risk patients  - Consider moving patient to room near nurses station  Outcome: Progressing  Goal: Maintain or return to baseline ADL function  Description: INTERVENTIONS:  - Educate patient/family on patient safety including physical limitations  - Instruct patient to call for assistance with activity   - Consult OT/PT to assist with strengthening/mobility   - Keep Call bell within reach  - Keep bed low and locked with side rails adjusted as appropriate  - Keep care items and personal belongings within reach  - Initiate and maintain comfort rounds  - Make Fall Risk Sign visible to staff  - Apply yellow socks and bracelet for high fall risk patients  - Consider moving patient to room near nurses station  Outcome: Progressing  Goal: Maintains/Returns to pre admission functional level  Description: INTERVENTIONS:  - Perform BMAT or MOVE assessment daily    - Set and communicate daily mobility goal to care team and patient/family/caregiver  - Collaborate with rehabilitation services on mobility goals if consulted  - Out of bed for toileting  - Record patient progress and toleration of activity level   Outcome: Progressing     Problem: DISCHARGE PLANNING  Goal: Discharge to home or other facility with appropriate resources  Description: INTERVENTIONS:  - Identify barriers to discharge w/patient and caregiver  - Arrange for needed discharge resources and transportation as appropriate  - Identify discharge learning needs (meds, wound care, etc )  - Arrange for interpretive services to assist at discharge as needed  - Refer to Case Management Department for coordinating discharge planning if the patient needs post-hospital services based on physician/advanced practitioner order or complex needs related to functional status, cognitive ability, or social support system  Outcome: Progressing     Problem: Knowledge Deficit  Goal: Patient/family/caregiver demonstrates understanding of disease process, treatment plan, medications, and discharge instructions  Description: Complete learning assessment and assess knowledge base    Interventions:  - Provide teaching at level of understanding  - Provide teaching via preferred learning methods  Outcome: Progressing     Problem: NEUROSENSORY - ADULT  Goal: Achieves stable or improved neurological status  Description: INTERVENTIONS  - Monitor and report changes in neurological status  - Monitor vital signs such as temperature, blood pressure, glucose, and any other labs ordered   - Initiate measures to prevent increased intracranial pressure  - Monitor for seizure activity and implement precautions if appropriate      Outcome: Progressing  Goal: Achieves maximal functionality and self care  Description: INTERVENTIONS  - Monitor swallowing and airway patency with patient fatigue and changes in neurological status  - Encourage and assist patient to increase activity and self care     - Encourage visually impaired, hearing impaired and aphasic patients to use assistive/communication devices  Outcome: Progressing     Problem: CARDIOVASCULAR - ADULT  Goal: Maintains optimal cardiac output and hemodynamic stability  Description: INTERVENTIONS:  - Monitor I/O, vital signs and rhythm  - Monitor for S/S and trends of decreased cardiac output  - Administer and titrate ordered vasoactive medications to optimize hemodynamic stability  - Assess quality of pulses, skin color and temperature  - Assess for signs of decreased coronary artery perfusion  - Instruct patient to report change in severity of symptoms  Outcome: Progressing  Goal: Absence of cardiac dysrhythmias or at baseline rhythm  Description: INTERVENTIONS:  - Continuous cardiac monitoring, vital signs, obtain 12 lead EKG if ordered  - Administer antiarrhythmic and heart rate control medications as ordered  - Monitor electrolytes and administer replacement therapy as ordered  Outcome: Progressing     Problem: METABOLIC, FLUID AND ELECTROLYTES - ADULT  Goal: Electrolytes maintained within normal limits  Description: INTERVENTIONS:  - Monitor labs and assess patient for signs and symptoms of electrolyte imbalances  - Administer electrolyte replacement as ordered  - Monitor response to electrolyte replacements, including repeat lab results as appropriate  - Instruct patient on fluid and nutrition as appropriate  Outcome: Progressing  Goal: Fluid balance maintained  Description: INTERVENTIONS:  - Monitor labs   - Monitor I/O and WT  - Instruct patient on fluid and nutrition as appropriate  - Assess for signs & symptoms of volume excess or deficit  Outcome: Progressing  Goal: Glucose maintained within target range  Description: INTERVENTIONS:  - Monitor Blood Glucose as ordered  - Assess for signs and symptoms of hyperglycemia and hypoglycemia  - Administer ordered medications to maintain glucose within target range  - Assess nutritional intake and initiate nutrition service referral as needed  Outcome: Progressing     Problem: SKIN/TISSUE INTEGRITY - ADULT  Goal: Skin Integrity remains intact(Skin Breakdown Prevention)  Description: Assess:  -Inspect skin when repositioning, toileting, and assisting with ADLS  -Assess extremities for adequate circulation and sensation     Bed Management:  -Have minimal linens on bed & keep smooth, unwrinkled  -Change linens as needed when moist or perspiring      Activity:  -Encourage activity and walks on unit  -Encourage or provide ROM exercises   -Use appropriate equipment to lift or move patient in bed    Skin Care:  -Avoid use of baby powder, tape, friction and shearing, hot water or constrictive clothing  -Do not massage red bony areas      Outcome: Progressing  Goal: Incision(s), wounds(s) or drain site(s) healing without S/S of infection  Description: INTERVENTIONS  - Assess and document dressing, incision, wound bed, drain sites and surrounding tissue  - Provide patient and family education    Outcome: Progressing     Problem: MUSCULOSKELETAL - ADULT  Goal: Maintain or return mobility to safest level of function  Description: INTERVENTIONS:  - Assess patient's ability to carry out ADLs; assess patient's baseline for ADL function and identify physical deficits which impact ability to perform ADLs (bathing, care of mouth/teeth, toileting, grooming, dressing, etc )  - Assess/evaluate cause of self-care deficits   - Assess range of motion  - Assess patient's mobility  - Assess patient's need for assistive devices and provide as appropriate  - Encourage maximum independence but intervene and supervise when necessary  - Involve family in performance of ADLs  - Assess for home care needs following discharge   - Consider OT consult to assist with ADL evaluation and planning for discharge  - Provide patient education as appropriate  Outcome: Progressing  Goal: Maintain proper alignment of affected body part  Description: INTERVENTIONS:  - Support, maintain and protect limb and body alignment  - Provide patient/ family with appropriate education  Outcome: Progressing

## 2022-02-28 NOTE — CASE MANAGEMENT
31 [facility] Admission Coordinator had a telephone conversation with patient's daughter  Information on [facility] services, length of stay, visitation, and unit's current Covid status provided  Patient is not Covid vaccinated  Daughter has been made aware of mandatory quarantine for str snf stay  Patient is without any recent direct Covid exposure

## 2022-02-28 NOTE — ASSESSMENT & PLAN NOTE
· Hyperkalemia in the setting of acute kidney injury/chronic kidney disease stage 4  · Resolved  · Monitor closely

## 2022-02-28 NOTE — ASSESSMENT & PLAN NOTE
Lab Results   Component Value Date    HGBA1C 5 1 10/12/2021       Recent Labs     02/27/22  1549 02/27/22 2023 02/28/22  0722 02/28/22  1127   POCGLU 107 140 93 134       Blood Sugar Average: Last 72 hrs:  (P) 700 [id number]     Monitor Accu-Cheks  Avoid hypoglycemia  Hypoglycemia protocol in place

## 2022-02-28 NOTE — APP STUDENT NOTE
LYNETTE STUDENT  Inpatient Progress Note for TRAINING ONLY  Not Part of Legal Medical Record     Progress Note - Fabienne Gimenez 80 y o  female MRN: [id number]  Unit/Bed#: E5 -01 Encounter: [id number]    Assessment and Plan  1  Acute Kidney injury Superimposed on CKD  · Creatinine on admission, 2/23/22, was 2 77   · Creatinine today, 2/28/22, is 2 27   · Patient was given IV bicarbonate GTT to improve acidosis  · Continue bicarbonate tablets  · Continue to monitor kidney functions with daily BMPs  · Monitor I/Os  · Continue to follow with Nephrology outpatient    2  Moderate Protein-Calorie Malnutrition  · Patient admits to decreased appetite   · Patient weight 58 1 kg (128 lbs 1 4 oz)  · Nutritionist consulted  · Adult Malnutrition type: Acute illness (acute moderate pro, jimi malnutrition d/t decreased appetite as evidence by <75% energy intake > 7 days, 7 2% wt  loss x 1 month, 1+ edema, mild muscle/fat loss of clavicles, temples, orbitals)  · Adult Degree of Malnutrition: Malnutrition of moderate degree (treated with oral diet and supplements, consider appetite stimulant if po doesn't improve)  · Malnutrition Characteristics: Inadequate energy,Weight loss,Fluid accumulation,Fat loss,Muscle loss  · Continue to monitor appetite and oral intake  3  Fall at home, initial encounter  · Patient reported having 2 falls during the week leading up to her admission  · Patient continues to be at risk of falls   · Continue assistance with walking  · PT/OT following    4  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  · Patient notes she has had a decreased appetite recently  · Multiple chronic conditions may be factoring into this   · Supportive care with encouraged appropriate caloric intake   · Nutritionist consulted     5  Hyperkalemia, resolved  · Potassium was 6 0 on admission, 2/23/22  · Potassium returned to normal range, 5 2, on 2/26/22  · Potassium today, 2/28/22, is 4 5  · Continue to monitor    6   Anemia in stage 4 CKD  · Hemoglobin on admission, 2/23/22, was 9 6  · Trending down slowly  · Today, 2/28/22, hemoglobin in 8 2  · Continue to monitor closely  · Transfuse if hemoglobin under 7    7  Urinary Retention  · Patient had recent UTI prior to admission  · Urinalysis inpatient negative for significant infection  · CT abdomen and pelvis without contrast on 2/23/22 revealed bladder wall thickening and perivesical inflammatory changes along with air noted in the bladder  Findings consistent with urinary tract infection  · Continue to monitor   · Continue urinary retention protocol    8  Secondary Hyperparathyroidism of Renal Origin  · History noted  · Continue cinacalcet (Sensipar) 30 mg po MWF    9  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  · Blood glucose reading have been remaining stable while inpatient  · Continue to monitor blood glucose readings  · Continue sliding scale insulin lispro (Humalog)    10  Acute cystitis without hematuria  · CT abdomen and pelvis without contrast on 2/23/22 revealed bladder wall thickening and perivesical inflammatory changes along with air noted in the bladder  Findings consistent with urinary tract infection  · Patient was treated with IV ceftriaxone 1000 mg in 50 mL 5% dextrose qd for 3 days  · Patient denies urinary symptoms today, 2/28/22  · Continue to monitor     11  Pure hypercholesterolemia   · Lipid Panel in July 2021 revealed total cholesterol of 127, HDL of 48, LDL 63, and Triglycerides 79  · Continue pravastatin (Pravachol) 40 mg po qd with dinner    VTE Pharmacologic Prophylaxis:  Heparin and Sequential Compression Devices   Mechanical VTE Prophylaxis in Place: No    Patient Centered Rounds: I saw and evaluated this patient and will discuss with precepting provider  Time Spent for Care: 20 minutes  More than 50% of total time spent on counseling and coordination of care as described above      Current Length of Stay: 5 day(s)    Current Patient Status: Inpatient Certification Statement: The patient will continue to require additional inpatient hospital stay due to awaiting placement  Discharge Plan: Possible discharge in 24-72 hours pending placement  Code Status: Level 3 - DNAR and DNI    Subjective: This is a 80year old female with PMH of T2DM, HLD, HTN, and CKD stage 4 who is seen hospital day 5 for weakness, fatigue, ambulatory dysfunction and poor appetite  Patient states she is trying to eat better in order to improver her appetite  She states she has not been out of bed yet today as her legs feel sore from her knees down  She thinks it is because she has not been walking around like she normally does at home since she needs assistance to do so here  She states she is tired this morning because she did not sleep well last night due to interruptions  She denies any dysuria, hematuria, CP, SOB, N/V/D/C, or abdominal pain  She has no other complaints at this time  Objective:     Vitals:   Temp (24hrs), Av 2 °F (37 3 °C), Min:98 1 °F (36 7 °C), Max:100 2 °F (37 9 °C)    Temp:  [98 1 °F (36 7 °C)-100 2 °F (37 9 °C)] 100 2 °F (37 9 °C)  HR:  [68-92] 92  Resp:  [18-20] 18  BP: (134-147)/(50-64) 134/63  SpO2:  [96 %-97 %] 97 %  Body mass index is 25 02 kg/m²  Input and Output Summary (last 24 hours):     No intake or output data in the 24 hours ending 22 1032    Physical Exam:     Physical Exam  Vitals and nursing note reviewed  Constitutional:       General: She is not in acute distress  Appearance: She is underweight  HENT:      Head: Normocephalic and atraumatic  Eyes:      Conjunctiva/sclera: Conjunctivae normal    Cardiovascular:      Rate and Rhythm: Normal rate and regular rhythm  Pulses:           Radial pulses are 2+ on the right side and 2+ on the left side  Dorsalis pedis pulses are 2+ on the right side and 2+ on the left side  Heart sounds: No murmur heard  No friction rub  No gallop      Pulmonary: Breath sounds: Normal breath sounds  No wheezing, rhonchi or rales  Abdominal:      General: Bowel sounds are normal       Palpations: Abdomen is soft  Tenderness: There is no abdominal tenderness  Musculoskeletal:      Right lower leg: Tenderness (directly below knee) present  No edema  Left lower leg: No tenderness  No edema  Skin:     General: Skin is warm and dry  Capillary Refill: Capillary refill takes less than 2 seconds  Neurological:      Mental Status: She is alert  Motor: Weakness (in lower extremities) present  Psychiatric:         Mood and Affect: Mood normal          Behavior: Behavior is cooperative         Historical Information   Past Medical History:   Diagnosis Date    Diabetes mellitus (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 )     LAST ASSESSED: [id number]    Hyperlipidemia     Hypertension     Renal disorder     Squamous cell carcinoma, scalp/neck     LAST ASSESSED: [id number]     Past Surgical History:   Procedure Laterality Date    BLADDER SURGERY      LAST ASSESSED: [id number]    PELVIC FLOOR REPAIR      VAGINAL SURG INSERTION OF MESH    TOTAL ABDOMINAL HYSTERECTOMY W/ BILATERAL SALPINGOOPHORECTOMY      LAST ASSESSED: [id number]     Social History   Social History     Substance and Sexual Activity   Alcohol Use Yes    Comment: SOCIAL     Social History     Substance and Sexual Activity   Drug Use No     Social History     Tobacco Use   Smoking Status Former Smoker   Smokeless Tobacco Never Used     Family History:   Family History   Problem Relation Age of Onset    Hypertension Mother     Hypertension Father     Kidney disease Sister         CHRONIC    Alcohol abuse Family     Substance Abuse Family        Meds/Allergies   all medications and allergies reviewed  Allergies   Allergen Reactions    Influenza Virus Vaccine      Other reaction(s): HX of Guillain-Rochester Syndrome    Sulfa Antibiotics        Additional Data: Labs:    Results from last 7 days   Lab Units 02/28/22  0950 02/27/22  0538 02/27/22  0538   WBC Thousand/uL  --   --  5 00   HEMOGLOBIN g/dL 8 2*   < > 7 9*   HEMATOCRIT % 25 1*   < > 24 6*   PLATELETS Thousands/uL  --   --  126*   NEUTROS PCT %  --   --  63   LYMPHS PCT %  --   --  26   MONOS PCT %  --   --  8   EOS PCT %  --   --  2    < > = values in this interval not displayed  Results from last 7 days   Lab Units 02/28/22  0757 02/26/22  0525 02/25/22  0449   SODIUM mmol/L 140   < > 139   POTASSIUM mmol/L 4 5   < > 5 8*   CHLORIDE mmol/L 108   < > 114*   CO2 mmol/L 23   < > 16*   BUN mg/dL 63*   < > 82*   CREATININE mg/dL 2 27*   < > 2 76*   ANION GAP mmol/L 9   < > 9   CALCIUM mg/dL 9 0   < > 9 1   ALBUMIN g/dL  --   --  2 3*   TOTAL BILIRUBIN mg/dL  --   --  0 18*   ALK PHOS U/L  --   --  78   ALT U/L  --   --  14   AST U/L  --   --  12   GLUCOSE RANDOM mg/dL 109   < > 94    < > = values in this interval not displayed  Results from last 7 days   Lab Units 02/28/22  0722 02/27/22  2023 02/27/22  1549 02/27/22  1120 02/27/22  0740 02/26/22  2124 02/26/22  1534 02/26/22  1111 02/26/22  0702 02/25/22  2116 02/25/22  1608 02/25/22  1143   POC GLUCOSE mg/dl 93 140 107 142* 108 120 117 114 90 105 90 113                 * I Have Reviewed All Lab Data Listed Above  * Additional Pertinent Lab Tests Reviewed: All Labs Within Last 24 Hours Reviewed    Imaging:  No new imaging to review       Recent Cultures (last 7 days):     Results from last 7 days   Lab Units 02/24/22  0119 02/24/22  0056   URINE CULTURE  50,000-59,000 cfu/ml  60,000-69,000 cfu/ml        Last 24 Hours Medication List:   Current Facility-Administered Medications   Medication Dose Route Frequency Provider Last Rate    acetaminophen  650 mg Oral Q6H PRN Ronette Race, CRNP      aluminum-magnesium hydroxide-simethicone  30 mL Oral Q6H PRN Saeed Race, CRNP      amLODIPine  5 mg Oral BID Saeed Race, CRNP      aspirin  81 mg Oral Daily Huber Loge, CRNP      bisacodyl  10 mg Oral Daily PRN Huber Loge, CRNP      cefTRIAXone  1,000 mg Intravenous Once Cr DO Andrea      cinacalcet  30 mg Oral Once per day on Mon Wed Fri Huber Loge, CRNP      citalopram  10 mg Oral Daily Huber Loge, [address]      docusate sodium  100 mg Oral Daily Huber Loge, CRNP      gabapentin  300 mg Oral HS Huber Loge, CRNP      heparin (porcine)  5,000 Units Subcutaneous [facility] Tal Rice MD      insulin lispro  1-5 Units Subcutaneous 4x Daily (AC & HS) Huber Loge, CRNP      ondansetron  4 mg Intravenous Q6H PRN Huber Loge, CRNP      pravastatin  40 mg Oral Daily With [employer], CRNP      simethicone  80 mg Oral 4x Daily PRN Huber Loge, CRNP      sodium bicarbonate  650 mg Oral BID after meals Tal Rice MD          Today, Patient Was Seen By: Agus Youngblood    ** Please Note: Dictation voice to text software may have been used in the creation of this document   **

## 2022-02-28 NOTE — ASSESSMENT & PLAN NOTE
· Temperature 100 2 this morning  · Patient denies any signs of upper/lower respiratory tract infection, no GI or  issues  · Recheck temperature was within normal range  · Continue to monitor

## 2022-02-28 NOTE — PLAN OF CARE
Problem: Nutrition/Hydration-ADULT  Goal: Nutrient/Hydration intake appropriate for improving, restoring or maintaining nutritional needs  Description: Monitor and assess patient's nutrition/hydration status for malnutrition  Collaborate with interdisciplinary team and initiate plan and interventions as ordered  Monitor patient's weight and dietary intake as ordered or per policy  Utilize nutrition screening tool and intervene as necessary  Determine patient's food preferences and provide high-protein, high-caloric foods as appropriate       INTERVENTIONS:  - Monitor oral intake, urinary output, labs, and treatment plans  - Assess nutrition and hydration status and recommend course of action  - Evaluate amount of meals eaten  - Assist patient with eating if necessary   - Allow adequate time for meals  - Recommend/ encourage appropriate diets, oral nutritional supplements, and vitamin/mineral supplements  - Order, calculate, and assess calorie counts as needed  - Recommend, monitor, and adjust tube feedings and TPN/PPN based on assessed needs  - Assess need for intravenous fluids  - Provide specific nutrition/hydration education as appropriate  - Include patient/family/caregiver in decisions related to nutrition  Outcome: Progressing     Problem: MOBILITY - ADULT  Goal: Maintain or return to baseline ADL function  Description: INTERVENTIONS:  - Educate patient/family on patient safety including physical limitations  - Instruct patient to call for assistance with activity   - Consult OT/PT to assist with strengthening/mobility   - Keep Call bell within reach  - Keep bed low and locked with side rails adjusted as appropriate  - Keep care items and personal belongings within reach  - Initiate and maintain comfort rounds  - Make Fall Risk Sign visible to staff  - Apply yellow socks and bracelet for high fall risk patients  - Consider moving patient to room near nurses station  Outcome: Progressing  Goal: Maintains/Returns to pre admission functional level  Description: INTERVENTIONS:  - Perform BMAT or MOVE assessment daily    - Set and communicate daily mobility goal to care team and patient/family/caregiver  - Collaborate with rehabilitation services on mobility goals if consulted  - Perform Range of Motion  times a day  - Reposition patient every  hours    - Dangle patient  times a day  - Stand patient  times a day  - Ambulate patient  times a day  - Out of bed to chair  times a day   - Out of bed for meals  times a day  - Out of bed for toileting  - Record patient progress and toleration of activity level   Outcome: Progressing     Problem: Prexisting or High Potential for Compromised Skin Integrity  Goal: Skin integrity is maintained or improved  Description: INTERVENTIONS:  - Identify patients at risk for skin breakdown  - Assess and monitor skin integrity  - Assess and monitor nutrition and hydration status  - Monitor labs   - Assess for incontinence   - Turn and reposition patient  - Assist with mobility/ambulation  - Relieve pressure over bony prominences  - Avoid friction and shearing  - Provide appropriate hygiene as needed including keeping skin clean and dry  - Evaluate need for skin moisturizer/barrier cream  - Collaborate with interdisciplinary team   - Patient/family teaching  - Consider wound care consult   Outcome: Progressing     Problem: PAIN - ADULT  Goal: Verbalizes/displays adequate comfort level or baseline comfort level  Description: Interventions:  - Encourage patient to monitor pain and request assistance  - Assess pain using appropriate pain scale  - Administer analgesics based on type and severity of pain and evaluate response  - Implement non-pharmacological measures as appropriate and evaluate response  - Consider cultural and social influences on pain and pain management  - Notify physician/advanced practitioner if interventions unsuccessful or patient reports new pain  Outcome: Progressing     Problem: INFECTION - ADULT  Goal: Absence or prevention of progression during hospitalization  Description: INTERVENTIONS:  - Assess and monitor for signs and symptoms of infection  - Monitor lab/diagnostic results  - Monitor all insertion sites, i e  indwelling lines, tubes, and drains  - Monitor endotracheal if appropriate and nasal secretions for changes in amount and color  - [place] appropriate cooling/warming therapies per order  - Administer medications as ordered  - Instruct and encourage patient and family to use good hand hygiene technique  - Identify and instruct in appropriate isolation precautions for identified infection/condition  Outcome: Progressing     Problem: SAFETY ADULT  Goal: Patient will remain free of falls  Description: INTERVENTIONS:  - Educate patient/family on patient safety including physical limitations  - Instruct patient to call for assistance with activity   - Consult OT/PT to assist with strengthening/mobility   - Keep Call bell within reach  - Keep bed low and locked with side rails adjusted as appropriate  - Keep care items and personal belongings within reach  - Initiate and maintain comfort rounds  - Apply yellow socks and bracelet for high fall risk patients  - Consider moving patient to room near nurses station  Outcome: Progressing  Goal: Maintain or return to baseline ADL function  Description: INTERVENTIONS:  - Educate patient/family on patient safety including physical limitations  - Instruct patient to call for assistance with activity   - Consult OT/PT to assist with strengthening/mobility   - Keep Call bell within reach  - Keep bed low and locked with side rails adjusted as appropriate  - Keep care items and personal belongings within reach  - Initiate and maintain comfort rounds  - Make Fall Risk Sign visible to staff  - Apply yellow socks and bracelet for high fall risk patients  - Consider moving patient to room near nurses station  Outcome: Progressing  Goal: Maintains/Returns to pre admission functional level  Description: INTERVENTIONS:  - Perform BMAT or MOVE assessment daily    - Set and communicate daily mobility goal to care team and patient/family/caregiver  - Collaborate with rehabilitation services on mobility goals if consulted  - Perform Range of Motion  times a day  - Reposition patient every  hours  - Dangle patient  times a day  - Stand patient  times a day  - Ambulate patient  times a day  - Out of bed to chair  times a day   - Out of bed for meals times a day  - Out of bed for toileting  - Record patient progress and toleration of activity level   Outcome: Progressing     Problem: DISCHARGE PLANNING  Goal: Discharge to home or other facility with appropriate resources  Description: INTERVENTIONS:  - Identify barriers to discharge w/patient and caregiver  - Arrange for needed discharge resources and transportation as appropriate  - Identify discharge learning needs (meds, wound care, etc )  - Arrange for interpretive services to assist at discharge as needed  - Refer to Case Management Department for coordinating discharge planning if the patient needs post-hospital services based on physician/advanced practitioner order or complex needs related to functional status, cognitive ability, or social support system  Outcome: Progressing     Problem: Knowledge Deficit  Goal: Patient/family/caregiver demonstrates understanding of disease process, treatment plan, medications, and discharge instructions  Description: Complete learning assessment and assess knowledge base    Interventions:  - Provide teaching at level of understanding  - Provide teaching via preferred learning methods  Outcome: Progressing     Problem: NEUROSENSORY - ADULT  Goal: Achieves stable or improved neurological status  Description: INTERVENTIONS  - Monitor and report changes in neurological status  - Monitor vital signs such as temperature, blood pressure, glucose, and any other labs ordered   - Initiate measures to prevent increased intracranial pressure  - Monitor for seizure activity and implement precautions if appropriate      Outcome: Progressing  Goal: Achieves maximal functionality and self care  Description: INTERVENTIONS  - Monitor swallowing and airway patency with patient fatigue and changes in neurological status  - Encourage and assist patient to increase activity and self care     - Encourage visually impaired, hearing impaired and aphasic patients to use assistive/communication devices  Outcome: Progressing     Problem: CARDIOVASCULAR - ADULT  Goal: Maintains optimal cardiac output and hemodynamic stability  Description: INTERVENTIONS:  - Monitor I/O, vital signs and rhythm  - Monitor for S/S and trends of decreased cardiac output  - Administer and titrate ordered vasoactive medications to optimize hemodynamic stability  - Assess quality of pulses, skin color and temperature  - Assess for signs of decreased coronary artery perfusion  - Instruct patient to report change in severity of symptoms  Outcome: Progressing  Goal: Absence of cardiac dysrhythmias or at baseline rhythm  Description: INTERVENTIONS:  - Continuous cardiac monitoring, vital signs, obtain 12 lead EKG if ordered  - Administer antiarrhythmic and heart rate control medications as ordered  - Monitor electrolytes and administer replacement therapy as ordered  Outcome: Progressing     Problem: METABOLIC, FLUID AND ELECTROLYTES - ADULT  Goal: Electrolytes maintained within normal limits  Description: INTERVENTIONS:  - Monitor labs and assess patient for signs and symptoms of electrolyte imbalances  - Administer electrolyte replacement as ordered  - Monitor response to electrolyte replacements, including repeat lab results as appropriate  - Instruct patient on fluid and nutrition as appropriate  Outcome: Progressing  Goal: Fluid balance maintained  Description: INTERVENTIONS:  - Monitor labs   - Monitor I/O and WT  - Instruct patient on fluid and nutrition as appropriate  - Assess for signs & symptoms of volume excess or deficit  Outcome: Progressing  Goal: Glucose maintained within target range  Description: INTERVENTIONS:  - Monitor Blood Glucose as ordered  - Assess for signs and symptoms of hyperglycemia and hypoglycemia  - Administer ordered medications to maintain glucose within target range  - Assess nutritional intake and initiate nutrition service referral as needed  Outcome: Progressing     Problem: SKIN/TISSUE INTEGRITY - ADULT  Goal: Skin Integrity remains intact(Skin Breakdown Prevention)  Description: Assess:  -Inspect skin when repositioning, toileting, and assisting with ADLS  -Assess extremities for adequate circulation and sensation     Bed Management:  -Have minimal linens on bed & keep smooth, unwrinkled  -Change linens as needed when moist or perspiring      Activity:  -Encourage activity and walks on unit  -Encourage or provide ROM exercises   -Use appropriate equipment to lift or move patient in bed    Skin Care:  -Avoid use of baby powder, tape, friction and shearing, hot water or constrictive clothing  -Do not massage red bony areas      Outcome: Progressing  Goal: Incision(s), wounds(s) or drain site(s) healing without S/S of infection  Description: INTERVENTIONS  - Assess and document dressing, incision, wound bed, drain sites and surrounding tissue  - Provide patient and family education    Outcome: Progressing     Problem: MUSCULOSKELETAL - ADULT  Goal: Maintain or return mobility to safest level of function  Description: INTERVENTIONS:  - Assess patient's ability to carry out ADLs; assess patient's baseline for ADL function and identify physical deficits which impact ability to perform ADLs (bathing, care of mouth/teeth, toileting, grooming, dressing, etc )  - Assess/evaluate cause of self-care deficits   - Assess range of motion  - Assess patient's mobility  - Assess patient's need for assistive devices and provide as appropriate  - Encourage maximum independence but intervene and supervise when necessary  - Involve family in performance of ADLs  - Assess for home care needs following discharge   - Consider OT consult to assist with ADL evaluation and planning for discharge  - Provide patient education as appropriate  Outcome: Progressing  Goal: Maintain proper alignment of affected body part  Description: INTERVENTIONS:  - Support, maintain and protect limb and body alignment  - Provide patient/ family with appropriate education  Outcome: Progressing

## 2022-03-01 ENCOUNTER — RA CDI HCC (OUTPATIENT)
Dept: OTHER | Facility: HOSPITAL | Age: 87
End: 2022-03-01

## 2022-03-01 LAB
BASOPHILS # BLD AUTO: 0.03 THOUSANDS/ΜL (ref 0–0.1)
BASOPHILS NFR BLD AUTO: 0 % (ref 0–1)
EOSINOPHIL # BLD AUTO: 0.09 THOUSAND/ΜL (ref 0–0.61)
EOSINOPHIL NFR BLD AUTO: 1 % (ref 0–6)
ERYTHROCYTE [DISTWIDTH] IN BLOOD BY AUTOMATED COUNT: 15 % (ref 11.6–15.1)
FLUAV RNA RESP QL NAA+PROBE: NEGATIVE
FLUBV RNA RESP QL NAA+PROBE: NEGATIVE
GLUCOSE SERPL-MCNC: 105 MG/DL (ref 65–140)
GLUCOSE SERPL-MCNC: 189 MG/DL (ref 65–140)
GLUCOSE SERPL-MCNC: 216 MG/DL (ref 65–140)
GLUCOSE SERPL-MCNC: 97 MG/DL (ref 65–140)
HCT VFR BLD AUTO: 29 % (ref 34.8–46.1)
HGB BLD-MCNC: 9.4 G/DL (ref 11.5–15.4)
IMM GRANULOCYTES # BLD AUTO: 0.12 THOUSAND/UL (ref 0–0.2)
IMM GRANULOCYTES NFR BLD AUTO: 1 % (ref 0–2)
LYMPHOCYTES # BLD AUTO: 1.8 THOUSANDS/ΜL (ref 0.6–4.47)
LYMPHOCYTES NFR BLD AUTO: 14 % (ref 14–44)
MCH RBC QN AUTO: 31.9 PG (ref 26.8–34.3)
MCHC RBC AUTO-ENTMCNC: 32.4 G/DL (ref 31.4–37.4)
MCV RBC AUTO: 98 FL (ref 82–98)
MONOCYTES # BLD AUTO: 0.99 THOUSAND/ΜL (ref 0.17–1.22)
MONOCYTES NFR BLD AUTO: 7 % (ref 4–12)
NEUTROPHILS # BLD AUTO: 10.31 THOUSANDS/ΜL (ref 1.85–7.62)
NEUTS SEG NFR BLD AUTO: 77 % (ref 43–75)
NRBC BLD AUTO-RTO: 0 /100 WBCS
PLATELET # BLD AUTO: 175 THOUSANDS/UL (ref 149–390)
PMV BLD AUTO: 12.4 FL (ref 8.9–12.7)
PROCALCITONIN SERPL-MCNC: 0.42 NG/ML
RBC # BLD AUTO: 2.95 MILLION/UL (ref 3.81–5.12)
RSV RNA RESP QL NAA+PROBE: NEGATIVE
SARS-COV-2 RNA RESP QL NAA+PROBE: NEGATIVE
WBC # BLD AUTO: 13.34 THOUSAND/UL (ref 4.31–10.16)

## 2022-03-01 PROCEDURE — 82948 REAGENT STRIP/BLOOD GLUCOSE: CPT

## 2022-03-01 PROCEDURE — 99232 SBSQ HOSP IP/OBS MODERATE 35: CPT | Performed by: INTERNAL MEDICINE

## 2022-03-01 PROCEDURE — 85025 COMPLETE CBC W/AUTO DIFF WBC: CPT | Performed by: INTERNAL MEDICINE

## 2022-03-01 PROCEDURE — 84145 PROCALCITONIN (PCT): CPT | Performed by: INTERNAL MEDICINE

## 2022-03-01 PROCEDURE — 0241U HB NFCT DS VIR RESP RNA 4 TRGT: CPT | Performed by: INTERNAL MEDICINE

## 2022-03-01 RX ADMIN — INSULIN LISPRO 1 UNITS: 100 INJECTION, SOLUTION INTRAVENOUS; SUBCUTANEOUS at 21:25

## 2022-03-01 RX ADMIN — DOCUSATE SODIUM 100 MG: 100 CAPSULE ORAL at 09:38

## 2022-03-01 RX ADMIN — BISACODYL 10 MG: 5 TABLET, COATED ORAL at 21:24

## 2022-03-01 RX ADMIN — INSULIN LISPRO 1 UNITS: 100 INJECTION, SOLUTION INTRAVENOUS; SUBCUTANEOUS at 12:11

## 2022-03-01 RX ADMIN — AMLODIPINE BESYLATE 5 MG: 5 TABLET ORAL at 09:39

## 2022-03-01 RX ADMIN — ASPIRIN 81 MG: 81 TABLET, COATED ORAL at 09:38

## 2022-03-01 RX ADMIN — ACETAMINOPHEN 650 MG: 325 TABLET, FILM COATED ORAL at 09:38

## 2022-03-01 RX ADMIN — ACETAMINOPHEN 650 MG: 325 TABLET, FILM COATED ORAL at 20:13

## 2022-03-01 RX ADMIN — HEPARIN SODIUM 5000 UNITS: 5000 INJECTION INTRAVENOUS; SUBCUTANEOUS at 21:26

## 2022-03-01 RX ADMIN — SODIUM BICARBONATE 650 MG TABLET 650 MG: at 17:51

## 2022-03-01 RX ADMIN — AMLODIPINE BESYLATE 5 MG: 5 TABLET ORAL at 17:51

## 2022-03-01 RX ADMIN — HEPARIN SODIUM 5000 UNITS: 5000 INJECTION INTRAVENOUS; SUBCUTANEOUS at 06:34

## 2022-03-01 RX ADMIN — HEPARIN SODIUM 5000 UNITS: 5000 INJECTION INTRAVENOUS; SUBCUTANEOUS at 15:00

## 2022-03-01 RX ADMIN — GABAPENTIN 300 MG: 300 CAPSULE ORAL at 21:25

## 2022-03-01 RX ADMIN — SODIUM BICARBONATE 650 MG TABLET 650 MG: at 09:43

## 2022-03-01 RX ADMIN — PRAVASTATIN SODIUM 40 MG: 40 TABLET ORAL at 17:51

## 2022-03-01 RX ADMIN — CITALOPRAM HYDROBROMIDE 10 MG: 20 TABLET ORAL at 09:38

## 2022-03-01 NOTE — ASSESSMENT & PLAN NOTE
· CT shows bladder wall thickening and perivesical inflammatory changes along with air noted in the bladder  · Received empirical IV ceftriaxone 3 doses  · Urine culture with mixed contaminant  · Denies dysuria

## 2022-03-01 NOTE — CASE MANAGEMENT
Case Management Discharge Planning Note    Patient name Digna Rodriguez  Location [facility] /E5 MS [phone] MRN [id number]  : 1927 Date 3/1/2022       Current Admission Date: 2022  Current Admission Diagnosis:Acute kidney injury superimposed on CKD [facility])   Patient Active Problem List    Diagnosis Date Noted    Low grade fever 2022    Moderate protein-calorie malnutrition ([address] ) 2022    Urinary retention 2022    Acute kidney injury superimposed on CKD ([facility] ) 2022    Anemia in stage 4 chronic kidney disease ([facility] ) 2022    Hyperkalemia 2022    Adult failure to thrive 2022    Fall at home, initial encounter 2022    Elevated uric acid in blood 2022    Secondary hyperparathyroidism of renal origin ([facility] ) 12/15/2021    Hypertensive chronic kidney disease 12/15/2021    Type 2 diabetes mellitus with diabetic chronic kidney disease ([facility] ) 2020    Fecal smearing 10/24/2019    Generalized edema 10/08/2019    Bowel habit changes 10/01/2019    Rectal discharge 10/01/2019    Acute cystitis without hematuria 10/13/2018    Vertebral anomaly 2018    Acquired cyst of kidney 2017    Chronic kidney disease, stage IV (severe) ([facility] ) 2017    Atrophic kidney, acquired 2017    Retinal hole of right eye 2017    Essential hypertension 2017    Pure hypercholesterolemia 2017    Controlled type 2 diabetes mellitus with diabetic polyneuropathy ([address] ) 2017    Allergic rhinitis 2017    Vitamin D insufficiency 2016    Bilateral deafness 2016    Peripheral neuropathy 2015    Gait disturbance 2014    Gout 2014    Ectropion 2013    Borderline glaucoma 2013      LOS (days): 6  Geometric Mean LOS (GMLOS) (days): 3 10  Days to GMLOS:-2 6     OBJECTIVE:  Risk of Unplanned Readmission Score: 18         Current admission status: Inpatient   Preferred Pharmacy: [id number] [phone]  Phone: [phone] Fax: [phone]    Primary Care Provider: Linus Tyson MD    Primary Insurance: [facility]ba [facility]agon [facility] REP  Secondary Insurance:     DISCHARGE DETAILS:     Facility Insurance Auth Number: Submitted SNF auth to [employer] via [employer], pending ref# [id number] for [facility]: 3/1/2022 (automatic 1 day approval given, will add new note once more days are given after clinical review) for [facility] NPI: [id number], Dr Edmond Christianson: [id number]; clinicals attached in availity

## 2022-03-01 NOTE — PLAN OF CARE
Problem: Nutrition/Hydration-ADULT  Goal: Nutrient/Hydration intake appropriate for improving, restoring or maintaining nutritional needs  Description: Monitor and assess patient's nutrition/hydration status for malnutrition  Collaborate with interdisciplinary team and initiate plan and interventions as ordered  Monitor patient's weight and dietary intake as ordered or per policy  Utilize nutrition screening tool and intervene as necessary  Determine patient's food preferences and provide high-protein, high-caloric foods as appropriate       INTERVENTIONS:  - Monitor oral intake, urinary output, labs, and treatment plans  - Assess nutrition and hydration status and recommend course of action  - Evaluate amount of meals eaten  - Assist patient with eating if necessary   - Allow adequate time for meals  - Recommend/ encourage appropriate diets, oral nutritional supplements, and vitamin/mineral supplements  - Order, calculate, and assess calorie counts as needed  - Recommend, monitor, and adjust tube feedings and TPN/PPN based on assessed needs  - Assess need for intravenous fluids  - Provide specific nutrition/hydration education as appropriate  - Include patient/family/caregiver in decisions related to nutrition  Outcome: Progressing     Problem: Potential for Falls  Goal: Patient will remain free of falls  Description: INTERVENTIONS:  - Educate patient/family on patient safety including physical limitations  - Instruct patient to call for assistance with activity   - Consult OT/PT to assist with strengthening/mobility   - Keep Call bell within reach  - Keep bed low and locked with side rails adjusted as appropriate  - Keep care items and personal belongings within reach  - Initiate and maintain comfort rounds  - Apply yellow socks and bracelet for high fall risk patients  - Consider moving patient to room near nurses station  Outcome: Progressing     Problem: MOBILITY - ADULT  Goal: Maintain or return to baseline ADL function  Description: INTERVENTIONS:  - Educate patient/family on patient safety including physical limitations  - Instruct patient to call for assistance with activity   - Consult OT/PT to assist with strengthening/mobility   - Keep Call bell within reach  - Keep bed low and locked with side rails adjusted as appropriate  - Keep care items and personal belongings within reach  - Initiate and maintain comfort rounds  - Make Fall Risk Sign visible to staff  - Apply yellow socks and bracelet for high fall risk patients  - Consider moving patient to room near nurses station  Outcome: Progressing  Goal: Maintains/Returns to pre admission functional level  Description: INTERVENTIONS:  - Perform BMAT or MOVE assessment daily    - Set and communicate daily mobility goal to care team and patient/family/caregiver     - Collaborate with rehabilitation services on mobility goals if consulted  - Out of bed for toileting  - Record patient progress and toleration of activity level   Outcome: Progressing     Problem: Prexisting or High Potential for Compromised Skin Integrity  Goal: Skin integrity is maintained or improved  Description: INTERVENTIONS:  - Identify patients at risk for skin breakdown  - Assess and monitor skin integrity  - Assess and monitor nutrition and hydration status  - Monitor labs   - Assess for incontinence   - Turn and reposition patient  - Assist with mobility/ambulation  - Relieve pressure over bony prominences  - Avoid friction and shearing  - Provide appropriate hygiene as needed including keeping skin clean and dry  - Evaluate need for skin moisturizer/barrier cream  - Collaborate with interdisciplinary team   - Patient/family teaching  - Consider wound care consult   Outcome: Progressing     Problem: PAIN - ADULT  Goal: Verbalizes/displays adequate comfort level or baseline comfort level  Description: Interventions:  - Encourage patient to monitor pain and request assistance  - Assess pain using appropriate pain scale  - Administer analgesics based on type and severity of pain and evaluate response  - Implement non-pharmacological measures as appropriate and evaluate response  - Consider cultural and social influences on pain and pain management  - Notify physician/advanced practitioner if interventions unsuccessful or patient reports new pain  Outcome: Progressing     Problem: INFECTION - ADULT  Goal: Absence or prevention of progression during hospitalization  Description: INTERVENTIONS:  - Assess and monitor for signs and symptoms of infection  - Monitor lab/diagnostic results  - Monitor all insertion sites, i e  indwelling lines, tubes, and drains  - Monitor endotracheal if appropriate and nasal secretions for changes in amount and color  - [place] appropriate cooling/warming therapies per order  - Administer medications as ordered  - Instruct and encourage patient and family to use good hand hygiene technique  - Identify and instruct in appropriate isolation precautions for identified infection/condition  Outcome: Progressing     Problem: SAFETY ADULT  Goal: Patient will remain free of falls  Description: INTERVENTIONS:  - Educate patient/family on patient safety including physical limitations  - Instruct patient to call for assistance with activity   - Consult OT/PT to assist with strengthening/mobility   - Keep Call bell within reach  - Keep bed low and locked with side rails adjusted as appropriate  - Keep care items and personal belongings within reach  - Initiate and maintain comfort rounds  - Apply yellow socks and bracelet for high fall risk patients  - Consider moving patient to room near nurses station  Outcome: Progressing  Goal: Maintain or return to baseline ADL function  Description: INTERVENTIONS:  - Educate patient/family on patient safety including physical limitations  - Instruct patient to call for assistance with activity   - Consult OT/PT to assist with strengthening/mobility   - Keep Call bell within reach  - Keep bed low and locked with side rails adjusted as appropriate  - Keep care items and personal belongings within reach  - Initiate and maintain comfort rounds  - Make Fall Risk Sign visible to staff  - Apply yellow socks and bracelet for high fall risk patients  - Consider moving patient to room near nurses station  Outcome: Progressing  Goal: Maintains/Returns to pre admission functional level  Description: INTERVENTIONS:  - Perform BMAT or MOVE assessment daily    - Set and communicate daily mobility goal to care team and patient/family/caregiver  - Collaborate with rehabilitation services on mobility goals if consulted  - Out of bed for toileting  - Record patient progress and toleration of activity level   Outcome: Progressing     Problem: DISCHARGE PLANNING  Goal: Discharge to home or other facility with appropriate resources  Description: INTERVENTIONS:  - Identify barriers to discharge w/patient and caregiver  - Arrange for needed discharge resources and transportation as appropriate  - Identify discharge learning needs (meds, wound care, etc )  - Arrange for interpretive services to assist at discharge as needed  - Refer to Case Management Department for coordinating discharge planning if the patient needs post-hospital services based on physician/advanced practitioner order or complex needs related to functional status, cognitive ability, or social support system  Outcome: Progressing     Problem: Knowledge Deficit  Goal: Patient/family/caregiver demonstrates understanding of disease process, treatment plan, medications, and discharge instructions  Description: Complete learning assessment and assess knowledge base    Interventions:  - Provide teaching at level of understanding  - Provide teaching via preferred learning methods  Outcome: Progressing     Problem: NEUROSENSORY - ADULT  Goal: Achieves stable or improved neurological status  Description: INTERVENTIONS  - Monitor and report changes in neurological status  - Monitor vital signs such as temperature, blood pressure, glucose, and any other labs ordered   - Initiate measures to prevent increased intracranial pressure  - Monitor for seizure activity and implement precautions if appropriate      Outcome: Progressing  Goal: Achieves maximal functionality and self care  Description: INTERVENTIONS  - Monitor swallowing and airway patency with patient fatigue and changes in neurological status  - Encourage and assist patient to increase activity and self care     - Encourage visually impaired, hearing impaired and aphasic patients to use assistive/communication devices  Outcome: Progressing     Problem: CARDIOVASCULAR - ADULT  Goal: Maintains optimal cardiac output and hemodynamic stability  Description: INTERVENTIONS:  - Monitor I/O, vital signs and rhythm  - Monitor for S/S and trends of decreased cardiac output  - Administer and titrate ordered vasoactive medications to optimize hemodynamic stability  - Assess quality of pulses, skin color and temperature  - Assess for signs of decreased coronary artery perfusion  - Instruct patient to report change in severity of symptoms  Outcome: Progressing  Goal: Absence of cardiac dysrhythmias or at baseline rhythm  Description: INTERVENTIONS:  - Continuous cardiac monitoring, vital signs, obtain 12 lead EKG if ordered  - Administer antiarrhythmic and heart rate control medications as ordered  - Monitor electrolytes and administer replacement therapy as ordered  Outcome: Progressing     Problem: METABOLIC, FLUID AND ELECTROLYTES - ADULT  Goal: Electrolytes maintained within normal limits  Description: INTERVENTIONS:  - Monitor labs and assess patient for signs and symptoms of electrolyte imbalances  - Administer electrolyte replacement as ordered  - Monitor response to electrolyte replacements, including repeat lab results as appropriate  - Instruct patient on fluid and nutrition as appropriate  Outcome: Progressing  Goal: Fluid balance maintained  Description: INTERVENTIONS:  - Monitor labs   - Monitor I/O and WT  - Instruct patient on fluid and nutrition as appropriate  - Assess for signs & symptoms of volume excess or deficit  Outcome: Progressing  Goal: Glucose maintained within target range  Description: INTERVENTIONS:  - Monitor Blood Glucose as ordered  - Assess for signs and symptoms of hyperglycemia and hypoglycemia  - Administer ordered medications to maintain glucose within target range  - Assess nutritional intake and initiate nutrition service referral as needed  Outcome: Progressing     Problem: SKIN/TISSUE INTEGRITY - ADULT  Goal: Skin Integrity remains intact(Skin Breakdown Prevention)  Description: Assess:  -Inspect skin when repositioning, toileting, and assisting with ADLS  -Assess extremities for adequate circulation and sensation     Bed Management:  -Have minimal linens on bed & keep smooth, unwrinkled  -Change linens as needed when moist or perspiring      Activity:  -Encourage activity and walks on unit  -Encourage or provide ROM exercises   -Use appropriate equipment to lift or move patient in bed    Skin Care:  -Avoid use of baby powder, tape, friction and shearing, hot water or constrictive clothing  -Do not massage red bony areas      Outcome: Progressing  Goal: Incision(s), wounds(s) or drain site(s) healing without S/S of infection  Description: INTERVENTIONS  - Assess and document dressing, incision, wound bed, drain sites and surrounding tissue  - Provide patient and family education    Outcome: Progressing     Problem: MUSCULOSKELETAL - ADULT  Goal: Maintain or return mobility to safest level of function  Description: INTERVENTIONS:  - Assess patient's ability to carry out ADLs; assess patient's baseline for ADL function and identify physical deficits which impact ability to perform ADLs (bathing, care of mouth/teeth, toileting, grooming, dressing, etc )  - Assess/evaluate cause of self-care deficits   - Assess range of motion  - Assess patient's mobility  - Assess patient's need for assistive devices and provide as appropriate  - Encourage maximum independence but intervene and supervise when necessary  - Involve family in performance of ADLs  - Assess for home care needs following discharge   - Consider OT consult to assist with ADL evaluation and planning for discharge  - Provide patient education as appropriate  Outcome: Progressing  Goal: Maintain proper alignment of affected body part  Description: INTERVENTIONS:  - Support, maintain and protect limb and body alignment  - Provide patient/ family with appropriate education  Outcome: Progressing

## 2022-03-01 NOTE — PROGRESS NOTES
[address]  Progress Note - Jose A Abbott 2/24/1927, 80 y o  female MRN: [id number]  Unit/Bed#: E5 -01 Encounter: [id number]  Primary Care Provider: Lucas Case MD   Date and time admitted to hospital: 2/23/2022  4:49 PM    * Acute kidney injury superimposed on CKD [facility])  Assessment & Plan  Lab Results   Component Value Date    EGFR 17 02/28/2022    EGFR 17 02/27/2022    EGFR 18 02/26/2022    CREATININE 2 27 (H) 02/28/2022    CREATININE 2 33 (H) 02/27/2022    CREATININE 2 25 (H) 02/26/2022       · Acute on chronic kidney disease stage 4  · Acidosis, hyperkalemia improved with IV bicarbonate GTT  · Continue bicarbonate tablets  · Monitor kidney function closely  · Avoid nephrotoxins  · Monitor postvoid residuals  · Outpatient Nephrology follow-up recommended    Leukocytosis  Assessment & Plan  · White blood cell count 13 today  · Patient denies any signs of upper/lower respiratory tract infection, no cough, sore throat, sputum production  · Denies abdominal pain constipation/diarrhea  · Denies dysuria  · Had 1 episode of low-grade fever 100 2 in the morning of 2/28, afebrile since  · Patient received 3 days of antibiotics for a UTI  · Procalcitonin 0 42, inaccurate in the setting of chronic kidney disease  · Patient was retaining urine overnight, had to be straight cath, could be stress leukocytosis  · Small open wound in the left arm without signs of infection, continue local wound care  · COVID/flu/RSV negative  · Monitor, no need for antibiotic    Moderate protein-calorie malnutrition (HCC)  Assessment & Plan  Malnutrition Findings:   Adult Malnutrition type: Acute illness (acute moderate pro, jimi malnutrition d/t decreased appetite as evidence by <75% energy intake > 7 days, 7 2% wt  loss x 1 month, 1+ edema, mild muscle/fat loss of clavicles, temples, orbitals)  Adult Degree of Malnutrition: Malnutrition of moderate degree (treated with oral diet and supplements, consider appetite stimulant if po doesn't improve)    BMI Findings: Body mass index is 25 02 kg/m²  Fall at home, initial encounter  Assessment & Plan  · Fall at home  · At risk for falls  · Safe ambulation  · Fall precautions  · Physical therapy  · Plan to discharge to rehab tomorrow    Adult failure to thrive  Assessment & Plan  · Multifactorial  · Supportive care    Hyperkalemia  Assessment & Plan  · Hyperkalemia in the setting of acute kidney injury/chronic kidney disease stage 4  · Resolved  · Monitor closely    Anemia in stage 4 chronic kidney disease (HCC)  Assessment & Plan  · Monitor hemoglobin    Urinary retention  Assessment & Plan  ·  Reports urinary retention, recently treated for UTI   ·  Patient had to be straight cath once overnight  · Continue urinary retention protocol  · Suspect is going to improve after patient is more ambulatory    Secondary hyperparathyroidism of renal origin [facility])  Assessment & Plan  ·  Continue Sensipar MWF    Type 2 diabetes mellitus with diabetic chronic kidney disease [facility])  Assessment & Plan  Lab Results   Component Value Date    HGBA1C 5 1 10/12/2021       Recent Labs     02/28/22  1638 02/28/22  2113 03/01/22  0731 03/01/22  1052   POCGLU 125 148* 97 189*       Blood Sugar Average: Last 72 hrs:  (P) 123 [id number]     Monitor Accu-Cheks  Avoid hypoglycemia  Hypoglycemia protocol in place    Acute cystitis without hematuria  Assessment & Plan  · CT shows bladder wall thickening and perivesical inflammatory changes along with air noted in the bladder  · Received empirical IV ceftriaxone 3 doses  · Urine culture with mixed contaminant  · Denies dysuria    Pure hypercholesterolemia  Assessment & Plan  ·  Continue statin        VTE Pharmacologic Prophylaxis: VTE Score: 5 High Risk (Score >/= 5) - Pharmacological DVT Prophylaxis Ordered: heparin  Sequential Compression Devices Ordered      Patient Centered Rounds: I performed bedside rounds with nursing staff today   Discussions with Specialists or Other Care Team Provider:  Nursing, case management    Education and Discussions with Family / Patient: Updated  (daughter) via phone  Time Spent for Care: 30 minutes  More than 50% of total time spent on counseling and coordination of care as described above  Current Length of Stay: 6 day(s)  Current Patient Status: Inpatient   Certification Statement: The patient will continue to require additional inpatient hospital stay due to Leukocytosis  Discharge Plan: Anticipate discharge tomorrow to rehab facility  Code Status: Level 3 - DNAR and DNI    Subjective:   Patient was seen evaluated bedside, she is sitting in exercising her legs, feeling much better  Denies chest pain palpitation shortness of breath, sore throat, cough, sputum production, nausea vomiting abdominal pain, constipation/diarrhea or dysuria  Objective:     Vitals:   Temp (24hrs), Av 7 °F (37 1 °C), Min:97 9 °F (36 6 °C), Max:99 4 °F (37 4 °C)    Temp:  [97 9 °F (36 6 °C)-99 4 °F (37 4 °C)] 97 9 °F (36 6 °C)  HR:  [63-74] 63  BP: (108-138)/(47-69) 112/47  SpO2:  [95 %-98 %] 98 %  Body mass index is 25 02 kg/m²  Input and Output Summary (last 24 hours): Intake/Output Summary (Last 24 hours) at 3/1/2022 1605  Last data filed at 3/1/2022 0959  Gross per 24 hour   Intake --   Output 2150 ml   Net -2150 ml       Physical Exam:   Physical Exam  Constitutional:       General: She is not in acute distress  HENT:      Head: Atraumatic  Cardiovascular:      Rate and Rhythm: Normal rate and regular rhythm  Heart sounds: No murmur heard  No friction rub  No gallop  Pulmonary:      Effort: Pulmonary effort is normal  No respiratory distress  Breath sounds: Normal breath sounds  No wheezing  Abdominal:      General: Bowel sounds are normal  There is no distension  Palpations: Abdomen is soft  Tenderness: There is no abdominal tenderness     Musculoskeletal: General: No swelling  Cervical back: Neck supple  Skin:     General: Skin is warm and dry  Comments: Small skin laceration in the left arm, no signs of infection   Neurological:      General: No focal deficit present  Mental Status: She is alert  Psychiatric:         Mood and Affect: Mood normal           Additional Data:     Labs:  Results from last 7 days   Lab Units 03/01/22  1210   WBC Thousand/uL 13 34*   HEMOGLOBIN g/dL 9 4*   HEMATOCRIT % 29 0*   PLATELETS Thousands/uL 175   NEUTROS PCT % 77*   LYMPHS PCT % 14   MONOS PCT % 7   EOS PCT % 1     Results from last 7 days   Lab Units 02/28/22  0757 02/26/22  0525 02/25/22  0449   SODIUM mmol/L 140   < > 139   POTASSIUM mmol/L 4 5   < > 5 8*   CHLORIDE mmol/L 108   < > 114*   CO2 mmol/L 23   < > 16*   BUN mg/dL 63*   < > 82*   CREATININE mg/dL 2 27*   < > 2 76*   ANION GAP mmol/L 9   < > 9   CALCIUM mg/dL 9 0   < > 9 1   ALBUMIN g/dL  --   --  2 3*   TOTAL BILIRUBIN mg/dL  --   --  0 18*   ALK PHOS U/L  --   --  78   ALT U/L  --   --  14   AST U/L  --   --  12   GLUCOSE RANDOM mg/dL 109   < > 94    < > = values in this interval not displayed  Results from last 7 days   Lab Units 03/01/22  1052 03/01/22  0731 02/28/22  2113 02/28/22  1638 02/28/22  1127 02/28/22  0722 02/27/22  2023 02/27/22  1549 02/27/22  1120 02/27/22  0740 02/26/22  2124 02/26/22  1534   POC GLUCOSE mg/dl 189* 97 148* 125 134 93 140 107 142* 108 120 117         Results from last 7 days   Lab Units 03/01/22  1359   PROCALCITONIN ng/ml 0 42*       Lines/Drains:  Invasive Devices  Report    Peripheral Intravenous Line            Peripheral IV 02/28/22 Right Antecubital 1 day                      Imaging:   CT head without contrast   Final Result by Mirza Cagle MD (02/23 1929)      No acute intracranial abnormality                    Workstation performed: [id number]         CT abdomen pelvis wo contrast   Final Result by Mirza Cagle MD (02/23 1950) Bladder wall thickening and perivesical inflammatory changes along with air noted in the bladder  Findings consistent with urinary tract infection  Workstation performed: [id number]             Recent Cultures (last 7 days):   Results from last 7 days   Lab Units 02/24/22  0119 02/24/22  0056   URINE CULTURE  50,000-59,000 cfu/ml  60,000-69,000 cfu/ml        Last 24 Hours Medication List:   Current Facility-Administered Medications   Medication Dose Route Frequency Provider Last Rate    acetaminophen  650 mg Oral Q6H PRN Carmina Elvis, CRNP      aluminum-magnesium hydroxide-simethicone  30 mL Oral Q6H PRN Carmina Elvis, CRNP      amLODIPine  5 mg Oral BID Carmina Elvis, CRNP      aspirin  81 mg Oral Daily Carmina Elvis, CRNP      bisacodyl  10 mg Oral Daily PRN Carmina Elvis, CRNP      cinacalcet  30 mg Oral Once per day on Mon Wed Fri Carmina Leal CRNP      citalopram  10 mg Oral Daily Carmina Elvis, JONAH      docusate sodium  100 mg Oral Daily Carmina Elvis, CRNP      gabapentin  300 mg Oral HS Carmina Elvis, CRNP      heparin (porcine)  5,000 Units Subcutaneous [facility] Linda Castano MD      insulin lispro  1-5 Units Subcutaneous 4x Daily (AC & HS) JONAH Shah      ondansetron  4 mg Intravenous Q6H PRN Carmina Elvis, JONAH      pravastatin  40 mg Oral Daily With [employer], CRNP      simethicone  80 mg Oral 4x Daily PRN Carminapina Leal, JONAH      sodium bicarbonate  650 mg Oral BID after meals Linda Castano MD          Today, Patient Was Seen By: Axel De La Cruz MD    **Please Note: This note may have been constructed using a voice recognition system  **

## 2022-03-01 NOTE — APP STUDENT NOTE
LYNETTE STUDENT  Inpatient Progress Note for TRAINING ONLY  Not Part of Legal Medical Record     Progress Note - Minh Cazares 80 y o  female MRN: [id number]  Unit/Bed#: E5 -01 Encounter: [id number]    Assessment and Plan  1  Acute Kidney injury Superimposed on CKD stage 4  · Creatinine on admission, 2/23/22, was 2 77   · Creatinine on 2/28/22 was 2 27   · Patient was given IV bicarbonate GTT for hyperkalemia  · Continue bicarbonate tablets  · Continue to monitor kidney functions with daily BMPs  · Monitor I/Os  · Continue to follow with Nephrology outpatient     2  Low Grade Fever  · Yesterday, 2/28/22, patient had a 100  2'F temperature in the morning  · Patient denied any signs or symptoms of infection  · No cough, congestion, sore throat, GI symptoms, or  symptoms  · Temperature returned to normal range by mid morning yesterday, 2/28/22  · Leukocytosis of 13 34 on 3/1/22  Patient continues to deny any infectious symptoms  · Procalcitonin ordered  · Continue to monitor vitals and labs    3  Moderate Protein-Calorie Malnutrition  · Patient admits to decreased appetite   · Patient weight 58 1 kg (128 lbs 1 4 oz)  · Nutritionist consulted  · Adult Malnutrition type: Acute illness (acute moderate pro, jimi malnutrition d/t decreased appetite as evidence by <75% energy intake > 7 days, 7 2% wt  loss x 1 month, 1+ edema, mild muscle/fat loss of clavicles, temples, orbitals)  · Adult Degree of Malnutrition: Malnutrition of moderate degree (treated with oral diet and supplements, consider appetite stimulant if po doesn't improve)  · Malnutrition Characteristics: Inadequate energy,Weight loss,Fluid accumulation,Fat loss,Muscle loss  · Continue to monitor appetite and oral intake      4  Fall at home, initial encounter  · Patient reported having 2 falls during the week leading up to her admission  · Patient continues to be at risk of falls   · Continue assistance with walking  · PT/OT following     5   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  · Patient notes she has had a decreased appetite recently  · Multiple chronic conditions may be factoring into this   · Supportive care with encouraged appropriate caloric intake   · Nutritionist consulted      6  Hyperkalemia, resolved  · Potassium was 6 0 on admission, 2/23/22  · Potassium returned to normal range, 5 2, on 2/26/22  · Potassium on 2/28/22 was 4 5  · Continue to monitor     7  Anemia in stage 4 CKD  · Hemoglobin on admission, 2/23/22, was 9 6  · Trending down slowly  · On 2/28/22, hemoglobin was 8 2  · Continue to monitor closely  · Transfuse if hemoglobin under 7     8  Urinary Retention  · Patient had recent UTI prior to admission  · Urinalysis inpatient negative for significant infection  · CT abdomen and pelvis without contrast on 2/23/22 revealed bladder wall thickening and perivesical inflammatory changes along with air noted in the bladder   Findings consistent with urinary tract infection  · Overnight patient was bladder scanned revealing urinary retention and had to be straight cathed  · Continue to monitor urinary output  · Continue urinary retention protocol     9  Secondary Hyperparathyroidism of Renal Origin  · History noted  · Continue cinacalcet (Sensipar) 30 mg po MWF     9  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  · Blood glucose reading have been remaining stable while inpatient  · Continue to monitor blood glucose readings  · Continue sliding scale insulin lispro (Humalog)     10  Acute cystitis without hematuria  · CT abdomen and pelvis without contrast on 2/23/22 revealed bladder wall thickening and perivesical inflammatory changes along with air noted in the bladder   Findings consistent with urinary tract infection  · Patient was treated with IV ceftriaxone 1000 mg in 50 mL 5% dextrose qd for 3 days  · Patient denies any urinary symptoms   · Continue to monitor     11   Pure hypercholesterolemia   · Lipid Panel in July 2021 revealed total cholesterol of 127, HDL of 48, LDL 63, and Triglycerides 79  · Continue pravastatin (Pravachol) 40 mg po qd with dinner       VTE Pharmacologic Prophylaxis:   Pharmacologic: Heparin and Sequential Compression Devices  Mechanical VTE Prophylaxis in Place: No    Patient Centered Rounds: I have seen and evaluated this patient with the precepting provider  Time Spent for Care: 20 minutes  More than 50% of total time spent on counseling and coordination of care as described above  Current Length of Stay: 6 day(s)    Current Patient Status: Inpatient   Certification Statement: The patient will continue to require additional inpatient hospital stay due to GAY and awaiting rehab placement    Discharge Plan: Potential discharge to rehab facility in 24-48 hours    Code Status: Level 3 - DNAR and DNI    Subjective: This is a 80year old female with PMH of T2DM, HLD, HTN, and CKD stage 4 who is seen hospital day 6 for weakness, fatigue, ambulatory dysfunction and poor appetite  Patient states she is continuing to work on improving her appetite and eat better  She notes pain in her right foot at rest and increased with weight bearing  She notes she has been having decreased urinary output as well  She denies any CP, SOB, N/V/D/C, abdominal pain, dysuria, or hematuria  Objective:     Vitals:   Temp (24hrs), Av 8 °F (37 1 °C), Min:98 1 °F (36 7 °C), Max:99 4 °F (37 4 °C)    Temp:  [98 1 °F (36 7 °C)-99 4 °F (37 4 °C)] 99 4 °F (37 4 °C)  HR:  [63-74] 74  Resp:  [17] 17  BP: (108-138)/(48-69) 120/64  SpO2:  [95 %-97 %] 96 %  Body mass index is 25 02 kg/m²  Input and Output Summary (last 24 hours): Intake/Output Summary (Last 24 hours) at 3/1/2022 1022  Last data filed at 3/1/2022 0959  Gross per 24 hour   Intake --   Output 2150 ml   Net -2150 ml       Physical Exam:     Physical Exam  Vitals and nursing note reviewed  Constitutional:       General: She is not in acute distress  Appearance: She is normal weight     HENT: Head: Normocephalic and atraumatic  Eyes:      Conjunctiva/sclera: Conjunctivae normal    Cardiovascular:      Rate and Rhythm: Normal rate and regular rhythm  Pulses:           Dorsalis pedis pulses are 2+ on the right side and 2+ on the left side  Heart sounds: No murmur heard  No friction rub  No gallop  Pulmonary:      Breath sounds: Normal breath sounds  No wheezing, rhonchi or rales  Abdominal:      General: Bowel sounds are normal       Palpations: Abdomen is soft  Tenderness: There is no abdominal tenderness  Musculoskeletal:      Right lower leg: Edema present  Left lower leg: Edema present  Right foot: Swelling and tenderness present  Left foot: Swelling present  No tenderness  Skin:     General: Skin is warm and dry  Neurological:      General: No focal deficit present  Mental Status: She is alert  Psychiatric:         Mood and Affect: Mood normal          Behavior: Behavior is cooperative           Historical Information   Past Medical History:   Diagnosis Date    Diabetes mellitus ([facility] )     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 ([facility] )     LAST ASSESSED: [id number]    Hyperlipidemia     Hypertension     Renal disorder     Squamous cell carcinoma, scalp/neck     LAST ASSESSED: [id number]     Past Surgical History:   Procedure Laterality Date    BLADDER SURGERY      LAST ASSESSED: [id number]    PELVIC FLOOR REPAIR      VAGINAL SURG INSERTION OF MESH    TOTAL ABDOMINAL HYSTERECTOMY W/ BILATERAL SALPINGOOPHORECTOMY      LAST ASSESSED: [id number]     Social History   Social History     Substance and Sexual Activity   Alcohol Use Yes    Comment: SOCIAL     Social History     Substance and Sexual Activity   Drug Use No     Social History     Tobacco Use   Smoking Status Former Smoker   Smokeless Tobacco Never Used     Family History:   Family History   Problem Relation Age of Onset    Hypertension Mother     Hypertension Father     Kidney disease Sister         CHRONIC    Alcohol abuse Family     Substance Abuse Family        Meds/Allergies   all medications and allergies reviewed  Allergies   Allergen Reactions    Influenza Virus Vaccine      Other reaction(s): HX of Guillain-[place] Syndrome    Sulfa Antibiotics        Additional Data:     Labs:    Results from last 7 days   Lab Units 02/28/22  0950 02/27/22  0538 02/27/22  0538   WBC Thousand/uL  --   --  5 00   HEMOGLOBIN g/dL 8 2*   < > 7 9*   HEMATOCRIT % 25 1*   < > 24 6*   PLATELETS Thousands/uL  --   --  126*   NEUTROS PCT %  --   --  63   LYMPHS PCT %  --   --  26   MONOS PCT %  --   --  8   EOS PCT %  --   --  2    < > = values in this interval not displayed  Results from last 7 days   Lab Units 02/28/22  0757 02/26/22  0525 02/25/22  0449   SODIUM mmol/L 140   < > 139   POTASSIUM mmol/L 4 5   < > 5 8*   CHLORIDE mmol/L 108   < > 114*   CO2 mmol/L 23   < > 16*   BUN mg/dL 63*   < > 82*   CREATININE mg/dL 2 27*   < > 2 76*   ANION GAP mmol/L 9   < > 9   CALCIUM mg/dL 9 0   < > 9 1   ALBUMIN g/dL  --   --  2 3*   TOTAL BILIRUBIN mg/dL  --   --  0 18*   ALK PHOS U/L  --   --  78   ALT U/L  --   --  14   AST U/L  --   --  12   GLUCOSE RANDOM mg/dL 109   < > 94    < > = values in this interval not displayed  Results from last 7 days   Lab Units 03/01/22  0731 02/28/22  2113 02/28/22  1638 02/28/22  1127 02/28/22  0722 02/27/22  2023 02/27/22  1549 02/27/22  1120 02/27/22  0740 02/26/22  2124 02/26/22  1534 02/26/22  1111   POC GLUCOSE mg/dl 97 148* 125 134 93 140 107 142* 108 120 117 114                 * I Have Reviewed All Lab Data Listed Above  * Additional Pertinent Lab Tests Reviewed: All Labs Within Last 24 Hours Reviewed    Imaging:  No new imaging to review      Recent Cultures (last 7 days):     Results from last 7 days   Lab Units 02/24/22  0119 02/24/22  0056   URINE CULTURE  50,000-59,000 cfu/ml  60,000-69,000 cfu/ml        Last 24 Hours Medication List:   Current Facility-Administered Medications   Medication Dose Route Frequency Provider Last Rate    acetaminophen  650 mg Oral Q6H PRN Kizzy Guard, CRNP      aluminum-magnesium hydroxide-simethicone  30 mL Oral Q6H PRN Kizzy Guard, CRNP      amLODIPine  5 mg Oral BID Kizzy Guard, CRNP      aspirin  81 mg Oral Daily Kizzy Guard, CRNP      bisacodyl  10 mg Oral Daily PRN Kizzy Guard, CRNP      cinacalcet  30 mg Oral Once per day on Mon Wed Fri Kizzy Guard, CRNP      citalopram  10 mg Oral Daily Kizzy Guard, CRNP      docusate sodium  100 mg Oral Daily Kizzy Guard, CRNP      gabapentin  300 mg Oral HS Kizzy Guard, CRNP      heparin (porcine)  5,000 Units Subcutaneous [facility] Fawad Cho MD      insulin lispro  1-5 Units Subcutaneous 4x Daily (AC & HS) Kizzy Guard, CRNP      ondansetron  4 mg Intravenous Q6H PRN Kizzy Guard, CRNP      pravastatin  40 mg Oral Daily With [employer], CRNP      simethicone  80 mg Oral 4x Daily PRN Kizzy Guard, CRNP      sodium bicarbonate  650 mg Oral BID after meals Fawad Cho MD          Today, Patient Was Seen By: Rogers Phan    ** Please Note: Dictation voice to text software may have been used in the creation of this document   **

## 2022-03-01 NOTE — CASE MANAGEMENT
Case Management Discharge Planning Note    Patient name Dominick Liu  Location [address] [place] [address] /E5 MS [phone] MRN [id number]  : 1927 Date 3/1/2022       Current Admission Date: 2022  Current Admission Diagnosis:Acute kidney injury superimposed on CKD [facility])   Patient Active Problem List    Diagnosis Date Noted    Low grade fever 2022    Moderate protein-calorie malnutrition ([address] ) 2022    Urinary retention 2022    Acute kidney injury superimposed on CKD ([address] ) 2022    Anemia in stage 4 chronic kidney disease ([facility] ) 2022    Hyperkalemia 2022    Adult failure to thrive 2022    Fall at home, initial encounter 2022    Elevated uric acid in blood 2022    Secondary hyperparathyroidism of renal origin ([facility] ) 12/15/2021    Hypertensive chronic kidney disease 12/15/2021    Type 2 diabetes mellitus with diabetic chronic kidney disease ([facility] ) 2020    Fecal smearing 10/24/2019    Generalized edema 10/08/2019    Bowel habit changes 10/01/2019    Rectal discharge 10/01/2019    Acute cystitis without hematuria 10/13/2018    Vertebral anomaly 2018    Acquired cyst of kidney 2017    Chronic kidney disease, stage IV (severe) ([address] ) 2017    Atrophic kidney, acquired 2017    Retinal hole of right eye 2017    Essential hypertension 2017    Pure hypercholesterolemia 2017    Controlled type 2 diabetes mellitus with diabetic polyneuropathy ([address] ) 2017    Allergic rhinitis 2017    Vitamin D insufficiency 2016    Bilateral deafness 2016    Peripheral neuropathy 2015    Gait disturbance 2014    Gout 2014    Ectropion 2013    Borderline glaucoma 2013      LOS (days): 6  Geometric Mean LOS (GMLOS) (days): 3 10  Days to GMLOS:-2 5     OBJECTIVE:  Risk of Unplanned Readmission Score: 18         Current admission status: Inpatient   [facility]erred Pharmacy: [id number] [id number]  Phone: [phone] Fax: [phone]    Primary Care Provider: Yuly Hernandez MD    Primary Insurance: [facility]  Secondary Insurance:     DISCHARGE DETAILS:    Discharge planning discussed with[de-identified] Pt and pt's daughter  Freedom of Choice: Yes  Comments - Freedom of Choice: [address] contacted family/caregiver?: Yes (Pt's daughter Raina Ayala at bedside)  Were Treatment Team discharge recommendations reviewed with patient/caregiver?: Yes  Did patient/caregiver verbalize understanding of patient care needs?: N/A- going to facility  Were patient/caregiver advised of the risks associated with not following Treatment Team discharge recommendations?: No- see comments (Pt agreeable to d/c plan )    Contacts  Patient Contacts: lOivia Aguilar  Relationship to Patient[de-identified] Family (Daughter)  Contact Method: Phone  Phone Number: [phone]  Reason/Outcome: Discharge [address]         Is the patient interested in [facility] at discharge?: No    DME Referral Provided  Referral made for DME?: No              Treatment Team Recommendation: Short Term Rehab                                   IMM Given (Date):: 03/01/22  IMM Given to[de-identified] Patient (Copy given to pt)              [phone] - Pt with ready bed at [facility]  Auth request sent to [address] support staff via [address]  Dr Leo Dexter aware of same  1314 - Additional labs to be completed per attending  TCU bed will be available tomorrow, 3/2/22       1445 - Auth info sent to [facility] [place] liaison via [email or web address] as received from [address] support staff

## 2022-03-01 NOTE — ASSESSMENT & PLAN NOTE
· White blood cell count 13 today  · Patient denies any signs of upper/lower respiratory tract infection, no cough, sore throat, sputum production  · Denies abdominal pain constipation/diarrhea  · Denies dysuria  · Had 1 episode of low-grade fever 100 2 in the morning of 2/28, afebrile since  · Patient received 3 days of antibiotics for a UTI  · Procalcitonin 0 42, inaccurate in the setting of chronic kidney disease  · Patient was retaining urine overnight, had to be straight cath, could be stress leukocytosis  · Small open wound in the left arm without signs of infection, continue local wound care  · COVID/flu/RSV negative  · Monitor, no need for antibiotic

## 2022-03-01 NOTE — PLAN OF CARE
Problem: Nutrition/Hydration-ADULT  Goal: Nutrient/Hydration intake appropriate for improving, restoring or maintaining nutritional needs  Description: Monitor and assess patient's nutrition/hydration status for malnutrition  Collaborate with interdisciplinary team and initiate plan and interventions as ordered  Monitor patient's weight and dietary intake as ordered or per policy  Utilize nutrition screening tool and intervene as necessary  Determine patient's food preferences and provide high-protein, high-caloric foods as appropriate       INTERVENTIONS:  - Monitor oral intake, urinary output, labs, and treatment plans  - Assess nutrition and hydration status and recommend course of action  - Evaluate amount of meals eaten  - Assist patient with eating if necessary   - Allow adequate time for meals  - Recommend/ encourage appropriate diets, oral nutritional supplements, and vitamin/mineral supplements  - Order, calculate, and assess calorie counts as needed  - Recommend, monitor, and adjust tube feedings and TPN/PPN based on assessed needs  - Assess need for intravenous fluids  - Provide specific nutrition/hydration education as appropriate  - Include patient/family/caregiver in decisions related to nutrition  Outcome: Progressing     Problem: Potential for Falls  Goal: Patient will remain free of falls  Description: INTERVENTIONS:  - Educate patient/family on patient safety including physical limitations  - Instruct patient to call for assistance with activity   - Consult OT/PT to assist with strengthening/mobility   - Keep Call bell within reach  - Keep bed low and locked with side rails adjusted as appropriate  - Keep care items and personal belongings within reach  - Initiate and maintain comfort rounds  - Apply yellow socks and bracelet for high fall risk patients  - Consider moving patient to room near nurses station  Outcome: Progressing     Problem: MOBILITY - ADULT  Goal: Maintain or return to baseline ADL function  Description: INTERVENTIONS:  - Educate patient/family on patient safety including physical limitations  - Instruct patient to call for assistance with activity   - Consult OT/PT to assist with strengthening/mobility   - Keep Call bell within reach  - Keep bed low and locked with side rails adjusted as appropriate  - Keep care items and personal belongings within reach  - Initiate and maintain comfort rounds  - Make Fall Risk Sign visible to staff  - Apply yellow socks and bracelet for high fall risk patients  - Consider moving patient to room near nurses station  Outcome: Progressing  Goal: Maintains/Returns to pre admission functional level  Description: INTERVENTIONS:  - Perform BMAT or MOVE assessment daily    - Set and communicate daily mobility goal to care team and patient/family/caregiver  - Collaborate with rehabilitation services on mobility goals if consulted  - Perform Range of Motion  times a day  - Reposition patient every  hours    - Dangle patient  times a day  - Stand patient  times a day  - Ambulate patient  times a day  - Out of bed to chair  times a day   - Out of bed for meals  times a day  - Out of bed for toileting  - Record patient progress and toleration of activity level   Outcome: Progressing     Problem: Prexisting or High Potential for Compromised Skin Integrity  Goal: Skin integrity is maintained or improved  Description: INTERVENTIONS:  - Identify patients at risk for skin breakdown  - Assess and monitor skin integrity  - Assess and monitor nutrition and hydration status  - Monitor labs   - Assess for incontinence   - Turn and reposition patient  - Assist with mobility/ambulation  - Relieve pressure over bony prominences  - Avoid friction and shearing  - Provide appropriate hygiene as needed including keeping skin clean and dry  - Evaluate need for skin moisturizer/barrier cream  - Collaborate with interdisciplinary team   - Patient/family teaching  - Consider wound care consult   Outcome: Progressing     Problem: PAIN - ADULT  Goal: Verbalizes/displays adequate comfort level or baseline comfort level  Description: Interventions:  - Encourage patient to monitor pain and request assistance  - Assess pain using appropriate pain scale  - Administer analgesics based on type and severity of pain and evaluate response  - Implement non-pharmacological measures as appropriate and evaluate response  - Consider cultural and social influences on pain and pain management  - Notify physician/advanced practitioner if interventions unsuccessful or patient reports new pain  Outcome: Progressing     Problem: SAFETY ADULT  Goal: Patient will remain free of falls  Description: INTERVENTIONS:  - Educate patient/family on patient safety including physical limitations  - Instruct patient to call for assistance with activity   - Consult OT/PT to assist with strengthening/mobility   - Keep Call bell within reach  - Keep bed low and locked with side rails adjusted as appropriate  - Keep care items and personal belongings within reach  - Initiate and maintain comfort rounds  - Apply yellow socks and bracelet for high fall risk patients  - Consider moving patient to room near nurses station  Outcome: Progressing  Goal: Maintain or return to baseline ADL function  Description: INTERVENTIONS:  - Educate patient/family on patient safety including physical limitations  - Instruct patient to call for assistance with activity   - Consult OT/PT to assist with strengthening/mobility   - Keep Call bell within reach  - Keep bed low and locked with side rails adjusted as appropriate  - Keep care items and personal belongings within reach  - Initiate and maintain comfort rounds  - Make Fall Risk Sign visible to staff  - Apply yellow socks and bracelet for high fall risk patients  - Consider moving patient to room near nurses station  Outcome: Progressing  Goal: Maintains/Returns to pre admission functional level  Description: INTERVENTIONS:  - Perform BMAT or MOVE assessment daily    - Set and communicate daily mobility goal to care team and patient/family/caregiver  - Collaborate with rehabilitation services on mobility goals if consulted  - Perform Range of Motion  times a day  - Reposition patient every  hours  - Dangle patient  times a day  - Stand patient  times a day  - Ambulate patient  times a day  - Out of bed to chair  times a day   - Out of bed for meals times a day  - Out of bed for toileting  - Record patient progress and toleration of activity level   Outcome: Progressing     Problem: DISCHARGE PLANNING  Goal: Discharge to home or other facility with appropriate resources  Description: INTERVENTIONS:  - Identify barriers to discharge w/patient and caregiver  - Arrange for needed discharge resources and transportation as appropriate  - Identify discharge learning needs (meds, wound care, etc )  - Arrange for interpretive services to assist at discharge as needed  - Refer to Case Management Department for coordinating discharge planning if the patient needs post-hospital services based on physician/advanced practitioner order or complex needs related to functional status, cognitive ability, or social support system  Outcome: Progressing     Problem: Knowledge Deficit  Goal: Patient/family/caregiver demonstrates understanding of disease process, treatment plan, medications, and discharge instructions  Description: Complete learning assessment and assess knowledge base    Interventions:  - Provide teaching at level of understanding  - Provide teaching via preferred learning methods  Outcome: Progressing     Problem: NEUROSENSORY - ADULT  Goal: Achieves stable or improved neurological status  Description: INTERVENTIONS  - Monitor and report changes in neurological status  - Monitor vital signs such as temperature, blood pressure, glucose, and any other labs ordered   - Initiate measures to prevent increased intracranial pressure  - Monitor for seizure activity and implement precautions if appropriate      Outcome: Progressing  Goal: Achieves maximal functionality and self care  Description: INTERVENTIONS  - Monitor swallowing and airway patency with patient fatigue and changes in neurological status  - Encourage and assist patient to increase activity and self care     - Encourage visually impaired, hearing impaired and aphasic patients to use assistive/communication devices  Outcome: Progressing     Problem: CARDIOVASCULAR - ADULT  Goal: Maintains optimal cardiac output and hemodynamic stability  Description: INTERVENTIONS:  - Monitor I/O, vital signs and rhythm  - Monitor for S/S and trends of decreased cardiac output  - Administer and titrate ordered vasoactive medications to optimize hemodynamic stability  - Assess quality of pulses, skin color and temperature  - Assess for signs of decreased coronary artery perfusion  - Instruct patient to report change in severity of symptoms  Outcome: Progressing  Goal: Absence of cardiac dysrhythmias or at baseline rhythm  Description: INTERVENTIONS:  - Continuous cardiac monitoring, vital signs, obtain 12 lead EKG if ordered  - Administer antiarrhythmic and heart rate control medications as ordered  - Monitor electrolytes and administer replacement therapy as ordered  Outcome: Progressing     Problem: METABOLIC, FLUID AND ELECTROLYTES - ADULT  Goal: Electrolytes maintained within normal limits  Description: INTERVENTIONS:  - Monitor labs and assess patient for signs and symptoms of electrolyte imbalances  - Administer electrolyte replacement as ordered  - Monitor response to electrolyte replacements, including repeat lab results as appropriate  - Instruct patient on fluid and nutrition as appropriate  Outcome: Progressing  Goal: Fluid balance maintained  Description: INTERVENTIONS:  - Monitor labs   - Monitor I/O and WT  - Instruct patient on fluid and nutrition as appropriate  - Assess for signs & symptoms of volume excess or deficit  Outcome: Progressing  Goal: Glucose maintained within target range  Description: INTERVENTIONS:  - Monitor Blood Glucose as ordered  - Assess for signs and symptoms of hyperglycemia and hypoglycemia  - Administer ordered medications to maintain glucose within target range  - Assess nutritional intake and initiate nutrition service referral as needed  Outcome: Progressing     Problem: SKIN/TISSUE INTEGRITY - ADULT  Goal: Skin Integrity remains intact(Skin Breakdown Prevention)  Description: Assess:  -Inspect skin when repositioning, toileting, and assisting with ADLS  -Assess extremities for adequate circulation and sensation     Bed Management:  -Have minimal linens on bed & keep smooth, unwrinkled  -Change linens as needed when moist or perspiring      Activity:  -Encourage activity and walks on unit  -Encourage or provide ROM exercises   -Use appropriate equipment to lift or move patient in bed    Skin Care:  -Avoid use of baby powder, tape, friction and shearing, hot water or constrictive clothing  -Do not massage red bony areas      Outcome: Progressing  Goal: Incision(s), wounds(s) or drain site(s) healing without S/S of infection  Description: INTERVENTIONS  - Assess and document dressing, incision, wound bed, drain sites and surrounding tissue  - Provide patient and family education    Outcome: Progressing     Problem: MUSCULOSKELETAL - ADULT  Goal: Maintain or return mobility to safest level of function  Description: INTERVENTIONS:  - Assess patient's ability to carry out ADLs; assess patient's baseline for ADL function and identify physical deficits which impact ability to perform ADLs (bathing, care of mouth/teeth, toileting, grooming, dressing, etc )  - Assess/evaluate cause of self-care deficits   - Assess range of motion  - Assess patient's mobility  - Assess patient's need for assistive devices and provide as appropriate  - Encourage maximum independence but intervene and supervise when necessary  - Involve family in performance of ADLs  - Assess for home care needs following discharge   - Consider OT consult to assist with ADL evaluation and planning for discharge  - Provide patient education as appropriate  Outcome: Progressing  Goal: Maintain proper alignment of affected body part  Description: INTERVENTIONS:  - Support, maintain and protect limb and body alignment  - Provide patient/ family with appropriate education  Outcome: Progressing

## 2022-03-01 NOTE — ASSESSMENT & PLAN NOTE
Lab Results   Component Value Date    HGBA1C 5 1 10/12/2021       Recent Labs     02/28/22  1638 02/28/22  2113 03/01/22  0731 03/01/22  1052   POCGLU 125 148* 97 189*       Blood Sugar Average: Last 72 hrs:  (P) 536 [id number]     Monitor Accu-Cheks  Avoid hypoglycemia  Hypoglycemia protocol in place

## 2022-03-01 NOTE — ASSESSMENT & PLAN NOTE
· Fall at home  · At risk for falls  · Safe ambulation  · Fall precautions  · Physical therapy  · Plan to discharge to rehab tomorrow

## 2022-03-01 NOTE — CASE MANAGEMENT
Case Management Discharge Planning Note    Patient name Ham Ba  [place] [address] /E5 MS [phone] MRN [id number]  : 1927 Date 3/1/2022       Current Admission Date: 2022  Current Admission Diagnosis:Acute kidney injury superimposed on CKD [facility])   Patient Active Problem List    Diagnosis Date Noted    Low grade fever 2022    Moderate protein-calorie malnutrition ([address] ) 2022    Urinary retention 2022    Acute kidney injury superimposed on CKD ([address] ) 2022    Anemia in stage 4 chronic kidney disease ([facility] ) 2022    Hyperkalemia 2022    Adult failure to thrive 2022    Fall at home, initial encounter 2022    Elevated uric acid in blood 2022    Secondary hyperparathyroidism of renal origin ([facility] ) 12/15/2021    Hypertensive chronic kidney disease 12/15/2021    Type 2 diabetes mellitus with diabetic chronic kidney disease ([facility] ) 2020    Fecal smearing 10/24/2019    Generalized edema 10/08/2019    Bowel habit changes 10/01/2019    Rectal discharge 10/01/2019    Acute cystitis without hematuria 10/13/2018    Vertebral anomaly 2018    Acquired cyst of kidney 2017    Chronic kidney disease, stage IV (severe) ([facility] ) 2017    Atrophic kidney, acquired 2017    Retinal hole of right eye 2017    Essential hypertension 2017    Pure hypercholesterolemia 2017    Controlled type 2 diabetes mellitus with diabetic polyneuropathy ([address] ) 2017    Allergic rhinitis 2017    Vitamin D insufficiency 2016    Bilateral deafness 2016    Peripheral neuropathy 2015    Gait disturbance 2014    Gout 2014    Ectropion 2013    Borderline glaucoma 2013      LOS (days): 6  Geometric Mean LOS (GMLOS) (days): 3 10  Days to GMLOS:-2 6     OBJECTIVE:  Risk of Unplanned Readmission Score: 18         Current admission status: Inpatient   [id number] [id number]  Phone: [phone] Fax: [phone]    Primary Care Provider: Maurizio Oden MD    Primary Insurance: [facility]  Secondary Insurance:     DISCHARGE DETAILS:     Facility Insurance Auth Number: Approved auth # [id number] for [address] T[employer] x2 days SOC: 3/1/2022 NRD: 3/3/2022 at level 1 Reviewer:  Miladys SCHULTZ# [phone] Ext [phone] Fax clinical review to [phone]

## 2022-03-01 NOTE — PROGRESS NOTES
[facility]  coding opportunities          Number of diagnosis code(s) already on the problem list added to FYI fla  E11 42  N18 4, d63 1     Chart Reviewed * (Number of) Inbasket suggestions sent to Provider: 2  Z79 4  f23 0                  Patients insurance company: [employer] (Medicare Advantage and Commercial)

## 2022-03-01 NOTE — ASSESSMENT & PLAN NOTE
·  Reports urinary retention, recently treated for UTI   ·  Patient had to be straight cath once overnight  · Continue urinary retention protocol  · Suspect is going to improve after patient is more ambulatory

## 2022-03-02 ENCOUNTER — APPOINTMENT (INPATIENT)
Dept: RADIOLOGY | Facility: HOSPITAL | Age: 87
DRG: 683 | End: 2022-03-02
Payer: COMMERCIAL

## 2022-03-02 PROBLEM — M25.571 RIGHT ANKLE PAIN: Status: ACTIVE | Noted: 2022-01-01

## 2022-03-02 LAB
ANION GAP SERPL CALCULATED.3IONS-SCNC: 8 MMOL/L (ref 4–13)
BASOPHILS # BLD AUTO: 0.01 THOUSANDS/ΜL (ref 0–0.1)
BASOPHILS NFR BLD AUTO: 0 % (ref 0–1)
BUN SERPL-MCNC: 67 MG/DL (ref 5–25)
CALCIUM SERPL-MCNC: 9.2 MG/DL (ref 8.3–10.1)
CHLORIDE SERPL-SCNC: 105 MMOL/L (ref 100–108)
CO2 SERPL-SCNC: 22 MMOL/L (ref 21–32)
CREAT SERPL-MCNC: 2.47 MG/DL (ref 0.6–1.3)
EOSINOPHIL # BLD AUTO: 0.09 THOUSAND/ΜL (ref 0–0.61)
EOSINOPHIL NFR BLD AUTO: 1 % (ref 0–6)
ERYTHROCYTE [DISTWIDTH] IN BLOOD BY AUTOMATED COUNT: 14.9 % (ref 11.6–15.1)
GFR SERPL CREATININE-BSD FRML MDRD: 16 ML/MIN/1.73SQ M
GLUCOSE SERPL-MCNC: 105 MG/DL (ref 65–140)
GLUCOSE SERPL-MCNC: 116 MG/DL (ref 65–140)
GLUCOSE SERPL-MCNC: 153 MG/DL (ref 65–140)
GLUCOSE SERPL-MCNC: 161 MG/DL (ref 65–140)
GLUCOSE SERPL-MCNC: 165 MG/DL (ref 65–140)
HCT VFR BLD AUTO: 26.5 % (ref 34.8–46.1)
HGB BLD-MCNC: 8.3 G/DL (ref 11.5–15.4)
IMM GRANULOCYTES # BLD AUTO: 0.05 THOUSAND/UL (ref 0–0.2)
IMM GRANULOCYTES NFR BLD AUTO: 1 % (ref 0–2)
LYMPHOCYTES # BLD AUTO: 1.47 THOUSANDS/ΜL (ref 0.6–4.47)
LYMPHOCYTES NFR BLD AUTO: 16 % (ref 14–44)
MCH RBC QN AUTO: 30.7 PG (ref 26.8–34.3)
MCHC RBC AUTO-ENTMCNC: 31.3 G/DL (ref 31.4–37.4)
MCV RBC AUTO: 98 FL (ref 82–98)
MONOCYTES # BLD AUTO: 0.68 THOUSAND/ΜL (ref 0.17–1.22)
MONOCYTES NFR BLD AUTO: 7 % (ref 4–12)
NEUTROPHILS # BLD AUTO: 7.18 THOUSANDS/ΜL (ref 1.85–7.62)
NEUTS SEG NFR BLD AUTO: 75 % (ref 43–75)
NRBC BLD AUTO-RTO: 0 /100 WBCS
PLATELET # BLD AUTO: 135 THOUSANDS/UL (ref 149–390)
PMV BLD AUTO: 12.7 FL (ref 8.9–12.7)
POTASSIUM SERPL-SCNC: 4.3 MMOL/L (ref 3.5–5.3)
PROCALCITONIN SERPL-MCNC: 0.51 NG/ML
RBC # BLD AUTO: 2.7 MILLION/UL (ref 3.81–5.12)
SODIUM SERPL-SCNC: 135 MMOL/L (ref 136–145)
WBC # BLD AUTO: 9.48 THOUSAND/UL (ref 4.31–10.16)

## 2022-03-02 PROCEDURE — 97110 THERAPEUTIC EXERCISES: CPT

## 2022-03-02 PROCEDURE — 85025 COMPLETE CBC W/AUTO DIFF WBC: CPT | Performed by: INTERNAL MEDICINE

## 2022-03-02 PROCEDURE — 80048 BASIC METABOLIC PNL TOTAL CA: CPT | Performed by: INTERNAL MEDICINE

## 2022-03-02 PROCEDURE — 97530 THERAPEUTIC ACTIVITIES: CPT

## 2022-03-02 PROCEDURE — 82948 REAGENT STRIP/BLOOD GLUCOSE: CPT

## 2022-03-02 PROCEDURE — 71045 X-RAY EXAM CHEST 1 VIEW: CPT

## 2022-03-02 PROCEDURE — 84145 PROCALCITONIN (PCT): CPT | Performed by: INTERNAL MEDICINE

## 2022-03-02 PROCEDURE — 99232 SBSQ HOSP IP/OBS MODERATE 35: CPT | Performed by: INTERNAL MEDICINE

## 2022-03-02 PROCEDURE — 73600 X-RAY EXAM OF ANKLE: CPT

## 2022-03-02 PROCEDURE — 97116 GAIT TRAINING THERAPY: CPT

## 2022-03-02 RX ADMIN — HEPARIN SODIUM 5000 UNITS: 5000 INJECTION INTRAVENOUS; SUBCUTANEOUS at 05:25

## 2022-03-02 RX ADMIN — CINACALCET 30 MG: 30 TABLET, FILM COATED ORAL at 09:26

## 2022-03-02 RX ADMIN — HEPARIN SODIUM 5000 UNITS: 5000 INJECTION INTRAVENOUS; SUBCUTANEOUS at 21:48

## 2022-03-02 RX ADMIN — GABAPENTIN 300 MG: 300 CAPSULE ORAL at 21:48

## 2022-03-02 RX ADMIN — HEPARIN SODIUM 5000 UNITS: 5000 INJECTION INTRAVENOUS; SUBCUTANEOUS at 14:11

## 2022-03-02 RX ADMIN — PRAVASTATIN SODIUM 40 MG: 40 TABLET ORAL at 16:59

## 2022-03-02 RX ADMIN — ASPIRIN 81 MG: 81 TABLET, COATED ORAL at 09:25

## 2022-03-02 RX ADMIN — INSULIN LISPRO 1 UNITS: 100 INJECTION, SOLUTION INTRAVENOUS; SUBCUTANEOUS at 12:06

## 2022-03-02 RX ADMIN — INSULIN LISPRO 1 UNITS: 100 INJECTION, SOLUTION INTRAVENOUS; SUBCUTANEOUS at 21:48

## 2022-03-02 RX ADMIN — SODIUM BICARBONATE 650 MG TABLET 650 MG: at 09:26

## 2022-03-02 RX ADMIN — INSULIN LISPRO 1 UNITS: 100 INJECTION, SOLUTION INTRAVENOUS; SUBCUTANEOUS at 07:59

## 2022-03-02 RX ADMIN — ACETAMINOPHEN 650 MG: 325 TABLET, FILM COATED ORAL at 09:27

## 2022-03-02 RX ADMIN — DOCUSATE SODIUM 100 MG: 100 CAPSULE ORAL at 09:25

## 2022-03-02 RX ADMIN — SODIUM BICARBONATE 650 MG TABLET 650 MG: at 16:59

## 2022-03-02 RX ADMIN — AMLODIPINE BESYLATE 5 MG: 5 TABLET ORAL at 09:28

## 2022-03-02 RX ADMIN — CITALOPRAM HYDROBROMIDE 10 MG: 20 TABLET ORAL at 09:24

## 2022-03-02 RX ADMIN — AMLODIPINE BESYLATE 5 MG: 5 TABLET ORAL at 17:02

## 2022-03-02 NOTE — PHYSICAL THERAPY NOTE
PHYSICAL THERAPY NOTE          Patient Name: Anita Hanna  BNUJB'N Date: 3/2/2022     03/02/22 1120   Note Type   Note Type Treatment   Pain Assessment   Pain Assessment Tool 0-10   Pain Score 7   Pain Location/Orientation Orientation: Right  (ankle)   Pain Onset/Description   (increased pain with weightbearing)   Hospital Pain Intervention(s) Ambulation/increased activity; Emotional support; Rest   Restrictions/Precautions   Other Precautions Chair Alarm; Bed Alarm; Fall Risk;Pain   General   Chart Reviewed Yes   Family/Caregiver Present No   Cognition   Overall Cognitive Status WFL   Arousal/Participation Alert; Responsive; Cooperative   Attention Within functional limits   Orientation Level Oriented X4   Memory Within functional limits   Following Commands Follows all commands and directions without difficulty   Subjective   Subjective I have pain in my right ankle when i step on it  i don;t know why but it hurts when i put weight on it  Transfers   Sit to Stand 4  Minimal assistance   Additional items Assist x 1; Increased time required; Bedrails   Stand to Sit 4  Minimal assistance   Additional items Assist x 1; Increased time required;Verbal cues   Stand pivot 4  Minimal assistance   Additional items Assist x 1; Increased time required;Verbal cues   Toilet transfer 4  Minimal assistance   Additional items Assist x 1;Standard toilet  (grab bar)   Ambulation/Elevation   Gait pattern Forward Flexion;Decreased foot clearance;Decreased R stance; Antalgic; Short stride; Step to;Excessively slow  (progressing to step through gait)   Gait Assistance 4  Minimal assist   Additional items Assist x 1;Verbal cues   Assistive Device Rolling walker   Distance 40' x2, 15' x1   Balance   Static Sitting Good   Dynamic Sitting Fair +   Static Standing Fair  (with rw)   Dynamic Standing Poor +   Ambulatory Fair -  (with rw)   Endurance Deficit   Endurance Deficit Description pain, fatigue   Activity Tolerance   Activity Tolerance Patient tolerated treatment well;Patient limited by pain   Exercises   Hip Flexion Sitting;AROM; Bilateral   Hip Abduction Sitting;Bilateral;10 reps   Hip Adduction Sitting;10 reps;Bilateral   Knee AROM Long Arc Quad Sitting;10 reps;AROM; Bilateral   Ankle Pumps Sitting;AROM; Bilateral  ([id number])   UE Exercise Sitting;10 reps;AROM; Bilateral  (shoulder flexion, horizontal abd /add)   Equipment Use   Comments assisted pt with lower body dressing donning doffing of pants slipper socks and depends in sitting and standing   Assessment   Prognosis Good   Problem List Decreased strength;Decreased range of motion;Decreased endurance; Impaired balance;Decreased mobility;Pain; Impaired hearing   Assessment PT reports increased pain with weightbearing to R foot/ankle  Pt reports having x-rays not to long ago  spoke with student medical intern,with slim during Pt session who took a look at the pt's x-rays during our PT session and stated everything looks stable and is clear for weightbearing activity   pt  Is currently requiring min assist x1 for transfers and ambulation on level surfaces  Gait deviations of slow antalgic,step to gait  pattern with decreased foot clearance on L during swing through progressing to step through gait with slightly less antalgic gait noted  Pt  Progressed with ambulation distances to 40' x2  Pt  Limited by pain and fatigue  Cues to maintain close distance to walker at all times and to steer around obstacles to avoid running into them required  Pt performs toilet transfers with min assist, assistance required for lower body dressing and steadying assist while pulling bottoms up and down  Pt performs seated b/l le and ue arom exercises at eob x 10 reps  The patient's AM-PAC Basic Mobility Inpatient Short Form Raw Score is 18  A Raw score of greater than 16 suggests the patient may benefit from discharge to home   Please also refer to the recommendation of the Physical Therapist for safe discharge planning  Despite recommendations of AM- pac, Given impairments pt remains at increased risk for falls and decreased caregiver support continue to recommend STR at d/c in order to optimize functional outcomes and mobility  Goals   Patient Goals walk   STG Expiration Date 03/06/22   PT Treatment Day 2   Plan   Treatment/Interventions Functional transfer training;LE strengthening/ROM; Therapeutic exercise; Endurance training;Patient/family training;Equipment eval/education;Gait training;Spoke to MD   PT Frequency 3-5x/wk   Recommendation   PT Discharge Recommendation Post acute rehabilitation services   AM-PAC Basic Mobility Inpatient   Turning in Bed Without Bedrails 3   Lying on Back to Sitting on Edge of Flat Bed 3   Moving Bed to Chair 3   Standing Up From Chair 3   Walk in Room 4   Climb 3-5 Stairs 2   Basic Mobility Inpatient Raw Score 18   Basic Mobility Standardized Score 41 05   Highest Level Of Mobility   JH-HLM Goal 6: Walk 10 steps or more   JH-HLM Highest Level of Mobility 7: Walk 25 feet or more   JH-HLM Goal Achieved Yes   End of Consult   Patient Position at End of Consult Seated edge of bed; All needs within reach;Bed/Chair alarm activated   Mirna Gray PTA

## 2022-03-02 NOTE — ASSESSMENT & PLAN NOTE
Lab Results   Component Value Date    EGFR 16 03/02/2022    EGFR 17 02/28/2022    EGFR 17 02/27/2022    CREATININE 2 47 (H) 03/02/2022    CREATININE 2 27 (H) 02/28/2022    CREATININE 2 33 (H) 02/27/2022       · Acute on chronic kidney disease stage 4, baseline creatinine up until in 2021 was 1 6-1 8, after that on random labs creatinine was 2 1, 2 35, progression of CKD stage 4  · Patient presented with a creatinine of 2 77  · Acidosis, hyperkalemia improved with IV bicarbonate GTT  · Continue bicarbonate tablets  · Monitor kidney function closely  · Avoid nephrotoxins  · Monitor postvoid residuals, patient has a history of urinary retention, has been straight cath once, did not meet criteria for straight cathing later  · Outpatient Nephrology follow-up recommended

## 2022-03-02 NOTE — ASSESSMENT & PLAN NOTE
·  Reports urinary retention, recently treated for UTI   ·  Patient had to be straight cath once   · Continue urinary retention protocol  · Suspect it is going to improve after patient is more ambulatory

## 2022-03-02 NOTE — PLAN OF CARE
Problem: OCCUPATIONAL THERAPY ADULT  Goal: Performs self-care activities at highest level of function for planned discharge setting  See evaluation for individualized goals  Description: Treatment Interventions: ADL retraining,UE strengthening/ROM,Functional transfer training,Endurance training,Cognitive reorientation,Equipment evaluation/education,Patient/family training,Compensatory technique education,Activityengagement,Energy conservation          See flowsheet documentation for full assessment, interventions and recommendations  Outcome: Progressing  Note: Limitation: Decreased ADL status,Decreased UE strength,Decreased cognition,Decreased Safe judgement during ADL,Decreased endurance,Decreased high-level ADLs,Decreased self-care trans  Prognosis: Good  Assessment: Pt seen this date for skilled OT session focused on ADLs, functional transfers and mobility, safety education  The patient was received in bathroom toileting with PCA, NAD, on RA, PIV access  She participated in toileting ADL, and seated BUE exercises  The patient was Minimal Assistance with RW for ambulation in room, and was supervision for BUE exercises while seated EOB  Pt participated well throughout session  At end of session the patient was located seated EOB with call bell in reach and all needs met  Overall the patient remains below her functional baseline, and is primarily limited at this time due to decreased activity tolerance, mildly impaired balance, and generalized deconditioning  OT will continue to follow while acute to address POC  The patient's raw score on the AM-PAC Daily Activity inpatient short form is 16, standardized score is 35 96, less than 39 4  Patients at this level are likely to benefit from discharge to post-acute rehabilitation services   At this time, recommend inpatient rehab upon d/c       OT Discharge Recommendation: Post acute rehabilitation services  OT - OK to Discharge: Yes (once medically cleared) Malcom Krabbe, OTR/L

## 2022-03-02 NOTE — OCCUPATIONAL THERAPY NOTE
Occupational Therapy Progress Note     Patient Name: Megan Ferreira  AVRDC'B Date: 3/2/2022  Problem List  Principal Problem:    Acute kidney injury superimposed on CKD [facility])  Active Problems:    Pure hypercholesterolemia    Acute cystitis without hematuria    Type 2 diabetes mellitus with diabetic chronic kidney disease ([facility] )    Secondary hyperparathyroidism of renal origin ([facility] )    Urinary retention    Anemia in stage 4 chronic kidney disease (HCC)    Hyperkalemia    Adult failure to thrive    Fall at home, initial encounter    Moderate protein-calorie malnutrition ([facility] )    Leukocytosis          03/02/22 0907   OT Last Visit   OT Visit Date 03/02/22   Note Type   Note Type Treatment   Restrictions/Precautions   Weight Bearing Precautions Per Order No   Other Precautions Chair Alarm; Bed Alarm; Fall Risk   Pain Assessment   Pain Assessment Tool 0-10   Pain Score 3   Pain Location/Orientation Orientation: Right;Location: Foot   Pain Onset/Description   (in foot/ankle when ambulating)   Patient's Stated Pain Goal No pain   Hospital Pain Intervention(s) Repositioned   ADL   Eating Assistance 7  Independent   Eating Deficit Setup   Grooming Assistance 5  Supervision/Setup   Grooming Deficit Setup;Brushing hair  (while seated EOB)   UB Dressing Assistance 5  Supervision/Setup   UB Dressing Deficit Setup; Increased time to complete   LB Dressing Assistance 3  Moderate Assistance   LB Dressing Deficit Setup;Supervision/safety; Increased time to complete; Don/doff R sock; Don/doff L sock   Toileting Assistance  4  Minimal Assistance   Toileting Deficit Perineal hygiene   Functional Standing Tolerance   Time 3 minutes with RW   Activity dynamic standing/ambulation   Comments Min A x1 with RW   Transfers   Sit to Stand 4  Minimal assistance   Additional items Assist x 1;Bedrails  (RW)   Stand to Sit 5  Supervision   Additional items Bedrails   Stand pivot 4  Minimal assistance   Additional items Assist x 1  (RW)   Toilet transfer 4  Minimal assistance   Additional items Assist x 1;Commode  (grab bar)   Functional Mobility   Functional Mobility 4  Minimal assistance   Additional Comments assist x1 with RW   Therapeutic Exercise - ROM   UE-ROM Yes   ROM- Right Upper Extremities   R Shoulder AROM; Flexion; Extension;Horizontal ABduction   R Elbow AROM;Elbow flexion;Elbow extension   ROM - Left Upper Extremities    L Shoulder AROM; Flexion; Extension;Horizontal ABduction   L Elbow AROM;Elbow flexion;Elbow extension   Cognition   Overall Cognitive Status WFL   Arousal/Participation Alert; Cooperative   Attention Within functional limits   Orientation Level Oriented X4   Memory Within functional limits   Following Commands Follows all commands and directions without difficulty   Comments Pt engaged and motivated throughout session    Activity Tolerance   Activity Tolerance Patient tolerated treatment well   Medical Staff Made Aware ALICIA Larkin   Assessment   Assessment Pt seen this date for skilled OT session focused on ADLs, functional transfers and mobility, safety education  The patient was received in bathroom toileting with PCA, NAD, on RA, PIV access  She participated in toileting ADL, and seated BUE exercises  The patient was Minimal Assistance with RW for ambulation in room, and was supervision for BUE exercises while seated EOB  Pt participated well throughout session  At end of session the patient was located seated EOB with call bell in reach and all needs met  Overall the patient remains below her functional baseline, and is primarily limited at this time due to decreased activity tolerance, mildly impaired balance, and generalized deconditioning  OT will continue to follow while acute to address POC  The patient's raw score on the AM-PAC Daily Activity inpatient short form is 16, standardized score is 35 96, less than 39 4  Patients at this level are likely to benefit from discharge to post-acute rehabilitation services   At this time, recommend inpatient rehab upon d/c     Plan   Treatment Interventions ADL retraining;Functional transfer training;UE strengthening/ROM; Endurance training;Patient/family training   Goal Expiration Date 03/10/22   OT Treatment Day 2   OT Frequency 3-5x/wk   Recommendation   OT Discharge Recommendation Post acute rehabilitation services   OT - OK to Discharge Yes  (once medically cleared)   AM-PAC Daily Activity Inpatient   Lower Body Dressing 2   Bathing 2   Toileting 2   Upper Body Dressing 3   Grooming 3   Eating 4   Daily Activity Raw Score 16   Daily Activity Standardized Score (Calc for Raw Score >=11) 35 96   AM-PAC Applied Cognition Inpatient   Following a Speech/Presentation 4   Understanding Ordinary Conversation 4   Taking Medications 4   Remembering Where Things Are Placed or Put Away 4   Remembering List of 4-5 Errands 3   Taking Care of Complicated Tasks 3   Applied Cognition Raw Score 22   Applied Cognition Standardized Score 47 83     Suraj Gamma, OTR/L

## 2022-03-02 NOTE — ASSESSMENT & PLAN NOTE
· White blood cell count 13 on 03/01, resolved today  · Patient had an episode of fever 100 6 this morning while eating breakfast   Her temperature was rechecked without giving any Tylenol and it was 98  · Patient denies any signs of upper/lower respiratory tract infection, no cough, sore throat, sputum production  · Denies abdominal pain constipation/diarrhea  · Denies dysuria  · Patient received 3 days of antibiotics for a UTI  · Procalcitonin elevated, inaccurate in the setting of chronic kidney disease  · Small open wound in the left arm without signs of infection, continue local wound care  · COVID/flu/RSV negative  · Monitor, no need for antibiotic

## 2022-03-02 NOTE — CONSULTS
Podiatry - Consultation    Patient Information:   Elvis To 80 y o  female MRN: [id number]  Unit/Bed#: E5 -01 Encounter: [id number]  PCP: Haleigh Penny MD  Date of Admission:  2/23/2022  Date of Consultation: 03/02/22  Requesting Physician: Sukhjinder Anderson MD      ASSESSMENT:    Elvis To is a 80 y o  female with:    1  Right achilles tendonitis  2  Right ankle pain  3  T2DM  4  Chronic stasis dermatitis    PLAN:    · Voltaren gel for pain relief and PT consult for ankle exercises  No local signs of infection, no open wounds or lesions to suggest infectious etiology of ankle pain  · Right ankle XR reviewed: no acute osseous pathology, orthopedic hardware in alignment  There is soft tissue air about the lateral ankle likely from chronic edema vs  soft tissue injury 2/2 fall  · Elevation and offloading on green foam wedges or pillows when non-ambulatory  · Rest of care per primary team   · Will discuss this plan with my attending and update as needed  Weightbearing status: WBAT    SUBJECTIVE:    History of Present Illness:    Elvis To is a 80 y o  female who is originally admitted 2/23/2022 due to GAY on CKD  Patient has a past medical history of T2DM, CKD, anema, hyperparathyroidism  We are consulted due to findings of soft tissue air on right ankle XR to evaluate right ankle pain 2/2 fall  She reports that her ankle has been painful for 3-4 days, specifically in the posterior aspect following a fall in which she also injured her arm  Describes a mild, achy ankle pain worse with prolonged activity  She denies any open wounds or lesions to the area  Denies paraesthesias  She had an ankle fx repair performed several years ago which healed uneventfully  Review of Systems:    Constitutional: Negative  HENT: Negative  Eyes: Negative  Respiratory: Negative  Cardiovascular: Negative  Gastrointestinal: Negative      Musculoskeletal: Right ankle pain, bilateral LE edema  Skin: Negative  Neurological: Negative  Psych: Negative  Past Medical and Surgical History:     Past Medical History:   Diagnosis Date    Diabetes mellitus ([facility] )     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 ([facility] )     LAST ASSESSED: 2016    Hyperlipidemia     Hypertension     Renal disorder     Squamous cell carcinoma, scalp/neck     LAST ASSESSED: [id number]       Past Surgical History:   Procedure Laterality Date    BLADDER SURGERY      LAST ASSESSED: 2016    PELVIC FLOOR REPAIR      VAGINAL SURG INSERTION OF MESH    TOTAL ABDOMINAL HYSTERECTOMY W/ BILATERAL SALPINGOOPHORECTOMY      LAST ASSESSED: 2016       Meds/Allergies:    Medications Prior to Admission   Medication    amLODIPine (NORVASC) 5 mg tablet    aspirin 81 MG tablet    Cholecalciferol (VITAMIN D3) 2000 units capsule    cinacalcet (SENSIPAR) 30 mg tablet    citalopram (CeleXA) 10 mg tablet    colchicine (COLCRYS) 0 6 mg tablet    Contour Test test strip    docusate sodium (COLACE) 100 mg capsule    furosemide (LASIX) 20 mg tablet    gabapentin (NEURONTIN) 300 mg capsule    Lancets (ONETOUCH ULTRASOFT) lancets    lisinopril (ZESTRIL) 10 mg tablet    [] nitrofurantoin (MACROBID) 100 mg capsule    Premarin vaginal cream    simvastatin (ZOCOR) 20 mg tablet       Allergies   Allergen Reactions    Influenza Virus Vaccine      Other reaction(s): HX of Guillain-[place] Syndrome    Sulfa Antibiotics        Social History:     Marital Status:      Substance Use History:   Social History     Substance and Sexual Activity   Alcohol Use Yes    Comment: SOCIAL     Social History     Tobacco Use   Smoking Status Former Smoker   Smokeless Tobacco Never Used     Social History     Substance and Sexual Activity   Drug Use No       Family History:    Family History   Problem Relation Age of Onset    Hypertension Mother     Hypertension Father     Kidney disease Sister         CHRONIC    Alcohol abuse Family  Substance Abuse Family          OBJECTIVE:    Vitals:   Blood Pressure: 130/53 (03/02/22 1451)  Pulse: 64 (03/02/22 1451)  Temperature: 98 2 °F (36 8 °C) (03/02/22 1451)  Temp Source: Oral (03/02/22 0904)  Respirations: 18 (03/02/22 1451)  Height: 5' (152 4 cm) (02/24/22 0108)  Weight - Scale: 58 1 kg (128 lb 1 4 oz) (02/23/22 1655)  SpO2: 96 % (03/02/22 1451)    Physical Exam:    General Appearance: Alert, cooperative, no distress  HEENT: Head normocephalic, atraumatic, without obvious abnormality  Heart: Normal rate and rhythm  Lungs: Non-labored breathing  No respiratory distress  Abdomen: Without distension  Psychiatric: AAOx3  Lower Extremity:  Vascular:   Right DP and PT pulses are present  Left DP and PT pulses are present  CRT < 3 seconds at the digits  +2/4 edema noted at bilateral lower extremities  Pedal hair is absent  Skin temperature is WNL bilaterally  Musculoskeletal:  MMT is 5/5 in all muscle compartments bilaterally (pain with plantarflexion, right)  ROM at the 1st MPJ and ankle joint are decreased bilaterally with the leg extended (pain with plantarflexion, right)  Pain on palpation of achilles tendon insertion right heel  Dermatological:  Bilateral LE chronic stasis changes (xerotic, scaly changes) without open wounds present  Healed surgical scars medial and lateral right ankle  Neurological:  Gross sensation is intact  Protective sensation is intact  Patient Denies numbness and/or paresthesias      Additional data:     Lab Results: I have personally reviewed pertinent labs including:    Results from last 7 days   Lab Units 03/02/22  0611   WBC Thousand/uL 9 48   HEMOGLOBIN g/dL 8 3*   HEMATOCRIT % 26 5*   PLATELETS Thousands/uL 135*   NEUTROS PCT % 75   LYMPHS PCT % 16   MONOS PCT % 7   EOS PCT % 1     Results from last 7 days   Lab Units 03/02/22  0611 02/26/22  0525 02/25/22  0449   POTASSIUM mmol/L 4 3   < > 5 8*   CHLORIDE mmol/L 105   < > 114*   CO2 mmol/L 22   < > 16* BUN mg/dL 67*   < > 82*   CREATININE mg/dL 2 47*   < > 2 76*   CALCIUM mg/dL 9 2   < > 9 1   ALK PHOS U/L  --   --  78   ALT U/L  --   --  14   AST U/L  --   --  12    < > = values in this interval not displayed  Cultures: I have personally reviewed pertinent cultures including:    Results from last 7 days   Lab Units 02/24/22  0119 02/24/22  0056   URINE CULTURE  50,000-59,000 cfu/ml  60,000-69,000 cfu/ml            Imaging: I have personally reviewed pertinent reports in PACS  EKG, Pathology, and Other Studies: I have personally reviewed pertinent reports  Time Spent for Care: 30 minutes  More than 50% of total time spent on counseling and coordination of care as described above  ** Please Note: Portions of the record may have been created with voice recognition software  Occasional wrong word or "sound a like" substitutions may have occurred due to the inherent limitations of voice recognition software  Read the chart carefully and recognize, using context, where substitutions have occurred   **

## 2022-03-02 NOTE — PROGRESS NOTES
[address] [facility]  Progress Note - Elvis To 2/24/1927, 80 y o  female MRN: [id number]  Unit/Bed#: E5 -01 Encounter: [id number]  Primary Care Provider: Haleigh Penny MD   Date and time admitted to hospital: 2/23/2022  4:49 PM    * Acute kidney injury superimposed on CKD [facility])  Assessment & Plan  Lab Results   Component Value Date    EGFR 16 03/02/2022    EGFR 17 02/28/2022    EGFR 17 02/27/2022    CREATININE 2 47 (H) 03/02/2022    CREATININE 2 27 (H) 02/28/2022    CREATININE 2 33 (H) 02/27/2022       · Acute on chronic kidney disease stage 4, baseline creatinine up until in 2021 was 1 6-1 8, after that on random labs creatinine was 2 1, 2 35, progression of CKD stage 4  · Patient presented with a creatinine of 2 77  · Acidosis, hyperkalemia improved with IV bicarbonate GTT  · Continue bicarbonate tablets  · Monitor kidney function closely  · Avoid nephrotoxins  · Monitor postvoid residuals, patient has a history of urinary retention, has been straight cath once, did not meet criteria for straight cathing later  · Outpatient Nephrology follow-up recommended    Right ankle pain  Assessment & Plan  · Patient was complaining of right ankle pain especially with ambulation  · X-ray showed diffuse soft tissue swelling about the right ankle with potential soft tissue gas  · Evaluated by Podiatry, appreciate recommendations  · Patient has right Achilles tendon pain and chronic stasis dermatitis    X-ray was reviewed, no cells abnormality, hardware is in place, soft tissue air is above lateral ankle likely from chronic edema unlikely from gas or emphysema  · Podiatry recommended PT for ankle exercises, Voltaren gel for pain relief  · Continue weight-bearing as tolerated    Leukocytosis  Assessment & Plan  · White blood cell count 13 on 03/01, resolved today  · Patient had an episode of fever 100 6 this morning while eating breakfast   Her temperature was rechecked without giving any Tylenol and it was 98  · Patient denies any signs of upper/lower respiratory tract infection, no cough, sore throat, sputum production  · Denies abdominal pain constipation/diarrhea  · Denies dysuria  · Patient received 3 days of antibiotics for a UTI  · Procalcitonin elevated, inaccurate in the setting of chronic kidney disease  · Small open wound in the left arm without signs of infection, continue local wound care  · COVID/flu/RSV negative  · Monitor, no need for antibiotic    Moderate protein-calorie malnutrition (HCC)  Assessment & Plan  Malnutrition Findings:   Adult Malnutrition type: Acute illness (acute moderate pro, jimi malnutrition d/t decreased appetite as evidence by <75% energy intake > 7 days, 7 2% wt  loss x 1 month, 1+ edema, mild muscle/fat loss of clavicles, temples, orbitals)  Adult Degree of Malnutrition: Malnutrition of moderate degree (treated with oral diet and supplements, consider appetite stimulant if po doesn't improve)    BMI Findings: Body mass index is 25 02 kg/m²         Fall at home, initial encounter  Assessment & Plan  · Fall at home  · At risk for falls  · Safe ambulation  · Fall precautions  · Physical therapy  · Plan to discharge to rehab tomorrow    Adult failure to thrive  Assessment & Plan  · Multifactorial  · Supportive care    Hyperkalemia  Assessment & Plan  · Hyperkalemia in the setting of acute kidney injury/chronic kidney disease stage 4  · Resolved  · Monitor closely    Anemia in stage 4 chronic kidney disease (HCC)  Assessment & Plan  · Monitor hemoglobin    Urinary retention  Assessment & Plan  ·  Reports urinary retention, recently treated for UTI   ·  Patient had to be straight cath once   · Continue urinary retention protocol  · Suspect it is going to improve after patient is more ambulatory    Secondary hyperparathyroidism of renal origin ([facility] [address] )  Assessment & Plan  ·  Continue Sensipar MWF    Type 2 diabetes mellitus with diabetic chronic kidney disease ([facility] [address] )  Assessment & Plan  Lab Results   Component Value Date    HGBA1C 5 1 10/12/2021       Recent Labs     22  2032 22  0750 22  1154 22  1621   POCGLU 216* 161* 165* 116       Blood Sugar Average: Last 72 hrs:  (P) 136 4     Monitor Accu-Cheks  Avoid hypoglycemia  Hypoglycemia protocol in place    Acute cystitis without hematuria  Assessment & Plan  · CT shows bladder wall thickening and perivesical inflammatory changes along with air noted in the bladder  · Received empirical IV ceftriaxone 3 doses  · Urine culture with mixed contaminant  · Denies dysuria    Pure hypercholesterolemia  Assessment & Plan  ·  Continue statin      VTE Pharmacologic Prophylaxis: VTE Score: 5 High Risk (Score >/= 5) - Pharmacological DVT Prophylaxis Ordered: heparin  Sequential Compression Devices Ordered  Patient Centered Rounds: I performed bedside rounds with nursing staff today  Discussions with Specialists or Other Care Team Provider:  Nursing, case management, Podiatry    Education and Discussions with Family / Patient: Updated  (daughter) via phone  Time Spent for Care: 30 minutes  More than 50% of total time spent on counseling and coordination of care as described above  Current Length of Stay: 7 day(s)  Current Patient Status: Inpatient   Certification Statement: The patient will continue to require additional inpatient hospital stay due to Isolated episode of fever  Discharge Plan: Anticipate discharge tomorrow to rehab facility  Code Status: Level 3 - DNAR and DNI    Subjective:   Patient was seen and evaluated bedside, she is feeling well, denies sore throat, cough, sputum production, abdominal pain, diarrhea, dysuria    Complaining of right ankle pain with walking    Objective:     Vitals:   Temp (24hrs), Av 8 °F (37 1 °C), Min:98 °F (36 7 °C), Max:100 6 °F (38 1 °C)    Temp:  [98 °F (36 7 °C)-100 6 °F (38 1 °C)] 98 2 °F (36 8 °C)  HR:  [58-82] 76  Resp:  [18-19] 18  BP: (112-134)/(49-57) 116/52  SpO2:  [94 %-98 %] 98 %  Body mass index is 25 02 kg/m²  Input and Output Summary (last 24 hours): Intake/Output Summary (Last 24 hours) at 3/2/2022 1805  Last data filed at 3/2/2022 2625  Gross per 24 hour   Intake --   Output 2001 ml   Net -2001 ml       Physical Exam:   Physical Exam  Constitutional:       General: She is not in acute distress  HENT:      Head: Atraumatic  Cardiovascular:      Rate and Rhythm: Normal rate and regular rhythm  Heart sounds: No murmur heard  No gallop  Pulmonary:      Effort: Pulmonary effort is normal  No respiratory distress  Breath sounds: Normal breath sounds  No wheezing  Abdominal:      General: Bowel sounds are normal  There is no distension  Palpations: Abdomen is soft  Tenderness: There is no abdominal tenderness  Musculoskeletal:      Cervical back: Neck supple  Comments: Lateral aspect of the right ankle swelling, no redness or tenderness, no warmth   Skin:     General: Skin is warm and dry  Neurological:      General: No focal deficit present  Mental Status: She is alert     Psychiatric:         Mood and Affect: Mood normal           Additional Data:     Labs:  Results from last 7 days   Lab Units 03/02/22  0611   WBC Thousand/uL 9 48   HEMOGLOBIN g/dL 8 3*   HEMATOCRIT % 26 5*   PLATELETS Thousands/uL 135*   NEUTROS PCT % 75   LYMPHS PCT % 16   MONOS PCT % 7   EOS PCT % 1     Results from last 7 days   Lab Units 03/02/22  0611 02/26/22  0525 02/25/22  0449   SODIUM mmol/L 135*   < > 139   POTASSIUM mmol/L 4 3   < > 5 8*   CHLORIDE mmol/L 105   < > 114*   CO2 mmol/L 22   < > 16*   BUN mg/dL 67*   < > 82*   CREATININE mg/dL 2 47*   < > 2 76*   ANION GAP mmol/L 8   < > 9   CALCIUM mg/dL 9 2   < > 9 1   ALBUMIN g/dL  --   --  2 3*   TOTAL BILIRUBIN mg/dL  --   --  0 18*   ALK PHOS U/L  --   --  78   ALT U/L  --   --  14   AST U/L  --   --  12   GLUCOSE RANDOM mg/dL 105   < > 94    < > = values in this interval not displayed  Results from last 7 days   Lab Units 03/02/22  1621 03/02/22  1154 03/02/22  0750 03/01/22  2032 03/01/22  1607 03/01/22  1052 03/01/22  0731 02/28/22  2113 02/28/22  1638 02/28/22  1127 02/28/22  0722 02/27/22  2023   POC GLUCOSE mg/dl 116 165* 161* 216* 105 189* 97 148* 125 134 93 140         Results from last 7 days   Lab Units 03/02/22  0611 03/01/22  1359   PROCALCITONIN ng/ml 0 51* 0 42*       Lines/Drains:  Invasive Devices  Report    Peripheral Intravenous Line            Peripheral IV 02/28/22 Right Antecubital 2 days                      Imaging:   XR ankle 2 vw right   Final Result by Bel Caballero MD (03/02 1406)      Diffuse soft tissue swelling about the right ankle with potential soft tissue gas  The study was marked in [facility] for immediate notification  Workstation performed: [id number]         XR chest portable   Final Result by Bel Caballero MD (03/02 1023)      No acute cardiopulmonary disease  Workstation performed: [id number]         CT head without contrast   Final Result by Donald Ellison MD (02/23 1929)      No acute intracranial abnormality  Workstation performed: [id number]         CT abdomen pelvis wo contrast   Final Result by Donald Ellison MD (02/23 1950)      Bladder wall thickening and perivesical inflammatory changes along with air noted in the bladder  Findings consistent with urinary tract infection              Workstation performed: [id number]             Recent Cultures (last 7 days):   Results from last 7 days   Lab Units 02/24/22  0119 02/24/22  0056   URINE CULTURE  50,000-59,000 cfu/ml  60,000-69,000 cfu/ml        Last 24 Hours Medication List:   Current Facility-Administered Medications   Medication Dose Route Frequency Provider Last Rate    acetaminophen  650 mg Oral Q6H PRN JOANH Messina      aluminum-magnesium hydroxide-simethicone  30 mL Oral Q6H PRN Gucci Herrera Zula Pain, CRNP      amLODIPine  5 mg Oral BID Viasanjaye Hives, CRNP      aspirin  81 mg Oral Daily Vianne Hives, [address]      bisacodyl  10 mg Oral Daily PRN Viasanjaye Hives, CRNP      cinacalcet  30 mg Oral Once per day on Mon Wed Fri Viadori Gaona, CRNP      citalopram  10 mg Oral Daily Vianne Hives, [address]      docusate sodium  100 mg Oral Daily Viasanjaye Hives, CRNP      gabapentin  300 mg Oral HS Viadori Hivantonio, CRNP      heparin (porcine)  5,000 Units Subcutaneous [facility] Carlos Canchola MD      insulin lispro  1-5 Units Subcutaneous 4x Daily (AC & HS) Mg Gaona, CRNP      ondansetron  4 mg Intravenous Q6H PRN Viadori Hivantonio, CRNP      pravastatin  40 mg Oral Daily With [employer], CRNP      simethicone  80 mg Oral 4x Daily PRN Viadori Hivantonio, CRNP      sodium bicarbonate  650 mg Oral BID after meals Carlos Canchola MD          Today, Patient Was Seen By: Marbin Covington MD    **Please Note: This note may have been constructed using a voice recognition system  **

## 2022-03-02 NOTE — ASSESSMENT & PLAN NOTE
Lab Results   Component Value Date    HGBA1C 5 1 10/12/2021       Recent Labs     03/01/22 2032 03/02/22  0750 03/02/22  1154 03/02/22  1621   POCGLU 216* 161* 165* 116       Blood Sugar Average: Last 72 hrs:  (P) 136 4     Monitor Accu-Cheks  Avoid hypoglycemia  Hypoglycemia protocol in place

## 2022-03-02 NOTE — ASSESSMENT & PLAN NOTE
· Patient was complaining of right ankle pain especially with ambulation  · X-ray showed diffuse soft tissue swelling about the right ankle with potential soft tissue gas  · Evaluated by Podiatry, appreciate recommendations  · Patient has right Achilles tendon pain and chronic stasis dermatitis    X-ray was reviewed, no cells abnormality, hardware is in place, soft tissue air is above lateral ankle likely from chronic edema unlikely from gas or emphysema  · Podiatry recommended PT for ankle exercises, Voltaren gel for pain relief  · Continue weight-bearing as tolerated

## 2022-03-02 NOTE — PLAN OF CARE
Problem: Nutrition/Hydration-ADULT  Goal: Nutrient/Hydration intake appropriate for improving, restoring or maintaining nutritional needs  Description: Monitor and assess patient's nutrition/hydration status for malnutrition  Collaborate with interdisciplinary team and initiate plan and interventions as ordered  Monitor patient's weight and dietary intake as ordered or per policy  Utilize nutrition screening tool and intervene as necessary  Determine patient's food preferences and provide high-protein, high-caloric foods as appropriate       INTERVENTIONS:  - Monitor oral intake, urinary output, labs, and treatment plans  - Assess nutrition and hydration status and recommend course of action  - Evaluate amount of meals eaten  - Assist patient with eating if necessary   - Allow adequate time for meals  - Recommend/ encourage appropriate diets, oral nutritional supplements, and vitamin/mineral supplements  - Order, calculate, and assess calorie counts as needed  - Recommend, monitor, and adjust tube feedings and TPN/PPN based on assessed needs  - Assess need for intravenous fluids  - Provide specific nutrition/hydration education as appropriate  - Include patient/family/caregiver in decisions related to nutrition  Outcome: Progressing     Problem: Potential for Falls  Goal: Patient will remain free of falls  Description: INTERVENTIONS:  - Educate patient/family on patient safety including physical limitations  - Instruct patient to call for assistance with activity   - Consult OT/PT to assist with strengthening/mobility   - Keep Call bell within reach  - Keep bed low and locked with side rails adjusted as appropriate  - Keep care items and personal belongings within reach  - Initiate and maintain comfort rounds  - Apply yellow socks and bracelet for high fall risk patients  - Consider moving patient to room near nurses station  Outcome: Progressing     Problem: MOBILITY - ADULT  Goal: Maintain or return to baseline ADL function  Description: INTERVENTIONS:  - Educate patient/family on patient safety including physical limitations  - Instruct patient to call for assistance with activity   - Consult OT/PT to assist with strengthening/mobility   - Keep Call bell within reach  - Keep bed low and locked with side rails adjusted as appropriate  - Keep care items and personal belongings within reach  - Initiate and maintain comfort rounds  - Make Fall Risk Sign visible to staff  - Apply yellow socks and bracelet for high fall risk patients  - Consider moving patient to room near nurses station  Outcome: Progressing  Goal: Maintains/Returns to pre admission functional level  Description: INTERVENTIONS:  - Perform BMAT or MOVE assessment daily    - Set and communicate daily mobility goal to care team and patient/family/caregiver  - Collaborate with rehabilitation services on mobility goals if consulted  - Perform Range of Motion 4 times a day  - Reposition patient every 4 hours    - Dangle patient 4 times a day  - Stand patient 4 times a day  - Ambulate patient 4 times a day  - Out of bed to chair 4 times a day   - Out of bed for meals 4 times a day  - Out of bed for toileting  - Record patient progress and toleration of activity level   Outcome: Progressing     Problem: Prexisting or High Potential for Compromised Skin Integrity  Goal: Skin integrity is maintained or improved  Description: INTERVENTIONS:  - Identify patients at risk for skin breakdown  - Assess and monitor skin integrity  - Assess and monitor nutrition and hydration status  - Monitor labs   - Assess for incontinence   - Turn and reposition patient  - Assist with mobility/ambulation  - Relieve pressure over bony prominences  - Avoid friction and shearing  - Provide appropriate hygiene as needed including keeping skin clean and dry  - Evaluate need for skin moisturizer/barrier cream  - Collaborate with interdisciplinary team   - Patient/family teaching  - Consider wound care consult   Outcome: Progressing     Problem: PAIN - ADULT  Goal: Verbalizes/displays adequate comfort level or baseline comfort level  Description: Interventions:  - Encourage patient to monitor pain and request assistance  - Assess pain using appropriate pain scale  - Administer analgesics based on type and severity of pain and evaluate response  - Implement non-pharmacological measures as appropriate and evaluate response  - Consider cultural and social influences on pain and pain management  - Notify physician/advanced practitioner if interventions unsuccessful or patient reports new pain  Outcome: Progressing     Problem: INFECTION - ADULT  Goal: Absence or prevention of progression during hospitalization  Description: INTERVENTIONS:  - Assess and monitor for signs and symptoms of infection  - Monitor lab/diagnostic results  - Monitor all insertion sites, i e  indwelling lines, tubes, and drains  - Monitor endotracheal if appropriate and nasal secretions for changes in amount and color  - Leslie appropriate cooling/warming therapies per order  - Administer medications as ordered  - Instruct and encourage patient and family to use good hand hygiene technique  - Identify and instruct in appropriate isolation precautions for identified infection/condition  Outcome: Progressing     Problem: SAFETY ADULT  Goal: Patient will remain free of falls  Description: INTERVENTIONS:  - Educate patient/family on patient safety including physical limitations  - Instruct patient to call for assistance with activity   - Consult OT/PT to assist with strengthening/mobility   - Keep Call bell within reach  - Keep bed low and locked with side rails adjusted as appropriate  - Keep care items and personal belongings within reach  - Initiate and maintain comfort rounds  - Apply yellow socks and bracelet for high fall risk patients  - Consider moving patient to room near nurses station  Outcome: Progressing  Goal: Maintain or return to baseline ADL function  Description: INTERVENTIONS:  - Educate patient/family on patient safety including physical limitations  - Instruct patient to call for assistance with activity   - Consult OT/PT to assist with strengthening/mobility   - Keep Call bell within reach  - Keep bed low and locked with side rails adjusted as appropriate  - Keep care items and personal belongings within reach  - Initiate and maintain comfort rounds  - Make Fall Risk Sign visible to staff  - Apply yellow socks and bracelet for high fall risk patients  - Consider moving patient to room near nurses station  Outcome: Progressing  Goal: Maintains/Returns to pre admission functional level  Description: INTERVENTIONS:  - Perform BMAT or MOVE assessment daily    - Set and communicate daily mobility goal to care team and patient/family/caregiver     - Collaborate with rehabilitation services on mobility goals if consulted  - Out of bed for toileting  - Record patient progress and toleration of activity level   Outcome: Progressing     Problem: DISCHARGE PLANNING  Goal: Discharge to home or other facility with appropriate resources  Description: INTERVENTIONS:  - Identify barriers to discharge w/patient and caregiver  - Arrange for needed discharge resources and transportation as appropriate  - Identify discharge learning needs (meds, wound care, etc )  - Arrange for interpretive services to assist at discharge as needed  - Refer to Case Management Department for coordinating discharge planning if the patient needs post-hospital services based on physician/advanced practitioner order or complex needs related to functional status, cognitive ability, or social support system  Outcome: Progressing     Problem: Knowledge Deficit  Goal: Patient/family/caregiver demonstrates understanding of disease process, treatment plan, medications, and discharge instructions  Description: Complete learning assessment and assess knowledge base   Interventions:  - Provide teaching at level of understanding  - Provide teaching via preferred learning methods  Outcome: Progressing     Problem: NEUROSENSORY - ADULT  Goal: Achieves stable or improved neurological status  Description: INTERVENTIONS  - Monitor and report changes in neurological status  - Monitor vital signs such as temperature, blood pressure, glucose, and any other labs ordered   - Initiate measures to prevent increased intracranial pressure  - Monitor for seizure activity and implement precautions if appropriate      Outcome: Progressing  Goal: Achieves maximal functionality and self care  Description: INTERVENTIONS  - Monitor swallowing and airway patency with patient fatigue and changes in neurological status  - Encourage and assist patient to increase activity and self care     - Encourage visually impaired, hearing impaired and aphasic patients to use assistive/communication devices  Outcome: Progressing     Problem: CARDIOVASCULAR - ADULT  Goal: Maintains optimal cardiac output and hemodynamic stability  Description: INTERVENTIONS:  - Monitor I/O, vital signs and rhythm  - Monitor for S/S and trends of decreased cardiac output  - Administer and titrate ordered vasoactive medications to optimize hemodynamic stability  - Assess quality of pulses, skin color and temperature  - Assess for signs of decreased coronary artery perfusion  - Instruct patient to report change in severity of symptoms  Outcome: Progressing  Goal: Absence of cardiac dysrhythmias or at baseline rhythm  Description: INTERVENTIONS:  - Continuous cardiac monitoring, vital signs, obtain 12 lead EKG if ordered  - Administer antiarrhythmic and heart rate control medications as ordered  - Monitor electrolytes and administer replacement therapy as ordered  Outcome: Progressing     Problem: METABOLIC, FLUID AND ELECTROLYTES - ADULT  Goal: Electrolytes maintained within normal limits  Description: INTERVENTIONS:  - Monitor labs and assess patient for signs and symptoms of electrolyte imbalances  - Administer electrolyte replacement as ordered  - Monitor response to electrolyte replacements, including repeat lab results as appropriate  - Instruct patient on fluid and nutrition as appropriate  Outcome: Progressing  Goal: Fluid balance maintained  Description: INTERVENTIONS:  - Monitor labs   - Monitor I/O and WT  - Instruct patient on fluid and nutrition as appropriate  - Assess for signs & symptoms of volume excess or deficit  Outcome: Progressing  Goal: Glucose maintained within target range  Description: INTERVENTIONS:  - Monitor Blood Glucose as ordered  - Assess for signs and symptoms of hyperglycemia and hypoglycemia  - Administer ordered medications to maintain glucose within target range  - Assess nutritional intake and initiate nutrition service referral as needed  Outcome: Progressing     Problem: SKIN/TISSUE INTEGRITY - ADULT  Goal: Skin Integrity remains intact(Skin Breakdown Prevention)  Description: Assess:  -Inspect skin when repositioning, toileting, and assisting with ADLS  -Assess extremities for adequate circulation and sensation     Bed Management:  -Have minimal linens on bed & keep smooth, unwrinkled  -Change linens as needed when moist or perspiring      Activity:  -Encourage activity and walks on unit  -Encourage or provide ROM exercises   -Use appropriate equipment to lift or move patient in bed    Skin Care:  -Avoid use of baby powder, tape, friction and shearing, hot water or constrictive clothing  -Do not massage red bony areas      Outcome: Progressing  Goal: Incision(s), wounds(s) or drain site(s) healing without S/S of infection  Description: INTERVENTIONS  - Assess and document dressing, incision, wound bed, drain sites and surrounding tissue  - Provide patient and family education    Outcome: Progressing     Problem: MUSCULOSKELETAL - ADULT  Goal: Maintain or return mobility to safest level of function  Description: INTERVENTIONS:  - Assess patient's ability to carry out ADLs; assess patient's baseline for ADL function and identify physical deficits which impact ability to perform ADLs (bathing, care of mouth/teeth, toileting, grooming, dressing, etc )  - Assess/evaluate cause of self-care deficits   - Assess range of motion  - Assess patient's mobility  - Assess patient's need for assistive devices and provide as appropriate  - Encourage maximum independence but intervene and supervise when necessary  - Involve family in performance of ADLs  - Assess for home care needs following discharge   - Consider OT consult to assist with ADL evaluation and planning for discharge  - Provide patient education as appropriate  Outcome: Progressing  Goal: Maintain proper alignment of affected body part  Description: INTERVENTIONS:  - Support, maintain and protect limb and body alignment  - Provide patient/ family with appropriate education  Outcome: Progressing

## 2022-03-02 NOTE — PLAN OF CARE
Problem: PHYSICAL THERAPY ADULT  Goal: Performs mobility at highest level of function for planned discharge setting  See evaluation for individualized goals  Description: Treatment/Interventions: Functional transfer training,LE strengthening/ROM,Elevations,Therapeutic exercise,Endurance training,Patient/family training,Equipment eval/education,Bed mobility,Gait training,Compensatory technique education,Continued evaluation,Spoke to nursing,Family  Equipment Recommended: Lesly Pinzon (has)       See flowsheet documentation for full assessment, interventions and recommendations  Outcome: Progressing  Note: Prognosis: Good  Problem List: Decreased strength,Decreased range of motion,Decreased endurance,Impaired balance,Decreased mobility,Pain,Impaired hearing  Assessment: PT reports increased pain with weightbearing to R foot/ankle  Pt reports having x-rays not to long ago  spoke with student medical intern,with [employer] during Pt session who took a look at the pt's x-rays during our PT session and stated everything looks stable and is clear for weightbearing activity  pt  Is currently requiring min assist x1 for transfers and ambulation on level surfaces  Gait deviations of slow antalgic,step to gait  pattern with decreased foot clearance on L during swing through progressing to step through gait with slightly less antalgic gait noted  Pt  Progressed with ambulation distances to 40' x2  Pt  Limited by pain and fatigue  Cues to maintain close distance to walker at all times and to steer around obstacles to avoid running into them required  Pt performs toilet transfers with min assist, assistance required for lower body dressing and steadying assist while pulling bottoms up and down  Pt performs seated b/l le and ue arom exercises at eob x 10 reps  The patient's AM-PAC Basic Mobility Inpatient Short Form Raw Score is 18  A Raw score of greater than 16 suggests the patient may benefit from discharge to home   Please also refer to the recommendation of the Physical Therapist for safe discharge planning  Despite recommendations of AM- pac, Given impairments pt remains at increased risk for falls and decreased caregiver support continue to recommend STR at d/c in order to optimize functional outcomes and mobility  Barriers to Discharge: Decreased caregiver support  Barriers to Discharge Comments: lives alone     PT Discharge Recommendation: Post acute rehabilitation services          See flowsheet documentation for full assessment

## 2022-03-02 NOTE — UTILIZATION REVIEW
Continued Stay Review    Date:   3/2/22                          Current Patient Class:    Inpatient  Current Level of Care:  Med surg    HPI:89 y o  female initially admitted on    2/23  With GAY/CKD     Assessment/Plan:   3/2    Low  Grade temp noted this am   No signs/symptoms of  Infection noted  Continue to monitor vital signs and labs  WBC  Elevated  3/1, normal today  Continue  PT/OT  Needs SNF at discharge  Still complains of right ankle pain  Monitor I & O  Continue current meds/treatment plan  Vital Signs:   -- 75 -- 112/49 Abnormal  70 98 % -- --    03/02/22 09:04:52 98 °F (36 7 °C) 82 -- -- -- 98 % -- --   03/02/22 06:53:58 100 6 °F (38 1 °C) Abnormal  67 18 134/52 79 94 % None (Room air) Lying         Pertinent Labs/Diagnostic Results:   CXR  ( 3/2)   NAD  Results from last 7 days   Lab Units 03/01/22  1152   SARS-COV-2  Negative     Results from last 7 days   Lab Units 03/02/22  0611 03/01/22  1210 02/28/22  0950 02/27/22  0538 02/25/22  0449 02/25/22  0449   WBC Thousand/uL 9 48 13 34*  --  5 00   < > 6 71   HEMOGLOBIN g/dL 8 3* 9 4* 8 2* 7 9*  --  8 7*   HEMATOCRIT % 26 5* 29 0* 25 1* 24 6*  --  27 4*   PLATELETS Thousands/uL 135* 175  --  126*   < > 108*   NEUTROS ABS Thousands/µL 7 18 10 31*  --  3 15   < > 3 82    < > = values in this interval not displayed           Results from last 7 days   Lab Units 03/02/22  0611 02/28/22  0757 02/27/22  0538 02/26/22  0525 02/25/22  0449   SODIUM mmol/L 135* 140 146* 141 139   POTASSIUM mmol/L 4 3 4 5 4 4 5 2 5 8*   CHLORIDE mmol/L 105 108 113* 112* 114*   CO2 mmol/L 22 23 23 23 16*   ANION GAP mmol/L 8 9 10 6 9   BUN mg/dL 67* 63* 64* 65* 82*   CREATININE mg/dL 2 47* 2 27* 2 33* 2 25* 2 76*   EGFR ml/min/1 73sq m 16 17 17 18 14   CALCIUM mg/dL 9 2 9 0 8 8 8 6 9 1   MAGNESIUM mg/dL  --   --   --  2 2  --    PHOSPHORUS mg/dL  --   --   --  3 4  --      Results from last 7 days   Lab Units 02/25/22  0449 02/23/22  1744   AST U/L 12 13   ALT U/L 14 22   ALK PHOS U/L 78 91   TOTAL PROTEIN g/dL 5 5* 6 4   ALBUMIN g/dL 2 3* 2 8*   TOTAL BILIRUBIN mg/dL 0 18* 0 33     Results from last 7 days   Lab Units 03/02/22  1154 03/02/22  0750 03/01/22  2032 03/01/22  1607 03/01/22  1052 03/01/22  0731 02/28/22  2113 02/28/22  1638 02/28/22  1127 02/28/22  0722 02/27/22  2023 02/27/22  1549   POC GLUCOSE mg/dl 165* 161* 216* 105 189* 97 148* 125 134 93 140 107     Results from last 7 days   Lab Units 03/02/22  0611 02/28/22  0757 02/27/22  0538 02/26/22  0525 02/25/22  0449 02/24/22  0646 02/23/22  1744   GLUCOSE RANDOM mg/dL 105 109 96 89 94 102 104           Results from last 7 days   Lab Units 02/23/22  1945 02/23/22  1744   HS TNI 0HR ng/L  --  7   HS TNI 2HR ng/L 9  --    HSTNI D2 ng/L 2  --                  Results from last 7 days   Lab Units 03/02/22  0611 03/01/22  1359   PROCALCITONIN ng/ml 0 51* 0 42*           Results from last 7 days   Lab Units 02/23/22  1744   FERRITIN ng/mL 221           Results from last 7 days   Lab Units 02/24/22  0119 02/23/22  1920   CLARITY UA   --  Cloudy   COLOR UA   --  Yellow   SPEC GRAV UA   --  >=1 030   PH UA   --  6 5   GLUCOSE UA mg/dl  --  Negative   KETONES UA mg/dl  --  Negative   BLOOD UA   --  Trace*   PROTEIN UA mg/dl  --  30 (1+)*   NITRITE UA   --  Negative   BILIRUBIN UA   --  Negative   UROBILINOGEN UA E U /dl  --  0 2   LEUKOCYTES UA   --  Small*   WBC UA /hpf Innumerable*  --    RBC UA /hpf 4-10*  --    BACTERIA UA /hpf Innumerable*  --    EPITHELIAL CELLS WET PREP /hpf Occasional  --      Results from last 7 days   Lab Units 03/01/22  1152   INFLUENZA A PCR  Negative   INFLUENZA B PCR  Negative   RSV PCR  Negative               Results from last 7 days   Lab Units 02/24/22  0119 02/24/22  0056   URINE CULTURE  50,000-59,000 cfu/ml  60,000-69,000 cfu/ml                  Medications:   Scheduled Medications:  amLODIPine, 5 mg, Oral, BID  aspirin, 81 mg, Oral, Daily  cinacalcet, 30 mg, Oral, Once per day on Mon Wed Fri  citalopram, 10 mg, Oral, Daily  docusate sodium, 100 mg, Oral, Daily  gabapentin, 300 mg, Oral, HS  heparin (porcine), 5,000 Units, Subcutaneous, Q8H [facility]  insulin lispro, 1-5 Units, Subcutaneous, 4x Daily (AC & HS)  pravastatin, 40 mg, Oral, Daily With Dinner  sodium bicarbonate, 650 mg, Oral, BID after meals      Continuous IV Infusions:     PRN Meds:  acetaminophen, 650 mg, Oral, Q6H PRN  aluminum-magnesium hydroxide-simethicone, 30 mL, Oral, Q6H PRN  bisacodyl, 10 mg, Oral, Daily PRN  ondansetron, 4 mg, Intravenous, Q6H PRN  simethicone, 80 mg, Oral, 4x Daily PRN        Discharge Plan:   SNF    Network Utilization Review Department  ATTENTION: Please call with any questions or concerns to [phone] and carefully listen to the prompts so that you are directed to the right person  All voicemails are confidential   [facility] all requests for admission clinical reviews, approved or denied determinations and any other requests to dedicated fax number below belonging to the campus where the patient is receiving treatment   List of dedicated fax numbers for the Facilities:  [address] DENIALS (Administrative/Medical Necessity) [phone]   [address] (Maternity/NICU/Pediatrics) [phone]   [address]  [id number] [place] [address] [address] [facility] [facility] [phone]

## 2022-03-02 NOTE — PLAN OF CARE
Problem: Nutrition/Hydration-ADULT  Goal: Nutrient/Hydration intake appropriate for improving, restoring or maintaining nutritional needs  Description: Monitor and assess patient's nutrition/hydration status for malnutrition  Collaborate with interdisciplinary team and initiate plan and interventions as ordered  Monitor patient's weight and dietary intake as ordered or per policy  Utilize nutrition screening tool and intervene as necessary  Determine patient's food preferences and provide high-protein, high-caloric foods as appropriate       INTERVENTIONS:  - Monitor oral intake, urinary output, labs, and treatment plans  - Assess nutrition and hydration status and recommend course of action  - Evaluate amount of meals eaten  - Assist patient with eating if necessary   - Allow adequate time for meals  - Recommend/ encourage appropriate diets, oral nutritional supplements, and vitamin/mineral supplements  - Order, calculate, and assess calorie counts as needed  - Recommend, monitor, and adjust tube feedings and TPN/PPN based on assessed needs  - Assess need for intravenous fluids  - Provide specific nutrition/hydration education as appropriate  - Include patient/family/caregiver in decisions related to nutrition  Outcome: Progressing     Problem: Potential for Falls  Goal: Patient will remain free of falls  Description: INTERVENTIONS:  - Educate patient/family on patient safety including physical limitations  - Instruct patient to call for assistance with activity   - Consult OT/PT to assist with strengthening/mobility   - Keep Call bell within reach  - Keep bed low and locked with side rails adjusted as appropriate  - Keep care items and personal belongings within reach  - Initiate and maintain comfort rounds  - Apply yellow socks and bracelet for high fall risk patients  - Consider moving patient to room near nurses station  Outcome: Progressing     Problem: MOBILITY - ADULT  Goal: Maintain or return to baseline ADL function  Description: INTERVENTIONS:  - Educate patient/family on patient safety including physical limitations  - Instruct patient to call for assistance with activity   - Consult OT/PT to assist with strengthening/mobility   - Keep Call bell within reach  - Keep bed low and locked with side rails adjusted as appropriate  - Keep care items and personal belongings within reach  - Initiate and maintain comfort rounds  - Make Fall Risk Sign visible to staff  - Apply yellow socks and bracelet for high fall risk patients  - Consider moving patient to room near nurses station  Outcome: Progressing  Goal: Maintains/Returns to pre admission functional level  Description: INTERVENTIONS:  - Perform BMAT or MOVE assessment daily    - Set and communicate daily mobility goal to care team and patient/family/caregiver     - Collaborate with rehabilitation services on mobility goals if consulted  - Out of bed to chair 3 times a day   - Out of bed for meals 3 times a day  - Out of bed for toileting  - Record patient progress and toleration of activity level   Outcome: Progressing     Problem: Prexisting or High Potential for Compromised Skin Integrity  Goal: Skin integrity is maintained or improved  Description: INTERVENTIONS:  - Identify patients at risk for skin breakdown  - Assess and monitor skin integrity  - Assess and monitor nutrition and hydration status  - Monitor labs   - Assess for incontinence   - Turn and reposition patient  - Assist with mobility/ambulation  - Relieve pressure over bony prominences  - Avoid friction and shearing  - Provide appropriate hygiene as needed including keeping skin clean and dry  - Evaluate need for skin moisturizer/barrier cream  - Collaborate with interdisciplinary team   - Patient/family teaching  - Consider wound care consult   Outcome: Progressing     Problem: PAIN - ADULT  Goal: Verbalizes/displays adequate comfort level or baseline comfort level  Description: Interventions:  - Encourage patient to monitor pain and request assistance  - Assess pain using appropriate pain scale  - Administer analgesics based on type and severity of pain and evaluate response  - Implement non-pharmacological measures as appropriate and evaluate response  - Consider cultural and social influences on pain and pain management  - Notify physician/advanced practitioner if interventions unsuccessful or patient reports new pain  Outcome: Progressing     Problem: INFECTION - ADULT  Goal: Absence or prevention of progression during hospitalization  Description: INTERVENTIONS:  - Assess and monitor for signs and symptoms of infection  - Monitor lab/diagnostic results  - Monitor all insertion sites, i e  indwelling lines, tubes, and drains  - Monitor endotracheal if appropriate and nasal secretions for changes in amount and color  - [place] appropriate cooling/warming therapies per order  - Administer medications as ordered  - Instruct and encourage patient and family to use good hand hygiene technique  - Identify and instruct in appropriate isolation precautions for identified infection/condition  Outcome: Progressing     Problem: SAFETY ADULT  Goal: Patient will remain free of falls  Description: INTERVENTIONS:  - Educate patient/family on patient safety including physical limitations  - Instruct patient to call for assistance with activity   - Consult OT/PT to assist with strengthening/mobility   - Keep Call bell within reach  - Keep bed low and locked with side rails adjusted as appropriate  - Keep care items and personal belongings within reach  - Initiate and maintain comfort rounds  - Apply yellow socks and bracelet for high fall risk patients  - Consider moving patient to room near nurses station  Outcome: Progressing  Goal: Maintain or return to baseline ADL function  Description: INTERVENTIONS:  - Educate patient/family on patient safety including physical limitations  - Instruct patient to call for assistance with activity   - Consult OT/PT to assist with strengthening/mobility   - Keep Call bell within reach  - Keep bed low and locked with side rails adjusted as appropriate  - Keep care items and personal belongings within reach  - Initiate and maintain comfort rounds  - Make Fall Risk Sign visible to staff  - Apply yellow socks and bracelet for high fall risk patients  - Consider moving patient to room near nurses station  Outcome: Progressing  Goal: Maintains/Returns to pre admission functional level  Description: INTERVENTIONS:  - Perform BMAT or MOVE assessment daily    - Set and communicate daily mobility goal to care team and patient/family/caregiver  - Collaborate with rehabilitation services on mobility goals if consulted  - Out of bed for toileting  - Record patient progress and toleration of activity level   Outcome: Progressing     Problem: DISCHARGE PLANNING  Goal: Discharge to home or other facility with appropriate resources  Description: INTERVENTIONS:  - Identify barriers to discharge w/patient and caregiver  - Arrange for needed discharge resources and transportation as appropriate  - Identify discharge learning needs (meds, wound care, etc )  - Arrange for interpretive services to assist at discharge as needed  - Refer to Case Management Department for coordinating discharge planning if the patient needs post-hospital services based on physician/advanced practitioner order or complex needs related to functional status, cognitive ability, or social support system  Outcome: Progressing     Problem: Knowledge Deficit  Goal: Patient/family/caregiver demonstrates understanding of disease process, treatment plan, medications, and discharge instructions  Description: Complete learning assessment and assess knowledge base    Interventions:  - Provide teaching at level of understanding  - Provide teaching via preferred learning methods  Outcome: Progressing     Problem: NEUROSENSORY - ADULT  Goal: Achieves stable or improved neurological status  Description: INTERVENTIONS  - Monitor and report changes in neurological status  - Monitor vital signs such as temperature, blood pressure, glucose, and any other labs ordered   - Initiate measures to prevent increased intracranial pressure  - Monitor for seizure activity and implement precautions if appropriate      Outcome: Progressing  Goal: Achieves maximal functionality and self care  Description: INTERVENTIONS  - Monitor swallowing and airway patency with patient fatigue and changes in neurological status  - Encourage and assist patient to increase activity and self care     - Encourage visually impaired, hearing impaired and aphasic patients to use assistive/communication devices  Outcome: Progressing     Problem: CARDIOVASCULAR - ADULT  Goal: Maintains optimal cardiac output and hemodynamic stability  Description: INTERVENTIONS:  - Monitor I/O, vital signs and rhythm  - Monitor for S/S and trends of decreased cardiac output  - Administer and titrate ordered vasoactive medications to optimize hemodynamic stability  - Assess quality of pulses, skin color and temperature  - Assess for signs of decreased coronary artery perfusion  - Instruct patient to report change in severity of symptoms  Outcome: Progressing  Goal: Absence of cardiac dysrhythmias or at baseline rhythm  Description: INTERVENTIONS:  - Continuous cardiac monitoring, vital signs, obtain 12 lead EKG if ordered  - Administer antiarrhythmic and heart rate control medications as ordered  - Monitor electrolytes and administer replacement therapy as ordered  Outcome: Progressing     Problem: METABOLIC, FLUID AND ELECTROLYTES - ADULT  Goal: Electrolytes maintained within normal limits  Description: INTERVENTIONS:  - Monitor labs and assess patient for signs and symptoms of electrolyte imbalances  - Administer electrolyte replacement as ordered  - Monitor response to electrolyte replacements, including repeat lab results as appropriate  - Instruct patient on fluid and nutrition as appropriate  Outcome: Progressing  Goal: Fluid balance maintained  Description: INTERVENTIONS:  - Monitor labs   - Monitor I/O and WT  - Instruct patient on fluid and nutrition as appropriate  - Assess for signs & symptoms of volume excess or deficit  Outcome: Progressing  Goal: Glucose maintained within target range  Description: INTERVENTIONS:  - Monitor Blood Glucose as ordered  - Assess for signs and symptoms of hyperglycemia and hypoglycemia  - Administer ordered medications to maintain glucose within target range  - Assess nutritional intake and initiate nutrition service referral as needed  Outcome: Progressing     Problem: SKIN/TISSUE INTEGRITY - ADULT  Goal: Skin Integrity remains intact(Skin Breakdown Prevention)  Description: Assess:  -Inspect skin when repositioning, toileting, and assisting with ADLS  -Assess extremities for adequate circulation and sensation     Bed Management:  -Have minimal linens on bed & keep smooth, unwrinkled  -Change linens as needed when moist or perspiring      Activity:  -Encourage activity and walks on unit  -Encourage or provide ROM exercises   -Use appropriate equipment to lift or move patient in bed    Skin Care:  -Avoid use of baby powder, tape, friction and shearing, hot water or constrictive clothing  -Do not massage red bony areas      Outcome: Progressing  Goal: Incision(s), wounds(s) or drain site(s) healing without S/S of infection  Description: INTERVENTIONS  - Assess and document dressing, incision, wound bed, drain sites and surrounding tissue  - Provide patient and family education    Outcome: Progressing     Problem: MUSCULOSKELETAL - ADULT  Goal: Maintain or return mobility to safest level of function  Description: INTERVENTIONS:  - Assess patient's ability to carry out ADLs; assess patient's baseline for ADL function and identify physical deficits which impact ability to perform ADLs (bathing, care of mouth/teeth, toileting, grooming, dressing, etc )  - Assess/evaluate cause of self-care deficits   - Assess range of motion  - Assess patient's mobility  - Assess patient's need for assistive devices and provide as appropriate  - Encourage maximum independence but intervene and supervise when necessary  - Involve family in performance of ADLs  - Assess for home care needs following discharge   - Consider OT consult to assist with ADL evaluation and planning for discharge  - Provide patient education as appropriate  Outcome: Progressing  Goal: Maintain proper alignment of affected body part  Description: INTERVENTIONS:  - Support, maintain and protect limb and body alignment  - Provide patient/ family with appropriate education  Outcome: Progressing

## 2022-03-02 NOTE — APP STUDENT NOTE
LYNETTE STUDENT  Inpatient Progress Note for TRAINING ONLY  Not Part of Legal Medical Record     Progress Note - Kira Ruano 80 y o  female MRN: [id number]  Unit/Bed#: E5 -01 Encounter: [id number]      Assessment and Plan  1  Acute Kidney injury Superimposed on CKD stage 4  · Creatinine on admission, 2/23/22, was 2 77   · Creatinine on 2/28/22 was 2 27   · Patient was given IV bicarbonate GTT for hyperkalemia  · Continue bicarbonate tablets  · Continue to monitor kidney functions with daily BMPs  · Monitor I/Os  · Continue to follow with Nephrology outpatient     2  Low Grade Fever  · On 2/28/22, patient had a 100  2'F temperature in the morning  · Patient denied any signs or symptoms of infection  · No cough, congestion, sore throat, GI symptoms, or  symptoms  · Temperature returned to normal range by mid morning on 2/28/22  · Leukocytosis of 13 34 on 3/1/22  Resolved on 3/2/22  · Patient continues to deny any infectious symptoms  · Procalcitonin on 3/1/22 was 0 42  Today, 3/2/22, it is 0 51  · Continue to monitor vitals and labs     3  Right Ankle Pain  · Patient notes pain in her right ankle and foot with ambulation  · Patient notes she had a fracture of the right ankle previously and had hardware placed  · She notes the pain started about 3 days ago and has continued to persist    · Ordered Right ankle x-ray  · Results on 3/2/22 revealed diffuse soft tissue swelling about the right ankle with potential soft tissue gas  · Consult placed to Podiatry and Orthopedic Surgery  · Continue assistance with ambulation    4   Moderate Protein-Calorie Malnutrition  · Patient admits to decreased appetite   · Patient weight 58 1 kg (128 lbs 1 4 oz)  · Nutritionist consulted  · Adult Malnutrition type: Acute illness (acute moderate pro, jimi malnutrition d/t decreased appetite as evidence by <75% energy intake > 7 days, 7 2% wt  loss x 1 month, 1+ edema, mild muscle/fat loss of clavicles, temples, orbitals)  · Adult Degree of Malnutrition: Malnutrition of moderate degree (treated with oral diet and supplements, consider appetite stimulant if po doesn't improve)  · Malnutrition Characteristics: Inadequate energy, Weight loss, Fluid accumulation, Fat loss, Muscle loss  · Continue to monitor appetite and oral intake      5  Fall at home, initial encounter  · Patient reported having 2 falls during the week leading up to her admission  · Patient continues to be at risk of falls   · Continue assistance with walking  · PT/OT following  · Discharge plan to rehab facility     6  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  · Patient notes she has had a decreased appetite recently  · Multiple chronic conditions may be factoring into this   · Supportive care with encouraged appropriate caloric intake   · Nutritionist consulted      7  Hyperkalemia, resolved  · Potassium was 6 0 on admission, 2/23/22  · Potassium returned to normal range, 5 2, on 2/26/22  · Potassium today, 3/2/22 is 4 3  · Continue to monitor     8  Anemia in stage 4 CKD  · Hemoglobin on admission, 2/23/22, was 9 6  · Trending down slowly  · Today 3/2/22, hemoglobin was 8 3  · Continue to monitor closely  · Transfuse if hemoglobin under 7     9  Urinary Retention  · Patient had recent UTI prior to admission  · Urinalysis inpatient negative for significant infection  · CT abdomen and pelvis without contrast on 2/23/22 revealed bladder wall thickening and perivesical inflammatory changes along with air noted in the bladder   Findings consistent with urinary tract infection  · Patient continues to admit to decreased urination  · Straight cathed again this am   · Continue to monitor urinary output  · Continue urinary retention protocol     10  Secondary Hyperparathyroidism of Renal Origin  · History noted  · Continue cinacalcet (Sensipar) 30 mg po MWF     11   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  · Blood glucose reading have been fluctuating while inpatient  · Continue to monitor blood glucose readings  · Continue sliding scale insulin lispro (Humalog)     12  Acute cystitis without hematuria  · CT abdomen and pelvis without contrast on 2/23/22 revealed bladder wall thickening and perivesical inflammatory changes along with air noted in the bladder   Findings consistent with urinary tract infection  · Patient was treated with IV ceftriaxone 1000 mg in 50 mL 5% dextrose qd for 3 days  · Patient denies any urinary symptoms   · Continue to monitor     13  Pure hypercholesterolemia   · Lipid Panel in July 2021 revealed total cholesterol of 127, HDL of 48, LDL 63, and Triglycerides 79  · Continue pravastatin (Pravachol) 40 mg po qd with dinner         VTE Pharmacologic Prophylaxis:   Pharmacologic: Heparin and Sequential Compression Devices  Mechanical VTE Prophylaxis in Place: No    Patient Centered Rounds: I have performed bedside rounds with nursing staff today  Discussions with Specialists or Other Care Team Provider: Dr Brijesh Torres    Time Spent for Care: 20 minutes  More than 50% of total time spent on counseling and coordination of care as described above  Current Length of Stay: 7 day(s)    Current Patient Status: Inpatient   Certification Statement: The patient will continue to require additional inpatient hospital stay due to awaiting rehab placement  Discharge Plan: Anticipated discharge to rehab facility within 24 hours    Code Status: Level 3 - DNAR and DNI    Subjective: This is a 80year old female with PMH of T2DM, HLD, HTN, and CKD stage 4 who is seen hospital day 7 for weakness, fatigue, ambulatory dysfunction and poor appetite  Patient complains of right ankle/foot pain today  Worsened with ambulation  Patient has history of ankle fracture and hardware placement in the right ankle  Patient continues to improve her diet  She denies any cough, congestion, CP, SOB, N/V/D, abdominal pain or other infectious symptoms  She has no further complaints at this time       Objective: Vitals:   Temp (24hrs), Av 8 °F (37 1 °C), Min:97 9 °F (36 6 °C), Max:100 6 °F (38 1 °C)    Temp:  [97 9 °F (36 6 °C)-100 6 °F (38 1 °C)] 98 °F (36 7 °C)  HR:  [58-82] 75  Resp:  [18-19] 18  BP: (112-134)/(46-57) 112/49  SpO2:  [94 %-98 %] 98 %  Body mass index is 25 02 kg/m²  Input and Output Summary (last 24 hours): Intake/Output Summary (Last 24 hours) at 3/2/2022 1030  Last data filed at 3/2/2022 6731  Gross per 24 hour   Intake --   Output  ml   Net - ml       Physical Exam:     Physical Exam  Vitals and nursing note reviewed  Constitutional:       General: She is not in acute distress  Appearance: She is normal weight  She is not ill-appearing  HENT:      Head: Normocephalic and atraumatic  Eyes:      Conjunctiva/sclera: Conjunctivae normal    Cardiovascular:      Rate and Rhythm: Normal rate and regular rhythm  Pulses:           Dorsalis pedis pulses are 2+ on the right side and 2+ on the left side  Heart sounds: No murmur heard  No friction rub  No gallop  Pulmonary:      Breath sounds: Normal breath sounds  No wheezing, rhonchi or rales  Abdominal:      General: Bowel sounds are normal       Palpations: Abdomen is soft  Tenderness: There is no abdominal tenderness  Musculoskeletal:      Right lower leg: No tenderness  No edema  Left lower leg: No tenderness  No edema  Right ankle: Swelling and deformity (bulging at lateral malleolus region) present  Tenderness present over the lateral malleolus and medial malleolus  Decreased range of motion  Left ankle: Normal  No swelling  No tenderness  Normal range of motion  Right foot: Decreased range of motion  Swelling present  No tenderness  Left foot: Normal  Normal range of motion  No swelling or tenderness  Skin:     General: Skin is warm and dry  Capillary Refill: Capillary refill takes less than 2 seconds  Neurological:      General: No focal deficit present  Mental Status: She is alert  Psychiatric:         Mood and Affect: Mood normal          Behavior: Behavior is cooperative         Historical Information   Past Medical History:   Diagnosis Date    Diabetes mellitus [facility] [address] [facility]     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 ([facility] [address] )     LAST ASSESSED: 17AUG2016    Hyperlipidemia     Hypertension     Renal disorder     Squamous cell carcinoma, scalp/neck     LAST ASSESSED: [id number]     Past Surgical History:   Procedure Laterality Date    BLADDER SURGERY      LAST ASSESSED: 17AUG2016    PELVIC FLOOR REPAIR      VAGINAL SURG INSERTION OF MESH    TOTAL ABDOMINAL HYSTERECTOMY W/ BILATERAL SALPINGOOPHORECTOMY      LAST ASSESSED: 17AUG2016     Social History   Social History     Substance and Sexual Activity   Alcohol Use Yes    Comment: SOCIAL     Social History     Substance and Sexual Activity   Drug Use No     Social History     Tobacco Use   Smoking Status Former Smoker   Smokeless Tobacco Never Used     Family History:   Family History   Problem Relation Age of Onset    Hypertension Mother     Hypertension Father     Kidney disease Sister         CHRONIC    Alcohol abuse Family     Substance Abuse Family        Meds/Allergies   all medications and allergies reviewed  Allergies   Allergen Reactions    Influenza Virus Vaccine      Other reaction(s): HX of [place]illain[place] Syndrome    Sulfa Antibiotics        Additional Data:     Labs:    Results from last 7 days   Lab Units 03/02/22  0611   WBC Thousand/uL 9 48   HEMOGLOBIN g/dL 8 3*   HEMATOCRIT % 26 5*   PLATELETS Thousands/uL 135*   NEUTROS PCT % 75   LYMPHS PCT % 16   MONOS PCT % 7   EOS PCT % 1     Results from last 7 days   Lab Units 03/02/22  0611 02/26/22  0525 02/25/22  0449   SODIUM mmol/L 135*   < > 139   POTASSIUM mmol/L 4 3   < > 5 8*   CHLORIDE mmol/L 105   < > 114*   CO2 mmol/L 22   < > 16*   BUN mg/dL 67*   < > 82*   CREATININE mg/dL 2 47*   < > 2 76*   ANION GAP mmol/L 8   < > 9   CALCIUM mg/dL 9 2   < > 9 1   ALBUMIN g/dL  --   --  2 3*   TOTAL BILIRUBIN mg/dL  --   --  0 18*   ALK PHOS U/L  --   --  78   ALT U/L  --   --  14   AST U/L  --   --  12   GLUCOSE RANDOM mg/dL 105   < > 94    < > = values in this interval not displayed  Results from last 7 days   Lab Units 03/02/22  0750 03/01/22  2032 03/01/22  1607 03/01/22  1052 03/01/22  0731 02/28/22  2113 02/28/22  1638 02/28/22  1127 02/28/22  0722 02/27/22  2023 02/27/22  1549 02/27/22  1120   POC GLUCOSE mg/dl 161* 216* 105 189* 97 148* 125 134 93 140 107 142*         Results from last 7 days   Lab Units 03/02/22  0611 03/01/22  1359   PROCALCITONIN ng/ml 0 51* 0 42*         * I Have Reviewed All Lab Data Listed Above  * Additional Pertinent Lab Tests Reviewed: All Labs Within Last 24 Hours Reviewed    Imaging:  No new imaging to review       Recent Cultures (last 7 days):     Results from last 7 days   Lab Units 02/24/22  0119 02/24/22  0056   URINE CULTURE  50,000-59,000 cfu/ml  60,000-69,000 cfu/ml        Last 24 Hours Medication List:   Current Facility-Administered Medications   Medication Dose Route Frequency Provider Last Rate    acetaminophen  650 mg Oral Q6H PRN Leeland Power, CRNP      aluminum-magnesium hydroxide-simethicone  30 mL Oral Q6H PRN Leeland Power, CRNP      amLODIPine  5 mg Oral BID Leeland Power, CRNP      aspirin  81 mg Oral Daily Leeland Power, CRNP      bisacodyl  10 mg Oral Daily PRN Leeland Power, CRNP      cinacalcet  30 mg Oral Once per day on Mon Wed Fri Leeland Power, CRNP      citalopram  10 mg Oral Daily Leeland Power, CRNP      docusate sodium  100 mg Oral Daily Leeland Power, CRNP      gabapentin  300 mg Oral HS Leeland Power, CRNP      heparin (porcine)  5,000 Units Subcutaneous [facility] Deyvi Pearson MD      insulin lispro  1-5 Units Subcutaneous 4x Daily (AC & HS) Leeland Power, CRNP      ondansetron  4 mg Intravenous Q6H PRN JONAH Mcdermott      pravastatin  40 mg Oral Daily With [employer], JONAH      simethicone  80 mg Oral 4x Daily PRN JONAH Mdcermott      sodium bicarbonate  650 mg Oral BID after meals Fawad Cho MD          Today, Patient Was Seen By: Rogers Phan    ** Please Note: Dictation voice to text software may have been used in the creation of this document   **

## 2022-03-03 LAB
BACTERIA UR QL AUTO: ABNORMAL /HPF
BILIRUB UR QL STRIP: NEGATIVE
CLARITY UR: ABNORMAL
COLOR UR: YELLOW
GLUCOSE SERPL-MCNC: 156 MG/DL (ref 65–140)
GLUCOSE SERPL-MCNC: 162 MG/DL (ref 65–140)
GLUCOSE SERPL-MCNC: 98 MG/DL (ref 65–140)
GLUCOSE UR STRIP-MCNC: NEGATIVE MG/DL
HGB UR QL STRIP.AUTO: ABNORMAL
KETONES UR STRIP-MCNC: NEGATIVE MG/DL
LEUKOCYTE ESTERASE UR QL STRIP: ABNORMAL
NITRITE UR QL STRIP: NEGATIVE
NON-SQ EPI CELLS URNS QL MICRO: ABNORMAL /HPF
PH UR STRIP.AUTO: 6 [PH]
PROT UR STRIP-MCNC: ABNORMAL MG/DL
RBC #/AREA URNS AUTO: ABNORMAL /HPF
SP GR UR STRIP.AUTO: 1.02 (ref 1–1.03)
UROBILINOGEN UR QL STRIP.AUTO: 0.2 E.U./DL
WBC #/AREA URNS AUTO: ABNORMAL /HPF

## 2022-03-03 PROCEDURE — 81001 URINALYSIS AUTO W/SCOPE: CPT | Performed by: INTERNAL MEDICINE

## 2022-03-03 PROCEDURE — 82948 REAGENT STRIP/BLOOD GLUCOSE: CPT

## 2022-03-03 PROCEDURE — 87077 CULTURE AEROBIC IDENTIFY: CPT | Performed by: INTERNAL MEDICINE

## 2022-03-03 PROCEDURE — 87181 SC STD AGAR DILUTION PER AGT: CPT | Performed by: INTERNAL MEDICINE

## 2022-03-03 PROCEDURE — 99239 HOSP IP/OBS DSCHRG MGMT >30: CPT | Performed by: INTERNAL MEDICINE

## 2022-03-03 PROCEDURE — 87186 SC STD MICRODIL/AGAR DIL: CPT | Performed by: INTERNAL MEDICINE

## 2022-03-03 PROCEDURE — 87086 URINE CULTURE/COLONY COUNT: CPT | Performed by: INTERNAL MEDICINE

## 2022-03-03 RX ADMIN — INSULIN LISPRO 1 UNITS: 100 INJECTION, SOLUTION INTRAVENOUS; SUBCUTANEOUS at 12:28

## 2022-03-03 RX ADMIN — ASPIRIN 81 MG: 81 TABLET, COATED ORAL at 08:44

## 2022-03-03 RX ADMIN — CITALOPRAM HYDROBROMIDE 10 MG: 20 TABLET ORAL at 08:44

## 2022-03-03 RX ADMIN — HEPARIN SODIUM 5000 UNITS: 5000 INJECTION INTRAVENOUS; SUBCUTANEOUS at 05:49

## 2022-03-03 RX ADMIN — ACETAMINOPHEN 650 MG: 325 TABLET, FILM COATED ORAL at 08:43

## 2022-03-03 RX ADMIN — AMLODIPINE BESYLATE 5 MG: 5 TABLET ORAL at 08:44

## 2022-03-03 RX ADMIN — SODIUM BICARBONATE 650 MG TABLET 650 MG: at 08:44

## 2022-03-03 NOTE — ASSESSMENT & PLAN NOTE
· White blood cell count 13 on 03/01, resolved   · Patient had an episode of fever 100 6 3/2 while eating breakfast   Her temperature was rechecked without giving any Tylenol and it was 98  · Patient denies any signs of upper/lower respiratory tract infection, no cough, sore throat, sputum production  · Denies abdominal pain constipation/diarrhea  · Denies dysuria  · Patient received 3 days of antibiotics for a UTI  · Procalcitonin elevated, inaccurate in the setting of chronic kidney disease  · Small open wound in the left arm without signs of infection, continue local wound care  · COVID/flu/RSV negative  · Monitor, no need for antibiotic

## 2022-03-03 NOTE — CASE MANAGEMENT
Case Management Discharge Planning Note    Patient name Megan Ferreira  [place] [address] /E5 MS [phone] MRN [id number]  : 1927 Date 3/3/2022       Current Admission Date: 2022  Current Admission Diagnosis:Acute kidney injury superimposed on CKD [facility])   Patient Active Problem List    Diagnosis Date Noted    Right ankle pain 2022    Leukocytosis 2022    Moderate protein-calorie malnutrition ([address] ) 2022    Urinary retention 2022    Acute kidney injury superimposed on CKD ([address] ) 2022    Anemia in stage 4 chronic kidney disease ([address] ) 2022    Hyperkalemia 2022    Adult failure to thrive 2022    Fall at home, initial encounter 2022    Elevated uric acid in blood 2022    Secondary hyperparathyroidism of renal origin ([facility] [address] ) 12/15/2021    Hypertensive chronic kidney disease 12/15/2021    Type 2 diabetes mellitus with diabetic chronic kidney disease ([address] ) 2020    Fecal smearing 10/24/2019    Generalized edema 10/08/2019    Bowel habit changes 10/01/2019    Rectal discharge 10/01/2019    Acute cystitis without hematuria 10/13/2018    Vertebral anomaly 2018    Acquired cyst of kidney 2017    Chronic kidney disease, stage IV (severe) ([address] ) 2017    Atrophic kidney, acquired 2017    Retinal hole of right eye 2017    Essential hypertension 2017    Pure hypercholesterolemia 2017    Controlled type 2 diabetes mellitus with diabetic polyneuropathy ([address] ) 2017    Allergic rhinitis 2017    Vitamin D insufficiency 2016    Bilateral deafness 2016    Peripheral neuropathy 2015    Gait disturbance 2014    Gout 2014    Ectropion 2013    Borderline glaucoma 2013      LOS (days): 8  Geometric Mean LOS (GMLOS) (days): 3 10  Days to GMLOS:-4 5     OBJECTIVE:  Risk of Unplanned Readmission Score: 18         Current admission status: Inpatient Preferred Pharmacy:   [id number] [place] - [address]  Phone: [phone] Fax: [phone]    Primary Care Provider: Anne Wu MD    Primary Insurance: [facility]  Secondary Insurance:     DISCHARGE DETAILS:           Other Referral/Resources/Interventions Provided:  Referral Comments:  support staff tasked to contact insurance for chage of auth dates  Treatment Team Recommendation: Short Term Rehab  Discharge Destination Plan[de-identified] Short Term Rehab (- TCU)  Transport at Discharge : Wheelchair van  Dispatcher Contacted: Yes  Number/Name of Dispatcher: [employer]  Transported by [employer] and Unit #):  Elizabeth  ETA of Transport (Date): 03/03/22  ETA of Transport (Time): [address] Name, [place] [address] : - TCU  Receiving Facility/Agency Phone Number: [phone]  Facility/Agency Fax Number: [phone]

## 2022-03-03 NOTE — ASSESSMENT & PLAN NOTE
· Fall at home  · At risk for falls  · Safe ambulation  · Fall precautions  · Physical therapy  · Discharge to rehab

## 2022-03-03 NOTE — APP STUDENT NOTE
Discharge Summary    Assessment and Plan  1  Acute Kidney injury Superimposed on CKD stage 4  · Creatinine on admission, 2/23/22, was 2 77   · Creatinine on 3/2/22 was 2 47  · Patient was given IV bicarbonate GTT for hyperkalemia, this improved  · Monitor postvoid residuals at rehab as patient has been having some urinary retention  · Continue to follow with Nephrology outpatient for kidney function monitoring     2  Low Grade Fever  · On 2/28/22, patient had a 100  2'F temperature in the morning  · Patient denied any signs or symptoms of infection  · No cough, congestion, sore throat, GI symptoms, or  symptoms  · Temperature returned to normal range by mid morning on 2/28/22  · Leukocytosis of 13 34 on 3/1/22  Resolved on 3/2/22  · Patient continues to deny any infectious symptoms  · Procalcitonin on 3/1/22 was 0 42  Today, 3/2/22, it is 0 51  However these are inaccurate due to CKD  · COVID/Flu/RSV negative on 3/1/22  · Continue to monitor      3  Right Ankle Pain  · Patient noted pain in her right ankle and foot with ambulation  · Patient noted she had a fracture of the right ankle previously and had hardware placed  · She notes the pain started about 3 days ago and has continued to persist    · Ordered Right ankle x-ray  · Results on 3/2/22 revealed diffuse soft tissue swelling about the right ankle with potential soft tissue gas    · Consult placed to Podiatry  · Recommended PT for ankle exercises and Voltaren gel for pain  · Continue to weight bear as tolerated     4  Moderate Protein-Calorie Malnutrition  · Patient admits to decreased appetite   · Patient weight 58 1 kg (128 lbs 1 4 oz)  · Nutritionist consulted  · Adult Malnutrition type: Acute illness (acute moderate pro, jimi malnutrition d/t decreased appetite as evidence by <75% energy intake > 7 days, 7 2% wt  loss x 1 month, 1+ edema, mild muscle/fat loss of clavicles, temples, orbitals)  · Adult Degree of Malnutrition: Malnutrition of moderate degree (treated with oral diet and supplements, consider appetite stimulant if po doesn't improve)  · Malnutrition Characteristics: Inadequate energy, Weight loss, Fluid accumulation, Fat loss, Muscle loss  · Continue to monitor appetite and oral intake      5  Fall at home, initial encounter  · Patient reported having 2 falls during the week leading up to her admission  · Patient continues to be at risk of falls   · Continue assistance with ambulation for safety  · Fall precautions should be utilized   · PT/OT  · Discharged to rehab facility     6  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  · Patient notes she has had a decreased appetite recently  · Multiple chronic conditions may be factoring into this   · Supportive care with encouraged appropriate caloric intake   · Nutritionist consulted      7  Hyperkalemia, resolved  · Potassium was 6 0 on admission, 2/23/22  · Potassium returned to normal range, 5 2, on 2/26/22  · Potassium on 3/2/22 was 4 3     8  Anemia in stage 4 CKD  · Hemoglobin on admission, 2/23/22, was 9 6  · Trending down slowly  · On 3/2/22 hemoglobin was 8 3  · Continue to monitor      9  Urinary Retention  · Patient had recent UTI prior to admission  · Urinalysis inpatient negative for significant infection  · CT abdomen and pelvis without contrast on 2/23/22 revealed bladder wall thickening and perivesical inflammatory changes along with air noted in the bladder   Findings consistent with urinary tract infection  · Patient has had urinary retention   · Straight cathed twice while inpatient  · Continue to monitor urinary output and follow urinary retention protocol     10  Secondary Hyperparathyroidism of Renal Origin  · History noted  · Continue cinacalcet (Sensipar) 30 mg po MWF     11  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  · Blood glucose reading have been fluctuating while inpatient  · Continue to monitor blood glucose readings  · Continue sliding scale insulin lispro (Humalog)     12   Acute cystitis without hematuria  · CT abdomen and pelvis without contrast on 2/23/22 revealed bladder wall thickening and perivesical inflammatory changes along with air noted in the bladder   Findings consistent with urinary tract infection  · Patient was treated with IV ceftriaxone 1000 mg in 50 mL 5% dextrose qd for 3 days  · Patient denies any urinary symptoms      13  Pure hypercholesterolemia   · Lipid Panel in July 2021 revealed total cholesterol of 127, HDL of 48, LDL 63, and Triglycerides 79  · Continue pravastatin (Pravachol) 40 mg po qd with dinner      Medical Problems             Resolved Problems  Date Reviewed: 3/3/2022    None              Discharging Physician / Practitioner: Kathleen Bell MD and NANY Fernandez  PCP: Charlene Aguilar MD  Admission Date:   Admission Orders (From admission, onward)     Ordered        02/23/22 2033  Inpatient Admission  Once                      Discharge Date: 03/03/22    Consultations During Hospital Stay:  · Podiatry    Procedures Performed:   · None    Significant Findings / Test Results:   · Creatinine elevated on admission, 2/23/22, at 2 77  · CT head without contrast on 2/23/22 revealed no acute intracranial abnormality  · CT abdomen and pelvis without contrast on 2/23/22 revealed bladder wall thickening and perivesical inflammatory changes along with air noted in the bladder   Findings consistent with urinary tract infection    · CXR on 3/2/22 revealed no acute cardiopulmonary disease  · Right ankle x-ray on 3/2/22 revealed diffuse soft tissue swelling about the right ankle with potential soft tissue gas    Incidental Findings:   · None    Test Results Pending at Discharge (will require follow up):   · Urine Culture from 3/3/22     Outpatient Tests Requested:  · Follow up with PCP within 1-2 weeks of discharge  · Follow up with     Complications:  None    Reason for Admission: Dysuria, decreased oral intake, and difficulties with ambulation    Hospital Course:   Giovanni Hand Parvin Vargas is a 80 y o  female patient who originally presented to the hospital on 2/23/2022 due to weakness, fatigue, ambulatory dysfunction, and poor appetite  CT of head was negative on admission  Patient was found to have GAY on admission with underlying CKD stage 4  This improved with IV hydration and patient returned to her baseline  Patient was treated with 3 days ceftriaxone empirically for UTI due to CT scan of abdomen and pelvis  Patient had known history of urinary retention, therefore protocol was started for urinary retention  During her stay the patient had to be straight cathed twice  Patient noted that her right ankle was causing her pain and discomfort with walking  An x-ray was performed showing diffuse soft tissue swelling and potential soft tissue gas  Podiatry was consulted and they evaluated the patient  Patient was found to have right achilles tendon pain and chronic stasis dermatitis  With the soft tissue air being above the lateral ankle it was determined that this is likely chronic edema and unlikely gas  Podiatry recommended PT for ankle exercises, Voltaren gel for pain, and weight bearing as tolerated  Patient was seen by PT and OT who recommended rehab facility  Patient will be discharged to short term rehab  Please see above list of diagnoses and related plan for additional information  Condition at Discharge: stable    Discharge Day Visit / Exam:   Subjective:  Patient notes she still has pain in her right ankle today especially with walking  She notes she is able to bear weight and walk to bathroom and back to chair/bed  She denied any CP, SOB, N/V/D, or abdominal pain  She has no further complaints       Vitals: Blood Pressure: 113/75 (03/03/22 0843)  Pulse: 69 (03/03/22 0730)  Temperature: 97 5 °F (36 4 °C) (03/03/22 0730)  Temp Source: Oral (03/02/22 2324)  Respirations: 18 (03/02/22 2324)  Height: 5' (152 4 cm) (02/24/22 0108)  Weight - Scale: 58 1 kg (128 lb 1 4 oz) (02/23/22 1655)  SpO2: 96 % (03/03/22 0730)  Exam:   Physical Exam  Vitals and nursing note reviewed  Constitutional:       General: She is not in acute distress  Appearance: She is normal weight  She is not ill-appearing  HENT:      Head: Normocephalic and atraumatic  Eyes:      Conjunctiva/sclera: Conjunctivae normal    Cardiovascular:      Rate and Rhythm: Normal rate and regular rhythm  Pulses:           Dorsalis pedis pulses are 2+ on the right side and 2+ on the left side  Heart sounds: No murmur heard  No friction rub  No gallop  Pulmonary:      Breath sounds: Normal breath sounds  No wheezing, rhonchi or rales  Abdominal:      General: Bowel sounds are normal       Palpations: Abdomen is soft  Tenderness: There is no abdominal tenderness  Musculoskeletal:      Right ankle: Swelling present  Tenderness present  Decreased range of motion  Right Achilles Tendon: Tenderness present  Left ankle: Normal  No swelling  No tenderness  Normal range of motion  Left Achilles Tendon: Normal    Skin:     General: Skin is warm and dry  Capillary Refill: Capillary refill takes less than 2 seconds  Neurological:      General: No focal deficit present  Mental Status: She is alert  Psychiatric:         Mood and Affect: Mood normal          Behavior: Behavior is cooperative  Discharge instructions/Information to patient and family:   See after visit summary for information provided to patient and family  Provisions for Follow-Up Care:  See after visit summary for information related to follow-up care and any pertinent home health orders  Disposition:   Other: - TCU    Planned Readmission: None     Discharge Statement:  I spent 30 minutes discharging the patient  This time was spent on the day of discharge  I had direct contact with the patient on the day of discharge   Greater than 50% of the total time was spent examining patient, answering all patient questions, arranging and discussing plan of care with patient as well as directly providing post-discharge instructions  Additional time then spent on discharge activities  Discharge Medications:  See after visit summary for reconciled discharge medications provided to patient and/or family        **Please Note: This note may have been constructed using a voice recognition system**

## 2022-03-03 NOTE — ASSESSMENT & PLAN NOTE
Lab Results   Component Value Date    HGBA1C 5 1 10/12/2021       Recent Labs     03/02/22  1154 03/02/22  1621 03/02/22  2128 03/03/22  0720   POCGLU 165* 116 153* 98       Blood Sugar Average: Last 72 hrs:  (P) 247 [id number]     Monitor Accu-Cheks, on sliding scale  Avoid hypoglycemia  Hypoglycemia protocol in place

## 2022-03-03 NOTE — NURSING NOTE
Pt  Educated on options of urinary retention protocol  Pt ambulated, sat on the toilet and was able to void an unmeasured amount  Bladder scan revealed 271 mL  Jimenez was ordered by doctor but pt  Refused

## 2022-03-03 NOTE — ASSESSMENT & PLAN NOTE
Lab Results   Component Value Date    EGFR 16 03/02/2022    EGFR 17 02/28/2022    EGFR 17 02/27/2022    CREATININE 2 47 (H) 03/02/2022    CREATININE 2 27 (H) 02/28/2022    CREATININE 2 33 (H) 02/27/2022       · Acute on chronic kidney disease stage 4, baseline creatinine up until in 2021 was 1 6-1 8, after that on random labs creatinine was 2 1, 2 35, progression of CKD stage 4   Saw Nephro as outpatient  · Patient presented with a creatinine of 2 77  · Acidosis, hyperkalemia improved with IV bicarbonate GTT  · Monitor kidney function closely  · Avoid nephrotoxins  · Monitor postvoid residuals, patient has a history of urinary retention, has been straight cath twice,   · Outpatient Nephrology follow-up recommended

## 2022-03-03 NOTE — PLAN OF CARE
Problem: Nutrition/Hydration-ADULT  Goal: Nutrient/Hydration intake appropriate for improving, restoring or maintaining nutritional needs  Description: Monitor and assess patient's nutrition/hydration status for malnutrition  Collaborate with interdisciplinary team and initiate plan and interventions as ordered  Monitor patient's weight and dietary intake as ordered or per policy  Utilize nutrition screening tool and intervene as necessary  Determine patient's food preferences and provide high-protein, high-caloric foods as appropriate       INTERVENTIONS:  - Monitor oral intake, urinary output, labs, and treatment plans  - Assess nutrition and hydration status and recommend course of action  - Evaluate amount of meals eaten  - Assist patient with eating if necessary   - Allow adequate time for meals  - Recommend/ encourage appropriate diets, oral nutritional supplements, and vitamin/mineral supplements  - Order, calculate, and assess calorie counts as needed  - Recommend, monitor, and adjust tube feedings and TPN/PPN based on assessed needs  - Assess need for intravenous fluids  - Provide specific nutrition/hydration education as appropriate  - Include patient/family/caregiver in decisions related to nutrition  Outcome: Progressing     Problem: Potential for Falls  Goal: Patient will remain free of falls  Description: INTERVENTIONS:  - Educate patient/family on patient safety including physical limitations  - Instruct patient to call for assistance with activity   - Consult OT/PT to assist with strengthening/mobility   - Keep Call bell within reach  - Keep bed low and locked with side rails adjusted as appropriate  - Keep care items and personal belongings within reach  - Initiate and maintain comfort rounds  - Apply yellow socks and bracelet for high fall risk patients  - Consider moving patient to room near nurses station  Outcome: Progressing     Problem: Prexisting or High Potential for Compromised Skin Integrity  Goal: Skin integrity is maintained or improved  Description: INTERVENTIONS:  - Identify patients at risk for skin breakdown  - Assess and monitor skin integrity  - Assess and monitor nutrition and hydration status  - Monitor labs   - Assess for incontinence   - Turn and reposition patient  - Assist with mobility/ambulation  - Relieve pressure over bony prominences  - Avoid friction and shearing  - Provide appropriate hygiene as needed including keeping skin clean and dry  - Evaluate need for skin moisturizer/barrier cream  - Collaborate with interdisciplinary team   - Patient/family teaching  - Consider wound care consult   Outcome: Progressing     Problem: PAIN - ADULT  Goal: Verbalizes/displays adequate comfort level or baseline comfort level  Description: Interventions:  - Encourage patient to monitor pain and request assistance  - Assess pain using appropriate pain scale  - Administer analgesics based on type and severity of pain and evaluate response  - Implement non-pharmacological measures as appropriate and evaluate response  - Consider cultural and social influences on pain and pain management  - Notify physician/advanced practitioner if interventions unsuccessful or patient reports new pain  Outcome: Progressing     Problem: INFECTION - ADULT  Goal: Absence or prevention of progression during hospitalization  Description: INTERVENTIONS:  - Assess and monitor for signs and symptoms of infection  - Monitor lab/diagnostic results  - Monitor all insertion sites, i e  indwelling lines, tubes, and drains  - Monitor endotracheal if appropriate and nasal secretions for changes in amount and color  - [place] appropriate cooling/warming therapies per order  - Administer medications as ordered  - Instruct and encourage patient and family to use good hand hygiene technique  - Identify and instruct in appropriate isolation precautions for identified infection/condition  Outcome: Progressing     Problem: Nutrition/Hydration-ADULT  Goal: Nutrient/Hydration intake appropriate for improving, restoring or maintaining nutritional needs  Description: Monitor and assess patient's nutrition/hydration status for malnutrition  Collaborate with interdisciplinary team and initiate plan and interventions as ordered  Monitor patient's weight and dietary intake as ordered or per policy  Utilize nutrition screening tool and intervene as necessary  Determine patient's food preferences and provide high-protein, high-caloric foods as appropriate       INTERVENTIONS:  - Monitor oral intake, urinary output, labs, and treatment plans  - Assess nutrition and hydration status and recommend course of action  - Evaluate amount of meals eaten  - Assist patient with eating if necessary   - Allow adequate time for meals  - Recommend/ encourage appropriate diets, oral nutritional supplements, and vitamin/mineral supplements  - Order, calculate, and assess calorie counts as needed  - Recommend, monitor, and adjust tube feedings and TPN/PPN based on assessed needs  - Assess need for intravenous fluids  - Provide specific nutrition/hydration education as appropriate  - Include patient/family/caregiver in decisions related to nutrition  Outcome: Progressing     Problem: Potential for Falls  Goal: Patient will remain free of falls  Description: INTERVENTIONS:  - Educate patient/family on patient safety including physical limitations  - Instruct patient to call for assistance with activity   - Consult OT/PT to assist with strengthening/mobility   - Keep Call bell within reach  - Keep bed low and locked with side rails adjusted as appropriate  - Keep care items and personal belongings within reach  - Initiate and maintain comfort rounds  - Apply yellow socks and bracelet for high fall risk patients  - Consider moving patient to room near nurses station  Outcome: Progressing     Problem: MOBILITY - ADULT  Goal: Maintain or return to baseline ADL function  Description: INTERVENTIONS:  - Educate patient/family on patient safety including physical limitations  - Instruct patient to call for assistance with activity   - Consult OT/PT to assist with strengthening/mobility   - Keep Call bell within reach  - Keep bed low and locked with side rails adjusted as appropriate  - Keep care items and personal belongings within reach  - Initiate and maintain comfort rounds  - Make Fall Risk Sign visible to staff  - Apply yellow socks and bracelet for high fall risk patients  - Consider moving patient to room near nurses station  Outcome: Progressing  Goal: Maintains/Returns to pre admission functional level  Description: INTERVENTIONS:  - Perform BMAT or MOVE assessment daily    - Set and communicate daily mobility goal to care team and patient/family/caregiver  - Collaborate with rehabilitation services on mobility goals if consulted  - Perform Range of Motion 4 times a day  - Reposition patient every 2 hours    - Dangle patient 4 times a day  - Stand patient 8 times a day  - Ambulate patient 8 times a day  - Out of bed to chair 2 times a day   - Out of bed for meals 3 times a day  - Out of bed for toileting  - Record patient progress and toleration of activity level   Outcome: Progressing     Problem: Prexisting or High Potential for Compromised Skin Integrity  Goal: Skin integrity is maintained or improved  Description: INTERVENTIONS:  - Identify patients at risk for skin breakdown  - Assess and monitor skin integrity  - Assess and monitor nutrition and hydration status  - Monitor labs   - Assess for incontinence   - Turn and reposition patient  - Assist with mobility/ambulation  - Relieve pressure over bony prominences  - Avoid friction and shearing  - Provide appropriate hygiene as needed including keeping skin clean and dry  - Evaluate need for skin moisturizer/barrier cream  - Collaborate with interdisciplinary team   - Patient/family teaching  - Consider wound care consult   Outcome: Progressing     Problem: PAIN - ADULT  Goal: Verbalizes/displays adequate comfort level or baseline comfort level  Description: Interventions:  - Encourage patient to monitor pain and request assistance  - Assess pain using appropriate pain scale  - Administer analgesics based on type and severity of pain and evaluate response  - Implement non-pharmacological measures as appropriate and evaluate response  - Consider cultural and social influences on pain and pain management  - Notify physician/advanced practitioner if interventions unsuccessful or patient reports new pain  Outcome: Progressing     Problem: INFECTION - ADULT  Goal: Absence or prevention of progression during hospitalization  Description: INTERVENTIONS:  - Assess and monitor for signs and symptoms of infection  - Monitor lab/diagnostic results  - Monitor all insertion sites, i e  indwelling lines, tubes, and drains  - Monitor endotracheal if appropriate and nasal secretions for changes in amount and color  - [place] appropriate cooling/warming therapies per order  - Administer medications as ordered  - Instruct and encourage patient and family to use good hand hygiene technique  - Identify and instruct in appropriate isolation precautions for identified infection/condition  Outcome: Progressing     Problem: SAFETY ADULT  Goal: Patient will remain free of falls  Description: INTERVENTIONS:  - Educate patient/family on patient safety including physical limitations  - Instruct patient to call for assistance with activity   - Consult OT/PT to assist with strengthening/mobility   - Keep Call bell within reach  - Keep bed low and locked with side rails adjusted as appropriate  - Keep care items and personal belongings within reach  - Initiate and maintain comfort rounds  - Apply yellow socks and bracelet for high fall risk patients  - Consider moving patient to room near nurses station  Outcome: Progressing  Goal: Maintain or return to baseline ADL function  Description: INTERVENTIONS:  - Educate patient/family on patient safety including physical limitations  - Instruct patient to call for assistance with activity   - Consult OT/PT to assist with strengthening/mobility   - Keep Call bell within reach  - Keep bed low and locked with side rails adjusted as appropriate  - Keep care items and personal belongings within reach  - Initiate and maintain comfort rounds  - Make Fall Risk Sign visible to staff  - Apply yellow socks and bracelet for high fall risk patients  - Consider moving patient to room near nurses station  Outcome: Progressing  Goal: Maintains/Returns to pre admission functional level  Description: INTERVENTIONS:  - Perform BMAT or MOVE assessment daily    - Set and communicate daily mobility goal to care team and patient/family/caregiver     - Collaborate with rehabilitation services on mobility goals if consulted  - Out of bed for toileting  - Record patient progress and toleration of activity level   Outcome: Progressing     Problem: DISCHARGE PLANNING  Goal: Discharge to home or other facility with appropriate resources  Description: INTERVENTIONS:  - Identify barriers to discharge w/patient and caregiver  - Arrange for needed discharge resources and transportation as appropriate  - Identify discharge learning needs (meds, wound care, etc )  - Arrange for interpretive services to assist at discharge as needed  - Refer to Case Management Department for coordinating discharge planning if the patient needs post-hospital services based on physician/advanced practitioner order or complex needs related to functional status, cognitive ability, or social support system  Outcome: Progressing     Problem: Knowledge Deficit  Goal: Patient/family/caregiver demonstrates understanding of disease process, treatment plan, medications, and discharge instructions  Description: Complete learning assessment and assess knowledge base   Interventions:  - Provide teaching at level of understanding  - Provide teaching via preferred learning methods  Outcome: Progressing     Problem: NEUROSENSORY - ADULT  Goal: Achieves stable or improved neurological status  Description: INTERVENTIONS  - Monitor and report changes in neurological status  - Monitor vital signs such as temperature, blood pressure, glucose, and any other labs ordered   - Initiate measures to prevent increased intracranial pressure  - Monitor for seizure activity and implement precautions if appropriate      Outcome: Progressing  Goal: Achieves maximal functionality and self care  Description: INTERVENTIONS  - Monitor swallowing and airway patency with patient fatigue and changes in neurological status  - Encourage and assist patient to increase activity and self care     - Encourage visually impaired, hearing impaired and aphasic patients to use assistive/communication devices  Outcome: Progressing     Problem: CARDIOVASCULAR - ADULT  Goal: Maintains optimal cardiac output and hemodynamic stability  Description: INTERVENTIONS:  - Monitor I/O, vital signs and rhythm  - Monitor for S/S and trends of decreased cardiac output  - Administer and titrate ordered vasoactive medications to optimize hemodynamic stability  - Assess quality of pulses, skin color and temperature  - Assess for signs of decreased coronary artery perfusion  - Instruct patient to report change in severity of symptoms  Outcome: Progressing  Goal: Absence of cardiac dysrhythmias or at baseline rhythm  Description: INTERVENTIONS:  - Continuous cardiac monitoring, vital signs, obtain 12 lead EKG if ordered  - Administer antiarrhythmic and heart rate control medications as ordered  - Monitor electrolytes and administer replacement therapy as ordered  Outcome: Progressing     Problem: METABOLIC, FLUID AND ELECTROLYTES - ADULT  Goal: Electrolytes maintained within normal limits  Description: INTERVENTIONS:  - Monitor labs and assess patient for signs and symptoms of electrolyte imbalances  - Administer electrolyte replacement as ordered  - Monitor response to electrolyte replacements, including repeat lab results as appropriate  - Instruct patient on fluid and nutrition as appropriate  Outcome: Progressing  Goal: Fluid balance maintained  Description: INTERVENTIONS:  - Monitor labs   - Monitor I/O and WT  - Instruct patient on fluid and nutrition as appropriate  - Assess for signs & symptoms of volume excess or deficit  Outcome: Progressing  Goal: Glucose maintained within target range  Description: INTERVENTIONS:  - Monitor Blood Glucose as ordered  - Assess for signs and symptoms of hyperglycemia and hypoglycemia  - Administer ordered medications to maintain glucose within target range  - Assess nutritional intake and initiate nutrition service referral as needed  Outcome: Progressing     Problem: SKIN/TISSUE INTEGRITY - ADULT  Goal: Skin Integrity remains intact(Skin Breakdown Prevention)  Description: Assess:  -Inspect skin when repositioning, toileting, and assisting with ADLS  -Assess extremities for adequate circulation and sensation     Bed Management:  -Have minimal linens on bed & keep smooth, unwrinkled  -Change linens as needed when moist or perspiring      Activity:  -Encourage activity and walks on unit  -Encourage or provide ROM exercises   -Use appropriate equipment to lift or move patient in bed    Skin Care:  -Avoid use of baby powder, tape, friction and shearing, hot water or constrictive clothing  -Do not massage red bony areas      Outcome: Progressing  Goal: Incision(s), wounds(s) or drain site(s) healing without S/S of infection  Description: INTERVENTIONS  - Assess and document dressing, incision, wound bed, drain sites and surrounding tissue  - Provide patient and family education    Outcome: Progressing     Problem: MUSCULOSKELETAL - ADULT  Goal: Maintain or return mobility to safest level of function  Description: INTERVENTIONS:  - Assess patient's ability to carry out ADLs; assess patient's baseline for ADL function and identify physical deficits which impact ability to perform ADLs (bathing, care of mouth/teeth, toileting, grooming, dressing, etc )  - Assess/evaluate cause of self-care deficits   - Assess range of motion  - Assess patient's mobility  - Assess patient's need for assistive devices and provide as appropriate  - Encourage maximum independence but intervene and supervise when necessary  - Involve family in performance of ADLs  - Assess for home care needs following discharge   - Consider OT consult to assist with ADL evaluation and planning for discharge  - Provide patient education as appropriate  Outcome: Progressing  Goal: Maintain proper alignment of affected body part  Description: INTERVENTIONS:  - Support, maintain and protect limb and body alignment  - Provide patient/ family with appropriate education  Outcome: Progressing

## 2022-03-03 NOTE — DISCHARGE SUMMARY
[facility]  Discharge- Ya Luis 2/24/1927, 80 y o  female MRN: [id number]  Unit/Bed#: E5 -01 Encounter: [id number]  Primary Care Provider: Nessa Baig MD   Date and time admitted to hospital: 2/23/2022  4:49 PM    * Acute kidney injury superimposed on CKD [facility])  Assessment & Plan  Lab Results   Component Value Date    EGFR 16 03/02/2022    EGFR 17 02/28/2022    EGFR 17 02/27/2022    CREATININE 2 47 (H) 03/02/2022    CREATININE 2 27 (H) 02/28/2022    CREATININE 2 33 (H) 02/27/2022       · Acute on chronic kidney disease stage 4, baseline creatinine up until in 2021 was 1 6-1 8, after that on random labs creatinine was 2 1, 2 35, progression of CKD stage 4  Saw Nephro as outpatient  · Patient presented with a creatinine of 2 77  · Acidosis, hyperkalemia improved with IV bicarbonate GTT  · Monitor kidney function closely  · Avoid nephrotoxins  · Monitor postvoid residuals, patient has a history of urinary retention, has been straight cath twice,   · Outpatient Nephrology follow-up recommended    Right ankle pain  Assessment & Plan  · Patient was complaining of right ankle pain especially with ambulation  · X-ray showed diffuse soft tissue swelling about the right ankle with potential soft tissue gas  · Evaluated by Podiatry, appreciate recommendations  · Patient has right Achilles tendon pain and chronic stasis dermatitis    X-ray was reviewed, no cells abnormality, hardware is in place, soft tissue air is above lateral ankle likely from chronic edema unlikely from gas or emphysema  · Podiatry recommended PT for ankle exercises, Voltaren gel for pain relief  · Continue weight-bearing as tolerated    Leukocytosis  Assessment & Plan  · White blood cell count 13 on 03/01, resolved   · Patient had an episode of fever 100 6 3/2 while eating breakfast   Her temperature was rechecked without giving any Tylenol and it was 98  · Patient denies any signs of upper/lower respiratory tract infection, no cough, sore throat, sputum production  · Denies abdominal pain constipation/diarrhea  · Denies dysuria  · Patient received 3 days of antibiotics for a UTI  · Procalcitonin elevated, inaccurate in the setting of chronic kidney disease  · Small open wound in the left arm without signs of infection, continue local wound care  · COVID/flu/RSV negative  · Monitor, no need for antibiotic    Moderate protein-calorie malnutrition (HCC)  Assessment & Plan  Malnutrition Findings:   Adult Malnutrition type: Acute illness (acute moderate pro, jimi malnutrition d/t decreased appetite as evidence by <75% energy intake > 7 days, 7 2% wt  loss x 1 month, 1+ edema, mild muscle/fat loss of clavicles, temples, orbitals)  Adult Degree of Malnutrition: Malnutrition of moderate degree (treated with oral diet and supplements, consider appetite stimulant if po doesn't improve)    BMI Findings: Body mass index is 25 02 kg/m²         Fall at home, initial encounter  Assessment & Plan  · Fall at home  · At risk for falls  · Safe ambulation  · Fall precautions  · Physical therapy  · Discharge to rehab    Adult failure to thrive  Assessment & Plan  · Multifactorial  · Supportive care    Hyperkalemia  Assessment & Plan  · Hyperkalemia in the setting of acute kidney injury/chronic kidney disease stage 4  · Resolved      Anemia in stage 4 chronic kidney disease (HCC)  Assessment & Plan  · Monitor hemoglobin    Urinary retention  Assessment & Plan  ·  Reports urinary retention, recently treated for UTI   ·  Patient had to be straight cath twice  · Continue to bladder scan  · Suspect retention will improve when patient is more ambulatory      Secondary hyperparathyroidism of renal origin [facility])  Assessment & Plan  ·  Continue Sensipar MWF    Type 2 diabetes mellitus with diabetic chronic kidney disease [facility])  Assessment & Plan  Lab Results   Component Value Date    HGBA1C 5 1 10/12/2021       Recent Labs     03/02/22  1154 03/02/22  1621 03/02/22 2128 03/03/22  0720   POCGLU 165* 116 153* 98       Blood Sugar Average: Last 72 hrs:  (P) 684 [id number]     Monitor Accu-Cheks, on sliding scale  Avoid hypoglycemia  Hypoglycemia protocol in place    Acute cystitis without hematuria  Assessment & Plan  · CT shows bladder wall thickening and perivesical inflammatory changes along with air noted in the bladder  · Received empirical IV ceftriaxone 3 doses  · Urine culture with mixed contaminant  · Denies dysuria    Pure hypercholesterolemia  Assessment & Plan  ·  Continue statin      Medical Problems             Resolved Problems  Date Reviewed: 3/3/2022    None              Discharging Physician / Practitioner: Trang Cisneros MD  PCP: Karthik Acevedo MD  Admission Date:   Admission Orders (From admission, onward)     Ordered        02/23/22 2033  Inpatient Admission  Once                      Discharge Date: 03/03/22    Consultations During Hospital Stay:  · Podiatry    Procedures Performed:   · None    Significant Findings / Test Results:   · CT head without contrast 02/23/2022  IMPRESSION:  No acute intracranial abnormality  · CT abdomen pelvis without contrast 02/23/2022  IMPRESSION:  Bladder wall thickening and perivesical inflammatory changes along with air noted in the bladder  Findings consistent with urinary tract infection      · Chest x-ray 03/02/2022  IMPRESSION:  No acute cardiopulmonary disease  · Right ankle x-ray 03/02/2022  IMPRESSION:  Diffuse soft tissue swelling about the right ankle with potential soft tissue gas  Incidental Findings:   · none    Test Results Pending at Discharge (will require follow up):   · none     Outpatient Tests Requested:  · none    Complications:  none    Reason for Admission:  Dysuria, poor p o  Intake, ambulatory dysfunction    Hospital Course:   Desmond Briones is a 80 y o  female patient who originally presented to the hospital on 2/23/2022 due to dysuria, poor p o   Intake and ambulatory dysfunction  Patient lives home alone, family is requesting rehab  CT head negative for acute intracranial abnormality  She has left upper extremity abrasion, local wound care  Patient had acute kidney injury on admission  She has a history of CKD stage 4 with recently progression of disease  Random creatinine were 2 1 and 2 35 recently  Patient developed GAY with creatinine of 2 7, improved with hydration  Patient was treated for 3 days empirically with ceftriaxone for UTI  Patient has a history of urinary retention per daughter  She was straight cathed twice  Continue bladder scan and retention protocol  Patient was complaining of right ankle pain, x-ray showed diffuse soft tissue swelling around the right ankle with potential soft tissue gas  Evaluated by Podiatry  Patient has right Achilles tendon pain and chronic stasis dermatitis, soft tissue air is above lateral ankle likely from chronic edema unlikely from gas or emphysema  Podiatry recommended PT for ankle exercises and Voltaren gel for pain relief  Weightbearing as tolerated  Patient was evaluated by PT OT recommended rehab  She is going to be discharged to [facility]  Please see above list of diagnoses and related plan for additional information  Condition at Discharge: fair    Discharge Day Visit / Exam:   * Please refer to separate progress note for these details *    Discussion with Family: Updated  (daughter) via phone  Discharge instructions/Information to patient and family:   See after visit summary for information provided to patient and family  Provisions for Follow-Up Care:  See after visit summary for information related to follow-up care and any pertinent home health orders  Disposition:   Other: TCF    Planned Readmission: no     Discharge Statement:  I spent 40 minutes discharging the patient  This time was spent on the day of discharge   I had direct contact with the patient on the day of discharge  Greater than 50% of the total time was spent examining patient, answering all patient questions, arranging and discussing plan of care with patient as well as directly providing post-discharge instructions  Additional time then spent on discharge activities  Discharge Medications:  See after visit summary for reconciled discharge medications provided to patient and/or family        **Please Note: This note may have been constructed using a voice recognition system**

## 2022-03-03 NOTE — ASSESSMENT & PLAN NOTE
·  Reports urinary retention, recently treated for UTI   ·  Patient had to be straight cath twice  · Continue to bladder scan  · Suspect retention will improve when patient is more ambulatory

## 2022-03-03 NOTE — PLAN OF CARE
Problem: Nutrition/Hydration-ADULT  Goal: Nutrient/Hydration intake appropriate for improving, restoring or maintaining nutritional needs  Description: Monitor and assess patient's nutrition/hydration status for malnutrition  Collaborate with interdisciplinary team and initiate plan and interventions as ordered  Monitor patient's weight and dietary intake as ordered or per policy  Utilize nutrition screening tool and intervene as necessary  Determine patient's food preferences and provide high-protein, high-caloric foods as appropriate       INTERVENTIONS:  - Monitor oral intake, urinary output, labs, and treatment plans  - Assess nutrition and hydration status and recommend course of action  - Evaluate amount of meals eaten  - Assist patient with eating if necessary   - Allow adequate time for meals  - Recommend/ encourage appropriate diets, oral nutritional supplements, and vitamin/mineral supplements  - Order, calculate, and assess calorie counts as needed  - Recommend, monitor, and adjust tube feedings and TPN/PPN based on assessed needs  - Assess need for intravenous fluids  - Provide specific nutrition/hydration education as appropriate  - Include patient/family/caregiver in decisions related to nutrition  Outcome: Progressing     Problem: Potential for Falls  Goal: Patient will remain free of falls  Description: INTERVENTIONS:  - Educate patient/family on patient safety including physical limitations  - Instruct patient to call for assistance with activity   - Consult OT/PT to assist with strengthening/mobility   - Keep Call bell within reach  - Keep bed low and locked with side rails adjusted as appropriate  - Keep care items and personal belongings within reach  - Initiate and maintain comfort rounds  - Apply yellow socks and bracelet for high fall risk patients  - Consider moving patient to room near nurses station  Outcome: Progressing     Problem: MOBILITY - ADULT  Goal: Maintain or return to baseline ADL function  Description: INTERVENTIONS:  - Educate patient/family on patient safety including physical limitations  - Instruct patient to call for assistance with activity   - Consult OT/PT to assist with strengthening/mobility   - Keep Call bell within reach  - Keep bed low and locked with side rails adjusted as appropriate  - Keep care items and personal belongings within reach  - Initiate and maintain comfort rounds  - Make Fall Risk Sign visible to staff  - Apply yellow socks and bracelet for high fall risk patients  - Consider moving patient to room near nurses station  Outcome: Progressing  Goal: Maintains/Returns to pre admission functional level  Description: INTERVENTIONS:  - Perform BMAT or MOVE assessment daily    - Set and communicate daily mobility goal to care team and patient/family/caregiver  - Collaborate with rehabilitation services on mobility goals if consulted  - Perform Range of Motion  times a day  - Reposition patient every  hours    - Dangle patient  times a day  - Stand patient  times a day  - Ambulate patient  times a day  - Out of bed to chair  times a day   - Out of bed for meals  times a day  - Out of bed for toileting  - Record patient progress and toleration of activity level   Outcome: Progressing     Problem: Prexisting or High Potential for Compromised Skin Integrity  Goal: Skin integrity is maintained or improved  Description: INTERVENTIONS:  - Identify patients at risk for skin breakdown  - Assess and monitor skin integrity  - Assess and monitor nutrition and hydration status  - Monitor labs   - Assess for incontinence   - Turn and reposition patient  - Assist with mobility/ambulation  - Relieve pressure over bony prominences  - Avoid friction and shearing  - Provide appropriate hygiene as needed including keeping skin clean and dry  - Evaluate need for skin moisturizer/barrier cream  - Collaborate with interdisciplinary team   - Patient/family teaching  - Consider wound care consult   Outcome: Progressing     Problem: PAIN - ADULT  Goal: Verbalizes/displays adequate comfort level or baseline comfort level  Description: Interventions:  - Encourage patient to monitor pain and request assistance  - Assess pain using appropriate pain scale  - Administer analgesics based on type and severity of pain and evaluate response  - Implement non-pharmacological measures as appropriate and evaluate response  - Consider cultural and social influences on pain and pain management  - Notify physician/advanced practitioner if interventions unsuccessful or patient reports new pain  Outcome: Progressing     Problem: SAFETY ADULT  Goal: Patient will remain free of falls  Description: INTERVENTIONS:  - Educate patient/family on patient safety including physical limitations  - Instruct patient to call for assistance with activity   - Consult OT/PT to assist with strengthening/mobility   - Keep Call bell within reach  - Keep bed low and locked with side rails adjusted as appropriate  - Keep care items and personal belongings within reach  - Initiate and maintain comfort rounds  - Apply yellow socks and bracelet for high fall risk patients  - Consider moving patient to room near nurses station  Outcome: Progressing  Goal: Maintain or return to baseline ADL function  Description: INTERVENTIONS:  - Educate patient/family on patient safety including physical limitations  - Instruct patient to call for assistance with activity   - Consult OT/PT to assist with strengthening/mobility   - Keep Call bell within reach  - Keep bed low and locked with side rails adjusted as appropriate  - Keep care items and personal belongings within reach  - Initiate and maintain comfort rounds  - Make Fall Risk Sign visible to staff  - Apply yellow socks and bracelet for high fall risk patients  - Consider moving patient to room near nurses station  Outcome: Progressing  Goal: Maintains/Returns to pre admission functional level  Description: INTERVENTIONS:  - Perform BMAT or MOVE assessment daily    - Set and communicate daily mobility goal to care team and patient/family/caregiver  - Collaborate with rehabilitation services on mobility goals if consulted  - Perform Range of Motion  times a day  - Reposition patient every  hours  - Dangle patient  times a day  - Stand patient  times a day  - Ambulate patient  times a day  - Out of bed to chair  times a day   - Out of bed for meals  times a day  - Out of bed for toileting  - Record patient progress and toleration of activity level   Outcome: Progressing     Problem: DISCHARGE PLANNING  Goal: Discharge to home or other facility with appropriate resources  Description: INTERVENTIONS:  - Identify barriers to discharge w/patient and caregiver  - Arrange for needed discharge resources and transportation as appropriate  - Identify discharge learning needs (meds, wound care, etc )  - Arrange for interpretive services to assist at discharge as needed  - Refer to Case Management Department for coordinating discharge planning if the patient needs post-hospital services based on physician/advanced practitioner order or complex needs related to functional status, cognitive ability, or social support system  Outcome: Progressing     Problem: Knowledge Deficit  Goal: Patient/family/caregiver demonstrates understanding of disease process, treatment plan, medications, and discharge instructions  Description: Complete learning assessment and assess knowledge base    Interventions:  - Provide teaching at level of understanding  - Provide teaching via preferred learning methods  Outcome: Progressing     Problem: METABOLIC, FLUID AND ELECTROLYTES - ADULT  Goal: Electrolytes maintained within normal limits  Description: INTERVENTIONS:  - Monitor labs and assess patient for signs and symptoms of electrolyte imbalances  - Administer electrolyte replacement as ordered  - Monitor response to electrolyte replacements, including repeat lab results as appropriate  - Instruct patient on fluid and nutrition as appropriate  Outcome: Progressing  Goal: Fluid balance maintained  Description: INTERVENTIONS:  - Monitor labs   - Monitor I/O and WT  - Instruct patient on fluid and nutrition as appropriate  - Assess for signs & symptoms of volume excess or deficit  Outcome: Progressing  Goal: Glucose maintained within target range  Description: INTERVENTIONS:  - Monitor Blood Glucose as ordered  - Assess for signs and symptoms of hyperglycemia and hypoglycemia  - Administer ordered medications to maintain glucose within target range  - Assess nutritional intake and initiate nutrition service referral as needed  Outcome: Progressing     Problem: SKIN/TISSUE INTEGRITY - ADULT  Goal: Skin Integrity remains intact(Skin Breakdown Prevention)  Description: Assess:  -Inspect skin when repositioning, toileting, and assisting with ADLS  -Assess extremities for adequate circulation and sensation     Bed Management:  -Have minimal linens on bed & keep smooth, unwrinkled  -Change linens as needed when moist or perspiring      Activity:  -Encourage activity and walks on unit  -Encourage or provide ROM exercises   -Use appropriate equipment to lift or move patient in bed    Skin Care:  -Avoid use of baby powder, tape, friction and shearing, hot water or constrictive clothing  -Do not massage red bony areas      Outcome: Progressing  Goal: Incision(s), wounds(s) or drain site(s) healing without S/S of infection  Description: INTERVENTIONS  - Assess and document dressing, incision, wound bed, drain sites and surrounding tissue  - Provide patient and family education    Outcome: Progressing     Problem: MUSCULOSKELETAL - ADULT  Goal: Maintain or return mobility to safest level of function  Description: INTERVENTIONS:  - Assess patient's ability to carry out ADLs; assess patient's baseline for ADL function and identify physical deficits which impact ability to perform ADLs (bathing, care of mouth/teeth, toileting, grooming, dressing, etc )  - Assess/evaluate cause of self-care deficits   - Assess range of motion  - Assess patient's mobility  - Assess patient's need for assistive devices and provide as appropriate  - Encourage maximum independence but intervene and supervise when necessary  - Involve family in performance of ADLs  - Assess for home care needs following discharge   - Consider OT consult to assist with ADL evaluation and planning for discharge  - Provide patient education as appropriate  Outcome: Progressing  Goal: Maintain proper alignment of affected body part  Description: INTERVENTIONS:  - Support, maintain and protect limb and body alignment  - Provide patient/ family with appropriate education  Outcome: Progressing

## 2022-03-04 ENCOUNTER — NURSING HOME VISIT (OUTPATIENT)
Dept: GERIATRICS | Facility: OTHER | Age: 87
End: 2022-03-04
Payer: COMMERCIAL

## 2022-03-04 DIAGNOSIS — I10 ESSENTIAL HYPERTENSION: ICD-10-CM

## 2022-03-04 DIAGNOSIS — E11.22 TYPE 2 DIABETES MELLITUS WITH STAGE 4 CHRONIC KIDNEY DISEASE, WITHOUT LONG-TERM CURRENT USE OF INSULIN (HCC): ICD-10-CM

## 2022-03-04 DIAGNOSIS — N17.9 ACUTE KIDNEY INJURY SUPERIMPOSED ON CKD (HCC): Primary | ICD-10-CM

## 2022-03-04 DIAGNOSIS — D63.1 ANEMIA IN STAGE 4 CHRONIC KIDNEY DISEASE (HCC): ICD-10-CM

## 2022-03-04 DIAGNOSIS — N18.4 ANEMIA IN STAGE 4 CHRONIC KIDNEY DISEASE (HCC): ICD-10-CM

## 2022-03-04 DIAGNOSIS — N18.9 ACUTE KIDNEY INJURY SUPERIMPOSED ON CKD (HCC): Primary | ICD-10-CM

## 2022-03-04 DIAGNOSIS — M25.571 RIGHT ANKLE PAIN, UNSPECIFIED CHRONICITY: ICD-10-CM

## 2022-03-04 DIAGNOSIS — N25.81 SECONDARY HYPERPARATHYROIDISM OF RENAL ORIGIN (HCC): ICD-10-CM

## 2022-03-04 DIAGNOSIS — N18.4 TYPE 2 DIABETES MELLITUS WITH STAGE 4 CHRONIC KIDNEY DISEASE, WITHOUT LONG-TERM CURRENT USE OF INSULIN (HCC): ICD-10-CM
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
## 2022-03-04 DIAGNOSIS — R19.4 BOWEL HABIT CHANGES: ICD-10-CM

## 2022-03-04 DIAGNOSIS — F32.9 REACTIVE DEPRESSION: ICD-10-CM

## 2022-03-04 DIAGNOSIS — R33.9 URINARY RETENTION: ICD-10-CM
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
## 2022-03-04 LAB — GLUCOSE SERPL-MCNC: 111 MG/DL (ref 65–140)

## 2022-03-04 PROCEDURE — 99305 1ST NF CARE MODERATE MDM 35: CPT | Performed by: FAMILY MEDICINE

## 2022-03-04 NOTE — ASSESSMENT & PLAN NOTE
Lab Results   Component Value Date    HGBA1C 5 1 10/12/2021     -not on oral medications  -diet controlled  -continue diabetic diet

## 2022-03-04 NOTE — ASSESSMENT & PLAN NOTE
Lab Results   Component Value Date    EGFR 16 03/02/2022    EGFR 17 02/28/2022    EGFR 17 02/27/2022    CREATININE 2 47 (H) 03/02/2022    CREATININE 2 27 (H) 02/28/2022    CREATININE 2 33 (H) 02/27/2022     -Current hgb: 8 3  -no blood loss reported  -monitor CBC periodically or sooner if symptoms

## 2022-03-04 NOTE — ASSESSMENT & PLAN NOTE
-recent loss in family (daughter) in October  -symptoms of decreased PO intake, weight loss since then  -evaluated by PCP and started on citalopram 10 mg daily  -patient states has good support at home, has understanding of the grieving process  -feels that has been slowly improving in her mood and appetite--> eating smaller meals more often  -prior living independent at home and ADL's, ambulates with walker at home  -AAOx3 today (person, place, time)  -continue citalopram 10 mg daily  -Add: mirtazapine 7 5 mg (help in appetite as well)  -maintain reorientation, sleep/awake cycle  -monitor for symptoms of confusion/desorientation

## 2022-03-04 NOTE — ASSESSMENT & PLAN NOTE
Blood pressure:     -home medication: lisinopril 10 mg, amlodipine 5 mg BID and lasix 20 mg daily  -held: lasix and lisinopril during hospitalization due to GAY  -current medication: amlodipine 5 mg BID  -continue to hold lisinopril   -avoid hypotensive episodes

## 2022-03-04 NOTE — ASSESSMENT & PLAN NOTE
-X ray ankle findings of: 'diffuse soft tissue swelling of right ankle with potential soft tissue gas '  -evaluated by podiatry during hospitalization , findings of pain in insertion of Achilles tendon and stasis dermatitis   -recommendations for physical therapy for strengthening exercises and weight baring as tolerating

## 2022-03-04 NOTE — ASSESSMENT & PLAN NOTE
-hx as per chart review, requiring straight catheterization in the past  -chronic UTI's--> treated more recently outpatient with macrobid and subsequently for another UTI episode IV ceftriaxone during hospitalization  -recent urine culture 3/3/22 positive for E coli--> patient asymptomatic  -bacterial growth in new culture can be considered as a contaminant given chronic hx of UTI and asymptomatic, not requiring treatment at this time  -monitor for any acute changes or new onset of symptoms

## 2022-03-04 NOTE — ASSESSMENT & PLAN NOTE
-reported fall at home days prior to presentation to hospital  -ambulates with cane, lives alone and independent of ADL's at home  -Physical therapy as scheduled in TCU unit  -fall precautions  -patient reports plan after short term rehab to assisted living facility--> daughter is working in arrangements for this

## 2022-03-04 NOTE — PROGRESS NOTES
[facility]    History and Physical  POS: 32    Records Reviewed include: Hospital records      Chief Complaint/ Reason for Admission:     History of Present Illness:       Case of 79 y/o female with PMHx of DM, secondary hyperparathyroid of renal origin, HTN, CKD 4, protein-calorie malnutrition, anemia in CKD, gout, urinary retention, HLD, reactive depression who was brought to the hospital due to decreased appetite with poor oral intake and weight loss as well as a fall reported that occurred days prior on presentation to the hospital              On evaluation, CT head was negative  CT abdomen with findings of 'bladder thickening and perivesical inflammatory changes, consistent with urine tract infection ' Patient was treated for cystitis with IV antibiotics ceftriaxone  Lab work remarkable for GAY (superimposed on CKD), received treatment with IV bicarbonate and then continued on bicarbonate tablets  X-ray of the right ankle with findings of: 'diffuse tissue swelling right ankle with potential soft tissue gas  Intact screw and plate device of the distal fibula and medial malleolar fixation screw'  Diagnosis for right Achilles tendon pain and chronic stasis dermatitis, Podiatry evaluated patient with recommendations of Physical therapy, leg elevation and voltaren gel for pain relief  Upon physical therapy evaluation, recommendations for short term rehab for optimization on mobility  Patient evaluated today at bedside, reported that since one of her daughters passed away on October, she started feeling with decreased interests in eating, started losing weight and feeling of 'not wanting to do anything'  One of her daughters noticed this decline, and was brought for evaluation where was started with medications for it  Patient reports that has noticed some improvement in symptoms since hospitalization, and actually has been feeling much better   Her appetite has been improving slowly, patient states to be eating 'just to eat' but is tolerating more foods now  Her main concern at this time is her right ankle, which she states hurting for about 3-4 days ago, and also left ankle bothering as well  She reports prior history of gout in the past and is concerned that symptoms might be related to gout flare up  Allergies    Allergies   Allergen Reactions    Influenza Virus Vaccine      Other reaction(s): HX of Guillain-Laura Syndrome    Sulfa Antibiotics        Past Medical History  Past Medical History:   Diagnosis Date    Diabetes mellitus ([facility] )     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 ([facility] )     LAST ASSESSED: [id number]    Hyperlipidemia     Hypertension     Renal disorder     Squamous cell carcinoma, scalp/neck     LAST ASSESSED: [id number]        Past Surgical History:   Procedure Laterality Date    BLADDER SURGERY      LAST ASSESSED: [id number]    PELVIC FLOOR REPAIR      VAGINAL SURG INSERTION OF MESH    TOTAL ABDOMINAL HYSTERECTOMY W/ BILATERAL SALPINGOOPHORECTOMY      LAST ASSESSED: [id number]       Family History  Family History   Problem Relation Age of Onset    Hypertension Mother     Hypertension Father     Kidney disease Sister         CHRONIC    Alcohol abuse Family     Substance Abuse Family        Social History  Social History     Tobacco Use   Smoking Status Former Smoker   Smokeless Tobacco Never Used      Social History     Substance and Sexual Activity   Alcohol Use Yes    Comment: SOCIAL      Social History     Substance and Sexual Activity   Drug Use No        Lives: Home,  Social Support: yes  Fall in the past 12 months: yes  Use of assistance Device: Cane and Walker    Physical Exam    Vital Signs    Blood pressure: 119/36/ pulse: 65/ RR: 19/ O2 sat: 97% room air/ weight: 134 8 lbs/      Constitutional: thin body habitus patient      Physical Exam  Vitals reviewed  Constitutional:       General: She is not in acute distress  Appearance: Normal appearance   She is not ill-appearing, toxic-appearing or diaphoretic  Eyes:      Extraocular Movements: Extraocular movements intact  Cardiovascular:      Rate and Rhythm: Normal rate and regular rhythm  Pulses: Normal pulses  Heart sounds: Normal heart sounds  No murmur heard  Pulmonary:      Effort: Pulmonary effort is normal  No respiratory distress  Breath sounds: No wheezing  Comments: Crackles in left lower lobe bases  Abdominal:      General: Abdomen is flat  Bowel sounds are normal  There is no distension  Palpations: Abdomen is soft  Tenderness: There is no abdominal tenderness  Musculoskeletal:      Right lower leg: Edema present  Left lower leg: Edema present  Comments: Pitting edema in bilateral dorsal aspect of feet  Bilateral ankle edema  Able to move left foot up/down, some discomfort reported when moving right foot/ankle sideways  Adequate ROM of left foot   Neurological:      Mental Status: She is alert and oriented to person, place, and time  Mental status is at baseline  Motor: No weakness  Psychiatric:         Thought Content: Thought content normal       Comments: Pleasant, conversational, coherent during conversation         Review of Systems:  Review of Systems   Constitutional: Negative for fever  Respiratory: Negative for cough, shortness of breath and wheezing  Cardiovascular: Negative for chest pain, palpitations and leg swelling  Gastrointestinal: Negative for abdominal pain, blood in stool and constipation  Genitourinary: Negative for difficulty urinating  Musculoskeletal:        Right ankle pain   Neurological: Negative for headaches  Psychiatric/Behavioral: The patient is not nervous/anxious  List of Current Medications:    Medication reviewed  All orders signed  Complete list is in the paper chart       Allergies    Allergies   Allergen Reactions    Influenza Virus Vaccine      Other reaction(s): HX of Guillain-Evans Syndrome    Sulfa Antibiotics        Labs/Diagnostics (reviewed by this provider): I personally reviewed lab results and imaging studies  Full reports are in the paper chart       Assessment/Plan:    Type 2 diabetes mellitus with diabetic chronic kidney disease ([place] [address] )    Lab Results   Component Value Date    HGBA1C 5 1 10/12/2021     -diet controlled  -monitor for hypoglycemic events    Acute kidney injury superimposed on CKD [facility])  Lab Results   Component Value Date    EGFR 16 03/02/2022    EGFR 17 02/28/2022    EGFR 17 02/27/2022    CREATININE 2 47 (H) 03/02/2022    CREATININE 2 27 (H) 02/28/2022    CREATININE 2 33 (H) 02/27/2022     -CKD stage 4; follows with nephrology outpatient  -baseline creatinine: 2 1-2 35  -treated for hyperkalemia with IV bicarbonate and then PO bicarbonate replacement  -hx of urine retention and prior requirement of straight catheterization  -monitor I/O's  -daily weights  -hold lisinopril and lasix  -f/u BMP tomorrow      Essential hypertension  Blood pressure:     -home medication: lisinopril 10 mg, amlodipine 5 mg BID and lasix 20 mg daily  -held: lasix and lisinopril during hospitalization due to GAY  -current medication: amlodipine 5 mg BID  -continue to hold lisinopril   -avoid hypotensive episodes      Secondary hyperparathyroidism of renal origin ([place] [address] )  -on: cinacalcet 30 mg M, W, F    Controlled type 2 diabetes mellitus with diabetic polyneuropathy ([place] [address] )    Lab Results   Component Value Date    HGBA1C 5 1 10/12/2021     -not on oral medications  -diet controlled  -continue diabetic diet    Anemia in stage 4 chronic kidney disease ([place] [address] )  Lab Results   Component Value Date    EGFR 16 03/02/2022    EGFR 17 02/28/2022    EGFR 17 02/27/2022    CREATININE 2 47 (H) 03/02/2022    CREATININE 2 27 (H) 02/28/2022    CREATININE 2 33 (H) 02/27/2022     -Current hgb: 8 3  -no blood loss reported  -monitor CBC periodically or sooner if symptoms     Right ankle pain  -X ray ankle findings of: 'diffuse soft tissue swelling of right ankle with potential soft tissue gas '  -evaluated by podiatry during hospitalization , findings of pain in insertion of Achilles tendon and stasis dermatitis   -recommendations for physical therapy for strengthening exercises and weight baring as tolerating    Fall at home, initial encounter  -reported fall at home days prior to presentation to hospital  -ambulates with cane, lives alone and independent of ADL's at home  -Physical therapy as scheduled in TCU unit  -fall precautions  -patient reports plan after short term rehab to assisted living facility--> daughter is working in arrangements for this    Reactive depression  -recent loss in family (daughter) in October  -symptoms of decreased PO intake, weight loss since then  -evaluated by PCP and started on citalopram 10 mg daily  -patient states has good support at home, has understanding of the grieving process  -feels that has been slowly improving in her mood and appetite--> eating smaller meals more often  -prior living independent at home and ADL's, ambulates with walker at home  -AAOx3 today (person, place, time)  -continue citalopram 10 mg daily  -Add: mirtazapine 7 5 mg (help in appetite as well)  -maintain reorientation, sleep/awake cycle  -monitor for symptoms of confusion/desorientation    Bowel habit changes  -reports constipation  -add: senna to bowel regimen    Urinary retention  -hx as per chart review, requiring straight catheterization in the past  -chronic UTI's--> treated more recently outpatient with macrobid and subsequently for another UTI episode IV ceftriaxone during hospitalization  -recent urine culture 3/3/22 positive for E coli--> patient asymptomatic  -bacterial growth in new culture can be considered as a contaminant given chronic hx of UTI and asymptomatic, not requiring treatment at this time  -monitor for any acute changes or new onset of symptoms       Pain: controlled  Rehab Potential:Good  Patient Informed of Medical Condition: yes   Patient is Capable of Understanding Their Right: yes   Discharge Plan: upon clinical improvement from physical therapy  Vaccination: There is no immunization history for the selected administration types on file for this patient    Advanced Directives: yes: Yes   Code status:DNR (Per discussion with resident or POA)  PCP: MD Brent Macias MD  Family Medicine  1/8/32199:20 PM

## 2022-03-04 NOTE — ASSESSMENT & PLAN NOTE
Lab Results   Component Value Date    HGBA1C 5 1 10/12/2021     -diet controlled  -monitor for hypoglycemic events

## 2022-03-04 NOTE — ASSESSMENT & PLAN NOTE
Lab Results   Component Value Date    EGFR 16 03/02/2022    EGFR 17 02/28/2022    EGFR 17 02/27/2022    CREATININE 2 47 (H) 03/02/2022    CREATININE 2 27 (H) 02/28/2022    CREATININE 2 33 (H) 02/27/2022     -CKD stage 4; follows with nephrology outpatient  -baseline creatinine: 2 1-2 35  -treated for hyperkalemia with IV bicarbonate and then PO bicarbonate replacement  -hx of urine retention and prior requirement of straight catheterization  -monitor I/O's  -daily weights  -hold lisinopril and lasix  -f/u BMP tomorrow

## 2022-03-07 ENCOUNTER — NURSING HOME VISIT (OUTPATIENT)
Dept: GERIATRICS | Facility: OTHER | Age: 87
End: 2022-03-07
Payer: COMMERCIAL

## 2022-03-07 VITALS
RESPIRATION RATE: 18 BRPM | TEMPERATURE: 97.5 F | WEIGHT: 128.09 LBS | HEIGHT: 60 IN | BODY MASS INDEX: 25.15 KG/M2 | OXYGEN SATURATION: 96 % | DIASTOLIC BLOOD PRESSURE: 75 MMHG | SYSTOLIC BLOOD PRESSURE: 113 MMHG | HEART RATE: 69 BPM

## 2022-03-07 DIAGNOSIS — D63.1 ANEMIA IN STAGE 4 CHRONIC KIDNEY DISEASE (HCC): ICD-10-CM

## 2022-03-07 DIAGNOSIS — N18.4 ANEMIA IN STAGE 4 CHRONIC KIDNEY DISEASE (HCC): ICD-10-CM

## 2022-03-07 DIAGNOSIS — I95.1 ORTHOSTATIC HYPOTENSION: Primary | ICD-10-CM

## 2022-03-07 DIAGNOSIS — M1A.00X0 IDIOPATHIC CHRONIC GOUT WITHOUT TOPHUS, UNSPECIFIED SITE: ICD-10-CM

## 2022-03-07 DIAGNOSIS — E11.42 CONTROLLED TYPE 2 DIABETES MELLITUS WITH DIABETIC POLYNEUROPATHY, WITHOUT LONG-TERM CURRENT USE OF INSULIN (HCC): ICD-10-CM

## 2022-03-07 LAB
BACTERIA UR CULT: ABNORMAL
BACTERIA UR CULT: ABNORMAL

## 2022-03-07 PROCEDURE — 99309 SBSQ NF CARE MODERATE MDM 30: CPT | Performed by: FAMILY MEDICINE

## 2022-03-07 NOTE — ASSESSMENT & PLAN NOTE
Patient with swelling and redness left hand, denies pain  Will encourage to keep left upper extremity elevated, apply ice  Will start allopurinol 50 mg daily for chronic gout  Offer Tylenol as needed for pain

## 2022-03-07 NOTE — ASSESSMENT & PLAN NOTE
Lab Results   Component Value Date    HGBA1C 5 1 10/12/2021     Diabetes well controlled with diet, will continue  Increase gabapentin to 100 mg in a m   And 400 at bedtime, monitor and adjust dose as needed  Avoid hypoglycemia

## 2022-03-07 NOTE — ASSESSMENT & PLAN NOTE
Home blood pressure medication  Continue to monitor  Goal for SBP due to age and comorbidities will be around 150  Encourage p o   Hydration

## 2022-03-07 NOTE — PROGRESS NOTES
[address]  [phone]  [facility] of Service: nursing home place of service: POS [facility] Skilled Care-Part A Coverage      NAME: Sina Estrada  AGE: 80 y o  SEX: female [id number]    DATE OF ENCOUNTER: 3/7/2022    Assessment and Plan     Problem List Items Addressed This Visit        Endocrine    Controlled type 2 diabetes mellitus with diabetic polyneuropathy ([address] )       Lab Results   Component Value Date    HGBA1C 5 1 10/12/2021     Diabetes well controlled with diet, will continue  Increase gabapentin to 100 mg in a m  And 400 at bedtime, monitor and adjust dose as needed  Avoid hypoglycemia            Cardiovascular and Mediastinum    Orthostatic hypotension - Primary     Home blood pressure medication  Continue to monitor  Goal for SBP due to age and comorbidities will be around 150  Encourage p o  Hydration            Genitourinary    Anemia in stage 4 chronic kidney disease [facility])     Lab Results   Component Value Date    EGFR 14 03/07/2022    EGFR 14 03/05/2022    EGFR 16 03/02/2022    CREATININE 2 77 (H) 03/07/2022    CREATININE 2 65 (H) 03/05/2022    CREATININE 2 47 (H) 03/02/2022     Creatinine stable  Will avoid nephrotoxic medication  Encourage po hydration  Will monitor BMP  Hemoglobin at 7 4 today  Will check iron studies and consider iron supplements  Monitor for signs of bleeding  Continue to monitor CBC            Other    Gout     Patient with swelling and redness left hand, denies pain  Will encourage to keep left upper extremity elevated, apply ice  Will start allopurinol 50 mg daily for chronic gout  Offer Tylenol as needed for pain                   Chief Complaint     Follow up     History of Present Illness     Sina Estrada is a 80 y o  female who was seen today for follow up  Patient seen and examined at bedside, states she feels well overall and she is able to work with physical therapy  Reports feeling tired  States that her appetite is improved denies difficulty sleeping  No chest pain palpitation eats or shortness of breath  No cough fever chills  Denies abdominal pain constipation nausea  No dysuria hematuria  Reports chronic joint pain  States that swelling in her lower extremity is getting worse          The following portions of the patient's history were reviewed and updated as appropriate: allergies, current medications, past family history, past medical history, past social history, past surgical history and problem list     Review of Systems     Review of Systems   Constitutional: Negative for chills and fever  HENT: Negative for congestion and rhinorrhea  Respiratory: Negative for cough, shortness of breath and wheezing  Cardiovascular: Positive for leg swelling  Negative for chest pain and palpitations  Gastrointestinal: Negative for abdominal pain and constipation  Endocrine: Negative for cold intolerance  Genitourinary: Negative for difficulty urinating, dysuria and hematuria  Musculoskeletal: Positive for arthralgias and gait problem  Skin: Negative for wound  Allergic/Immunologic: Negative for environmental allergies  Neurological: Negative for dizziness and seizures  Hematological: Does not bruise/bleed easily  Psychiatric/Behavioral: Negative for behavioral problems and sleep disturbance         Active Problem List     Patient Active Problem List   Diagnosis    Vitamin D insufficiency    Retinal hole of right eye    Peripheral neuropathy    Essential hypertension    Pure hypercholesterolemia    Gout    Gait disturbance    Ectropion    Controlled type 2 diabetes mellitus with diabetic polyneuropathy (HCC)    Acquired cyst of kidney    Chronic kidney disease, stage IV (severe) (HCC)    Borderline glaucoma    Bilateral deafness    Atrophic kidney, acquired    Allergic rhinitis    Vertebral anomaly    Acute cystitis without hematuria    Bowel habit changes  Rectal discharge    Generalized edema    Fecal smearing    Type 2 diabetes mellitus with diabetic chronic kidney disease ([facility]    Secondary hyperparathyroidism of renal origin ([facility] )    Hypertensive chronic kidney disease    Elevated uric acid in blood    Urinary retention    [facility]    Anemia in stage 4 chronic kidney disease (HCC)    Hyperkalemia    Adult failure to thrive    Fall at home, initial encounter    Moderate protein-calorie malnutrition (HCC)    Leukocytosis    Right ankle pain    Reactive depression    Orthostatic hypotension       Objective     Vital Signs:     Blood pressure 113/56 Heart Rate: 78 Respiratory Rate 18   Temperature 97 5 Oxygen Saturation 96%RA Weight 134 2lbs    Physical Exam  Vitals and nursing note reviewed  Constitutional:       General: She is not in acute distress  Appearance: She is not diaphoretic  Comments: Frail looking   HENT:      Head: Normocephalic and atraumatic  Ears:      Comments: [place]  Eyes:      General:         Right eye: No discharge  Left eye: No discharge  Pupils: Pupils are equal, round, and reactive to light  Cardiovascular:      Rate and Rhythm: Normal rate and regular rhythm  Heart sounds: Normal heart sounds  No murmur heard  Pulmonary:      Effort: Pulmonary effort is normal  No respiratory distress  Breath sounds: Normal breath sounds  No wheezing  Abdominal:      Palpations: Abdomen is soft  Tenderness: There is no abdominal tenderness  There is no guarding or rebound  Musculoskeletal:      Cervical back: Normal range of motion and neck supple  Right lower leg: Edema present  Left lower leg: Edema present  Skin:     General: Skin is warm and dry  Neurological:      Mental Status: She is alert and oriented to person, place, and time  Mental status is at baseline     Psychiatric:         Behavior: Behavior normal          Pertinent Laboratory/Diagnostic Studies:  Laboratory and Imaging studies reviewed  Full report in the paper chart  Current Medications   Medications reviewed and updated in facility chart      Name: Elvis To  : 1927  MRN: [id number]        Felisa Phillip MD  Geriatric Medicine  3/7/2022 12:56 PM

## 2022-03-07 NOTE — UTILIZATION REVIEW
Notification of Discharge   This is a Notification of Discharge from our facility [address]  Please be advised that this patient has been discharge from our facility  Below you will find the admission and discharge date and time including the patients disposition  UTILIZATION REVIEW CONTACT:  Galilea Anders  Utilization   Network Utilization Review Department  Phone: [phone] x carefully listen to the prompts  All voicemails are confidential   Email: [email or web address]     PHYSICIAN ADVISORY SERVICES:  FOR [facility] REVIEW - MEDICAL NECESSITY DENIAL  Phone: [phone]  Fax: [phone]  Email: [email or web address]     PRESENTATION DATE: 2/23/2022  4:49 PM  OBERVATION ADMISSION DATE:   INPATIENT ADMISSION DATE: 2/23/22  8:33 PM   DISCHARGE DATE: 3/3/2022  3:38 PM  DISPOSITION: Released to SNF/TCU/SNU Facility Released to SNF/TCU/SNU Facility      IMPORTANT INFORMATION:  Send all requests for admission clinical reviews, approved or denied determinations and any other requests to dedicated fax number below belonging to the campus where the patient is receiving treatment   List of dedicated fax numbers:  [address] [place] [address] DENIALS (Administrative/Medical Necessity) [phone]   [address]  (Maternity/NICU/Pediatrics) [phone]   Danilo Bruce [phone]   [address] [facility] [phone]   [address] [place] [address] [phone]   [address] [place] [address],[address] [phone]   [facility]  [phone]   [address] [facility], [facility]  [address] [facility] [address] [facility] [phone]

## 2022-03-07 NOTE — ASSESSMENT & PLAN NOTE
Lab Results   Component Value Date    EGFR 14 03/07/2022    EGFR 14 03/05/2022    EGFR 16 03/02/2022    CREATININE 2 77 (H) 03/07/2022    CREATININE 2 65 (H) 03/05/2022    CREATININE 2 47 (H) 03/02/2022     Creatinine stable  Will avoid nephrotoxic medication  Encourage po hydration  Will monitor BMP    Hemoglobin at 7 4 today  Will check iron studies and consider iron supplements  Monitor for signs of bleeding  Continue to monitor CBC

## 2022-03-09 ENCOUNTER — NURSING HOME VISIT (OUTPATIENT)
Dept: GERIATRICS | Facility: OTHER | Age: 87
End: 2022-03-09
Payer: COMMERCIAL

## 2022-03-09 DIAGNOSIS — F32.9 REACTIVE DEPRESSION: ICD-10-CM

## 2022-03-09 DIAGNOSIS — D63.1 ANEMIA IN STAGE 4 CHRONIC KIDNEY DISEASE (HCC): ICD-10-CM

## 2022-03-09 DIAGNOSIS — N18.4 ANEMIA IN STAGE 4 CHRONIC KIDNEY DISEASE (HCC): ICD-10-CM

## 2022-03-09 DIAGNOSIS — I10 ESSENTIAL HYPERTENSION: Primary | ICD-10-CM

## 2022-03-09 DIAGNOSIS — I95.1 ORTHOSTATIC HYPOTENSION: ICD-10-CM

## 2022-03-09 DIAGNOSIS — R19.4 BOWEL HABIT CHANGES: ICD-10-CM

## 2022-03-09 DIAGNOSIS — M1A.00X0 IDIOPATHIC CHRONIC GOUT WITHOUT TOPHUS, UNSPECIFIED SITE: ICD-10-CM
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
## 2022-03-09 PROCEDURE — 99309 SBSQ NF CARE MODERATE MDM 30: CPT | Performed by: FAMILY MEDICINE

## 2022-03-09 RX ORDER — CITALOPRAM 10 MG/1
TABLET ORAL
Qty: 30 TABLET | Refills: 1 | Status: SHIPPED | OUTPATIENT
Start: 2022-03-09 | End: 2022-06-01

## 2022-03-09 NOTE — PROGRESS NOTES
[address] [facility]  [phone]  [facility] of Service: nursing home place of service: POS 31 Skilled Care-Part A Coverage      NAME: Foreign Hughes  AGE: 80 y o   SEX: female [phone]    DATE OF ENCOUNTER: 3/9/2022    Assessment and Plan     Essential hypertension  Blood pressure: 136/75    -controlled without medications  -Off home blood pressure medications: lisinopril, amlodipine  -avoid hypotensive episodes given ongoing CKD as well as to prevent orthostatic hypotension      Orthostatic hypotension  BP: 136/75    -home blood pressure medications: lisinopril and amlodipine on hold  -goal   -Po hydration encouraged,beneficial given ongoing CKD with decline in renal function    Anemia in stage 4 chronic kidney disease ([facility] [address] )  Lab Results   Component Value Date    EGFR 11 03/09/2022    EGFR 14 03/07/2022    EGFR 14 03/05/2022    CREATININE 3 16 (H) 03/09/2022    CREATININE 2 77 (H) 03/07/2022    CREATININE 2 65 (H) 03/05/2022     -Baseline on hospital discharge: 2 1-2 35  -Creatinine trending upwards; most recent Cr: 3 16/ GFR: 11  -patient reports not much oral hydration   -having urine output  -CBC: Hgb 8 6; MCV 97; folate and B12 normal  -iron studies: low iron, high ferritin--> suggestive for iron deficiency with anemia of chronic disease in the setting of CKD  -start: ferrous sulfate every other day--> goal Hgb >9  -on: lasix 40 mg daily for leg swelling--> improving  -avoid nephrotoxic medications  -renally dose medications  -PO oral hydration encouraged  -monitor for any acute changes in decrease urination/ hemodynamic instability  -monitor CBC periodically, or sooner if any acute changes    Reactive depression  -current medications: escitalopram 10 mg and mirtazapine 7 5 mg  -reports improvement in mood and appetite as well  -AAOx3 today  -maintain sleep/awake cycle  -reorientation as needed      Fall at home, initial encounter  -improving gradually with physical therapy  -ankle discomfort much improved  -continue with physical therapy as per schedule  -fall precautions    Bowel habit changes  -reports regular bowel movements  -D/C docusate  -continue senna  2 tabs daily    Gout  -on allopurinol, tolerating well  -tylenol PRN            Chief Complaint     Follow up     History of Present Illness     Jimmy Alpers is a 80 y o  female who was seen today for follow up  She indicates to have been feeling better, her mood and appetite has been improving  She does not routinely drinks a lot of water  Has been able to urinate and have bowel movements without difficulty  Has noticed improvement with physical therapy and ankle pain has much improved  She has wraps in lower extremities to help with the leg swelling  She is AAOx 3 today (person, place- [facility], time: able to recall days of the week forward and backwards)  The following portions of the patient's history were reviewed and updated as appropriate: allergies, current medications, past family history, past medical history, past social history, past surgical history and problem list     Review of Systems     Review of Systems   Constitutional: Negative for fever  Respiratory: Negative for cough, shortness of breath and wheezing  Cardiovascular: Negative for chest pain, palpitations and leg swelling  Gastrointestinal: Negative for abdominal pain, constipation and diarrhea  Genitourinary: Negative for decreased urine volume and difficulty urinating  Musculoskeletal: Positive for gait problem  Right ankle pain- improved   Hematological: Bruises/bleeds easily  Psychiatric/Behavioral: The patient is not nervous/anxious          Active Problem List     Patient Active Problem List   Diagnosis    Vitamin D insufficiency    Retinal hole of right eye    Peripheral neuropathy    Essential hypertension    Pure hypercholesterolemia    Gout    Gait disturbance  Ectropion    Controlled type 2 diabetes mellitus with diabetic polyneuropathy (HCC)    Acquired cyst of kidney    Chronic kidney disease, stage IV (severe) ([facility])    Borderline glaucoma    Bilateral deafness    Atrophic kidney, acquired    Allergic rhinitis    Vertebral anomaly    Acute cystitis without hematuria    Bowel habit changes    Rectal discharge    Generalized edema    Fecal smearing    Type 2 diabetes mellitus with diabetic chronic kidney disease (HCC)    Secondary hyperparathyroidism of renal origin ([address] )    Hypertensive chronic kidney disease    Elevated uric acid in blood    Urinary retention    Acute kidney injury superimposed on CKD (HCC)    Anemia in stage 4 chronic kidney disease (HCC)    Hyperkalemia    Adult failure to thrive    Fall at home, initial encounter    Moderate protein-calorie malnutrition (HCC)    Leukocytosis    Right ankle pain    Reactive depression    Orthostatic hypotension       Objective     Vital Signs:     Blood pressure 136/75 Heart Rate: 86 Respiratory Rate 16   Temperature 97 8 Oxygen Saturation 94% room air Weight 135 3 lbs    Physical Exam  Vitals reviewed  Constitutional:       General: She is not in acute distress  Appearance: She is not ill-appearing, toxic-appearing or diaphoretic  Comments: Thin body habitus   Eyes:      Extraocular Movements: Extraocular movements intact  Cardiovascular:      Rate and Rhythm: Normal rate and regular rhythm  Pulses: Normal pulses  Heart sounds: Normal heart sounds  No murmur heard  Pulmonary:      Effort: Pulmonary effort is normal  No respiratory distress  Breath sounds: Normal breath sounds  No wheezing  Abdominal:      General: Abdomen is flat  Bowel sounds are normal  There is no distension  Tenderness: There is no abdominal tenderness     Musculoskeletal:      Comments: Improved right ankle pain  Bilateral lower extremities covered in bandages   Skin: General: Skin is warm  Neurological:      Mental Status: She is alert and oriented to person, place, and time  Mental status is at baseline  Comments: Person, place ([facility] , time ( able to recall days of the week forward and backwards)   Psychiatric:         Mood and Affect: Mood normal       Comments: Pleasant, conversational, coherent         Pertinent Laboratory/Diagnostic Studies:  Laboratory and Imaging studies reviewed  Full report in the paper chart  Current Medications   Medications reviewed and updated in facility chart      Name: Digna Rodriguez  : 1927  MRN: [id number]        Sandra Hamm MD  Family Medicine  3/9/2022 2:30 PM

## 2022-03-09 NOTE — ASSESSMENT & PLAN NOTE
-improving gradually with physical therapy  -ankle discomfort much improved  -continue with physical therapy as per schedule  -fall precautions

## 2022-03-09 NOTE — ASSESSMENT & PLAN NOTE
Lab Results   Component Value Date    EGFR 11 03/09/2022    EGFR 14 03/07/2022    EGFR 14 03/05/2022    CREATININE 3 16 (H) 03/09/2022    CREATININE 2 77 (H) 03/07/2022    CREATININE 2 65 (H) 03/05/2022     -Baseline on hospital discharge: 2 1-2 35  -Creatinine trending upwards; most recent Cr: 3 16/ GFR: 11  -patient reports not much oral hydration   -having urine output  -CBC: Hgb 8 6; MCV 97; folate and B12 normal  -iron studies: low iron, high ferritin--> suggestive for iron deficiency with anemia of chronic disease in the setting of CKD  -start: ferrous sulfate every other day--> goal Hgb >9  -on: lasix 40 mg daily for leg swelling--> improving  -avoid nephrotoxic medications  -renally dose medications  -PO oral hydration encouraged  -monitor for any acute changes in decrease urination/ hemodynamic instability  -monitor CBC periodically, or sooner if any acute changes

## 2022-03-09 NOTE — ASSESSMENT & PLAN NOTE
Blood pressure: 136/75    -controlled without medications  -Off home blood pressure medications: lisinopril, amlodipine  -avoid hypotensive episodes given ongoing CKD as well as to prevent orthostatic hypotension

## 2022-03-09 NOTE — ASSESSMENT & PLAN NOTE
-current medications: escitalopram 10 mg and mirtazapine 7 5 mg  -reports improvement in mood and appetite as well  -AAOx3 today  -maintain sleep/awake cycle  -reorientation as needed

## 2022-03-09 NOTE — ASSESSMENT & PLAN NOTE
BP: 136/75    -home blood pressure medications: lisinopril and amlodipine on hold  -goal   -Po hydration encouraged,beneficial given ongoing CKD with decline in renal function

## 2022-03-11 ENCOUNTER — NURSING HOME VISIT (OUTPATIENT)
Dept: GERIATRICS | Facility: OTHER | Age: 87
End: 2022-03-11
Payer: COMMERCIAL

## 2022-03-11 DIAGNOSIS — N18.4 ANEMIA IN STAGE 4 CHRONIC KIDNEY DISEASE (HCC): ICD-10-CM

## 2022-03-11 DIAGNOSIS — D63.1 ANEMIA IN STAGE 4 CHRONIC KIDNEY DISEASE (HCC): ICD-10-CM

## 2022-03-11 DIAGNOSIS — I10 ESSENTIAL HYPERTENSION: ICD-10-CM

## 2022-03-11 DIAGNOSIS — R19.4 BOWEL HABIT CHANGES: ICD-10-CM

## 2022-03-11 DIAGNOSIS — M1A.00X1 IDIOPATHIC CHRONIC GOUT WITH TOPHUS, UNSPECIFIED SITE: Primary | ICD-10-CM

## 2022-03-11 PROCEDURE — 99309 SBSQ NF CARE MODERATE MDM 30: CPT | Performed by: FAMILY MEDICINE

## 2022-03-11 NOTE — ASSESSMENT & PLAN NOTE
-reports no bowel movement in the past '4 days'  -d/c docusate  -increase to: senna 2 tabs BID  -if not improvement, consider adding miralax

## 2022-03-11 NOTE — ASSESSMENT & PLAN NOTE
-on allopurinol  -physical exam: swelling localized in ring middle finger on the left hand associated with erythema, tender, swollen, some discomfort reported  -erythema in dorsal aspect of hand as well  -uric acid today-->10 8  -contraindication for NSAIDs and colchicine due to CKD/ elevated creatinine levels    Plan:  -Start : short course of prednisone 20 mg x 5 days  -Start: doxycycline for cover for potential infectious process tender, redness  -Elevation of arm, ice encouraged  -monitor area for any acute changes/ progression

## 2022-03-11 NOTE — PROGRESS NOTES
[address]  [phone]  [facility] of Service: nursing home place of service: POS 31 Skilled Care-Part A Coverage      NAME: Digna Rodriguez  AGE: 80 y o  SEX: female [phone]    DATE OF ENCOUNTER: 3/11/2022    Assessment and Plan     Gout  -on allopurinol  -physical exam: swelling localized in ring middle finger on the left hand associated with erythema, tender, swollen, some discomfort reported  -erythema in dorsal aspect of hand as well  -uric acid today-->10 8  -contraindication for NSAIDs and colchicine due to CKD/ elevated creatinine levels    Plan:  -Start : short course of prednisone 20 mg x 5 days  -Start: doxycycline for cover for potential infectious process tender, redness  -Elevation of arm, ice encouraged  -monitor area for any acute changes/ progression        Anemia in stage 4 chronic kidney disease ([facility] [address] )  Lab Results   Component Value Date    EGFR 12 03/11/2022    EGFR 11 03/09/2022    EGFR 14 03/07/2022    CREATININE 3 07 (H) 03/11/2022    CREATININE 3 16 (H) 03/09/2022    CREATININE 2 77 (H) 03/07/2022     -slight improvement, Cr: 3 07  -having urine output  -hgb 7 9--> slight drop from 8 6  -on oral iron supplementation  -on lasix for leg swelling--> improving  -continue to encourage PO hydration  -F/u CBC, BMP on Monday or sooner if any acute changes    Bowel habit changes  -reports no bowel movement in the past '4 days'  -d/c docusate  -increase to: senna 2 tabs BID  -if not improvement, consider adding miralax    Essential hypertension  Blood pressure: 140/55    -home medications: lisinopril, amlodipine on hold  -avoid hypontension due to ongoing CKD/ orthostatic hypotension           Chief Complaint     Follow up     History of Present Illness     Digna Rodriguez is a 80 y o  female who was seen today for follow up  She indicates to have noticed some swelling in her left ring finger with some redness and discomfort   She has prior history of gout and thinks is similar to when she had gout before, although it usually occurred in her toes  She states to have been urinating frequently and also drinking more water as well  No discomfort reported in ankle and has been noticing improvement in physical therapy  Reports not having had a bowel movement in the past '4 days', no abdominal pain or discomfort and has been tolerating PO intake  The following portions of the patient's history were reviewed and updated as appropriate: allergies, current medications, past family history, past medical history, past social history, past surgical history and problem list     Review of Systems     Review of Systems   Constitutional: Negative for fever  Respiratory: Negative for cough, shortness of breath and wheezing  Cardiovascular: Negative for chest pain, palpitations and leg swelling  Gastrointestinal: Positive for constipation  Negative for abdominal pain and diarrhea  Skin:        Swelling of Ring middle finger of left hand and discomfort   Neurological: Negative for headaches  Psychiatric/Behavioral: The patient is not nervous/anxious          Active Problem List     Patient Active Problem List   Diagnosis    Vitamin D insufficiency    Retinal hole of right eye    Peripheral neuropathy    Essential hypertension    Pure hypercholesterolemia    Gout    Gait disturbance    Ectropion    Controlled type 2 diabetes mellitus with diabetic polyneuropathy (HCC)    Acquired cyst of kidney    Chronic kidney disease, stage IV (severe) (HCC)    Borderline glaucoma    Bilateral deafness    Atrophic kidney, acquired    Allergic rhinitis    Vertebral anomaly    Acute cystitis without hematuria    Bowel habit changes    Rectal discharge    Generalized edema    Fecal smearing    Type 2 diabetes mellitus with diabetic chronic kidney disease ([address] )    Secondary hyperparathyroidism of renal origin ([address] )    Hypertensive chronic kidney disease    Elevated uric acid in blood    Urinary retention    Acute kidney injury superimposed on CKD (HCC)    Anemia in stage 4 chronic kidney disease (HCC)    Hyperkalemia    Adult failure to thrive    Fall at home, initial encounter    Moderate protein-calorie malnutrition (HCC)    Leukocytosis    Right ankle pain    Reactive depression    Orthostatic hypotension       Objective     Vital Signs:     Blood pressure 140/55 Heart Rate: 50 Respiratory Rate 19   Temperature 96 7 Oxygen Saturation 95% room air Weight 115 7 lbs    Physical Exam  Vitals reviewed  Constitutional:       General: She is not in acute distress  Appearance: Normal appearance  She is not ill-appearing, toxic-appearing or diaphoretic  Eyes:      Extraocular Movements: Extraocular movements intact  Cardiovascular:      Rate and Rhythm: Normal rate and regular rhythm  Pulses: Normal pulses  Heart sounds: Normal heart sounds  No murmur heard  Pulmonary:      Effort: Pulmonary effort is normal  No respiratory distress  Breath sounds: Normal breath sounds  No wheezing  Abdominal:      General: Abdomen is flat  Bowel sounds are normal  There is no distension  Palpations: Abdomen is soft  Tenderness: There is no abdominal tenderness  Musculoskeletal:      Right lower leg: Edema (  residiual, improving) present  Left lower leg: Edema ( residual, improving) present  Skin:     General: Skin is warm  Findings: Erythema ( left ring finger swelling with tophi-like collection , discomfort on moving finger, some erythema in dorsal aspect of left arm as well ) present  Neurological:      Mental Status: She is alert and oriented to person, place, and time  Mental status is at baseline  Psychiatric:         Mood and Affect: Mood normal       Comments: Conversational, pleasant         Pertinent Laboratory/Diagnostic Studies:  Laboratory and Imaging studies reviewed   Full report in the paper chart  Current Medications   Medications reviewed and updated in facility chart      Name: Rossy Brewster  : 1927  MRN: [id number]        Fior Garcia MD  Family Medicine  3/11/2022 5:26 PM

## 2022-03-11 NOTE — ASSESSMENT & PLAN NOTE
Lab Results   Component Value Date    EGFR 12 03/11/2022    EGFR 11 03/09/2022    EGFR 14 03/07/2022    CREATININE 3 07 (H) 03/11/2022    CREATININE 3 16 (H) 03/09/2022    CREATININE 2 77 (H) 03/07/2022     -slight improvement, Cr: 3 07  -having urine output  -hgb 7 9--> slight drop from 8 6  -on oral iron supplementation  -on lasix for leg swelling--> improving  -continue to encourage PO hydration  -F/u CBC, BMP on Monday or sooner if any acute changes

## 2022-03-11 NOTE — ASSESSMENT & PLAN NOTE
Blood pressure: 140/55    -home medications: lisinopril, amlodipine on hold  -avoid hypontension due to ongoing CKD/ orthostatic hypotension

## 2022-03-14 ENCOUNTER — NURSING HOME VISIT (OUTPATIENT)
Dept: GERIATRICS | Facility: OTHER | Age: 87
End: 2022-03-14
Payer: COMMERCIAL

## 2022-03-14 DIAGNOSIS — M1A.00X1 IDIOPATHIC CHRONIC GOUT WITH TOPHUS, UNSPECIFIED SITE: ICD-10-CM

## 2022-03-14 DIAGNOSIS — E87.5 HYPERKALEMIA: ICD-10-CM

## 2022-03-14 DIAGNOSIS — I95.1 ORTHOSTATIC HYPOTENSION: ICD-10-CM

## 2022-03-14 DIAGNOSIS — N18.4 CHRONIC KIDNEY DISEASE, STAGE IV (SEVERE) (HCC): Primary | ICD-10-CM

## 2022-03-14 PROCEDURE — 99309 SBSQ NF CARE MODERATE MDM 30: CPT | Performed by: FAMILY MEDICINE

## 2022-03-14 NOTE — ASSESSMENT & PLAN NOTE
Blood pressure currently controlled, patient is asymptomatic  Continue to hold home blood pressure medication  Monitor closely and continue physical therapy  Encourage elastic stockings
Gout flareup    Continue allopurinol  Patient started on prednisone 30 mg daily for 5 days  Symptoms much improved  Will continue and monitor
Hyperkalemia, most likely in context of CKD stage 4  Potassium today of 5 4, will repeat level in a m 
Lab Results   Component Value Date    EGFR 14 03/14/2022    EGFR 12 03/11/2022    EGFR 11 03/09/2022    CREATININE 2 69 (H) 03/14/2022    CREATININE 3 07 (H) 03/11/2022    CREATININE 3 16 (H) 03/09/2022
Lab Results   Component Value Date    EGFR 14 03/14/2022    EGFR 12 03/11/2022    EGFR 11 03/09/2022    CREATININE 2 69 (H) 03/14/2022    CREATININE 3 07 (H) 03/11/2022    CREATININE 3 16 (H) 03/09/2022     Creatinine improving, seems to be at baseline today  Avoid nephrotoxic medication and hypotension  Continue to monitor BMP
normal (ped)...

## 2022-03-14 NOTE — PROGRESS NOTES
[address] [facility]  [phone]  [facility] of Service: nursing home place of service: POS 31 Skilled Care-Part A Coverage      NAME: Megan Ferreira  AGE: 80 y o  SEX: female [phone]    DATE OF ENCOUNTER: 3/14/2022    Assessment and Plan     Problem List Items Addressed This Visit        Cardiovascular and Mediastinum    Orthostatic hypotension     Blood pressure currently controlled, patient is asymptomatic  Continue to hold home blood pressure medication  Monitor closely and continue physical therapy  Encourage elastic stockings            Genitourinary    Chronic kidney disease, stage IV (severe) ([address] ) - Primary     Lab Results   Component Value Date    EGFR 14 03/14/2022    EGFR 12 03/11/2022    EGFR 11 03/09/2022    CREATININE 2 69 (H) 03/14/2022    CREATININE 3 07 (H) 03/11/2022    CREATININE 3 16 (H) 03/09/2022     Creatinine improving, seems to be at baseline today  Avoid nephrotoxic medication and hypotension  Continue to monitor BMP            Other    Gout     Gout flareup  Continue allopurinol  Patient started on prednisone 30 mg daily for 5 days  Symptoms much improved  Will continue and monitor         Hyperkalemia     Hyperkalemia, most likely in context of CKD stage 4  Potassium today of 5 4, will repeat level in a m  Chief Complaint     Follow up     History of Present Illness     Megan Ferreira is a 80 y o  female who was seen today for follow up  Patient seen and examined at bedside states she feels better today  Pain in her left 3rd finger and elbow improved  Swelling and redness left 3rd finger much improved  No fever or chills  Patient reports good appetite, no difficulty sleeping  Denies chest pain shortness of breath cough  No nausea abdominal pain  Reports constipation  Patient states she is making progress with physical therapy, denies dizziness lightheadedness            The following portions of the patient's history were reviewed and updated as appropriate: allergies, current medications, past family history, past medical history, past social history, past surgical history and problem list     Review of Systems     Review of Systems   Constitutional: Negative for chills and fever  HENT: Negative for congestion and rhinorrhea  Respiratory: Negative for cough, shortness of breath and wheezing  Cardiovascular: Positive for leg swelling  Negative for chest pain and palpitations  Gastrointestinal: Positive for constipation  Negative for abdominal pain  Endocrine: Negative for cold intolerance  Genitourinary: Negative for difficulty urinating, dysuria and hematuria  Musculoskeletal: Positive for arthralgias and gait problem  Skin: Negative for wound  Allergic/Immunologic: Negative for environmental allergies  Neurological: Negative for dizziness and seizures  Hematological: Bruises/bleeds easily  Psychiatric/Behavioral: Negative for behavioral problems and sleep disturbance         Active Problem List     Patient Active Problem List   Diagnosis    Vitamin D insufficiency    Retinal hole of right eye    Peripheral neuropathy    Essential hypertension    Pure hypercholesterolemia    Gout    Gait disturbance    Ectropion    Controlled type 2 diabetes mellitus with diabetic polyneuropathy (HCC)    Acquired cyst of kidney    Chronic kidney disease, stage IV (severe) ([facility])    Borderline glaucoma    Bilateral deafness    Atrophic kidney, acquired    Allergic rhinitis    Vertebral anomaly    Acute cystitis without hematuria    Bowel habit changes    Rectal discharge    Generalized edema    Fecal smearing    Type 2 diabetes mellitus with diabetic chronic kidney disease ([facility] [address] )    Secondary hyperparathyroidism of renal origin ([facility] [address] )    Hypertensive chronic kidney disease    Elevated uric acid in blood    Urinary retention    Acute kidney injury superimposed on CKD ([facility] [address] )    Anemia in stage 4 chronic kidney disease (HCC)    Hyperkalemia    Adult failure to thrive    Fall at home, initial encounter    Moderate protein-calorie malnutrition (HCC)    Leukocytosis    Right ankle pain    Reactive depression    Orthostatic hypotension       Objective     Vital Signs:     Blood pressure 114/65 Heart Rate: 60 Respiratory Rate 20   Temperature 97 5 Oxygen Saturation 95% Weight 135lbs    Physical Exam  Vitals and nursing note reviewed  Constitutional:       General: She is not in acute distress  Appearance: She is not diaphoretic  Comments: Frail looking   HENT:      Head: Normocephalic and atraumatic  Ears:      Comments: [place]     Mouth/Throat:      Mouth: Mucous membranes are dry  Eyes:      General:         Right eye: No discharge  Left eye: No discharge  Pupils: Pupils are equal, round, and reactive to light  Cardiovascular:      Rate and Rhythm: Normal rate  Rhythm irregular  Heart sounds: Murmur heard  Pulmonary:      Effort: Pulmonary effort is normal  No respiratory distress  Breath sounds: Normal breath sounds  No wheezing  Abdominal:      Palpations: Abdomen is soft  Tenderness: There is no abdominal tenderness  There is no guarding or rebound  Musculoskeletal:      Cervical back: Normal range of motion and neck supple  Right lower leg: Edema present  Left lower leg: Edema present  Skin:     General: Skin is warm and dry  Comments: Tophi left third finger, scant whitish discharge started today   Neurological:      Mental Status: She is alert and oriented to person, place, and time  Mental status is at baseline  Psychiatric:         Behavior: Behavior normal          Pertinent Laboratory/Diagnostic Studies:  Laboratory and Imaging studies reviewed  Full report in the paper chart  Current Medications   Medications reviewed and updated in facility chart      Name: Jessica File  : 1927  MRN: [id number]        Janet Corcoran MD  Geriatric Medicine  3/14/2022 2:00 PM

## 2022-03-16 ENCOUNTER — NURSING HOME VISIT (OUTPATIENT)
Dept: GERIATRICS | Facility: OTHER | Age: 87
End: 2022-03-16
Payer: COMMERCIAL

## 2022-03-16 DIAGNOSIS — M1A.00X1 IDIOPATHIC CHRONIC GOUT WITH TOPHUS, UNSPECIFIED SITE: ICD-10-CM

## 2022-03-16 DIAGNOSIS — F32.9 REACTIVE DEPRESSION: ICD-10-CM

## 2022-03-16 DIAGNOSIS — E11.42 CONTROLLED TYPE 2 DIABETES MELLITUS WITH DIABETIC POLYNEUROPATHY, WITHOUT LONG-TERM CURRENT USE OF INSULIN (HCC): ICD-10-CM

## 2022-03-16 DIAGNOSIS — E87.5 HYPERKALEMIA: ICD-10-CM

## 2022-03-16 DIAGNOSIS — R60.1 GENERALIZED EDEMA: ICD-10-CM

## 2022-03-16 DIAGNOSIS — N18.4 CHRONIC KIDNEY DISEASE, STAGE IV (SEVERE) (HCC): Primary | ICD-10-CM

## 2022-03-16 PROCEDURE — 99316 NF DSCHRG MGMT 30 MIN+: CPT | Performed by: FAMILY MEDICINE

## 2022-03-16 RX ORDER — GABAPENTIN 400 MG/1
400 CAPSULE ORAL
COMMUNITY

## 2022-03-16 RX ORDER — FUROSEMIDE 40 MG/1
40 TABLET ORAL DAILY
COMMUNITY
End: 2022-03-29

## 2022-03-16 RX ORDER — MIRTAZAPINE 7.5 MG/1
7.5 TABLET, FILM COATED ORAL
COMMUNITY

## 2022-03-16 RX ORDER — ALLOPURINOL 100 MG/1
50 TABLET ORAL DAILY
COMMUNITY

## 2022-03-16 RX ORDER — GABAPENTIN 100 MG/1
100 CAPSULE ORAL DAILY
COMMUNITY

## 2022-03-16 NOTE — ASSESSMENT & PLAN NOTE
Potassium elevated at 5 7 yesterday  Receive 1 dose Kayexalate, repeat potassium level today 4 9  Will need repeat potassium level as outpatient script provided

## 2022-03-16 NOTE — ASSESSMENT & PLAN NOTE
Lab Results   Component Value Date    EGFR 16 03/16/2022    EGFR 15 03/15/2022    EGFR 14 03/14/2022    CREATININE 2 36 (H) 03/16/2022    CREATININE 2 51 (H) 03/15/2022    CREATININE 2 69 (H) 03/14/2022   Creatinine improved, at baseline at the time of discharge    Patient presented to the hospital with GAY which is now resolved  Encouraged to avoid nephrotoxic medication and hypertension  Repeat CMP in 1 week script provided  Follow-up with nephrology as needed

## 2022-03-16 NOTE — ASSESSMENT & PLAN NOTE
Patient with gout flare up while in the facility  She received prednisone 30 mg daily for 5 days with resolution of symptoms  Continue allopurinol 50 mg daily  Follow-up with PCP

## 2022-03-16 NOTE — PROGRESS NOTES
[address] [place] [address] Drive: [facility]  POS: 31: SNF/Short Term Rehab    NAME: Fco Bautista  AGE: 80 y o  SEX: female  DATE OF ADMISSION: 3/3/22   DATE OF DISCHARGE:3/17/22   DISCHARGE DISPOSITION: MIRTA Meyer  Today's Visit: 3/16/535992:25 PM    Reason for admission: Patient was admitted from [address] [place] [address] for rehabilitation after hospitalization for GAY and generalized weakness  Course of stay: Patient was admitted to  [address] [place] [address] for rehabilitation due to  physical deconditioning and GAY and generalized weakness  Significant events during the stay include gout flare up and hyperkalemia  The patient participated in PT/OT  Assessment/Plan:    Chronic kidney disease, stage IV (severe) (Formerly [facility])  Lab Results   Component Value Date    EGFR 16 03/16/2022    EGFR 15 03/15/2022    EGFR 14 03/14/2022    CREATININE 2 36 (H) 03/16/2022    CREATININE 2 51 (H) 03/15/2022    CREATININE 2 69 (H) 03/14/2022   Creatinine improved, at baseline at the time of discharge    Patient presented to the hospital with GAY which is now resolved  Encouraged to avoid nephrotoxic medication and hypertension  Repeat CMP in 1 week script provided  Follow-up with nephrology as needed    Controlled type 2 diabetes mellitus with diabetic polyneuropathy ([address] )    Lab Results   Component Value Date    HGBA1C 5 1 10/12/2021     Diabetes controlled with diet  Continue gabapentin for neuropathy  Monitor hemoglobin A1c periodically  Follow-up with PCP    Gout  Patient with gout flare up while in the facility  She received prednisone 30 mg daily for 5 days with resolution of symptoms  Continue allopurinol 50 mg daily  Follow-up with PCP    Hyperkalemia  Potassium elevated at 5 7 yesterday  Receive 1 dose Kayexalate, repeat potassium level today 4 9  Will need repeat potassium level as outpatient script provided    Reactive depression  Continue Celexa and mirtazapine  Continue to provide supportive care  Follow-up with PCP    Generalized edema  Continue Lasix 40 mg daily  Monitor CMP periodically  Encourage lower extremity elevation         Discharge Medications: See discharge medication list which was reviewed and signed  Status at time of discharge: Stable    Subjective:  Patient seen and examined at bedside, states she feels better  Denies any pain at the time of encounter  States her appetite is good, her sleep is improved  Denies chest pain shortness of breath cough  Weakness improved and she was able to participate in physical therapy  She denies abdominal pain dysuria hematuria constipation or diarrhea  Review of Systems   Constitutional: Negative for chills and fever  HENT: Positive for hearing loss  Negative for congestion and rhinorrhea  Respiratory: Negative for cough, shortness of breath and wheezing  Cardiovascular: Positive for leg swelling  Negative for chest pain and palpitations  Gastrointestinal: Negative for abdominal pain and constipation  Endocrine: Negative for cold intolerance  Genitourinary: Negative for difficulty urinating, dysuria and hematuria  Musculoskeletal: Positive for gait problem  Skin: Negative for wound  Allergic/Immunologic: Negative for environmental allergies  Neurological: Negative for dizziness and seizures  Hematological: Bruises/bleeds easily  Psychiatric/Behavioral: Positive for sleep disturbance  Negative for behavioral problems  Vital Signs:     Blood pressure 139/67 Heart Rate: 58 Respiratory Rate 19   Temperature 97 6 Oxygen Saturation 93% Weight 141 2lbs    Exam:     Physical Exam  Vitals and nursing note reviewed  Constitutional:       General: She is not in acute distress  Appearance: She is not diaphoretic  HENT:      Head: Normocephalic and atraumatic        Ears:      Comments: [place]     Mouth/Throat:      Mouth: Mucous membranes are dry    Eyes:      General:         Right eye: No discharge  Left eye: No discharge  Pupils: Pupils are equal, round, and reactive to light  Cardiovascular:      Rate and Rhythm: Normal rate and regular rhythm  Heart sounds: Normal heart sounds  No murmur heard  Pulmonary:      Effort: Pulmonary effort is normal  No respiratory distress  Breath sounds: Normal breath sounds  No wheezing  Abdominal:      Palpations: Abdomen is soft  Tenderness: There is no abdominal tenderness  There is no guarding or rebound  Musculoskeletal:      Cervical back: Normal range of motion and neck supple  Right lower leg: Edema (trace) present  Left lower leg: Edema (trace) present  Comments: walker   Skin:     General: Skin is warm and dry  Neurological:      Mental Status: She is alert and oriented to person, place, and time  Mental status is at baseline  Psychiatric:         Behavior: Behavior normal          Discussion with patient/family and further instructions:  -Fall precautions  -Aspiration precautions  -Bleeding precautions  -Monitor for signs/symptoms of infection  -Medication list was reviewed and signed  -DME form was completed    Follow-up Recommendations: Please follow-up with your primary care physician within 7-10 days of discharge to review medication changes and current status       Problem List Follow-up Recommendations:  Continue PT/OT- script provided  Repeat blood work in 1 week- script provided    Charli Reid MD  Geriatric Medicine  1/34/967088:82 PM

## 2022-03-22 ENCOUNTER — TELEPHONE (OUTPATIENT)
Dept: NEPHROLOGY | Facility: CLINIC | Age: 87
End: 2022-03-22

## 2022-03-22 ENCOUNTER — TELEPHONE (OUTPATIENT)
Dept: FAMILY MEDICINE CLINIC | Facility: CLINIC | Age: 87
End: 2022-03-22

## 2022-03-22 DIAGNOSIS — N18.4 CHRONIC KIDNEY DISEASE, STAGE IV (SEVERE) (HCC): Primary | ICD-10-CM

## 2022-03-22 DIAGNOSIS — E83.52 HYPERCALCEMIA: ICD-10-CM

## 2022-03-22 DIAGNOSIS — I12.9 HYPERTENSIVE CHRONIC KIDNEY DISEASE, UNSPECIFIED CKD STAGE: ICD-10-CM

## 2022-03-22 DIAGNOSIS — I10 HYPERTENSION, UNSPECIFIED TYPE: ICD-10-CM

## 2022-03-22 NOTE — TELEPHONE ENCOUNTER
Patient's daughter called in trying to set up an appointment for her mom, daughter states that the aptient had a visit in [phone] put had to cancel due to the patient being hospitalized  I advised daughter of open appointment dr Macey Chambers had and soon and none of them worked and daughter states that the patient cannot wait that long  Daughter states that dr Macey Chambers informed her that if there was ever a scheduling issue to speak with dr Ashok Wray nurse and they would squeeze them in  Please advise   Thank you

## 2022-03-22 NOTE — TELEPHONE ENCOUNTER
I spoke to the patients daughter she is aware we are sending her labs to be done in 1-2 weeks   Follow up appt will be determined then

## 2022-03-22 NOTE — TELEPHONE ENCOUNTER
Phone call from daughter- Isa Ortega- was told to schedule a hosp f/u asap  As of now there are no available appts  Isa Shannon is asking for you to look at [facility] labs while she was inpt to see if you would like any labs/testing done   Please advise

## 2022-03-29 ENCOUNTER — OFFICE VISIT (OUTPATIENT)
Dept: FAMILY MEDICINE CLINIC | Facility: CLINIC | Age: 87
End: 2022-03-29
Payer: COMMERCIAL

## 2022-03-29 ENCOUNTER — APPOINTMENT (OUTPATIENT)
Dept: LAB | Facility: CLINIC | Age: 87
End: 2022-03-29
Payer: COMMERCIAL

## 2022-03-29 VITALS
HEIGHT: 60 IN | WEIGHT: 136 LBS | HEART RATE: 64 BPM | SYSTOLIC BLOOD PRESSURE: 138 MMHG | BODY MASS INDEX: 26.7 KG/M2 | DIASTOLIC BLOOD PRESSURE: 68 MMHG

## 2022-03-29 DIAGNOSIS — F32.9 REACTIVE DEPRESSION: ICD-10-CM

## 2022-03-29 DIAGNOSIS — N18.4 CHRONIC KIDNEY DISEASE, STAGE IV (SEVERE) (HCC): ICD-10-CM

## 2022-03-29 DIAGNOSIS — M1A.00X1 IDIOPATHIC CHRONIC GOUT WITH TOPHUS, UNSPECIFIED SITE: ICD-10-CM

## 2022-03-29 DIAGNOSIS — D63.1 ANEMIA DUE TO STAGE 4 CHRONIC KIDNEY DISEASE (HCC): ICD-10-CM

## 2022-03-29 DIAGNOSIS — R60.1 GENERALIZED EDEMA: ICD-10-CM
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
## 2022-03-29 DIAGNOSIS — Z00.00 MEDICARE ANNUAL WELLNESS VISIT, SUBSEQUENT: ICD-10-CM

## 2022-03-29 DIAGNOSIS — I12.9 HYPERTENSIVE CHRONIC KIDNEY DISEASE, UNSPECIFIED CKD STAGE: ICD-10-CM

## 2022-03-29 DIAGNOSIS — G63 POLYNEUROPATHY ASSOCIATED WITH UNDERLYING DISEASE (HCC): ICD-10-CM

## 2022-03-29 DIAGNOSIS — M25.471 RIGHT ANKLE SWELLING: ICD-10-CM

## 2022-03-29 DIAGNOSIS — N18.4 ANEMIA DUE TO STAGE 4 CHRONIC KIDNEY DISEASE (HCC): ICD-10-CM

## 2022-03-29 DIAGNOSIS — E83.52 HYPERCALCEMIA: ICD-10-CM

## 2022-03-29 DIAGNOSIS — N17.9 ACUTE KIDNEY INJURY SUPERIMPOSED ON CKD (HCC): ICD-10-CM

## 2022-03-29 DIAGNOSIS — I10 HYPERTENSION, UNSPECIFIED TYPE: ICD-10-CM

## 2022-03-29 DIAGNOSIS — E11.42 CONTROLLED TYPE 2 DIABETES MELLITUS WITH DIABETIC POLYNEUROPATHY, WITHOUT LONG-TERM CURRENT USE OF INSULIN (HCC): ICD-10-CM

## 2022-03-29 DIAGNOSIS — N18.9 ACUTE KIDNEY INJURY SUPERIMPOSED ON CKD (HCC): ICD-10-CM

## 2022-03-29 LAB
ALBUMIN SERPL BCP-MCNC: 2.8 G/DL (ref 3.5–5)
ANION GAP SERPL CALCULATED.3IONS-SCNC: 4 MMOL/L (ref 4–13)
BUN SERPL-MCNC: 54 MG/DL (ref 5–25)
CALCIUM ALBUM COR SERPL-MCNC: 10.3 MG/DL (ref 8.3–10.1)
CALCIUM SERPL-MCNC: 9.3 MG/DL (ref 8.3–10.1)
CHLORIDE SERPL-SCNC: 119 MMOL/L (ref 100–108)
CO2 SERPL-SCNC: 20 MMOL/L (ref 21–32)
CREAT SERPL-MCNC: 2.48 MG/DL (ref 0.6–1.3)
ERYTHROCYTE [DISTWIDTH] IN BLOOD BY AUTOMATED COUNT: 16 % (ref 11.6–15.1)
GFR SERPL CREATININE-BSD FRML MDRD: 16 ML/MIN/1.73SQ M
GLUCOSE P FAST SERPL-MCNC: 97 MG/DL (ref 65–99)
HCT VFR BLD AUTO: 30.4 % (ref 34.8–46.1)
HGB BLD-MCNC: 8.8 G/DL (ref 11.5–15.4)
MCH RBC QN AUTO: 31 PG (ref 26.8–34.3)
MCHC RBC AUTO-ENTMCNC: 28.9 G/DL (ref 31.4–37.4)
MCV RBC AUTO: 107 FL (ref 82–98)
PHOSPHATE SERPL-MCNC: 3.3 MG/DL (ref 2.3–4.1)
PLATELET # BLD AUTO: 220 THOUSANDS/UL (ref 149–390)
PMV BLD AUTO: 11.7 FL (ref 8.9–12.7)
POTASSIUM SERPL-SCNC: 4.4 MMOL/L (ref 3.5–5.3)
RBC # BLD AUTO: 2.84 MILLION/UL (ref 3.81–5.12)
SODIUM SERPL-SCNC: 143 MMOL/L (ref 136–145)
WBC # BLD AUTO: 6.64 THOUSAND/UL (ref 4.31–10.16)

## 2022-03-29 PROCEDURE — 36415 COLL VENOUS BLD VENIPUNCTURE: CPT

## 2022-03-29 PROCEDURE — 85027 COMPLETE CBC AUTOMATED: CPT

## 2022-03-29 PROCEDURE — 1111F DSCHRG MED/CURRENT MED MERGE: CPT | Performed by: FAMILY MEDICINE

## 2022-03-29 PROCEDURE — 99215 OFFICE O/P EST HI 40 MIN: CPT | Performed by: FAMILY MEDICINE

## 2022-03-29 PROCEDURE — G0439 PPPS, SUBSEQ VISIT: HCPCS | Performed by: FAMILY MEDICINE

## 2022-03-29 PROCEDURE — 80069 RENAL FUNCTION PANEL: CPT

## 2022-03-29 RX ORDER — FUROSEMIDE 40 MG/1
40 TABLET ORAL DAILY
Qty: 90 TABLET | Refills: 2
Start: 2022-03-29

## 2022-03-29 RX ORDER — FERROUS SULFATE TAB EC 324 MG (65 MG FE EQUIVALENT) 324 (65 FE) MG
TABLET DELAYED RESPONSE ORAL
Qty: 45 TABLET | Refills: 3
Start: 2022-03-29

## 2022-03-29 NOTE — PATIENT INSTRUCTIONS
Patient seen today after recent hospitalization, rehab inpatient, and now resident at [facility] a [facility]  These are my recommendations    1  Continue current medications as reviewed from [facility]  2  Patient to initiate support, compression hose, as tolerated  3  Patient has blood work for chronic kidney disease which will be done today  She will follow-up as per Dr Desean Quarles  For now, continue Lasix 40 mg daily  4  Patient reports she is moving her bowels regularly at this time  She will have MiraLax and senna only as needed  5  Follow-up with me in 4-6 weeks

## 2022-03-29 NOTE — PROGRESS NOTES
Assessment and Plan:    Mrs Becca Castellano is a pleasant and spritely 80-year-old female who presents today with her son and daughter in regards to her recent hospitalization, multiple medical conditions to review, and her recent move to [facility] living [facility]  Patient was hospitalized from 2/23 through 3/3 after she presented with a fall, weakness, weight loss, UTI, acute on chronic kidney disease, depression, right ankle swelling and pain  At the time of the incident, patient was feeling very depressed after the recent loss of her daughter  She was started on citalopram a few weeks before that at her last visit with me, and has remained on it since  Pt was discharged from [facility] on 3/3 to [address] [place] [address]  Where she stayed for 2 weeks  She was then discharged from there to [facility], [facility] living [facility] where she is very happy  Son and daughter add to the history today  Med list requested and received from Torsten Gar and meds reconciled and reviewed with family today  1  Fall - secondary to weakness and frailty  Patient has received 2 weeks of inpatient rehab  She is now living in an assisted living community and receives continued instruction for strengthening and balance  She continues to use a walker which she finds much more helpful than cane  2  Diabetes mellitus with polyneuropathy - last hemoglobin A1c was 5 1  Sugars have been stable  She had lost almost 10-12 lb, but is starting to gain some back  3  Polyneuropathy - stable on gabapentin  Patient takes 100 mg in the morning and 400 mg at bedtime  4  Chronic kidney disease, with acute on chronic exacerbation during hospitalization, and hyperkalemia noted while in rehab  Recent BMP on 3/16 reveals K+ normal at 4  9  Cr of 2 3 and GFR of 16  Her nephrologist has ordered BW which she will have after today's appt  Patient remains on Sensipar for secondary hyperparathyroidism    She is also on ferrous sulfate due to renal induced anemia  Further change is management is pending BW and recommendation per Nephrology  5  Gout, presenting as swelling of right ankle - this occurred while in rehab  She was treated with prednisone and allopurinol, renal dose  6  Reactive depression - secondary to the loss of her daughter  Patient is much improved today  She is able to speak about her daughter without crying  She denies any thoughts of death or dying or suicidality  She feels good on the current dose  Continue citalopram 10 mg daily  She also remains on mirtazapine 7 5 mg at bedtime which helps with her appetite as well as sleep  7  Edema of lower extremities, right greater than left - she remains on Lasix 40 mg daily  Her renal function has returned to baseline, but I do not want to tax her kidneys by any increase in diuresis  I have instructed her to wear support / compression hose as tolerated  Try to elevate extremities when seated  Try to ambulate frequently and at least once per hour while awake  Any further diuresis is deferred to her nephrologist, Dr Duy Dozier  See me in 4-6 weeks  Subjective:      Patient ID: Kassandra Fontenot is a 80 y o  female  CC:    Chief Complaint   Patient presents with    Follow-up     patient is here for a hospital f/u for depression, gout, bilateral lower leg swelling/redness from 2/23 to mid March, then rehab   ak       HPI:    Rehab at [facility]  Now living in assisted living - likes it a lot; about 2 weeks  Mark Mercado      The following portions of the patient's history were reviewed and updated as appropriate: allergies, current medications, past family history, past medical history, past social history, past surgical history and problem list       Review of Systems   Constitutional: Negative for activity change, appetite change, fatigue and fever  See HPI  Starting to gain weight back     HENT: Negative for congestion, ear pain, mouth sores, sinus pressure and trouble swallowing  Eyes: Negative for discharge, redness and itching  Respiratory: Negative for apnea, cough, chest tightness, shortness of breath, wheezing and stridor  Cardiovascular: Negative for chest pain, palpitations and leg swelling  Gastrointestinal: Negative for abdominal distention, abdominal pain, blood in stool, constipation, diarrhea, nausea and vomiting  No longer constipated  HAS  miralax to use if needed  Endocrine: Negative for cold intolerance and heat intolerance  Genitourinary: Negative for difficulty urinating, dysuria, flank pain and urgency  CKD - Dr Layo Bashir ordered  Musculoskeletal: Negative for arthralgias and myalgias  L and R ankle swelling for a few weeks , R>L     Skin: Negative for rash  Erythema of R lower leg   Neurological: Positive for weakness  Negative for dizziness, seizures, syncope, speech difficulty, light-headedness, numbness and headaches  Using walker for added stability   Hematological: Negative for adenopathy  Bruises/bleeds easily  RLE bruised   Psychiatric/Behavioral: Negative for agitation, behavioral problems, confusion, hallucinations, self-injury, sleep disturbance and suicidal ideas  The patient is not nervous/anxious  Move much improved  Data to review:       Objective:    Vitals:    03/29/22 1057   BP: 138/68   Pulse: 64   Weight: 61 7 kg (136 lb)   Height: 5' (1 524 m)        Physical Exam  Vitals and nursing note reviewed  Constitutional:       General: She is not in acute distress  Appearance: Normal appearance  She is well-developed  She is not ill-appearing, toxic-appearing or diaphoretic  Comments: Patient encountered sitting in office chair in no apparent distress  She is well groomed, excellent historian, insightful into her emotional state    Son and daughter are present and add to her history today   HENT:      Left Ear: External ear normal    Eyes: General: No scleral icterus  Conjunctiva/sclera: Conjunctivae normal       Pupils: Pupils are equal, round, and reactive to light  Neck:      Vascular: No carotid bruit  Cardiovascular:      Rate and Rhythm: Normal rate and regular rhythm  Heart sounds: Normal heart sounds  No murmur heard  No friction rub  No gallop  Pulmonary:      Effort: Pulmonary effort is normal  No respiratory distress  Breath sounds: Normal breath sounds  No wheezing or rales  Musculoskeletal:         General: Normal range of motion  Cervical back: Normal range of motion and neck supple  Right lower leg: Edema present  Left lower leg: Edema present  Comments: Ambulating well with the aid of walker for added stability  Both ankles are swollen, right greater than left  Right lower extremity reveals some bruising mildly tender to palpation  Patient does not remember trauma to the area  There is no warmth, oozing or exudates  There is a central scab, suggestive of possible trauma to the area  Does not appear cellulitic at this time  Lymphadenopathy:      Cervical: No cervical adenopathy  Skin:     General: Skin is warm and dry  Coloration: Skin is not jaundiced  Neurological:      Mental Status: She is alert and oriented to person, place, and time  Gait: Gait abnormal    Psychiatric:         Mood and Affect: Mood normal          Behavior: Behavior normal          Thought Content: Thought content normal          Judgment: Judgment normal       Comments: Mood is good and back to baseline  She can smile and laugh again  She is answering questions appropriately  She is alert and oriented  Falls Plan of Care: balance, strength, and gait training instructions were provided  Patient received 2-3 weeks of inpatient rehab  She continues to do her exercises for strengthening and balance    Also, she is now using walker for added stability and finds it much more helpful than her cane

## 2022-04-05 ENCOUNTER — TELEPHONE (OUTPATIENT)
Dept: NEPHROLOGY | Facility: CLINIC | Age: 87
End: 2022-04-05

## 2022-04-05 DIAGNOSIS — N18.4 CHRONIC KIDNEY DISEASE, STAGE IV (SEVERE) (HCC): Primary | ICD-10-CM

## 2022-04-05 DIAGNOSIS — N18.4 ANEMIA IN STAGE 4 CHRONIC KIDNEY DISEASE (HCC): ICD-10-CM

## 2022-04-05 DIAGNOSIS — E11.22 TYPE 2 DIABETES MELLITUS WITH STAGE 4 CHRONIC KIDNEY DISEASE, WITHOUT LONG-TERM CURRENT USE OF INSULIN (HCC): ICD-10-CM

## 2022-04-05 DIAGNOSIS — D63.1 ANEMIA IN STAGE 4 CHRONIC KIDNEY DISEASE (HCC): ICD-10-CM

## 2022-04-05 DIAGNOSIS — N18.4 TYPE 2 DIABETES MELLITUS WITH STAGE 4 CHRONIC KIDNEY DISEASE, WITHOUT LONG-TERM CURRENT USE OF INSULIN (HCC): ICD-10-CM

## 2022-04-05 NOTE — TELEPHONE ENCOUNTER
----- Message from Damaso Higuera DO sent at 4/4/2022  4:10 PM EDT -----  RECENT REVIEWED LABORATORY STUDIES  PATIENT WILL NEED HER 3 MONTH APPOINTMENT SCHEDULED  THANK YOU

## 2022-04-07 ENCOUNTER — TELEPHONE (OUTPATIENT)
Dept: FAMILY MEDICINE CLINIC | Facility: CLINIC | Age: 87
End: 2022-04-07

## 2022-04-07 DIAGNOSIS — S81.801D WOUND OF LOWER EXTREMITY, RIGHT, SUBSEQUENT ENCOUNTER: Primary | ICD-10-CM

## 2022-04-07 NOTE — TELEPHONE ENCOUNTER
Rachna Viramontes from [facility] called in to request a order for wound care for patient she stated patient has a open wound on her right shin she would like a script for wound care faxed to [phone]  If you have any further questions Rachna Viramontes could be reached at [phone]   Thank you!

## 2022-04-13 ENCOUNTER — OFFICE VISIT (OUTPATIENT)
Dept: FAMILY MEDICINE CLINIC | Facility: CLINIC | Age: 87
End: 2022-04-13
Payer: COMMERCIAL

## 2022-04-13 VITALS
WEIGHT: 137 LBS | SYSTOLIC BLOOD PRESSURE: 120 MMHG | DIASTOLIC BLOOD PRESSURE: 72 MMHG | RESPIRATION RATE: 16 BRPM | HEART RATE: 72 BPM | BODY MASS INDEX: 26.9 KG/M2 | HEIGHT: 60 IN

## 2022-04-13 DIAGNOSIS — N18.4 CHRONIC KIDNEY DISEASE, STAGE IV (SEVERE) (HCC): ICD-10-CM

## 2022-04-13 DIAGNOSIS — M1A.0411 IDIOPATHIC CHRONIC GOUT OF RIGHT HAND WITH TOPHUS: Primary | ICD-10-CM

## 2022-04-13 DIAGNOSIS — R60.0 EDEMA OF BOTH LOWER LEGS: ICD-10-CM

## 2022-04-13 PROCEDURE — 99214 OFFICE O/P EST MOD 30 MIN: CPT | Performed by: NURSE PRACTITIONER

## 2022-04-13 PROCEDURE — 1036F TOBACCO NON-USER: CPT | Performed by: NURSE PRACTITIONER

## 2022-04-13 PROCEDURE — 1111F DSCHRG MED/CURRENT MED MERGE: CPT | Performed by: NURSE PRACTITIONER

## 2022-04-13 PROCEDURE — 1160F RVW MEDS BY RX/DR IN RCRD: CPT | Performed by: NURSE PRACTITIONER

## 2022-04-13 RX ORDER — COLCHICINE 0.6 MG/1
0.3 TABLET ORAL DAILY
Qty: 7 TABLET | Refills: 0 | Status: SHIPPED | OUTPATIENT
Start: 2022-04-13 | End: 2022-04-13

## 2022-04-13 RX ORDER — COLCHICINE 0.6 MG/1
0.3 TABLET ORAL DAILY
Qty: 7 TABLET | Refills: 0 | Status: SHIPPED | OUTPATIENT
Start: 2022-04-13 | End: 2022-05-05 | Stop reason: SDUPTHER

## 2022-04-13 NOTE — ASSESSMENT & PLAN NOTE
Lab Results   Component Value Date    EGFR 16 03/29/2022    EGFR 16 03/16/2022    EGFR 15 03/15/2022    CREATININE 2 48 (H) 03/29/2022    CREATININE 2 36 (H) 03/16/2022    CREATININE 2 51 (H) 03/15/2022     Due to patient's severely decreased kidney function I will defer to Nephrology whether or not it is appropriate to increase her dosage of Lasix or add a 2nd diuretic  Nephrology referral was placed

## 2022-04-13 NOTE — ASSESSMENT & PLAN NOTE
Due to patient's severely decreased kidney function I will defer to Nephrology whether or not it is appropriate to increase her dosage of Lasix or add a 2nd diuretic  Nephrology referral was placed  Patient is not currently experiencing any cardiac symptoms however, she has never had an echocardiogram performed therefore, echocardiogram was ordered to rule out cardiac cause of recurrent edema  If echocardiogram is normal patient will be referred for lymphedema therapy  Patient was advised to continue wearing compression stockings daily and elevating her legs above the level of the heart throughout the day  She was also advised to limit sodium in her diet

## 2022-04-13 NOTE — PROGRESS NOTES
Assessment and Plan:    Problem List Items Addressed This Visit        Genitourinary    Chronic kidney disease, stage IV (severe) ([facility] [address] )     Lab Results   Component Value Date    EGFR 16 03/29/2022    EGFR 16 03/16/2022    EGFR 15 03/15/2022    CREATININE 2 48 (H) 03/29/2022    CREATININE 2 36 (H) 03/16/2022    CREATININE 2 51 (H) 03/15/2022     Due to patient's severely decreased kidney function I will defer to Nephrology whether or not it is appropriate to increase her dosage of Lasix or add a 2nd diuretic  Nephrology referral was placed  Relevant Orders    Ambulatory Referral to Nephrology       Other    Gout - Primary     Patient's symptoms are consistent with acute gout of her right index finger  Patient will be started on colchicine 0 3 mg daily, which is the renal dose for acute gout  Patient was advised that if no improvement is seen in her symptoms by 04/18/2022 she should make the office aware  Relevant Medications    colchicine (COLCRYS) 0 6 mg tablet    Edema of both lower legs     Due to patient's severely decreased kidney function I will defer to Nephrology whether or not it is appropriate to increase her dosage of Lasix or add a 2nd diuretic  Nephrology referral was placed  Patient is not currently experiencing any cardiac symptoms however, she has never had an echocardiogram performed therefore, echocardiogram was ordered to rule out cardiac cause of recurrent edema  If echocardiogram is normal patient will be referred for lymphedema therapy  Patient was advised to continue wearing compression stockings daily and elevating her legs above the level of the heart throughout the day  She was also advised to limit sodium in her diet           Relevant Orders    Echo complete w/ contrast if indicated    Ambulatory Referral to Nephrology                 Diagnoses and all orders for this visit:    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  -     Discontinue: colchicine (COLCRYS) 0 6 mg tablet; Take 0 5 tablets (0 3 mg total) by mouth daily  -     colchicine (COLCRYS) 0 6 mg tablet; Take 0 5 tablets (0 3 mg total) by mouth daily    Edema of both lower legs  -     Echo complete w/ contrast if indicated; Future  -     Ambulatory Referral to Nephrology; Future    Chronic kidney disease, stage IV (severe) ([facility] [address] )  -     Ambulatory Referral to Nephrology; Future              Subjective:      Patient ID: Kathy Mendez is a 80 y o  female  CC:    Chief Complaint   Patient presents with    Hand Pain     right pointer finger swollen x 3 days  Rcruz    Leg Swelling     legs and feet are swollen       HPI:    Edema of right index finger: The patient reports she has been having increased swelling of her right index finger over the past 3 days  She denies any recent injuries to this area, cuts, or being scratched by any animals  She reports she was having pain in the area but this has since decreased  She denies any fevers or chills  She also denies any drainage from the area  She reports that the swelling has increased over the past 24 hours  She does have decreased ROM of the finger  There is increased warmth to touch  Edema of BLE:  At patient's last office visit in March 2022 it was noted that patient recently was diagnosed with gout of her right ankle which was treated with allopurinol and prednisone  It was also noted that she has been having ongoing edema of her bilateral lower extremities with the edema in her right lower extremity being greater than the edema in the left  Patient is currently being treated with Lasix 40 mg daily  Per the most recent office visit with her PCP Dr Linda Childs she was very hesitant to increase her dosage of diuretic due to the patient's history of CKD and she defered this decision to the patient's nephrologist Dr Ivet Banda  The patient denies SOB, chest pain, or SHELTON  The patient reports that she does have weeping of her BLE   She reports that her right leg is weeping more that the left  The patient is currently wearing compression stockings  She denies any orthopnea  She has never seen a cardiologist in the past and has not had an echocardiogram completed in the past             The following portions of the patient's history were reviewed and updated as appropriate: allergies, current medications, past family history, past medical history, past social history, past surgical history and problem list       Review of Systems   Constitutional: Negative for chills and fever  HENT: Negative for ear pain and sore throat  Eyes: Negative for pain and visual disturbance  Respiratory: Negative for cough, chest tightness, shortness of breath and wheezing  Cardiovascular: Positive for leg swelling (BLE)  Negative for chest pain and palpitations  Gastrointestinal: Negative for abdominal pain, constipation, diarrhea, nausea and vomiting  Endocrine: Negative for cold intolerance and heat intolerance  Genitourinary: Negative for decreased urine volume, dysuria and hematuria  Musculoskeletal: Positive for arthralgias (right index finger) and joint swelling (right index finger)  Negative for back pain  Skin: Negative for color change and rash  Allergic/Immunologic: Negative for environmental allergies  Neurological: Negative for dizziness, seizures, syncope, weakness, light-headedness, numbness and headaches  Hematological: Negative for adenopathy  Psychiatric/Behavioral: Negative for confusion  The patient is not nervous/anxious  All other systems reviewed and are negative  Data to review:       Objective:    Vitals:    04/13/22 1437   BP: 120/72   BP Location: Left arm   Patient Position: Sitting   Cuff Size: Standard   Pulse: 72   Resp: 16   Weight: 62 1 kg (137 lb)   Height: 5' (1 524 m)        Physical Exam  Vitals and nursing note reviewed  Constitutional:       General: She is not in acute distress  Appearance: Normal appearance   She is well-developed  She is not ill-appearing  HENT:      Head: Normocephalic and atraumatic  Eyes:      Conjunctiva/sclera: Conjunctivae normal    Cardiovascular:      Rate and Rhythm: Normal rate and regular rhythm  Pulses:           Carotid pulses are 2+ on the right side and 2+ on the left side  Radial pulses are 2+ on the right side and 2+ on the left side  Dorsalis pedis pulses are 1+ on the right side and 1+ on the left side  Posterior tibial pulses are 1+ on the right side and 1+ on the left side  Heart sounds: Normal heart sounds  No murmur heard  Comments: Bilateral lower extremity edema noted with R>L  Right lower extremity was weeping clear fluid during visit  Redness noted throughout the bilateral shin area but no open areas were noted  Pulmonary:      Effort: Pulmonary effort is normal  No respiratory distress  Breath sounds: Normal breath sounds  No wheezing or rhonchi  Abdominal:      General: Abdomen is flat  Bowel sounds are normal  There is no distension  Palpations: Abdomen is soft  Tenderness: There is no abdominal tenderness  There is no guarding  Musculoskeletal:      Right hand: Swelling present  No tenderness or bony tenderness  Decreased range of motion  Normal strength  Normal sensation  Normal capillary refill  Cervical back: Normal range of motion and neck supple  Right lower leg: 3+ Pitting Edema present  Left lower le+ Pitting Edema present  Comments: Swelling noted throughout right index finger  No redness or drainage noted in this area however, there was warmth to touch  Patient did have noted decreased range of motion in her right index finger as she was only able to slightly bend the finger  No tenderness was noted to palpation throughout the finger  Skin:     General: Skin is warm and dry  Capillary Refill: Capillary refill takes less than 2 seconds     Neurological:      General: No focal deficit present  Mental Status: She is alert and oriented to person, place, and time  Psychiatric:         Mood and Affect: Mood normal          Behavior: Behavior normal          Thought Content:  Thought content normal          Judgment: Judgment normal

## 2022-04-13 NOTE — ASSESSMENT & PLAN NOTE
Patient's symptoms are consistent with acute gout of her right index finger  Patient will be started on colchicine 0 3 mg daily, which is the renal dose for acute gout  Patient was advised that if no improvement is seen in her symptoms by 04/18/2022 she should make the office aware

## 2022-05-03 ENCOUNTER — TELEPHONE (OUTPATIENT)
Dept: FAMILY MEDICINE CLINIC | Facility: CLINIC | Age: 87
End: 2022-05-03

## 2022-05-03 NOTE — TELEPHONE ENCOUNTER
PATIENT'S DAUGHTER STATES SHE IS VISITING HER AND NOTICES SHE HAS EITHER BANGED HER TOE OR HAS GOUT  THE NURSE AT THE FACILITY TOLD HER TO CALL AND HAVE DR KAUR LOOK AT IT ASAP  DR KAUR HAS NO APPOINTMENTS TODAY OR TOMORROW  OFFERED FOR PATIENT TO SEE ANOTHER PROVIDER, DAUGHTER ASKING FOR ME TO PUT A NOTE BACK TO NURSING SO THEY CAN TALK TO DR KAUR AND SHE IS SURE DR KAUR WILL CALL BACK AND SQUEEZE HER IN AND HAVE HER COME OVER TODAY TO BE SEEN BY HER   Miley TO ADVISE [phone]

## 2022-05-03 NOTE — TELEPHONE ENCOUNTER
Bethany Lombardo,  Patient's daughter actually called back and I read her your message - she stated that it would be impossible to come in tomorrow at noon as she has two 11year-olds tomorrow so she could not possibly bring her mother in  She then asked if I could please schedule her mother with Brenda for either Thursday or Friday, which I scheduled for Thursday at 12:30  Gout in left big toe  Just wanted you to know  Thank you,  Jay Hyman

## 2022-05-03 NOTE — TELEPHONE ENCOUNTER
Sorry I am just seeing this now  Please call daughter back  I will see patient at the end of my day tomorrow    Thank you,  CB

## 2022-05-04 NOTE — TELEPHONE ENCOUNTER
Spoke to patient's daughter, Lainasonja Sanchez  Patient is doing okay  She is scheduled to see Keily Mensah tomorrow for what is likely gout in the left big toe

## 2022-05-05 ENCOUNTER — OFFICE VISIT (OUTPATIENT)
Dept: FAMILY MEDICINE CLINIC | Facility: CLINIC | Age: 87
End: 2022-05-05
Payer: COMMERCIAL

## 2022-05-05 VITALS
RESPIRATION RATE: 16 BRPM | WEIGHT: 140.8 LBS | DIASTOLIC BLOOD PRESSURE: 68 MMHG | HEIGHT: 60 IN | SYSTOLIC BLOOD PRESSURE: 120 MMHG | BODY MASS INDEX: 27.64 KG/M2 | HEART RATE: 68 BPM

## 2022-05-05 DIAGNOSIS — R60.0 EDEMA OF BOTH LOWER LEGS: ICD-10-CM

## 2022-05-05 DIAGNOSIS — M10.072 ACUTE IDIOPATHIC GOUT INVOLVING TOE OF LEFT FOOT: ICD-10-CM

## 2022-05-05 DIAGNOSIS — M1A.0411 IDIOPATHIC CHRONIC GOUT OF RIGHT HAND WITH TOPHUS: ICD-10-CM

## 2022-05-05 DIAGNOSIS — I87.2 VENOUS STASIS ULCER OF OTHER SITE LIMITED TO BREAKDOWN OF SKIN WITHOUT VARICOSE VEINS (HCC): Primary | ICD-10-CM

## 2022-05-05 DIAGNOSIS — L97.801 VENOUS STASIS ULCER OF OTHER SITE LIMITED TO BREAKDOWN OF SKIN WITHOUT VARICOSE VEINS (HCC): Primary | ICD-10-CM

## 2022-05-05 DIAGNOSIS — I10 ESSENTIAL HYPERTENSION: ICD-10-CM

## 2022-05-05 PROBLEM — I83.009 STASIS ULCER (HCC): Status: ACTIVE | Noted: 2022-01-01

## 2022-05-05 PROBLEM — L97.909 STASIS ULCER (HCC): Status: ACTIVE | Noted: 2022-01-01

## 2022-05-05 PROCEDURE — 99214 OFFICE O/P EST MOD 30 MIN: CPT | Performed by: NURSE PRACTITIONER

## 2022-05-05 PROCEDURE — 1036F TOBACCO NON-USER: CPT | Performed by: NURSE PRACTITIONER

## 2022-05-05 PROCEDURE — 1160F RVW MEDS BY RX/DR IN RCRD: CPT | Performed by: NURSE PRACTITIONER

## 2022-05-05 RX ORDER — COLCHICINE 0.6 MG/1
0.3 TABLET ORAL DAILY
Qty: 7 TABLET | Refills: 0 | Status: SHIPPED | OUTPATIENT
Start: 2022-05-05

## 2022-05-05 NOTE — PROGRESS NOTES
Assessment and Plan:    Problem List Items Addressed This Visit        Cardiovascular and Mediastinum    Essential hypertension     Well controlled on current regimen  Musculoskeletal and Integument    Stasis ulcer ([address] ) - Primary     Santyl ointment was ordered to be used on open area on left big toe  Patient reports that she will schedule appointment with her podiatrist Dr Pool Caraballo in the near future to have this looked at  Relevant Medications    collagenase (SANTYL) ointment       Other    Gout     Renal dose of colchicine ordered to be taken daily over the next 7 days  Patient was advised that if no improvement is seen in the next 5 days she should contact the office  Relevant Medications    colchicine (COLCRYS) 0 6 mg tablet    Edema of both lower legs     Patient does have upcoming follow-up with Nephrology regarding her bilateral lower extremity edema  Due to her decreased GFR and history of CKD I will leave the decision to adjust her diuretics up to Nephrology  Patient does get daily wrappings of her bilateral lower extremities to help with edema  Patient does have upcoming echocardiogram scheduled to assess for any cardiac involvement  Diagnoses and all orders for this visit:    Venous stasis ulcer of other site limited to breakdown of skin without varicose veins (HCC)  -     collagenase (SANTYL) ointment; Apply topically daily    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  -     colchicine (COLCRYS) 0 6 mg tablet; Take 0 5 tablets (0 3 mg total) by mouth daily    Acute idiopathic gout involving toe of left foot  -     colchicine (COLCRYS) 0 6 mg tablet; Take 0 5 tablets (0 3 mg total) by mouth daily    Essential hypertension    Edema of both lower legs            Subjective:      Patient ID: Av Crawley is a 80 y o  female  CC:    Chief Complaint   Patient presents with    Gout Pain     pt here with possible gout on left foot - big toe   Jake Lackey HPI:    Gout:  Patient has a noted history of this and was seen in the office for gout of her right index finger on 04/13/2022  The patient reports after she took the prescribed Colchicine and this did improve her symptoms  She reports that her right index finger has swelled again and she also has swelling, redness and warmth in her left big toe  Hypertension:  Well controlled on current dosage of Lasix  Patient denies any lightheadedness, dizziness, or orthostasis  Stasis ulcer:  Patient does have a small open weeping area on her left big toe  Clear fluid is coming out of the open area  Patient denies hitting the toe off of anything  Patient does have a significant amount of edema in her bilateral lower extremities which she has been dealing with for some time  The following portions of the patient's history were reviewed and updated as appropriate: allergies, current medications, past family history, past medical history, past social history, past surgical history and problem list       Review of Systems   Constitutional: Negative for chills and fever  HENT: Negative for ear pain and sore throat  Eyes: Negative for pain and visual disturbance  Respiratory: Negative for cough, chest tightness, shortness of breath and wheezing  Cardiovascular: Positive for leg swelling (BLE-chronic)  Negative for chest pain and palpitations  Gastrointestinal: Negative for abdominal pain, constipation, diarrhea, nausea and vomiting  Endocrine: Negative for cold intolerance and heat intolerance  Genitourinary: Negative for decreased urine volume, dysuria and hematuria  Musculoskeletal: Positive for arthralgias (right index finger and left big toe) and joint swelling (right index finger and left big toe)  Negative for back pain and myalgias  Skin: Positive for wound (left big toe)  Negative for color change and rash  Allergic/Immunologic: Negative for environmental allergies     Neurological: Negative for dizziness, seizures, syncope, weakness, light-headedness, numbness and headaches  Hematological: Negative for adenopathy  Psychiatric/Behavioral: Negative for confusion  The patient is not nervous/anxious  All other systems reviewed and are negative  Data to review:       Objective:    Vitals:    22 1230   BP: 120/68   BP Location: Right arm   Patient Position: Sitting   Cuff Size: Large   Pulse: 68   Resp: 16   Weight: 63 9 kg (140 lb 12 8 oz)   Height: 5' (1 524 m)        Physical Exam  Vitals and nursing note reviewed  Constitutional:       General: She is not in acute distress  Appearance: Normal appearance  She is well-developed  She is not ill-appearing  HENT:      Head: Normocephalic and atraumatic  Eyes:      Conjunctiva/sclera: Conjunctivae normal    Cardiovascular:      Rate and Rhythm: Normal rate and regular rhythm  Pulses: Normal pulses  Carotid pulses are 2+ on the right side and 2+ on the left side  Radial pulses are 2+ on the right side and 2+ on the left side  Posterior tibial pulses are 2+ on the right side and 2+ on the left side  Heart sounds: Normal heart sounds  No murmur heard  Pulmonary:      Effort: Pulmonary effort is normal  No respiratory distress  Breath sounds: Normal breath sounds  No wheezing or rhonchi  Abdominal:      General: Abdomen is flat  Bowel sounds are normal  There is no distension  Palpations: Abdomen is soft  Tenderness: There is no abdominal tenderness  There is no guarding  Musculoskeletal:      Right hand: Swelling and bony tenderness present  No tenderness  Decreased range of motion  Normal strength  Normal sensation  There is no disruption of two-point discrimination  Normal capillary refill  Cervical back: Normal range of motion and neck supple  Right lower le+ Pitting Edema present  Left lower le+ Pitting Edema present        Comments: Edema, tenderness, redness, and warmth to touch noted in right index finger  Feet:      Comments: Redness, edema, decreased range of motion, and warmth to touch noted in left big toe  Patient also has open area confined to only the skin with clear drainage consistent with possible stasis ulcer on distal portion of left big toe  Skin:     General: Skin is warm and dry  Capillary Refill: Capillary refill takes less than 2 seconds  Neurological:      General: No focal deficit present  Mental Status: She is alert and oriented to person, place, and time  Psychiatric:         Mood and Affect: Mood normal          Behavior: Behavior normal          Thought Content:  Thought content normal          Judgment: Judgment normal

## 2022-05-05 NOTE — ASSESSMENT & PLAN NOTE
Patient does have upcoming follow-up with Nephrology regarding her bilateral lower extremity edema  Due to her decreased GFR and history of CKD I will leave the decision to adjust her diuretics up to Nephrology  Patient does get daily wrappings of her bilateral lower extremities to help with edema  Patient does have upcoming echocardiogram scheduled to assess for any cardiac involvement

## 2022-05-05 NOTE — ASSESSMENT & PLAN NOTE
Santyl ointment was ordered to be used on open area on left big toe  Patient reports that she will schedule appointment with her podiatrist Dr Becca Robles in the near future to have this looked at

## 2022-05-05 NOTE — ASSESSMENT & PLAN NOTE
Renal dose of colchicine ordered to be taken daily over the next 7 days  Patient was advised that if no improvement is seen in the next 5 days she should contact the office

## 2022-05-05 NOTE — PATIENT INSTRUCTIONS
Gout   WHAT YOU NEED TO KNOW:   Gout is a form of arthritis that causes severe joint pain, redness, swelling, and stiffness  Acute gout pain starts suddenly, gets worse quickly, and stops on its own  Acute gout can become chronic and cause permanent damage to the joints  DISCHARGE INSTRUCTIONS:   Return to the emergency department if:   · You have severe pain in one or more of your joints that you cannot tolerate  · You have a fever or redness that spreads beyond the joint area  Call your doctor if:   · You have new symptoms, such as a rash, after you start gout treatment  · Your joint pain and swelling do not go away, even after treatment  · You are not urinating as much or as often as you usually do  · You have trouble taking your gout medicines  · You have questions or concerns about your condition or care  Medicines: You may need any of the following:  · Prescription pain medicine  may be given  Ask your healthcare provider how to take this medicine safely  Some prescription pain medicines contain acetaminophen  Do not take other medicines that contain acetaminophen without talking to your healthcare provider  Too much acetaminophen may cause liver damage  Prescription pain medicine may cause constipation  Ask your healthcare provider how to prevent or treat constipation  · NSAIDs , such as ibuprofen, help decrease swelling, pain, and fever  This medicine is available with or without a doctor's order  NSAIDs can cause stomach bleeding or kidney problems in certain people  If you take blood thinner medicine, always ask your healthcare provider if NSAIDs are safe for you  Always read the medicine label and follow directions  · Gout medicine  decreases joint pain and swelling  It may also be given to prevent new gout attacks  · Steroids  reduce inflammation and can help your joint stiffness and pain during gout attacks      · Uric acid medicine  may be given to reduce the amount of uric acid your body makes  Some medicines may help you pass more uric acid when you urinate  · Take your medicine as directed  Contact your healthcare provider if you think your medicine is not helping or if you have side effects  Tell him or her if you are allergic to any medicine  Keep a list of the medicines, vitamins, and herbs you take  Include the amounts, and when and why you take them  Bring the list or the pill bottles to follow-up visits  Carry your medicine list with you in case of an emergency  Manage your symptoms:       · Rest your painful joint so it can heal   Your healthcare provider may recommend crutches or a walker if the affected joint is in a leg  · Apply ice to your joint  Ice decreases pain and swelling  Use an ice pack, or put crushed ice in a plastic bag  Cover the ice pack or bag with a towel before you apply it to your painful joint  Apply ice for 15 to 20 minutes every hour, or as directed  · Elevate your joint  Elevation helps reduce swelling and pain  Raise your joint above the level of your heart as often as you can  Prop your painful joint on pillows to keep it above your heart comfortably  · Go to physical therapy if directed  A physical therapist can teach you exercises to improve flexibility and range of motion  Help prevent gout attacks:   · Do not eat high-purine foods  These foods include meats, seafood, asparagus, spinach, cauliflower, and some types of beans  Healthcare providers may tell you to eat more low-fat milk products, such as yogurt  Milk products may decrease your risk for gout attacks  Vitamin C and coffee may also help  Your healthcare provider or dietitian can help you create a meal plan  · Drink liquids as directed  Liquids such as water help remove uric acid from your body  Ask how much liquid to drink each day and which liquids are best for you  · Maintain a healthy weight    Weight loss may decrease the amount of uric acid in your body  Ask your healthcare provider what a healthy weight is for you  Ask him or her to help you create a weight loss plan if you are overweight  · Control your blood sugar level if you have diabetes  Keep your blood sugar level in a normal range  This can help prevent gout attacks  · Limit or do not drink alcohol as directed  Alcohol can trigger a gout attack  Alcohol also increases your risk for dehydration  Ask your healthcare provider if alcohol is safe for you  Follow up with your doctor as directed: You may be referred to a rheumatologist or podiatrist  Write down your questions so you remember to ask them during your visits  © Copyright [employer] 2022 Information is for End User's use only and may not be sold, redistributed or otherwise used for commercial purposes  All illustrations and images included in CareNotes® are the copyrighted property of A D A M [employer]  or Aryan Razo   The above information is an  only  It is not intended as medical advice for individual conditions or treatments  Talk to your doctor, nurse or pharmacist before following any medical regimen to see if it is safe and effective for you  Low Purine Diet   WHAT YOU NEED TO KNOW:   A low-purine diet is a meal plan based on foods that are low in purine content  Purine is a substance that is found in foods and is produced naturally by the body  Purines are broken down by the body and changed to uric acid  The kidneys normally filter the uric acid, and it leaves the body through the urine  However, people with gout sometimes have a buildup of uric acid in the blood  This buildup of uric acid can cause swelling and pain (a gout attack)  A low-purine diet may help to treat and prevent gout attacks  DISCHARGE INSTRUCTIONS:   Foods to include: The following foods are low in purine    · Eggs, nuts, and peanut butter    · Low-fat and fat-free cheese and ice cream    · Skim or 1% milk    · Soup made without meat extract or broth    · Vegetables that are not on the medium-purine list below    · All fruit and fruit juice    · Bread, pasta, rice, cakes, cornbread, and popcorn    · Water, soda, tea, coffee, and cocoa    · Sugar, sweets, and gelatin    · Fat and oil    Foods to limit:   · Medium-purine foods:     ? Meats:  Limit the following to 4 to 6 ounces each day  § Meat and [employer], lobster, oysters, and shrimp    ? Vegetables:  Limit the following vegetables to ½ cup each day  § Asparagus    § Cauliflower    § Spinach    § Mushrooms    § Green peas    ? Beans, peas, and lentils (limit to 1 cup each day)    ? Oats and oatmeal (limit to ? cup uncooked each day)    ? Wheat germ and bran (limit to ¼ cup each day)    · High-purine foods:  Limit or avoid foods high in purine  ? Anchovies, sardines, scallops, and mussels    ? Tuna, codfish, herring, and lexi    ? [employer], like goose and duck    ? Organ meats, such as brains, heart, kidney, liver, and sweetbreads    ? Gravies and sauces made with meat    ? Yeast extracts taken in the form of a supplement    Other guidelines to follow:   · Increase liquid intake  Drink 8 to 16 (eight-ounce) cups of liquid each day  At least half of the liquid you drink should be water  Liquid can help your body get rid of extra uric acid  · Limit or avoid alcohol  Alcohol (especially beer) increases your risk of a gout attack  Beer contains a high amount of purine  · Maintain a healthy weight  If you are overweight, you should lose weight slowly  Weight loss can help decrease the amount of stress on your joints  Regular exercise can help you lose weight if you are overweight, or maintain your weight if you are at a normal weight  Talk to your healthcare provider before you begin an exercise program     © Copyright [employer] 2022 Information is for End User's use only and may not be sold, redistributed or otherwise used for commercial purposes  All illustrations and images included in CareNotes® are the copyrighted property of A [employer] A M [employer] Inc  or Aryan Quinonez  The above information is an  only  It is not intended as medical advice for individual conditions or treatments  Talk to your doctor, nurse or pharmacist before following any medical regimen to see if it is safe and effective for you

## 2022-05-23 ENCOUNTER — RA CDI HCC (OUTPATIENT)
Dept: OTHER | Facility: HOSPITAL | Age: 87
End: 2022-05-23

## 2022-05-23 NOTE — PROGRESS NOTES
[facility]  coding opportunities          Chart Reviewed number of suggestions sent to Provider: 4  E11 22  N18 4, d63 1  Z79 4  f23 0        Patients Insurance     Medicare Insurance: Perez Patel

## 2022-05-31 ENCOUNTER — OFFICE VISIT (OUTPATIENT)
Dept: FAMILY MEDICINE CLINIC | Facility: CLINIC | Age: 87
End: 2022-05-31
Payer: COMMERCIAL

## 2022-05-31 VITALS
OXYGEN SATURATION: 97 % | DIASTOLIC BLOOD PRESSURE: 62 MMHG | SYSTOLIC BLOOD PRESSURE: 124 MMHG | HEIGHT: 60 IN | HEART RATE: 64 BPM | BODY MASS INDEX: 27.09 KG/M2 | WEIGHT: 138 LBS

## 2022-05-31 DIAGNOSIS — E78.00 PURE HYPERCHOLESTEROLEMIA: ICD-10-CM

## 2022-05-31 DIAGNOSIS — N18.4 TYPE 2 DIABETES MELLITUS WITH STAGE 4 CHRONIC KIDNEY DISEASE, WITHOUT LONG-TERM CURRENT USE OF INSULIN (HCC): ICD-10-CM

## 2022-05-31 DIAGNOSIS — G63 POLYNEUROPATHY ASSOCIATED WITH UNDERLYING DISEASE (HCC): ICD-10-CM

## 2022-05-31 DIAGNOSIS — N18.4 CHRONIC KIDNEY DISEASE, STAGE IV (SEVERE) (HCC): ICD-10-CM

## 2022-05-31 DIAGNOSIS — M1A.00X1 IDIOPATHIC CHRONIC GOUT WITH TOPHUS, UNSPECIFIED SITE: ICD-10-CM

## 2022-05-31 DIAGNOSIS — F32.9 REACTIVE DEPRESSION: ICD-10-CM

## 2022-05-31 DIAGNOSIS — R60.0 EDEMA OF BOTH LOWER LEGS: ICD-10-CM

## 2022-05-31 DIAGNOSIS — I10 ESSENTIAL HYPERTENSION: Primary | ICD-10-CM

## 2022-05-31 DIAGNOSIS — E11.22 TYPE 2 DIABETES MELLITUS WITH STAGE 4 CHRONIC KIDNEY DISEASE, WITHOUT LONG-TERM CURRENT USE OF INSULIN (HCC): ICD-10-CM

## 2022-05-31 LAB — SL AMB POCT HEMOGLOBIN AIC: 5.3 (ref ?–6.5)

## 2022-05-31 PROCEDURE — 83036 HEMOGLOBIN GLYCOSYLATED A1C: CPT | Performed by: FAMILY MEDICINE

## 2022-05-31 PROCEDURE — 99214 OFFICE O/P EST MOD 30 MIN: CPT | Performed by: FAMILY MEDICINE

## 2022-05-31 RX ORDER — PREDNISONE 10 MG/1
TABLET ORAL
Qty: 20 TABLET | Refills: 0 | Status: SHIPPED | OUTPATIENT
Start: 2022-05-31 | End: 2022-05-31

## 2022-05-31 RX ORDER — PREDNISONE 10 MG/1
TABLET ORAL
Qty: 20 TABLET | Refills: 0 | Status: SHIPPED | OUTPATIENT
Start: 2022-05-31 | End: 2022-07-30

## 2022-05-31 NOTE — PROGRESS NOTES
Assessment and Plan:    Pleasant and spry 80year old female presents today with her daughter to review her chronic medical conditions, and to also f/u on recent flare of gout, toe ulcer, LE edema in the setting of CKD  She notes that her LE are swollen and have been "weeping" serous fluid  Prescribed colchicine 3 weeks ago - now stopped  Gout is ?active  Finger and toe hurt but not as much  She apparently was told not to take too much colchicine is setting of CKD  Pt has not seen Dr Tray Ashley yet, but daughter did call him and labs were ordered for July  Pt remains happy at 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 - hemoglobin A1c today is 5 3  Patient has not have active diabetes  Diabetic foot exam performed today, but difficult to assess in the setting of bilateral lower extremity edema  3  Edema of both lower extremities, with weeping skin - challenging to assess the use of diuretics in the setting of chronic kidney disease  However in the setting of lower extremity edema with some mild rales at the bases, will initiate 40 mg of Lasix b i d  For 3 days  Then return to 40 mg daily  Patient has blood work and follow up with Nephrology in July 4  Gout - she is still flaring  Hold on further colchicine secondary to CKD  Start prednisone taper  Continue allopurinol Notify me if symptoms worsen or persist       5  Reactive depression - doing very well on citalopram   She remains on Remeron 7 5 mg q h s   If she continues to do well, consider stopping this  6  Polyneuropathy - continue gabapentin for now  Consider stopping if lower extremity edema continues  Follow-up 3 months    Patient and daughter know to notify me sooner if symptoms worsen or persist       Subjective:      Patient ID: Marybelle Lesches is a 80 y o  female  CC:    Chief Complaint   Patient presents with    Follow-up     Pt here for routine Follow up  She needs a RX to D/C her Polyeth Glycol as she is now getting the runs from it  kw    Diabetes    Hypertension       HPI:      Pleasant and spry 80year old female presents today with her daughter to review her chronic medical conditions, and to also f/u on recent flare of gout, toe ulcer, LE edema in the setting of CKD  She notes that her LE are swollen and have been "weeping" serous fluid  Prescribed colchicine 3 weeks ago - now stopped  Gout is ?active  Finger and toe hurt but not as much  She apparently was told not to take too much colchicine is setting of CKD  Pt has not seen Dr Baldev Avila yet, but daughter did call him and labs were ordered for July  Pt remains happy at [facility]  Her sister and other family members live there too, and she is quite social and engaged  The following portions of the patient's history were reviewed and updated as appropriate: allergies, current medications, past family history, past medical history, past social history, past surgical history and problem list       Review of Systems   Constitutional:        See HPI   HENT: Negative for congestion, ear pain, mouth sores, sinus pressure and trouble swallowing  Eyes: Negative for discharge, redness and itching  Respiratory: Negative for apnea, cough, chest tightness, shortness of breath, wheezing and stridor  Cardiovascular: Positive for leg swelling  Negative for chest pain and palpitations  Gastrointestinal: Negative for abdominal distention, abdominal pain, blood in stool, constipation, diarrhea, nausea and vomiting  Endocrine: Negative for cold intolerance and heat intolerance  Genitourinary: Negative for difficulty urinating, dysuria, flank pain and urgency  Musculoskeletal: Negative for arthralgias and myalgias  Right 2nd digit is swollen and tender  Left big toe swollen and tender  Both are improved from prior  Skin: Negative for rash  Lower extremity skin is weeping at the ankles  Neurological: Negative for dizziness, seizures, syncope, speech difficulty, weakness, light-headedness, numbness and headaches  Hematological: Negative for adenopathy  Psychiatric/Behavioral: Negative for agitation, behavioral problems, confusion and sleep disturbance  The patient is not nervous/anxious  Data to review:       Objective:    Vitals:    05/31/22 0959   BP: 124/62   BP Location: Right arm   Patient Position: Sitting   Pulse: 64   SpO2: 97%   Weight: 62 6 kg (138 lb)   Height: 5' (1 524 m)        Physical Exam  Vitals and nursing note reviewed  Constitutional:       General: She is not in acute distress  Appearance: She is well-developed  She is not ill-appearing, toxic-appearing or diaphoretic  Comments: Spry 15-year-old female, who looks younger than stated age  She is well groomed and in no apparent distress  She is well engaged, normal affect, answering questions appropriately  HENT:      Left Ear: External ear normal    Eyes:      General: No scleral icterus  Conjunctiva/sclera: Conjunctivae normal       Pupils: Pupils are equal, round, and reactive to light  Neck:      Vascular: No carotid bruit  Cardiovascular:      Rate and Rhythm: Normal rate and regular rhythm  Pulses: no weak pulses          Dorsalis pedis pulses are Right DP grade: Cannot assess in setting of edema  and Left DP grade: Cannot assess in setting of edema           Posterior tibial pulses are Right PT grade: Cannot assess in setting of edema  and Left PT grade: Cannot assess in setting of edema  Abigail Mick Heart sounds: Normal heart sounds  No murmur heard  No friction rub  No gallop  Pulmonary:      Effort: Pulmonary effort is normal  No respiratory distress  Breath sounds: Normal breath sounds  No wheezing or rales     Musculoskeletal: General: Normal range of motion  Cervical back: Normal range of motion and neck supple  Right lower leg: Edema present  Left lower leg: Edema present  Comments: R thumb is red, swollen and warm and mildly tender  L big toe is red, swollen and warm and mildly tender  Bilateral lower extremity edema noted  Skin is taut and weeping clear, serous fluid  There is a bandage on the left lower leg  Feet:      Right foot:      Skin integrity: Erythema and warmth present  Left foot:      Skin integrity: Erythema and warmth present  Lymphadenopathy:      Cervical: No cervical adenopathy  Skin:     General: Skin is warm and dry  Coloration: Skin is not jaundiced  Neurological:      Mental Status: She is alert and oriented to person, place, and time  Psychiatric:         Mood and Affect: Mood normal          Behavior: Behavior normal          Thought Content: Thought content normal          Judgment: Judgment normal                Diabetic Foot Exam    Patient's shoes and socks removed  Right Foot/Ankle   Right Foot Inspection  Skin Exam: warmth and erythema  Toe Exam: swelling  no right toe deformity    Vascular  Right foot capillary refill time: Unable to assess in setting of edema  Right PT grade: Cannot assess in setting of edema  Left Foot/Ankle  Left Foot Inspection  Skin Exam: warmth and erythema  Toe Exam: swelling and tenderness  No left toe deformity  Vascular  Left foot capillary refill time: Unable to assess in setting of edema  Left PT grade: Cannot assess in setting of edema       Assign Risk Category  No deformity present  Loss of protective sensation  No weak pulses  Risk: 1

## 2022-06-01 RX ORDER — CITALOPRAM 10 MG/1
TABLET ORAL
Qty: 90 TABLET | Refills: 1 | Status: SHIPPED | OUTPATIENT
Start: 2022-06-01

## 2022-06-27 ENCOUNTER — HOSPITAL ENCOUNTER (OUTPATIENT)
Dept: NON INVASIVE DIAGNOSTICS | Facility: HOSPITAL | Age: 87
Discharge: HOME | End: 2022-06-27
Payer: COMMERCIAL

## 2022-06-27 VITALS
DIASTOLIC BLOOD PRESSURE: 62 MMHG | WEIGHT: 138 LBS | BODY MASS INDEX: 27.09 KG/M2 | HEART RATE: 64 BPM | SYSTOLIC BLOOD PRESSURE: 124 MMHG | HEIGHT: 60 IN

## 2022-06-27 DIAGNOSIS — R60.0 EDEMA OF BOTH LOWER LEGS: ICD-10-CM

## 2022-06-27 LAB
AORTIC ROOT: 3.1 CM
AORTIC VALVE MEAN VELOCITY: 11 M/S
APICAL FOUR CHAMBER EJECTION FRACTION: 59 %
ASCENDING AORTA: 3.8 CM
AV LVOT MEAN GRADIENT: 3 MMHG
AV LVOT PEAK GRADIENT: 6 MMHG
AV MEAN GRADIENT: 5 MMHG
AV PEAK GRADIENT: 10 MMHG
AV VELOCITY RATIO: 0.73
DOP CALC AO PEAK VEL: 1.61 M/S
DOP CALC AO VTI: 39.33 CM
DOP CALC LVOT PEAK VEL VTI: 28.75 CM
DOP CALC LVOT PEAK VEL: 1.18 M/S
E WAVE DECELERATION TIME: 193 MS
FRACTIONAL SHORTENING: 27 % (ref 28–44)
INTERVENTRICULAR SEPTUM IN DIASTOLE (PARASTERNAL SHORT AXIS VIEW): 1.3 CM
INTERVENTRICULAR SEPTUM: 1.3 CM (ref 0.6–1.1)
LAAS-AP2: 31.7 CM2
LAAS-AP4: 31.7 CM2
LEFT ATRIUM SIZE: 4.7 CM
LEFT INTERNAL DIMENSION IN SYSTOLE: 3.3 CM (ref 2.1–4)
LEFT VENTRICULAR INTERNAL DIMENSION IN DIASTOLE: 4.5 CM (ref 3.5–6)
LEFT VENTRICULAR POSTERIOR WALL IN END DIASTOLE: 1.3 CM
LEFT VENTRICULAR STROKE VOLUME: 49 ML
LVSV (TEICH): 49 ML
MV E'TISSUE VEL-SEP: 6 CM/S
MV PEAK A VEL: 1.54 M/S
MV PEAK E VEL: 124 CM/S
MV STENOSIS PRESSURE HALF TIME: 56 MS
MV VALVE AREA P 1/2 METHOD: 3.93 CM2
RIGHT ATRIAL 2D VOLUME: 27 ML
RIGHT ATRIUM AREA SYSTOLE A4C: 12.5 CM2
RIGHT VENTRICLE ID DIMENSION: 3.8 CM
SL CV LEFT ATRIUM LENGTH A2C: 6.1 CM
SL CV LV EF: 67
SL CV PED ECHO LEFT VENTRICLE DIASTOLIC VOLUME (MOD BIPLANE) 2D: 94 ML
SL CV PED ECHO LEFT VENTRICLE SYSTOLIC VOLUME (MOD BIPLANE) 2D: 46 ML
TR MAX PG: 38 MMHG
TR PEAK VELOCITY: 3.1 M/S
TRICUSPID VALVE PEAK REGURGITATION VELOCITY: 3.08 M/S

## 2022-06-27 PROCEDURE — 93306 TTE W/DOPPLER COMPLETE: CPT | Performed by: INTERNAL MEDICINE

## 2022-06-27 PROCEDURE — 93306 TTE W/DOPPLER COMPLETE: CPT

## 2022-07-19 NOTE — TELEPHONE ENCOUNTER
Katarzyna Crane from [facility] called and stated that she is doing wound care on the patient and notice on both legs the patient has 3 to 4 venous stasis ulcers and that they are no bigger 1 by  05    Katarzyna Crane stated that she will come 2x times a week to treat the patient, cane be reached at [phone]

## 2022-07-22 NOTE — TELEPHONE ENCOUNTER
Nurse called to let us know pt heart rate is 56 and they are just flagged to call with any heart rate below 60  Pt denied any sx's  Pt o2 was 98% and BP was 146/60

## 2022-07-26 PROBLEM — E87.0 HYPERNATREMIA: Status: ACTIVE | Noted: 2022-01-01

## 2022-07-26 PROBLEM — L03.011 CELLULITIS OF RIGHT INDEX FINGER: Status: ACTIVE | Noted: 2022-07-26

## 2022-07-26 NOTE — TELEPHONE ENCOUNTER
Patient's daughter calling in wondering if the patient was with us because she is supposed to be having hand surgery  I advised that we are a doctor office so she would not be with us, but I could try to find her  I pulled up patient chart and saw she had been to [facility] and looked like they were planning transfer to [facility]  Called  to get number for the daughter to be able to reach her mom  I was given [address] [facility] ED nurse's stations @ [phone]  Gave daughter this phone number and transferred her as well

## 2022-07-26 NOTE — ED ATTENDING ATTESTATION
7/25/2022  IMorena MD, saw and evaluated the patient  I have discussed the patient with the resident/non-physician practitioner and agree with the resident's/non-physician practitioner's findings, Plan of Care, and MDM as documented in the resident's/non-physician practitioner's note, except where noted  All available labs and Radiology studies were reviewed  I was present for key portions of any procedure(s) performed by the resident/non-physician practitioner and I was immediately available to provide assistance  At this point I agree with the current assessment done in the Emergency Department  I have conducted an independent evaluation of this patient a history and physical is as follows:  70-year-old female presents for evaluation of right index finger pain  She states the pain started 1 week ago progressively worse over the last day  She denies swelling, redness, yellowish color to the tip of the finger  Associated with chills  Ten systems reviewed otherwise negative  Exam no distress, lungs normal cardiac normal abdomen normal, right hand exam; right index finger it is swollen, red, warm to the touch, there is a area of pus on the distal portion of the index finger  No pain with tapping of the flexor tendon, full range of motion without pain    Medical decision making;-will check labs, I and D, discussed with Hand surgery  ED Course         Critical Care Time  Procedures

## 2022-07-26 NOTE — ED PROVIDER NOTES
History  Chief Complaint   Patient presents with    Finger Swelling     Pt reports right index finger began to hurt a week ago and became swollen yesterday morning  Denies fever and injury  42-year-old woman with relevant PMH T2DM, CKD, gout, and dementia presents with right index finger pain  The finger became painful last week, and she noticed swelling yesterday morning  Today, she saw an area of pus on the distal fingertip which has been expanding  She does endorse something similar happening in the past, but it spontaneously drained and resolved on its own  No fever or chills  No trauma or innoculation of the area  Prior to Admission Medications   Prescriptions Last Dose Informant Patient Reported? Taking?    Contour Test test strip  Self No No   Sig: TEST AS DIRECTED   Lancets (ONETOUCH ULTRASOFT) lancets  Self Yes Yes   Sig: by Does not apply route 2 (two) times a day   allopurinol (ZYLOPRIM) 100 mg tablet  Self Yes Yes   Sig: Take 50 mg by mouth daily   aspirin 81 MG tablet  Self Yes Yes   Sig: Take 1 tablet by mouth daily   cinacalcet (SENSIPAR) 30 mg tablet  Self No Yes   Sig: Take 1 tablet (30 mg total) by mouth 3 (three) times a week   citalopram (CeleXA) 10 mg tablet   No Yes   Sig: TAKE 1 TABLET BY MOUTH EVERY DAY   colchicine (COLCRYS) 0 6 mg tablet  Self No No   Sig: Take 0 5 tablets (0 3 mg total) by mouth daily   collagenase (SANTYL) ointment  Self No Yes   Sig: Apply topically daily   ferrous sulfate 324 (65 Fe) mg  Self No Yes   Sig: Give one tab orally every 2 days for anemia   furosemide (LASIX) 40 mg tablet  Self No Yes   Sig: Take 1 tablet (40 mg total) by mouth in the morning   gabapentin (NEURONTIN) 100 mg capsule  Self Yes Yes   Sig: Take 100 mg by mouth in the morning   gabapentin (NEURONTIN) 400 mg capsule  Self Yes Yes   Sig: Take 400 mg by mouth daily at bedtime   mirtazapine (REMERON) 7 5 MG tablet  Self Yes Yes   Sig: Take 7 5 mg by mouth daily at bedtime   predniSONE 10 mg tablet   No No   Sig: Take 4 tabs by mouth daily for 1 day, then 3 tabs daily by mouth for 2 days, then 2 days daily by mouth for 3 days, then 1 tab daily by mouth for 4 days  simvastatin (ZOCOR) 20 mg tablet  Self No Yes   Sig: TAKE 1 TABLET BY MOUTH EVERY DAY      Facility-Administered Medications: None       Past Medical History:   Diagnosis Date    Diabetes mellitus ([facility] )     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[facility] )     LAST ASSESSED: 17AUG2016    Hyperlipidemia     Hypertension     Renal disorder     Squamous cell carcinoma, scalp/neck     LAST ASSESSED: [id number]       Past Surgical History:   Procedure Laterality Date    BLADDER SURGERY      LAST ASSESSED: [id number]    PELVIC FLOOR REPAIR      VAGINAL SURG INSERTION OF MESH    TOTAL ABDOMINAL HYSTERECTOMY W/ BILATERAL SALPINGOOPHORECTOMY      LAST ASSESSED: [id number]       Family History   Problem Relation Age of Onset    Hypertension Mother     Hypertension Father     Kidney disease Sister         CHRONIC    Alcohol abuse Family     Substance Abuse Family      I have reviewed and agree with the history as documented  E-Cigarette/Vaping    E-Cigarette Use Never User      E-Cigarette/Vaping Substances     Social History     Tobacco Use    Smoking status: Former Smoker    Smokeless tobacco: Never Used   Vaping Use    Vaping Use: Never used   Substance Use Topics    Alcohol use: Yes     Comment: SOCIAL    Drug use: No        Review of Systems   Constitutional: Negative for chills and fever  HENT: Negative for ear pain and sore throat  Eyes: Negative for pain and visual disturbance  Respiratory: Negative for cough, shortness of breath and wheezing  Cardiovascular: Negative for chest pain and palpitations  Gastrointestinal: Negative for abdominal pain, constipation, diarrhea and vomiting  Genitourinary: Negative for dysuria, frequency, hematuria and vaginal bleeding     Musculoskeletal: Negative for arthralgias and back pain  Skin: Negative for color change and rash  Neurological: Negative for seizures, syncope and headaches  Psychiatric/Behavioral: Negative for agitation and confusion  Physical Exam  ED Triage Vitals [07/25/22 2120]   Temperature Pulse Respirations Blood Pressure SpO2   98 1 °F (36 7 °C) 71 18 (!) 193/79 99 %      Temp Source Heart Rate Source Patient Position - Orthostatic VS BP Location FiO2 (%)   Oral Monitor Lying Left arm --      Pain Score       8             Orthostatic Vital Signs  Vitals:    07/25/22 2120 07/26/22 0042 07/26/22 0115   BP: (!) 193/79 (!) 193/86 (!) 174/73   Pulse: 71 80 66   Patient Position - Orthostatic VS: Lying Lying Lying       Physical Exam  Vitals and nursing note reviewed  Constitutional:       General: She is not in acute distress  Appearance: Normal appearance  She is well-developed  HENT:      Head: Normocephalic and atraumatic  Right Ear: External ear normal       Left Ear: External ear normal       Nose: Nose normal  No congestion  Eyes:      Conjunctiva/sclera: Conjunctivae normal    Cardiovascular:      Rate and Rhythm: Normal rate and regular rhythm  Pulmonary:      Effort: Pulmonary effort is normal  No respiratory distress  Musculoskeletal:         General: Normal range of motion  Hands:       Cervical back: Normal range of motion and neck supple  Right lower leg: No edema  Left lower leg: No edema  Skin:     General: Skin is warm and dry  Neurological:      General: No focal deficit present  Mental Status: She is alert and oriented to person, place, and time  Sensory: No sensory deficit  Motor: No weakness     Psychiatric:         Mood and Affect: Mood normal          Behavior: Behavior normal          ED Medications  Medications   lidocaine-epinephrine (XYLOCAINE/EPINEPHRINE) 1 %-1:100,000 injection 5 mL (5 mL Infiltration Given by Other 7/25/22 2144)   DAPTOmycin (CUBICIN) 400 mg in sodium chloride 0 9 % 50 mL IVPB (0 mg/kg × 65 2 kg Intravenous Stopped 7/26/22 0156)       Diagnostic Studies  Results Reviewed     Procedure Component Value Units Date/Time    Wound culture and Gram stain [[id number]] Collected: 07/26/22 0049    Lab Status:  In process Specimen: Wound from Finger, Right Updated: 07/26/22 8384    Basic metabolic panel [[id number]]  (Abnormal) Collected: 07/25/22 2303    Lab Status: Final result Specimen: Blood from Arm, Left Updated: 07/25/22 2321     Sodium 148 mmol/L      Potassium 5 0 mmol/L      Chloride 112 mmol/L      CO2 26 mmol/L      ANION GAP 10 mmol/L      BUN 43 mg/dL      Creatinine 2 73 mg/dL      Glucose 135 mg/dL      Calcium 8 5 mg/dL      eGFR 14 ml/min/1 73sq m     Narrative:      National Kidney Disease Foundation guidelines for Chronic Kidney Disease (CKD):     Stage 1 with normal or high GFR (GFR > 90 mL/min/1 73 square meters)    Stage 2 Mild CKD (GFR = 60-89 mL/min/1 73 square meters)    Stage 3A Moderate CKD (GFR = 45-59 mL/min/1 73 square meters)    Stage 3B Moderate CKD (GFR = 30-44 mL/min/1 73 square meters)    Stage 4 Severe CKD (GFR = 15-29 mL/min/1 73 square meters)    Stage 5 End Stage CKD (GFR <15 mL/min/1 73 square meters)  Note: GFR calculation is accurate only with a steady state creatinine    CBC and differential [[id number]]  (Abnormal) Collected: 07/25/22 2303    Lab Status: Final result Specimen: Blood from Arm, Left Updated: 07/25/22 2310     WBC 10 05 Thousand/uL      RBC 2 91 Million/uL      Hemoglobin 8 6 g/dL      Hematocrit 29 4 %       fL      MCH 29 6 pg      MCHC 29 3 g/dL      RDW 15 3 %      MPV 10 7 fL      Platelets 748 Thousands/uL      nRBC 0 /100 WBCs      Neutrophils Relative 73 %      Immat GRANS % 1 %      Lymphocytes Relative 13 %      Monocytes Relative 6 %      Eosinophils Relative 7 %      Basophils Relative 0 %      Neutrophils Absolute 7 37 Thousands/µL      Immature Grans Absolute 0 05 Thousand/uL      Lymphocytes Absolute 1 28 Thousands/µL      Monocytes Absolute 0 64 Thousand/µL      Eosinophils Absolute 0 68 Thousand/µL      Basophils Absolute 0 03 Thousands/µL                  No orders to display         Procedures  Incision and drain    Date/Time: 7/26/2022 11:30 AM  Performed by: Tonya Peña MD  Authorized by: Tonya Peña MD   Universal Protocol:  Consent: Verbal consent obtained  Risks and benefits: risks, benefits and alternatives were discussed  Consent given by: patient  Patient identity confirmed: verbally with patient      Patient location:  ED  Location:     Type:  Abscess    Size:  3 cm    Location:  Upper extremity    Upper extremity location:  R index finger  Pre-procedure details:     Skin preparation:  Chloraprep  Anesthesia (see MAR for exact dosages): Anesthesia method:  Nerve block    Block location:  Digital block    Block needle gauge:  25 G    Block anesthetic:  Lidocaine 1% WITH epi    Block injection procedure:  Introduced needle, incremental injection, anatomic landmarks palpated, negative aspiration for blood and anatomic landmarks identified    Block outcome:  Anesthesia achieved  Procedure details:     Complexity:  Simple    Needle aspiration: no      Incision type: ulnar incision  Scalpel blade:  11    Approach:  Open    Incision depth:  Deep    Wound management:  Probed and deloculated, irrigated with saline and extensive cleaning    Irrigation with saline:  30 mL    Drainage:  Purulent and bloody    Drainage amount:  Copious    Wound treatment:  Wound left open    Packing materials:  None  Post-procedure details:     Patient tolerance of procedure: Tolerated well, no immediate complications          ED Course  ED Course as of 07/26/22 0233   Mon Jul 25, 2022   2140 Examined and in no distress  No fever and hemodynamically stable  I am concerned for a felon of the distal finger     0348 Hand recommends I+D and transfer to [facility] 22yo+    Flowsheet Row Most Recent Value   SBIRT (23 yo +)    In order to provide better care to our patients, we are screening all of our patients for alcohol and drug use  Would it be okay to ask you these screening questions? Yes Filed at: 07/25/2022 2125   Initial Alcohol Screen: US AUDIT-C     1  How often do you have a drink containing alcohol? 0 Filed at: 07/25/2022 2125   2  How many drinks containing alcohol do you have on a typical day you are drinking? 0 Filed at: 07/25/2022 2125   3b  FEMALE Any Age, or MALE 65+: How often do you have 4 or more drinks on one occassion? 0 Filed at: 07/25/2022 2125   Audit-C Score 0 Filed at: 07/25/2022 2125   MOMO: How many times in the past year have you    Used an illegal drug or used a prescription medication for non-medical reasons? Never Filed at: 07/25/2022 2125                MDM  Number of Diagnoses or Management Options  Felon of finger of right hand  Diagnosis management comments: Presents with large felon of right index finger  She has a sausage digit but no other canavel signs  Performed I&D with large volume purulent discharge  Culture sent for evaluation  Started daptomycin for possible MRSA  Hand surgery recommend transfer to [place] for further evaluation  Patient in agreement with plan and questions were answered  Verbalized understanding of return precautions  Portions or all of this note were generated using voice recognition software  Occasional wrong word or "sound a like" substitutions may have occurred due to the inherent limitations of voice recognition software  Please interpret any errors within the intended context of the whole sentence or idea          Disposition  Final diagnoses:   Felon of finger of right hand     Time reflects when diagnosis was documented in both MDM as applicable and the Disposition within this note     Time User Action Codes Description Comment    7/26/2022  2:12 AM Eduardo Olea Add [[id number]] Félix of finger of right hand       ED Disposition     ED Disposition   Transfer to Another Facility-In Network    Condition   --    Date/Time   Mon Jul 25, 2022 11:41 PM    Comment   Ya Luis should be transferred out to [facility]  MD Documentation    [address] Most Recent Value   Patient Condition The patient has been stabilized such that within reasonable medical probability, no material deterioration of the patient condition or the condition of the unborn child(jenny) is likely to result from the transfer   Reason for Transfer Level of Care needed not available at this facility   Benefits of Transfer Specialized equipment and/or services available at the receiving facility (Include comment)________________________   Risks of Transfer Potential for delay in receiving treatment, Potential deterioration of medical condition, Loss of IV   Accepting Physician [address] Name, [address]   Transported by [employer] and Unit #) [facility]   Sending MD [facility]   Provider Certification General risk, such as traffic hazards, adverse weather conditions, rough terrain or turbulence, possible failure of equipment (including vehicle or aircraft), or consequences of actions of persons outside the control of the transport personnel, Unanticipated needs of medical equipment and personnel during transport, Risk of worsening condition, The possibility of a transport vehicle being unavailable      RN Documentation    Flowsheet Row Most [address] [facility] [address] Name, [address] [facility]   Transport Mode Ambulance   Transported by [employer] and Unit #) [facility]   Level of Care Basic life support      Follow-up Information    None         Patient's Medications   Discharge Prescriptions    No medications on file     No discharge procedures on file  PDMP Review     None           ED Provider  Attending physically available and evaluated Ya Luis   I managed the patient along with the ED Attending      Electronically Signed by         Stanford Sargent MD  07/26/22 2151

## 2022-07-26 NOTE — ED NOTES
Daughter, Gómez Mustafa, notified that patient is being transferred at [phone]       Clinton Ellsworth, RN  07/26/22 [phone]

## 2022-07-26 NOTE — EMTALA/ACUTE CARE TRANSFER
[facility]  [address] [facility] [address]  Dept: [phone]      EMTALA TRANSFER CONSENT    NAME Jared Osman                                         1927                              MRN [id number]    I have been informed of my rights regarding examination, treatment, and transfer   by Dr Robert Waters MD    Benefits: Specialized equipment and/or services available at the receiving facility (Include comment)________________________    Risks: Potential for delay in receiving treatment, Potential deterioration of medical condition, Loss of IV      Consent for Transfer:  I acknowledge that my medical condition has been evaluated and explained to me by the emergency department physician or other qualified medical person and/or my attending physician, who has recommended that I be transferred to the service of  Accepting Physician: Junior Ceballos at [address]  Name, [address] : [facility]  The above potential benefits of such transfer, the potential risks associated with such transfer, and the probable risks of not being transferred have been explained to me, and I fully understand them  The doctor has explained that, in my case, the benefits of transfer outweigh the risks  I agree to be transferred  I authorize the performance of emergency medical procedures and treatments upon me in both transit and upon arrival at the receiving facility  Additionally, I authorize the release of any and all medical records to the receiving facility and request they be transported with me, if possible  I understand that the safest mode of transportation during a medical emergency is an ambulance and that the Hospital advocates the use of this mode of transport   Risks of traveling to the receiving facility by car, including absence of medical control, life sustaining equipment, such as oxygen, and medical personnel has been explained to me and I fully understand them     ([address] [facility])  [  ]  I consent to the stated transfer and to be transported by ambulance/helicopter  [  ]  I consent to the stated transfer, but refuse transportation by ambulance and accept full responsibility for my transportation by car  I understand the risks of non-ambulance transfers and I exonerate the Hospital and its staff from any deterioration in my condition that results from this refusal     X___________________________________________    DATE  22  TIME________  Signature of patient or legally responsible individual signing on patient behalf           RELATIONSHIP TO PATIENT_________________________          Provider Certification    NAME Chhaya Acevedo                                         1927                              MRN [id number]    A medical screening exam was performed on the above named patient  Based on the examination:    Condition Necessitating Transfer The encounter diagnosis was Felon of finger of right hand  Patient Condition: The patient has been stabilized such that within reasonable medical probability, no material deterioration of the patient condition or the condition of the unborn child(jenny) is likely to result from the transfer    Reason for Transfer: Level of Care needed not available at this facility    Transfer Requirements: Facility [facility]   · Space available and qualified personnel available for treatment as acknowledged by    · Agreed to accept transfer and to provide appropriate medical treatment as acknowledged by       David Martinez  · Appropriate medical records of the examination and treatment of the patient are provided at the time of transfer   [address] _______  · Transfer will be performed by qualified personnel from [facility]  and appropriate transfer equipment as required, including the use of necessary and appropriate life support measures      Provider Certification: I have examined the patient and explained the following risks and benefits of being transferred/refusing transfer to the patient/family:  General risk, such as traffic hazards, adverse weather conditions, rough terrain or turbulence, possible failure of equipment (including vehicle or aircraft), or consequences of actions of persons outside the control of the transport personnel, Unanticipated needs of medical equipment and personnel during transport, Risk of worsening condition, The possibility of a transport vehicle being unavailable      Based on these reasonable risks and benefits to the patient and/or the unborn child(jenny), and based upon the information available at the time of the patients examination, I certify that the medical benefits reasonably to be expected from the provision of appropriate medical treatments at another medical facility outweigh the increasing risks, if any, to the individuals medical condition, and in the case of labor to the unborn child, from effecting the transfer      X____________________________________________ DATE 07/26/22        TIME_______      ORIGINAL - SEND TO MEDICAL RECORDS   COPY - SEND WITH PATIENT DURING TRANSFER

## 2022-07-26 NOTE — ED NOTES
Notified Drake Elkins that patient is being transported at this time       Chavez Mir RN  07/26/22 6643

## 2022-07-26 NOTE — ED NOTES
RN spoke to nursing supervisor, Jamarcus Stockton, from facility and was provided with update at this time  Per Jamarcus Stockton, she will inform daughter         Noemy Garrett, ALICIA  07/26/22 4368

## 2022-07-26 NOTE — ED NOTES
Report called to Naren Jarrell, receiving RN at [facility]       Yuly Gupta RN  07/26/22 6201

## 2022-07-27 NOTE — PLAN OF CARE
Problem: PHYSICAL THERAPY ADULT  Goal: Performs mobility at highest level of function for planned discharge setting  See evaluation for individualized goals  Description: Treatment/Interventions: Functional transfer training, LE strengthening/ROM, Therapeutic exercise, Endurance training, Gait training, Spoke to nursing, OT  Equipment Recommended: Samuel Chavez       See flowsheet documentation for full assessment, interventions and recommendations  7/27/2022 1601 by Darin Delcid PT  Note: Prognosis: Good  Problem List: Decreased strength, Decreased endurance, Impaired balance, Decreased mobility, Impaired hearing  Assessment: Pt is 80 y o  female admitted with hx of right-sided index finger pain and Dx of Cellulitis of right index finger  Pt 's comorbidities affecting POC include: Diabetes mellitus ([facility] ), 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 ([facility] ), Hypertension and CKD and personal factors of: advanced age and living alone  Pt's clinical presentation is currently unstable/unpredictable which is evident in abn lab values, WB restrictions (R) hand and  need for stand by assist w/ all phases of mobility when usually mobilizing independently  Pt presents w/ generalized weakness, decreased functional endurance and inconsistent amb balance and gait patterns but w/ overall observed mobility status on level surfaces appearing to be near premorbid level  Will cont to follow pt in PT for progressive mobilization to max level of (I), endurance, and safety  Otherwise, anticipate pt will return home w/ available support upon D/C when medically cleared; home PT follow up is recommended; will follow  PT Discharge Recommendation: Home with home health rehabilitation (home PT)    See flowsheet documentation for full assessment

## 2022-07-27 NOTE — ASSESSMENT & PLAN NOTE
· POA with Na 148   Na improved to 145 today  · Suspected to be due to dehydration, s/p D5 half saline  · D/C IV fluids and monitor off, resumed diet today

## 2022-07-27 NOTE — PROGRESS NOTES
[address] [facility]  Progress Note - Welford Giselle 2/24/1927, 80 y o  female MRN: [id number]  Unit/Bed#: -01 Encounter: [id number]  Primary Care Provider: Linus Tyson MD   Date and time admitted to hospital: 7/26/2022  6:49 PM    * Cellulitis of right index finger  Assessment & Plan  · Patient presented with right index finger swelling, erythema and pain in the past week, initially was evaluated in ED [address] s/p bedside I&D but was transferred to [facility] AND [facility] for hand surgical evaluation  · Wound culture: 4+ growth of Staphylococcus aureus, final susceptibilities pending  · CRP: 76 4  · C/w IV vancomycin pending final culture results  · Patient today with significant pain in right hand, tearful during evaluation minimal relief with oxycodone  · Add IV Dilaudid p r n   · Orthopedic surgery following:  · NWB RUE  · Warm soapy soaks TID after removal of packing  · If no improvement on IV antibiotics, may need further interventions  · Wound care  · Elevate RUE    Hypernatremia  Assessment & Plan  · POA with Na 148   Na improved to 145 today  · Suspected to be due to dehydration, s/p D5 half saline  · D/C IV fluids and monitor off, resumed diet today    Type 2 diabetes mellitus with diabetic chronic kidney disease [facility])  Assessment & Plan  Lab Results   Component Value Date    HGBA1C 5 8 (H) 07/26/2022       Recent Labs     07/26/22  2133 07/27/22  0638   POCGLU 82 114       Blood Sugar Average: Last 72 hrs:  (P) 114   · SSI coverage while inpatient   · Hypoglycemia protocol    Chronic kidney disease, stage IV (severe) [facility])  Assessment & Plan  Lab Results   Component Value Date    EGFR 17 07/27/2022    EGFR 14 07/25/2022    EGFR 16 03/29/2022    CREATININE 2 30 (H) 07/27/2022    CREATININE 2 73 (H) 07/25/2022    CREATININE 2 48 (H) 03/29/2022   · Baseline creatinine appears to be between 2 3-2 7  · Renal function remains stable at baseline, monitor BMP    Essential hypertension  Assessment & Plan  · Home regimen: Lasix 40 mg daily  · Continue holding Lasix, BP reviewed and acceptable      VTE Pharmacologic Prophylaxis: VTE Score: 5 High Risk (Score >/= 5) - Pharmacological DVT Prophylaxis Ordered: heparin  Sequential Compression Devices Ordered  Patient Centered Rounds: I performed bedside rounds with nursing staff today  Discussions with Specialists or Other Care Team Provider:  Case management    Education and Discussions with Family / Patient: Updated  (daughter) via phone  Time Spent for Care: 45 minutes  More than 50% of total time spent on counseling and coordination of care as described above  Current Length of Stay: 1 day(s)  Current Patient Status: Inpatient   Certification Statement: The patient will continue to require additional inpatient hospital stay due to Pain management, IV antibiotics, culture results pending  Discharge Plan: Anticipate discharge in 48 hrs to discharge location to be determined pending rehab evaluations  Code Status: Level 3 - DNAR and DNI    Subjective:   Patient is seen at bedside this a m , reports severe pain and right index finger/hand, unable to bend due to pain, patient tearful on examination  Denies fever, chills, sweats, nausea/vomiting  Objective:     Vitals:   Temp (24hrs), Av 8 °F (36 6 °C), Min:97 5 °F (36 4 °C), Max:98 2 °F (36 8 °C)    Temp:  [97 5 °F (36 4 °C)-98 2 °F (36 8 °C)] 97 5 °F (36 4 °C)  HR:  [60-76] 64  Resp:  [16] 16  BP: (129-167)/(55-74) 140/55  SpO2:  [94 %-97 %] 94 %  Body mass index is 27 16 kg/m²  Input and Output Summary (last 24 hours): Intake/Output Summary (Last 24 hours) at 2022 1410  Last data filed at 2022 1141  Gross per 24 hour   Intake 972 5 ml   Output --   Net 972 5 ml       Physical Exam:   Physical Exam  Constitutional:       General: She is not in acute distress  Appearance: She is not ill-appearing, toxic-appearing or diaphoretic     Cardiovascular:      Rate and Rhythm: Normal rate and regular rhythm  Pulses: Normal pulses  Heart sounds: Normal heart sounds  Pulmonary:      Effort: Pulmonary effort is normal  No respiratory distress  Breath sounds: Normal breath sounds  Abdominal:      General: Bowel sounds are normal  There is no distension  Palpations: Abdomen is soft  Tenderness: There is no abdominal tenderness  Musculoskeletal:         General: Swelling and tenderness present  Skin:     General: Skin is warm  Comments: Dressing on right hand clean, dry, intact, mild edema on right middle and ring finger, ROM limited by pain in right index finger  No red streaking  Right index finger and metacarpal region tender to palpation  Neurological:      General: No focal deficit present  Mental Status: She is alert     Psychiatric:      Comments: Patient tearful          Additional Data:     Labs:  Results from last 7 days   Lab Units 07/25/22  2303   WBC Thousand/uL 10 05   HEMOGLOBIN g/dL 8 6*   HEMATOCRIT % 29 4*   PLATELETS Thousands/uL 245   NEUTROS PCT % 73   LYMPHS PCT % 13*   MONOS PCT % 6   EOS PCT % 7*     Results from last 7 days   Lab Units 07/27/22  0447   SODIUM mmol/L 145   POTASSIUM mmol/L 4 3   CHLORIDE mmol/L 118*   CO2 mmol/L 25   BUN mg/dL 40*   CREATININE mg/dL 2 30*   ANION GAP mmol/L 2*   CALCIUM mg/dL 8 7   ALBUMIN g/dL 2 3*   TOTAL BILIRUBIN mg/dL 0 52   ALK PHOS U/L 108   ALT U/L 9*   AST U/L 8   GLUCOSE RANDOM mg/dL 116         Results from last 7 days   Lab Units 07/27/22  0638 07/26/22  2133   POC GLUCOSE mg/dl 114 82     Results from last 7 days   Lab Units 07/26/22  2317   HEMOGLOBIN A1C % 5 8*           Lines/Drains:  Invasive Devices  Report    Peripheral Intravenous Line  Duration           Peripheral IV 07/25/22 Left Antecubital 1 day    Peripheral IV 07/26/22 Right Antecubital 1 day                  Imaging: Reviewed radiology reports from this admission including: xray(s)    Recent Cultures (last 7 days):   Results from last 7 days   Lab Units 07/26/22  0049   GRAM STAIN RESULT  4+ Polys*  2+ Gram positive cocci in pairs*   WOUND CULTURE  4+ Growth of Staphylococcus aureus*       Last 24 Hours Medication List:   Current Facility-Administered Medications   Medication Dose Route Frequency Provider Last Rate    acetaminophen  650 mg Oral Q6H PRN Chris FELISA Faith      allopurinol  50 mg Oral Daily Hetul Horne, DO      aspirin  81 mg Oral Daily Hetul Horne, DO      cinacalcet  30 mg Oral Once per day on Mon Wed Fri Hetul Horne, DO      citalopram  10 mg Oral Daily Hetul Horne, DO      colchicine  0 3 mg Oral Daily Hetul Horne, DO      [START ON 7/28/2022] dextrose 5 % and sodium chloride 0 45 %  100 mL/hr Intravenous Continuous Desean Solano PA-C      gabapentin  400 mg Oral HS Hetul Horne, DO      heparin (porcine)  5,000 Units Subcutaneous [facility] Desean Solano PA-C      HYDROmorphone  0 5 mg Intravenous Q4H PRN Desean Solano PA-C      insulin lispro  1-5 Units Subcutaneous TID AC Hetul Horne, DO      mirtazapine  7 5 mg Oral HS Hetul Horne, DO      oxyCODONE  2 5 mg Oral Q6H PRN Chris FELISA Faith      oxyCODONE  5 mg Oral Q4H PRN Chris FELISA Faith      vancomycin  10 mg/kg Intravenous Daily PRN Hetul Horne, DO          Today, Patient Was Seen By: Desean Solano PA-C    **Please Note: This note may have been constructed using a voice recognition system  **

## 2022-07-27 NOTE — ASSESSMENT & PLAN NOTE
Lab Results   Component Value Date    HGBA1C 5 3 05/31/2022       No results for input(s): POCGLU in the last 72 hours      Blood Sugar Average: Last 72 hrs:   sliding scale insulin

## 2022-07-27 NOTE — CASE MANAGEMENT
Case Management Assessment & Discharge Planning Note    Patient name Dada Carlos  Location [address] 766/-21 MRN [id number]  : 1927 Date 2022       Current Admission Date: 2022  Current Admission Diagnosis:Cellulitis of right index finger   Patient Active Problem List    Diagnosis Date Noted    Cellulitis of right index finger 2022    Hypernatremia 2022    Stasis ulcer ([facility] ) 2022    Edema of both lower legs 2022    Orthostatic hypotension 2022    Reactive depression 2022    Right ankle pain 2022    Leukocytosis 2022    Moderate protein-calorie malnutrition ([address] ) 2022    Urinary retention 2022    Acute kidney injury superimposed on CKD ([facility] ) 2022    Anemia in stage 4 chronic kidney disease ([facility] ) 2022    Hyperkalemia 2022    Adult failure to thrive 2022    Fall at home, initial encounter 2022    Elevated uric acid in blood 2022    Secondary hyperparathyroidism of renal origin ([facility] ) 12/15/2021    Hypertensive chronic kidney disease 12/15/2021    Type 2 diabetes mellitus with diabetic chronic kidney disease ([facility] [facility] ) 2020    Fecal smearing 10/24/2019    Generalized edema 10/08/2019    Bowel habit changes 10/01/2019    Rectal discharge 10/01/2019    Acute cystitis without hematuria 10/13/2018    Vertebral anomaly 2018    Acquired cyst of kidney 2017    Chronic kidney disease, stage IV (severe) ([address] ) 2017    Atrophic kidney, acquired 2017    Retinal hole of right eye 2017    Essential hypertension 2017    Pure hypercholesterolemia 2017    Controlled type 2 diabetes mellitus with diabetic polyneuropathy ([address] ) 2017    Allergic rhinitis 2017    Vitamin D insufficiency 2016    Bilateral deafness 2016    Peripheral neuropathy 2015    Gait disturbance 2014    Gout 2014    Ectropion 03/19/2013    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      LOS (days): 1  Geometric Mean LOS (GMLOS) (days): 3 20  Days to GMLOS:2 4     OBJECTIVE:    Risk of Unplanned Readmission Score: 22 65         Current admission status: Inpatient       Preferred Pharmacy:   [facility]  Phone: [phone] Fax: [phone]    Primary Care Provider: Gloria Gustafson MD    Primary Insurance: [facility]  Secondary Insurance:     ASSESSMENT:  [address]     [place], [address] - Daughter   Primary Phone: [phone] (Mobile)  Home Phone: [phone]                         Readmission Root Cause  30 Day Readmission: No    Patient Information  Admitted from[de-identified[facility] Facility ([facility]  Mental Status: Alert  During Assessment patient was accompanied by: Not accompanied during assessment  Assessment information provided by[de-identified] Patient  Primary Caregiver: Self  Support Systems: Self, [address] of Residence: [address] [facility] do you live in?: [address] [facility] entry access options   Select all that apply : No steps to enter home  Type of Current Residence:  ([place])  In the last 12 months, was there a time when you were not able to pay the mortgage or rent on time?: No  In the last 12 months, how many places have you lived?: 1  In the last 12 months, was there a time when you did not have a steady place to sleep or slept in a shelter (including now)?: No  Homeless/housing insecurity resource given?: N/A  Living Arrangements: Other (Comment) ([place])  Is patient a ?: No    Activities of Daily Living Prior to Admission  Functional Status: Independent  Completes ADLs independently?: Yes  Ambulates independently?: Yes  Does patient use assisted devices?: Yes  Assisted Devices (DME) used: Jeremy [address], Other (Comment) (medical alert)  Does patient currently own DME?: Yes  What DME does the patient currently own?: [place], Other (Comment) (medical alert)  Does patient have a history of Outpatient Therapy (PT/OT)?: No  Does the patient have a history of Short-Term Rehab?: Yes ([facility])  Does patient have a history of HHC?: No  Does patient currently have [address]?: No         Patient Information Continued  Income Source: Pension/nursing home  Does patient have prescription coverage?: Yes (pharmacy through [employer])  Within the past 12 months, you worried that your food would run out before you got the money to buy more : Never true  Within the past 12 months, the food you bought just didn't last and you didn't have money to get more : Never true  Food insecurity resource given?: N/A  Does patient receive dialysis treatments?: No  Does patient have a history of substance abuse?: No  Does patient have a history of Mental Health Diagnosis?: Yes (reactive depression)  Has patient received inpatient treatment related to mental health in the last 2 years?: No         Means of Transportation  Means of Transport to Appts[de-identified] Family transport  In the past 12 months, has lack of transportation kept you from medical appointments or from getting medications?: No  In the past 12 months, has lack of transportation kept you from meetings, work, or from getting things needed for daily living?: No  Was application for public transport provided?: N/A        DISCHARGE DETAILS:    Discharge planning discussed with[de-identified] patient     Comments - Freedom of Choice: agreeable to services at [place] [address] if needed  CM contacted family/caregiver?: Yes  Were Treatment Team discharge recommendations reviewed with patient/caregiver?: Yes  Did patient/caregiver verbalize understanding of patient care needs?: Yes  Were patient/caregiver advised of the risks associated with not following Treatment Team discharge recommendations?: Yes    Contacts  Patient Contacts: Heather Andujar  Relationship to Patient[de-identified] Family  Contact Method: Phone  Phone Number: [phone]  Reason/Outcome: Discharge Planning, Continuity of Care, Emergency [address]         Is the patient interested in [place] [address] at discharge?:  (if indicated)    DME Referral Provided  Referral made for DME?: No         Would you like to participate in our [address] service program?  : No - Declined     Additional Comments: Patient was open to answering questions and seems informed of her current medical situation  Patient states her preference is services at [facility] if indicated  Daughter Heather Andujar [phone] was contacted who said the treatment team will decide by tomorrow if patient needs a procedure on her finger  CM to be available      CM reviewed d/c planning process including the following: identifying help at home, patient preference for d/c planning needs, Discharge Lounge, [facility] Meds to Bed program, availability of treatment team to discuss questions or concerns patient and/or family may have regarding understanding medications and recognizing signs and symptoms once discharged  CM also encouraged patient to follow up with all recommended appointments after discharge  Patient advised of importance for patient and family to participate in managing patients medical well being  Patient/caregiver received discharge checklist   Content reviewed  Patient/caregiver encouraged to participate in discharge plan of care prior to discharge home

## 2022-07-27 NOTE — ASSESSMENT & PLAN NOTE
Lab Results   Component Value Date    EGFR 14 07/25/2022    EGFR 16 03/29/2022    EGFR 16 03/16/2022    CREATININE 2 73 (H) 07/25/2022    CREATININE 2 48 (H) 03/29/2022    CREATININE 2 36 (H) 03/16/2022    baseline creatinine in mid 2s

## 2022-07-27 NOTE — PLAN OF CARE
Problem: MOBILITY - ADULT  Goal: Maintain or return to baseline ADL function  Description: INTERVENTIONS:  -  Assess patient's ability to carry out ADLs; assess patient's baseline for ADL function and identify physical deficits which impact ability to perform ADLs (bathing, care of mouth/teeth, toileting, grooming, dressing, etc )  - Assess/evaluate cause of self-care deficits   - Assess range of motion  - Assess patient's mobility; develop plan if impaired  - Assess patient's need for assistive devices and provide as appropriate  - Encourage maximum independence but intervene and supervise when necessary  - Involve family in performance of ADLs  - Assess for home care needs following discharge   - Consider OT consult to assist with ADL evaluation and planning for discharge  - Provide patient education as appropriate  Outcome: Progressing  Goal: Maintains/Returns to pre admission functional level  Description: INTERVENTIONS:  - Perform BMAT or MOVE assessment daily    - Set and communicate daily mobility goal to care team and patient/family/caregiver  - Collaborate with rehabilitation services on mobility goals if consulted  - Perform Range of Motion 4 times a day  - Reposition patient every 2 hours    - Dangle patient 4 times a day  - Stand patient 4 times a day  - Ambulate patient 3 times a day  - Out of bed to chair 3 times a day   - Out of bed for meals 3 times a day  - Out of bed for toileting  - Record patient progress and toleration of activity level   Outcome: Progressing     Problem: PAIN - ADULT  Goal: Verbalizes/displays adequate comfort level or baseline comfort level  Description: Interventions:  - Encourage patient to monitor pain and request assistance  - Assess pain using appropriate pain scale  - Administer analgesics based on type and severity of pain and evaluate response  - Implement non-pharmacological measures as appropriate and evaluate response  - Consider cultural and social influences on pain and pain management  - Notify physician/advanced practitioner if interventions unsuccessful or patient reports new pain  Outcome: Progressing     Problem: INFECTION - ADULT  Goal: Absence or prevention of progression during hospitalization  Description: INTERVENTIONS:  - Assess and monitor for signs and symptoms of infection  - Monitor lab/diagnostic results  - Monitor all insertion sites, i e  indwelling lines, tubes, and drains  - Monitor endotracheal if appropriate and nasal secretions for changes in amount and color  - [place] appropriate cooling/warming therapies per order  - Administer medications as ordered  - Instruct and encourage patient and family to use good hand hygiene technique  - Identify and instruct in appropriate isolation precautions for identified infection/condition  Outcome: Progressing  Goal: Absence of fever/infection during neutropenic period  Description: INTERVENTIONS:  - Monitor WBC    Outcome: Progressing     Problem: DISCHARGE PLANNING  Goal: Discharge to home or other facility with appropriate resources  Description: INTERVENTIONS:  - Identify barriers to discharge w/patient and caregiver  - Arrange for needed discharge resources and transportation as appropriate  - Identify discharge learning needs (meds, wound care, etc )  - Arrange for interpretive services to assist at discharge as needed  - Refer to Case Management Department for coordinating discharge planning if the patient needs post-hospital services based on physician/advanced practitioner order or complex needs related to functional status, cognitive ability, or social support system  Outcome: Progressing     Problem: Knowledge Deficit  Goal: Patient/family/caregiver demonstrates understanding of disease process, treatment plan, medications, and discharge instructions  Description: Complete learning assessment and assess knowledge base    Interventions:  - Provide teaching at level of understanding  - Provide teaching via preferred learning methods  Outcome: Progressing

## 2022-07-27 NOTE — H&P
[facility]  H&P- Abiel Johnston 2/24/1927, 80 y o  female MRN: [id number]  Unit/Bed#: -01 Encounter: [id number]  Primary Care Provider: Jo Ann Cristobal MD   Date and time admitted to hospital: 7/26/2022  6:49 PM    * Cellulitis of right index finger  Assessment & Plan   Consult orthopedic surgery   continue gentle hydration   monitor symptoms    Hypernatremia  Assessment & Plan   Will  start half normal saline    Type 2 diabetes mellitus with diabetic chronic kidney disease [facility])  Assessment & Plan  Lab Results   Component Value Date    HGBA1C 5 3 05/31/2022       No results for input(s): POCGLU in the last 72 hours  Blood Sugar Average: Last 72 hrs:   sliding scale insulin    Chronic kidney disease, stage IV (severe) [facility])  Assessment & Plan  Lab Results   Component Value Date    EGFR 14 07/25/2022    EGFR 16 03/29/2022    EGFR 16 03/16/2022    CREATININE 2 73 (H) 07/25/2022    CREATININE 2 48 (H) 03/29/2022    CREATININE 2 36 (H) 03/16/2022    baseline creatinine in mid 2s    Essential hypertension  Assessment & Plan   Monitor blood pressure    VTE Prophylaxis: Hold pending surgery evaluation  / sequential compression device   Code Status:   Level 3 DNR  POLST: There is no POLST form on file for this patient (pre-hospital)      Anticipated Length of Stay:  Patient will be admitted on an Inpatient basis with an anticipated length of stay of   Greater than 2 midnights  Justification for Hospital Stay:  Needs further evaluation of infection    Total Time for Visit, including Counseling / Coordination of Care: 45 minutes  Greater than 50% of this total time spent on direct patient counseling and coordination of care  Chief Complaint:    Right index finger cellulitis    History of Present Illness:    Abiel Johnston is a 80 y o  female who presents with  Symptoms of right-sided index finger pain  Patient reports a 1 week history of worsening pain    She denies any discoloration or symptoms of chills/systemic symptoms  She presents to the ED for further workup evaluation  Upon assessment patient was not had noted not to have pain with tapping of the flexor tendon and was noted have good range of motion without pain  Given location her symptoms, she was transferred here for further consultation with hand surgery  Review of Systems:    Review of Systems   Constitutional: Negative for chills, diaphoresis and fatigue  Respiratory: Negative for chest tightness and shortness of breath  Cardiovascular: Negative for chest pain and leg swelling  Musculoskeletal: Negative for arthralgias and back pain  Neurological: Negative for dizziness, speech difficulty and headaches  Hematological: Negative for adenopathy  Psychiatric/Behavioral: Negative for agitation and confusion  All other systems reviewed and are negative  Past Medical and Surgical History:     Past Medical History:   Diagnosis Date    Diabetes mellitus ([facility] [place] [facility] )     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  Authorizing Provider   allopurinol (ZYLOPRIM) 100 mg tablet Take 50 mg by mouth daily    Historical Provider, MD   aspirin 81 MG tablet Take 1 tablet by mouth daily 6/27/14   Historical Provider, MD   cinacalcet (SENSIPAR) 30 mg tablet Take 1 tablet (30 mg total) by mouth 3 (three) times a week 12/15/21   Belia Chávez DO   citalopram (CeleXA) 10 mg tablet TAKE 1 TABLET BY MOUTH EVERY DAY 6/1/22   Pinky Torres MD   colchicine (COLCRYS) 0 6 mg tablet Take 0 5 tablets (0 3 mg total) by mouth daily 5/5/22   JONAH Clay   collagenase (SANTYL) ointment Apply topically daily 5/5/22   JONAH Clay   Contour Test test strip TEST AS DIRECTED 8/17/20   Irene Zapata PA-C   ferrous sulfate 324 (65 Fe) mg Give one tab orally every 2 days for anemia 3/29/22   Pinky Torres MD   furosemide (LASIX) 40 mg tablet Take 1 tablet (40 mg total) by mouth in the morning 3/29/22   Pinky Torres MD   gabapentin (NEURONTIN) 100 mg capsule Take 100 mg by mouth in the morning    Historical Provider, MD   gabapentin (NEURONTIN) 400 mg capsule Take 400 mg by mouth daily at bedtime    Historical Provider, MD   Lancets (ONETOUCH ULTRASOFT) lancets by Does not apply route 2 (two) times a day 11/16/11   Historical Provider, MD   mirtazapine (REMERON) 7 5 MG tablet Take 7 5 mg by mouth daily at bedtime    Historical Provider, MD   predniSONE 10 mg tablet Take 4 tabs by mouth daily for 1 day, then 3 tabs daily by mouth for 2 days, then 2 days daily by mouth for 3 days, then 1 tab daily by mouth for 4 days  5/31/22   Pinky Torres MD   simvastatin (ZOCOR) 20 mg tablet TAKE 1 TABLET BY MOUTH EVERY DAY 2/2/22   Jayden Mercado DO     I have reviewed home medications with patient personally  Allergies: Allergies   Allergen Reactions    Influenza Virus Vaccine      Other reaction(s): HX of Guillain-[place] Syndrome    Sulfa Antibiotics        Social History:     Marital Status:     Occupation:  retired  Patient Pre-hospital Level of Mobility:  Ambulatory w walker  Patient Pre-hospital Diet Restrictions:  denies  Substance Use History:   Social History     Substance and Sexual Activity   Alcohol Use Yes    Comment: SOCIAL     Social History     Tobacco Use   Smoking Status Former Smoker   Smokeless Tobacco Never Used     Social History     Substance and Sexual Activity   Drug Use No       Family History:    Family History   Problem Relation Age of Onset    Hypertension Mother     Hypertension Father     Kidney disease Sister         CHRONIC    Alcohol abuse Family     Substance Abuse Family        Physical Exam:     Vitals:   Blood Pressure: 167/74 (07/26/22 2032)  Pulse: 75 (07/26/22 2032)  Temperature: 97 8 °F (36 6 °C) (07/26/22 2032)  SpO2: 96 % (07/26/22 2032)    Physical Exam  Constitutional:       Appearance: Normal appearance  HENT:      Head: Normocephalic  Cardiovascular:      Rate and Rhythm: Normal rate and regular rhythm  Pulmonary:      Effort: Pulmonary effort is normal       Breath sounds: Normal breath sounds  Abdominal:      General: Abdomen is flat  Palpations: Abdomen is soft  Skin:     Comments: Right index finger erythemaWith associated swelling   Negative pain with palpation   Patient unable to the flex the index finger due to swelling /pain   Neurological:      General: No focal deficit present  Mental Status: She is alert and oriented to person, place, and time  Psychiatric:         Mood and Affect: Mood normal              Additional Data:     Lab Results: I have personally reviewed pertinent reports  Results from last 7 days   Lab Units 07/25/22  2303   WBC Thousand/uL 10 05   HEMOGLOBIN g/dL 8 6*   HEMATOCRIT % 29 4*   PLATELETS Thousands/uL 245   NEUTROS PCT % 73   LYMPHS PCT % 13*   MONOS PCT % 6   EOS PCT % 7*     Results from last 7 days   Lab Units 07/25/22  2303   SODIUM mmol/L 148*   POTASSIUM mmol/L 5 0   CHLORIDE mmol/L 112*   CO2 mmol/L 26   BUN mg/dL 43*   CREATININE mg/dL 2 73*   ANION GAP mmol/L 10   CALCIUM mg/dL 8 5   GLUCOSE RANDOM mg/dL 135                       Imaging: I have personally reviewed pertinent reports  XR hand 3+ vw right    (Results Pending)   XR finger right second digit-index    (Results Pending)           ** Please Note: This note has been constructed using a voice recognition system   **

## 2022-07-27 NOTE — OCCUPATIONAL THERAPY NOTE
Occupational Therapy Evaluation     Patient Name: Mónica VILLALPANDO Date: 7/27/2022  Problem List  Principal Problem:    Cellulitis of right index finger  Active Problems:    Essential hypertension    Chronic kidney disease, stage IV (severe) (HCC)    Type 2 diabetes mellitus with diabetic chronic kidney disease ([facility] [place] [address] )    Hypernatremia    Past Medical History  Past Medical History:   Diagnosis Date    Diabetes mellitus ([facility] [place] [address] )     Guillain-[place] syndrome [facility] ([facility] )     LAST ASSESSED: 17AUG2016    Hyperlipidemia     Hypertension     Renal disorder     Squamous cell carcinoma, scalp/neck     LAST ASSESSED: [id number]     Past Surgical History  Past Surgical History:   Procedure Laterality Date    BLADDER SURGERY      LAST ASSESSED: 17AUG2016    PELVIC FLOOR REPAIR      VAGINAL SURG INSERTION OF MESH    TOTAL ABDOMINAL HYSTERECTOMY W/ BILATERAL SALPINGOOPHORECTOMY      LAST ASSESSED: 17AUG2016 07/27/22 1004   OT Last Visit   OT Visit Date 07/27/22   Note Type   Note type Evaluation   Restrictions/Precautions   Weight Bearing Precautions Per Order Yes   RUE Weight Bearing Per Order (S)  NWB   Other Precautions WBS;Pain;Hard of hearing   Pain Assessment   Pain Assessment Tool 0-10   Pain Score 10 - Worst Possible Pain   Pain Location/Orientation Orientation: Right  (index finger)   Hospital Pain Intervention(s) Repositioned; Ambulation/increased activity; Emotional support; Rest   Home Living   Type of Home Other (Comment); Assisted living  (I living at [facility])   Home Layout One level  (2ns d)   Bathroom Shower/Tub Walk-in shower   Bathroom Toilet Raised   Bathroom Equipment Grab bars in shower; Shower chair;Grab bars around toilet   [address] Walker;Grab bars   Prior Function   Level of [place] Independent with ADLs and functional mobility; Needs assistance with IADLs   Lives With Facility staff   Receives Help From Family   ADL Assistance Independent   IADLs Needs assistance   Falls in the last 6 months 0   Vocational Retired   Lifestyle   Autonomy I with ADL's, assistance with all IaDL's from facility +RW with functional mobiltiy (-) drives   Reciprocal Relationships facility, daughter   Service to Others retired   Intrinsic Gratification reading, walking, facility activiteis   Psychosocial   Psychosocial (WDL) WFL   Patient Behaviors/Mood    ADL   Eating Assistance 5  Supervision/Setup   Grooming Assistance 5  Supervision/Setup   [address] 5  Supervision/Setup   [address] 5  Supervision/Setup   [address] 5  Supervision/Setup    [address] 4 minimal assistance   Bed Mobility   Supine to Sit 6  Modified independent   Additional items Assist x 1; Increased time required;HOB elevated   Sit to Supine Unable to assess   Additional Comments pt left in chair with all needs met   Transfers   Sit to Stand 5  Supervision   Additional items Assist x 1   Stand to Sit 5  Supervision   Additional items Assist x 1   Additional Comments +RW   Functional Mobility   Functional Mobility 5  Supervision   Additional Comments S with RW short distance functional mobiltiy, no overt LOB, good activity tolerance  Balance   Static Sitting Normal   Dynamic Sitting Good   Static Standing Fair +   Dynamic Standing Fair -   Ambulatory Fair -   Activity Tolerance   Activity Tolerance Patient tolerated treatment well   Medical Staff Made Aware PT Dymtri due to the patient's co-morbidities, clinically unstable presentation, and present impairments which are a regression from the patient's baseline      Nurse Made Aware RN cleared pt for therapy   RUE Assessment   RUE Assessment WFL   LUE Assessment   LUE Assessment WFL   Hand Function   Gross Motor Coordination Impaired  (right hand 2* to pointer finger NWB/bandage, surgical site, edema to long finger)   Fine Motor Coordination Impaired  (impaired to right hand 2* to poiner finger bandage/surgery site and long finger edema, NWB)   Sensation   Light Touch No apparent deficits   Cognition   Overall Cognitive Status WFL   Arousal/Participation Alert; Cooperative   Attention Attends with cues to redirect   Orientation Level Oriented X4   Memory Within functional limits   Following Commands Follows multistep commands with increased time or repetition   Comments pt pleasant and motivated for therapy, good comprehesion of NWB status, good memory, direction following, [place]  Assessment   Limitation Decreased ADL status; Decreased self-care trans;Decreased high-level ADLs   Prognosis Good   Assessment Pt is a 80 y o  female who was admitted to [facility] on 7/26/2022 with pain to right index finger,  Cellulitis of right index finger s/p I &D 7/27 NWB to RUE   Pt's problem list also includes PMH of  has a past medical history of Diabetes mellitus ([facility] [facility] ), 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 ([facility] [address] [facility] ), Hyperlipidemia, Hypertension, Renal disorder, and Squamous cell carcinoma, scalp/neck    At baseline pt was completing I with ADL's, assistance with IADL's +RW with functional ambulation (-) drives  Pt lives at I living facility  Currently pt requires S/set-up min a bathing for overall ADLS and  S with RW for functional mobility/transfers  Pt currently presents with impairments in the following categories -difficulty performing ADLS and difficulty performing IADLS  /UE ROM  These impairments, as well as pt's pain and risk for falls  limit pt's ability to safely engage in all baseline areas of occupation, includingbathing and dressing The patient's raw score on the AM-PAC Daily Activity inpatient short form is 23, standardized score is 51 12, greater than 39 4  Patients at this level are likely to benefit from discharge to home  Please refer to the recommendation of the Occupational Therapist for safe discharge planning   From OT standpoint, recommend return to facility with increased support PRN upon D/C  No further acute OT needs indicated at this time - Anticipate d/c home with family support when medically cleared - d/c from caseload     Goals   Patient Goals no more surgery   STG Time Frame 1-3   Short Term Goal #1 *Mod I with ADL participation with LHAE PRN  Short Term Goal #2 pt/caregiver education with good participation/attention for safe discharge planning  Additional Treatment Session   Start Time 0957   End Time 1006   Treatment Assessment Pt seen for an additional treatment session to focus on self care tasks and mobility tasks with RW  Pt educated on RUE NWB status and compensatory strategies for ADL Bear due to pt is right hand dominant, and with home safety  Pt receptive and able to teach back information  Pt making progress   No further acute OT needs indicated at this time - Anticipate d/c home with family support when medically cleared - d/c from caseload   Recommendation   OT Discharge Recommendation No rehabilitation needs   Equipment Recommended   (NO DME needs)   AM-PAC Daily Activity Inpatient   Lower Body Dressing 3   Bathing 4   Toileting 4   Upper Body Dressing 4   Grooming 4   Eating 4   Daily Activity Raw Score 23   Daily Activity Standardized Score (Calc for Raw Score >=11) 51 12   AM-PAC Applied Cognition Inpatient   Following a Speech/Presentation 4   Understanding Ordinary Conversation 4   Taking Medications 4   Remembering Where Things Are Placed or Put Away 4   Remembering List of 4-5 Errands 4   Taking Care of Complicated Tasks 4   Applied Cognition Raw Score 24   Applied Cognition Standardized Score 62 21   Debby PLASENCIA, OTR/L

## 2022-07-27 NOTE — PLAN OF CARE
Problem: OCCUPATIONAL THERAPY ADULT  Goal: Performs self-care activities at highest level of function for planned discharge setting  See evaluation for individualized goals  Description:    Equipment Recommended:  (NO DME needs)       See flowsheet documentation for full assessment, interventions and recommendations  Note: Limitation: Decreased ADL status, Decreased self-care trans, Decreased high-level ADLs  Prognosis: Good  Assessment: Pt is a 80 y o  female who was admitted to [facility] on 7/26/2022 with pain to right index finger,  Cellulitis of right index finger s/p I &D 7/27 NWB to RUE   Pt's problem list also includes PMH of  has a past medical history of Diabetes mellitus (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 [address] ), Hyperlipidemia, Hypertension, Renal disorder, and Squamous cell carcinoma, scalp/neck    At baseline pt was completing I with ADL's, assistance with IADL's +RW with functional ambulation (-) drives  Pt lives at I living facility  Currently pt requires S/set-up min a bathing for overall ADLS and  S with RW for functional mobility/transfers  Pt currently presents with impairments in the following categories -difficulty performing ADLS and difficulty performing IADLS These impairments, as well as pt's pain and risk for falls  limit pt's ability to safely engage in all baseline areas of occupation, includingbathing and dressing The patient's raw score on the AM-PAC Daily Activity inpatient short form is 23, standardized score is 51 12, greater than 39 4  Patients at this level are likely to benefit from discharge to home  Please refer to the recommendation of the Occupational Therapist for safe discharge planning  From OT standpoint, recommend return to facility with increased support PRN upon D/C  OT will continue to follow to address the below stated goals       OT Discharge Recommendation: No rehabilitation needs

## 2022-07-27 NOTE — PROGRESS NOTES
Progress Note - Orthopedics   [place] Other 80 y o  female MRN: [id number]      Subjective:  Sleeping comfortably  Pain well controlled  Objective:  A 10 point ROS was performed; negative except as noted above       Labs:  Lab Results   Component Value Date/Time    WBC 10 05 07/25/2022 11:03 PM    WBC 6 3 12/09/2016 08:28 AM    HGB 8 6 (L) 07/25/2022 11:03 PM    HGB 13 1 12/09/2016 08:28 AM       Physical:  Vitals:    07/26/22 2220   BP: 129/63   Pulse: 76   Temp: 98 2 °F (36 8 °C)   SpO2: 94%     Musculoskeletal: right Upper Extremity  · Dressing C/D/I  · TTP Right index finger  ·  exposed Fingers WWP w Brisk capillary refill    Assessment:    80 y o  female with Right Index finger felon status post bedside I and D    Plan:  · NWB RUE  · Remove packing later today and start soaks  · If no improvement on IV abx, patient may need further interventions  · Pain control per primary  · DVT ppx  · IV Abx per primary team  · Rd Merida MD

## 2022-07-27 NOTE — PHYSICAL THERAPY NOTE
Physical Therapy Evaluation     Patient's Name: Dada Carlos    Admitting Diagnosis  Cellulitis of finger, right [L03 011]    Problem List  Patient Active Problem List   Diagnosis    Vitamin D insufficiency    Retinal hole of right eye    Peripheral neuropathy    Essential hypertension    Pure hypercholesterolemia    Gout    Gait disturbance    Ectropion    Controlled type 2 diabetes mellitus with diabetic polyneuropathy ([facility] )    Acquired cyst of kidney    Chronic kidney disease, stage IV (severe) ([facility])    Borderline glaucoma    Bilateral deafness    Atrophic kidney, acquired    Allergic rhinitis    Vertebral anomaly    Acute cystitis without hematuria    Bowel habit changes    Rectal discharge    Generalized edema    Fecal smearing    Type 2 diabetes mellitus with diabetic chronic kidney disease ([facility] )    Secondary hyperparathyroidism of renal origin ([facility] )    Hypertensive chronic kidney disease    Elevated uric acid in blood    Urinary retention    Acute kidney injury superimposed on CKD ([facility] )    Anemia in stage 4 chronic kidney disease ([facility])    Hyperkalemia    Adult failure to thrive    Fall at home, initial encounter    Moderate protein-calorie malnutrition ([facility])    Leukocytosis    Right ankle pain    Reactive depression    Orthostatic hypotension    Edema of both lower legs    Stasis ulcer ([facility])    Cellulitis of right index finger    Hypernatremia       Past Medical History  Past Medical History:   Diagnosis Date    Diabetes mellitus ([facility] )     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 ([facility] )     LAST ASSESSED: 17AUG2016    Hyperlipidemia     Hypertension     Renal disorder     Squamous cell carcinoma, scalp/neck     LAST ASSESSED: [id number]       Past Surgical History  Past Surgical History:   Procedure Laterality Date    BLADDER SURGERY      LAST ASSESSED: 17AUG2016    PELVIC FLOOR REPAIR      VAGINAL SURG INSERTION OF MESH    TOTAL ABDOMINAL HYSTERECTOMY W/ BILATERAL SALPINGOOPHORECTOMY      LAST ASSESSED: [id number]        07/27/22 0956   PT Last Visit   PT Visit Date 07/27/22   Note Type   Note type Evaluation   Pain Assessment   Pain Assessment Tool FLACC   Pain Location/Orientation Orientation: Right;Other (Comment)  (hand)   Pain Onset/Description Onset: Ongoing;Frequency: Intermittent; Descriptor: Aching;Descriptor: Discomfort   Effect of Pain on Daily Activities guarding   Patient's Stated Pain Goal No pain   Hospital Pain Intervention(s) Repositioned; Ambulation/increased activity; Emotional support   Pain Rating: FLACC (Rest) - Face 0   Pain Rating: FLACC (Rest) - Legs 0   Pain Rating: FLACC (Rest) - Activity 0   Pain Rating: FLACC (Rest) - Cry 0   Pain Rating: FLACC (Rest) - Consolability 0   Score: FLACC (Rest) 0   Pain Rating: FLACC (Activity) - Face 1   Pain Rating: FLACC (Activity) - Legs 0   Pain Rating: FLACC (Activity) - Activity 0   Pain Rating: FLACC (Activity) - Cry 0   Pain Rating: FLACC (Activity) - Consolability 1   Score: FLACC (Activity) 2   Restrictions/Precautions   RUE Weight Bearing Per Order NWB  (hand)   Braces or Orthoses   (denies)   Other Precautions WBS; Hard of hearing   Home Living   Type of Home Apartment  (ILF)   Home Layout One level;Ramped entrance  (denies the need to climb steps)   [address] [facility]   Prior Function   Level of [place] Independent with ADLs and functional mobility  (amb w/ rw outside the room and no AD in her room (holds on to the furniture))   Lives With Alone; Facility staff   Rece[facility] Help From [facility]   Additional Pertinent History cleared for assessment (spoke to sony)   Cognition   Overall Cognitive Status [facility]   Attention Attends with cues to redirect   Orientation Level Oriented to person;Oriented to place;Oriented to situation   Memory Within functional limits   Following Commands Follows one step commands without difficulty   Subjective   Subjective Alert; in bed; mod [place]; pleasant and cooperative; reports she has been mobilizing in the room w/ rw, incl to the BR; agreeable to demonstrate mobility skills   RUE Assessment   RUE Assessment X  (2nd digit is immobilized w/ dressing)   LUE Assessment   LUE Assessment WFL  (AROM)   RLE Assessment   RLE Assessment WFL  (AROM)   Strength RLE   RLE Overall Strength   (fair +/ good - (grossly))   LLE Assessment   LLE Assessment WFL  (AROM)   Strength LLE   LLE Overall Strength   (fair +/ good - (grossly))   Transfers   Sit to Stand 5  Supervision   Stand to Sit 5  Supervision   Ambulation/Elevation   Gait pattern Excessively slow; Short stride; Inconsistent leny  (no overt LOB w/ use of rw; increase swaying w/o AD -> recommended to cont using rw at this time)   Gait Assistance 5  Supervision   Additional items Verbal cues   Assistive Device Rolling walker;None   Distance 120 ft (w/ last 10 ft amb w/o AD)   Balance   Static Sitting Good   Dynamic Sitting Fair +   Static Standing Fair   Dynamic Standing Fair -   Ambulatory Fair -   Activity Tolerance   Activity Tolerance Patient tolerated treatment well   Medical Staff Made Aware Co-eval performed w/ OTR due to complexity of medical status and multiple comorbidities   Nurse Made Aware spoke to RN   Assessment   Prognosis Good   Problem List Decreased strength;Decreased endurance; Impaired balance;Decreased mobility; Impaired hearing   Assessment Pt is 80 y o  female admitted with hx of right-sided index finger pain and Dx of Cellulitis of right index finger  Pt 's comorbidities affecting POC include: Diabetes mellitus ([address] ), 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 ([facility] ), Hypertension and CKD and personal factors of: advanced age and living alone  Pt's clinical presentation is currently unstable/unpredictable which is evident in abn lab values, WB restrictions (R) hand and  need for stand by assist w/ all phases of mobility when usually mobilizing independently   Pt presents w/ generalized weakness, decreased functional endurance and inconsistent amb balance and gait patterns but w/ overall observed mobility status on level surfaces appearing to be near premorbid level  Will cont to follow pt in PT for progressive mobilization to max level of (I), endurance, and safety  Otherwise, anticipate pt will return home w/ available support upon D/C when medically cleared; home PT follow up is recommended; will follow  Goals   Patient Goals to return home   STG Expiration Date 08/06/22   Short Term Goal #1 7-10 days  Pt will amb 200 ft w/ rw, mod (I) in order to facilitate safe return to premorbid environment and to initiate return to community amb status  Pt will achieve (I) level w/ bed mob in order to facilitate safety with OOB and back to bed transitions in own living environment  Pt will perform transfers w/ mod (I) to assure (I) and safety w/ functional mobility/transitions w/ all aspects of mobility/locomotion  Pt will participate in LE therex and balance activities to max progression w/ mobility skills  PT Treatment Day 0   Plan   Treatment/Interventions Functional transfer training;LE strengthening/ROM; Therapeutic exercise; Endurance training;Gait training;Spoke to nursing;OT   PT Frequency 3-5x/wk   Recommendation   PT Discharge Recommendation Home with home health rehabilitation  (home PT)   Equipment Recommended [address] [facility] Recommended Wheeled walker   [address] Mobility Inpatient   Turning in Bed Without Bedrails 4   Lying on Back to Sitting on Edge of Flat Bed 3   Moving Bed to Chair 3   Standing Up From Chair 3   Walk in Room 3   Climb 3-5 Stairs 2   Basic Mobility Inpatient Raw Score 18   Basic Mobility Standardized Score 41 05   Highest Level Of Mobility   JH-HLM Goal 6: Walk 10 steps or more   JH-HLM Achieved 7: Walk 25 feet or more   Modified Dania Scale   Modified Dania Scale 3   End of Consult   Patient Position at End of Consult Bedside chair; All needs within reach           [facility], PT

## 2022-07-27 NOTE — CONSULTS
Orthopedics   Dada Carlos 80 y o  female MRN: [id number]  Unit/Bed#: BE -20      Chief Complaint:   right index finger pain    HPI:   80 y  o female right hand dominant, complaining of right index finger swelling, erythema and pain  She states she has had 1 week of increased pain and swelling and went to the ED at [facility] who performed a bedside incision and drainage of the right index finger, then was transferred to [facility] for hand surgery eval  She denies fever or systemic symptoms  PMH sig for HTN, CKD  On ASA  Review Of Systems:   · Skin: Redness and swelling  · Neuro: See HPI  · Musculoskeletal: See HPI  · 14 point review of systems negative except as stated above     Past Medical History:   Past Medical History:   Diagnosis Date    [facility]     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 ([facility] )     LAST ASSESSED: 17AUG2016    Hyperlipidemia     Hypertension     Renal disorder     Squamous cell carcinoma, scalp/neck     LAST ASSESSED: [id number]       Past Surgical History:   Past Surgical History:   Procedure Laterality Date    BLADDER SURGERY      LAST ASSESSED: 17AUG2016    PELVIC FLOOR REPAIR      VAGINAL SURG INSERTION OF MESH    TOTAL ABDOMINAL HYSTERECTOMY W/ BILATERAL SALPINGOOPHORECTOMY      LAST ASSESSED: 17AUG2016       Family History:  Family history reviewed and non-contributory  Family History   Problem Relation Age of Onset    Hypertension Mother     Hypertension Father     Kidney disease Sister         CHRONIC    Alcohol abuse Family     Substance Abuse Family        Social History:  Social History     Socioeconomic History    Marital status:       Spouse name: Not on file    Number of children: Not on file    Years of education: Not on file    Highest education level: Not on file   Occupational History    Not on file   Tobacco Use    Smoking status: Former Smoker    Smokeless tobacco: Never Used   Vaping Use    Vaping Use: Never used Substance and Sexual Activity    Alcohol use: Yes     Comment: SOCIAL    Drug use: No    Sexual activity: Not on file   Other Topics Concern    Not on file   Social History Narrative    [employer]ILY [employer], [address] [facility] [address]     Social Determinants of Health     Financial Resource Strain: Not on file   Food Insecurity: Not on file   Transportation Needs: Unknown    Lack of Transportation (Medical): No    Lack of Transportation (Non-Medical): Not on file   Physical Activity: Not on file   Stress: Not on file   Social Connections: Not on file   Intimate Partner Violence: Not on file   Housing Stability: Unknown    Unable to Pay for Housing in the Last Year: Not on file    Number of Places Lived in the Last Year: Not on file    Unstable Housing in the Last Year: No       Allergies:    Allergies   Allergen Reactions    Influenza Virus Vaccine      Other reaction(s): HX of Guillain-[place] Syndrome    Sulfa Antibiotics            Labs:  0   Lab Value Date/Time    HCT 29 4 (L) 07/25/2022 2303    HCT 30 4 (L) 03/29/2022 1212    HCT 24 3 (L) 03/14/2022 0747    HCT 39 7 12/09/2016 0828    HGB 8 6 (L) 07/25/2022 2303    HGB 8 8 (L) 03/29/2022 1212    HGB 8 3 (L) 03/14/2022 0747    HGB 13 1 12/09/2016 0828    INR 1 01 07/31/2018 2201    WBC 10 05 07/25/2022 2303    WBC 6 64 03/29/2022 1212    WBC 10 00 03/14/2022 0747    WBC 6 3 12/09/2016 0828       Meds:    Current Facility-Administered Medications:     [START ON 7/27/2022] allopurinol (ZYLOPRIM) tablet 50 mg, 50 mg, Oral, Daily, Hetul Horne, DO    [START ON 7/27/2022] aspirin chewable tablet 81 mg, 81 mg, Oral, Daily, Hetul Horne, DO    [START ON 7/27/2022] cinacalcet (SENSIPAR) tablet 30 mg, 30 mg, Oral, Once per day on Mon Wed Fri, Hetul Horne, DO    [START ON 7/27/2022] citalopram (CeleXA) tablet 10 mg, 10 mg, Oral, Daily, Hetul Horne, DO    [START ON 7/27/2022] colchicine (COLCRYS) tablet 0 3 mg, 0 3 mg, Oral, Daily, Hetul Lenord Settle, DO    dextrose 5 % and sodium chloride 0 45 % infusion, 75 mL/hr, Intravenous, Continuous, Hetul Horne, DO    gabapentin (NEURONTIN) capsule 400 mg, 400 mg, Oral, HS, Hetul Horne, DO    mirtazapine (REMERON) tablet 7 5 mg, 7 5 mg, Oral, HS, Hetul Horne, DO    vancomycin (VANCOCIN) 1,250 mg in sodium chloride 0 9 % 250 mL IVPB, 20 mg/kg, Intravenous, Once **FOLLOWED BY** vancomycin (VANCOCIN) IVPB (premix in dextrose) 750 mg 150 mL, 10 mg/kg, Intravenous, Daily PRN, Hetul Horne, DO    Blood Culture:   No results found for: BLOODCX    Wound Culture:   No results found for: WOUNDCULT    Ins and Outs:  No intake/output data recorded  Physical Exam:   /74   Pulse 75   Temp 97 8 °F (36 6 °C)   LMP  (LMP Unknown)   SpO2 96%   Gen: Alert and oriented to person, place, time  HEENT:  eyes clear, moist mucus membranes, hearing intact  Respiratory: No audible wheezing or stridor found  Cardiovascular: Well perfused peripherally  Abdomen: soft nontender/nondistended  · Musculoskeletal: right Upper Extremity  · Skin: Erythema and fusiform swelling over right index finger  · No pain with passive extension or flexion of right index finger  · No TTP over flexor tendon sheath  TTP of distal phalanx  · Sensation intact Radial, Ulna and Median  · Can wiggle fingers, deferred motor exam due to pain  · 2+ radial pulse  Radiology:   I personally reviewed the films  X-rays of right index finger show moderate DJD over distal phalanx, no evidence of fracture or dislocation  Significant soft tissue swelling  Procedure: Incision and Drainage of [place]  After informed consent was obtained a digital block via a volar approach with 1% lidocaine was given to the right index finger  Once adequate anesthesia was obtained the wound was then copiously irrigated with NS and betadine  The area was then sterilely prepped and draped   The distal phalanx was incised on the volar radial aspect and direct dorsal aspect with a 15 blade scalpel  Then with dissecting scissors the wound was dissected and remaining pus was expressed  The wound was then packed with sterile packing material and left open to drain  The fingers were the dressed with xeroform, 4x4 gauze, and kunal wrap  Pt tolerated the procedure well  Assessment:  89 y  o female with  right index finger cellulitis  She will benefit from continued antibiotics and warm soapy soaks tomorrow after packing removal     Plan:   · Cont   abx per primary team  · Nonweight bearing to right hand  · Heat to affected area  · Warm soapy soaks TID  · Analgesics for pain  · Dispo: Ortho will follow    Case was reviewed and discussed with senior resident and attending  Ab Chapman MD  Orthopedic surgery resident   · 07/27/22

## 2022-07-27 NOTE — UTILIZATION REVIEW
Initial Clinical Review    7/26 Transfer from [facility] 16  Pt at [address] from 7/25 to 726    Admission: Date/Time/Statement:   Admission Orders (From admission, onward)     Ordered        07/26/22 2051  Inpatient Admission  Once                      Orders Placed This Encounter   Procedures    Inpatient Admission     Standing Status:   Standing     Number of Occurrences:   1     Order Specific Question:   Level of Care     Answer:   Med Surg [16]     Order Specific Question:   Estimated length of stay     Answer:   More than 2 Midnights     Order Specific Question:   Certification     Answer:   I certify that inpatient services are medically necessary for this patient for a duration of greater than two midnights  See H&P and MD Progress Notes for additional information about the patient's course of treatment  ED Arrival Information     Patient not seen in ED                     Initial Presentation: 80 y o  female, transfer from [facility] ED initially presented with worsening right-sided index finger pain x1 wk  Transferred for Hand surgery evaluation  PMH for T2DM, CKD stage IV and HTN  Admit Inpatient level of care for Cellulitis of right index finger and Hypernatremia  Orthopedic consult  Iv antibiotics and IVFs  Date: 7/27  Day 2:   Orthopedic cons; Pt c/o  right index finger swelling, erythema and pain  She states she has had 1 week of increased pain and swelling and went to the ED at [facility] who performed a bedside incision and drainage of the right index finger then transferred  Right Index finger cellulitis  Continue Iv antibiotics  Warm soapy soaks tomorrow after packing removal  NWB to right hand  Warm soapy soaks Tid  Pain control  On exam; skin with erythema and fusiform swelling over right index finger  Progress notes; Wound culture: 4+ growth of Staphylococcus aureus, final susceptibilities pending  CRP 76 4  Continue IV Vanco pending final culture results   Pt with significant pain in right hand, tearful during evaluation minimal relief with oxycodone  Add Iv Dilaudid prn  If no improvement on Iv antibiotics, may need further interventions  Na 145 today  ID cons; Staphylococcus aureus wound infection with cellulitis right index R/0 septic arthritis DIP joint  Await result of Staphylococcus aureus susceptibilities from wound culture  Continue Iv Vanco prn with further dosing by pharmacy  Vitals   Temperature Pulse Resp Blood Pressure SpO2   07/26/22 2032 07/26/22 2032 -- 07/26/22 2032 07/26/22 2032   97 8 °F (36 6 °C) 75  167/74 96 %      Temp src Heart Rate Source Patient Position - Orthostatic VS BP Location FiO2 (%)   -- -- -- -- --             Pain Score       07/26/22 2104       2          Wt Readings from Last 1 Encounters:   07/26/22 65 2 kg (143 lb 11 8 oz)     Additional Vital Signs:   07/27/22 07:38:38 97 5 °F (36 4 °C) 64 140/55 83 94 % --   07/26/22 22:20:40 98 2 °F (36 8 °C) 76 129/63 85 94 % --   07/26/22 2100 -- -- -- -- -- None (Room air)     Pertinent Labs/Diagnostic Test Results:   XR hand 3+ vw right   Final Result by Shubham Eid MD (07/27 1039)      Significant soft tissue swelling of the 2nd digit with irregularity of the DIP joint concerning for infection  Correlation with medical history and symptoms is advised to assess whether this can represent a component of an inflammatory arthropathy  No    fracture                     Lab Units 07/27/22  0447   SODIUM mmol/L 145   POTASSIUM mmol/L 4 3   CHLORIDE mmol/L 118*   CO2 mmol/L 25   ANION GAP mmol/L 2*   BUN mg/dL 40*   CREATININE mg/dL 2 30*   EGFR ml/min/1 73sq m 17   CALCIUM mg/dL 8 7     Results from last 7 days   Lab Units 07/27/22  0447   AST U/L 8   ALT U/L 9*   ALK PHOS U/L 108   TOTAL PROTEIN g/dL 5 6*   ALBUMIN g/dL 2 3*   TOTAL BILIRUBIN mg/dL 0 52     Results from last 7 days   Lab Units 07/27/22  0638 07/26/22  2133   POC GLUCOSE mg/dl 114 82     Lab Units 07/27/22  0447   GLUCOSE RANDOM mg/dL 116         Results from last 7 days   Lab Units 07/26/22  2317   HEMOGLOBIN A1C % 5 8*   EAG mg/dl 120         Results from last 7 days   Lab Units 07/27/22  0447   CRP mg/L 76 4*         Results from last 7 days   Lab Units 07/26/22  0049   GRAM STAIN RESULT  4+ Polys*  2+ Gram positive cocci in pairs*       Past Medical History:   Diagnosis Date    Diabetes mellitus ([facility] )     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 ([facility] )     LAST ASSESSED: [id number]    Hyperlipidemia     Hypertension     Renal disorder     Squamous cell carcinoma, scalp/neck     LAST ASSESSED: [id number]     Present on Admission:   Type 2 diabetes mellitus with diabetic chronic kidney disease (HCC)   Chronic kidney disease, stage IV (severe) ([facility]   Essential hypertension      Admitting Diagnosis: Cellulitis of finger, right [L03 011]  Age/Sex: 80 y o  female     Admission Orders:  Scheduled Medications:  allopurinol, 50 mg, Oral, Daily  aspirin, 81 mg, Oral, Daily  cinacalcet, 30 mg, Oral, Once per day on Mon Wed Fri  citalopram, 10 mg, Oral, Daily  colchicine, 0 3 mg, Oral, Daily  gabapentin, 400 mg, Oral, HS  insulin lispro, 1-5 Units, Subcutaneous, TID AC  mirtazapine, 7 5 mg, Oral, HS    vancomycin (VANCOCIN) 1,250 mg in sodium chloride 0 9 % 250 mL IVPB  Dose: 20 mg/kg  Weight Dosing Info: 65 2 kg  Freq:  Once Route: IV  Last Dose: 1,250 mg (07/26/22 2243)  Start: 07/26/22 2100 End: 07/27/22 0013      Continuous IV Infusions:  dextrose 5 % and sodium chloride 0 45 %, 75 mL/hr, Intravenous, Continuous      PRN Meds:  acetaminophen, 650 mg, Oral, Q6H PRN 7/27 x1  oxyCODONE, 2 5 mg, Oral, Q6H PRN  oxyCODONE, 5 mg, Oral, Q4H PRN 7/27 x1  vancomycin, 10 mg/kg, Intravenous, Daily PRN        Wound culture and Gram stain  IP CONSULT TO HAND SURGERY  IP CONSULT TO PHARMACY  IP CONSULT TO INFECTIOUS DISEASES    Network Utilization Review Department  ATTENTION: Please call with any questions or concerns to [phone] and carefully listen to the prompts so that you are directed to the right person  All voicemails are confidential   Denton Robbins all requests for admission clinical reviews, approved or denied determinations and any other requests to dedicated fax number below belonging to the campus where the patient is receiving treatment   List of dedicated fax numbers for the Facilities:  [address] DENIALS (Administrative/Medical Necessity) [phone]   [address] (Maternity/NICU/Pediatrics) [phone]   [address]  [address] [place] [address] [place] [address] Scott Jamil Sara [address] [facility] [place] [address] [phone]

## 2022-07-27 NOTE — ASSESSMENT & PLAN NOTE
Lab Results   Component Value Date    HGBA1C 5 8 (H) 07/26/2022       Recent Labs     07/26/22  2133 07/27/22  0638   POCGLU 82 114       Blood Sugar Average: Last 72 hrs:  (P) 114   · SSI coverage while inpatient   · Hypoglycemia protocol

## 2022-07-27 NOTE — ASSESSMENT & PLAN NOTE
· Patient presented with right index finger swelling, erythema and pain in the past week, initially was evaluated in ED at [facility] s/p bedside I&D but was transferred to [facility] for hand surgical evaluation  · Wound culture: 4+ growth of Staphylococcus aureus, final susceptibilities pending  · CRP: 76 4  · C/w IV vancomycin pending final culture results  · Patient today with significant pain in right hand, tearful during evaluation minimal relief with oxycodone    · Add IV Dilaudid p r n   · Orthopedic surgery following:  · NWB RUE  · Warm soapy soaks TID after removal of packing  · If no improvement on IV antibiotics, may need further interventions  · Wound care  · Elevate RUE

## 2022-07-27 NOTE — PROGRESS NOTES
Vancomycin Assessment    Foreign Hughes is a 80 y o  female who is currently receiving vancomycin 1250 mg IV once for skin-soft tissue infection     Relevant clinical data and objective history reviewed:  Creatinine   Date Value Ref Range Status   07/25/2022 2 73 (H) 0 60 - 1 30 mg/dL Final     Comment:     Standardized to IDMS reference method   03/29/2022 2 48 (H) 0 60 - 1 30 mg/dL Final     Comment:     Standardized to IDMS reference method   03/16/2022 2 36 (H) 0 60 - 1 20 mg/dL Final     Comment:     Standardized to IDMS reference method   01/15/2018 1 63 (H) 0 60 - 0 88 mg/dL Final     Comment:     Result Comment: For patients >52years of age, the reference limit  for Creatinine is approximately 13% higher for people  identified as -American      10/17/2017 1 81 (H) 0 60 - 0 88 mg/dL Final     Comment:     Result Comment: For patients >52years of age, the reference limit  for Creatinine is approximately 13% higher for people  identified as -American      09/22/2017 1 64 (H) 0 60 - 0 88 mg/dL Final     Comment:     Result Comment: For patients >52years of age, the reference limit  for Creatinine is approximately 13% higher for people  identified as -American  /74   Pulse 75   Temp 97 8 °F (36 6 °C)   LMP  (LMP Unknown)   SpO2 96%   No intake/output data recorded    Lab Results   Component Value Date/Time    BUN 43 (H) 07/25/2022 11:03 PM    BUN 57 (H) 12/06/2021 08:12 AM    WBC 10 05 07/25/2022 11:03 PM    WBC 6 3 12/09/2016 08:28 AM    HGB 8 6 (L) 07/25/2022 11:03 PM    HGB 13 1 12/09/2016 08:28 AM    HCT 29 4 (L) 07/25/2022 11:03 PM    HCT 39 7 12/09/2016 08:28 AM     (H) 07/25/2022 11:03 PM    MCV 93 4 12/09/2016 08:28 AM     07/25/2022 11:03 PM     12/09/2016 08:28 AM     Temp Readings from Last 3 Encounters:   07/26/22 97 8 °F (36 6 °C)   07/26/22 97 6 °F (36 4 °C) (Oral)   03/03/22 97 5 °F (36 4 °C)     Vancomycin Days of Therapy: 1    Assessment/Plan  The patient is currently on vancomycin utilizing pulse dosing based on actual body weight  Baseline risks associated with therapy include: pre-existing renal impairment, concomitant nephrotoxic medications, and advanced age  The patient is currently receiving 1250 mg IV once and after clinical evaluation will be followed by 750 mg IV if random level returns less than 20  Pharmacy will also follow closely for s/sx of nephrotoxicity, infusion reactions, and appropriateness of therapy  BMP and CBC will be ordered per protocol  Plan for random level to be collected tomorrow 7-27-22  Due to infection severity, will target a trough of 15-20 (appropriate for most indications)   Pharmacy will continue to follow the patients culture results and clinical progress daily      Nena Rios, Pharmacist

## 2022-07-27 NOTE — CONSULTS
Consultation - Infectious Disease   Lilliana Desai 80 y o  female MRN: [id number]  Unit/Bed#: -01 Encounter: [id number]      Inpatient consult to Infectious Diseases  Consult performed by: Soledad Triplett MD  Consult ordered by: Dara Ramires DO          IMPRESSION & RECOMMENDATIONS:   Impression:  1  Staphylococcus aureus wound infection with cellulitis right index R/0 septic arthritis DIP joint  2  Type 2 DM  3  GAY on CKD  4  History of  gout    Recommendations:    Discuss with the primary service  1  Await result of Staphylococcus aureus susceptibilities from wound culture  2  Pending above would continue vancomycin 750 mg IV p r n  with further dosing by pharmacy  3  Local therapy as per Hand surgery including soaks        HISTORY OF PRESENT ILLNESS:    Reason for Consult:  Right index finger cellulitis  HPI: Lilliana Desai is a 80y o  year old female was seen at the [place] [address] ER 7/25 with right index finger pain x1 week and swelling x1 day  A bedside incision and drainage was performed  Patient has past history of type 2 DM CKD, gout, and dementia  She was transferred here yesterday in early a m  for further evaluation by orthopedic hand surgery  Wound cultures have grown out 4+ staph aureus with susceptibilities pending  Patient was begun on daptomycin IV on 07/25 and switched yesterday to vancomycin 1250 mg followed by 750 mg IV daily p r n  Jorden Estevez Further dosing is by pharmacy  X-rays of the right hand show significant soft tissue swelling of the 2nd digit with irregularity of the D IP joint that was concerning for infection  Orthopedic hand surgery has been following the patient and recommending conservative Rx at this time including soaks with further intervention if no improvement  Review of Systems positive pertinent findings include painful swelling right index finger, wound discharge  A [id number] point system-based review of systems is otherwise negative      PAST MEDICAL HISTORY:  Past Medical History:   Diagnosis Date    [facility] mellitus [facility]     [facility] syndrome [facility]     LAST ASSESSED: 2016    Hyperlipidemia     Hypertension     Renal disorder     Squamous cell carcinoma, scalp/neck     LAST ASSESSED:      Past Surgical History:   Procedure Laterality Date    BLADDER SURGERY      LAST ASSESSED: 2016    PELVIC FLOOR REPAIR      VAGINAL SURG INSERTION OF MESH    TOTAL ABDOMINAL HYSTERECTOMY W/ BILATERAL SALPINGOOPHORECTOMY      LAST ASSESSED: 2016       FAMILY HISTORY:  Non-contributory    SOCIAL HISTORY:  Social History     Social History     Substance and Sexual Activity   Alcohol Use Yes    Comment: SOCIAL     Social History     Substance and Sexual Activity   Drug Use No     Social History     Tobacco Use   Smoking Status Former Smoker   Smokeless Tobacco Never Used       ALLERGIES:  Allergies   Allergen Reactions    Influenza Virus Vaccine      Other reaction(s): HX of Guillain-[place] Syndrome    Sulfa Antibiotics        MEDICATIONS:  All current active medications have been reviewed        PHYSICAL EXAM:  Temp:  [97 5 °F (36 4 °C)-98 2 °F (36 8 °C)] 97 5 °F (36 4 °C)  HR:  [64-76] 68  Resp:  [16] 16  BP: (129-167)/(55-74) 137/58  SpO2:  [94 %-96 %] 95 %  Temp (24hrs), Av 8 °F (36 6 °C), Min:97 5 °F (36 4 °C), Max:98 2 °F (36 8 °C)  Current: Temperature: 97 5 °F (36 4 °C)    Intake/Output Summary (Last 24 hours) at 2022 1650  Last data filed at 2022 1141  Gross per 24 hour   Intake 972 5 ml   Output --   Net 972 5 ml       General Appearance:  Appearing well, elderly, alert nontoxic, and in no distress, appears stated age   Head:  Normocephalic, without obvious abnormality, atraumatic   Eyes:  PERRL, conjunctiva pale and sclera anicteric, both eyes   Nose: Nares normal, mucosa normal, no drainage   Throat: Oropharynx moist without lesions; lips, mucosa, and tongue normal; partial denture Neck: Supple, symmetrical, trachea midline, no adenopathy, no tenderness/mass/nodules   Back:   Symmetric, no curvature, ROM normal, no CVA tenderness   Lungs:   Clear to auscultation bilaterally, no audible wheezes, rhonchi and rales, respirations unlabored   Chest Wall:  No tenderness or deformity   Heart:  Regular rate and rhythm, S1, S2 normal, no murmur, rub or gallop   Abdomen:   Soft, non-tender, non-distended, positive bowel sounds, no masses, no organomegaly    No CVA tenderness   Extremities:  Right index finger with dry dressing   Skin:  As above  , surgical scars   Neurologic: Alert and oriented times 3, extremity strength 5/5 and symmetric           Invasive Devices:   Peripheral IV 07/25/22 Left Antecubital (Active)   Site Assessment [facility] 07/27/22 1000   Dressing Type Transparent 07/27/22 1000   Line Status Flushed;Saline locked 07/27/22 1000   Dressing Status Clean;Dry; Intact 07/27/22 1000   Dressing Change Due 07/29/22 07/27/22 1000   Reason Not Rotated Not due 07/26/22 2109       Peripheral IV 07/26/22 Right Antecubital (Active)   Site Assessment [facility] 07/27/22 1000   Dressing Type Transparent 07/27/22 1000   Line Status Flushed; Infusing 07/27/22 1000   Dressing Status Clean;Dry; Intact 07/27/22 1000   Dressing Change Due 07/30/22 07/27/22 1000   Reason Not Rotated Not due 07/26/22 2109       LABS, IMAGING, & OTHER STUDIES:  Lab Results:      I have personally reviewed pertinent labs      Results from last 7 days   Lab Units 07/25/22  2303   WBC Thousand/uL 10 05   HEMOGLOBIN g/dL 8 6*   PLATELETS Thousands/uL 245     Results from last 7 days   Lab Units 07/27/22  0447 07/25/22  2303   SODIUM mmol/L 145 148*   POTASSIUM mmol/L 4 3 5 0   CHLORIDE mmol/L 118* 112*   CO2 mmol/L 25 26   BUN mg/dL 40* 43*   CREATININE mg/dL 2 30* 2 73*   EGFR ml/min/1 73sq m 17 14   CALCIUM mg/dL 8 7 8 5   AST U/L 8  --    ALT U/L 9*  --    ALK PHOS U/L 108  --      Results from last 7 days   Lab Units 07/26/22  0049 GRAM STAIN RESULT  4+ Polys*  2+ Gram positive cocci in pairs*   WOUND CULTURE  4+ Growth of Staphylococcus aureus*       Imaging Studies:   I have personally reviewed pertinent imaging study reports and images in PACS  EKG, Pathology, and Other Studies:   I have personally reviewed pertinent reports

## 2022-07-28 NOTE — ASSESSMENT & PLAN NOTE
Lab Results   Component Value Date    HGBA1C 5 8 (H) 07/26/2022       Recent Labs     07/27/22  1605 07/27/22 2052 07/28/22  0605 07/28/22  1045   POCGLU 136 113 133 183*       Blood Sugar Average: Last 72 hrs:  (P) 325 [id number]   · SSI coverage while inpatient   · Hypoglycemia protocol

## 2022-07-28 NOTE — PROGRESS NOTES
Progress Note - Orthopedics   Ya Luis 80 y o  female MRN: [id number]      Subjective:  No acute events overnight  No acute distress  Denies fevers, chills, SOB  Objective:  A 10 point ROS was performed; negative except as noted above       Labs:  Lab Results   Component Value Date/Time    WBC 8 47 07/28/2022 04:51 AM    WBC 6 3 12/09/2016 08:28 AM    HGB 8 3 (L) 07/28/2022 04:51 AM    HGB 13 1 12/09/2016 08:28 AM       Physical:  Vitals:    07/27/22 2158   BP: 162/61   Pulse: 63   Resp:    Temp: 97 9 °F (36 6 °C)   SpO2: 93%     Musculoskeletal: right Upper Extremity  · Dressing changed, less erythema and swelling is improved  · TTP around IF  · SILT m/r/u    · Motor intact ain/pin/m/r/u  · Fingers WWP    Assessment:    80 y o  female with Right IF felon s/p bedside I&D    Plan:  · NWB RUE  · Betadine soaks 3-4 times a day  · PT/OT  · Pain control  · Appreciate ID evaluation and recommendations  · Follow final Cx results    Kris Valencia MD

## 2022-07-28 NOTE — PHYSICAL THERAPY NOTE
PHYSICAL THERAPY NOTE          Patient Name: Minh Cazares  PPRVO'K Date: 7/28/2022 07/28/22 1105   PT Last Visit   PT Visit Date 07/28/22   Note Type   Note Type Treatment   Pain Assessment   Pain Assessment Tool 0-10   Pain Score No Pain   Restrictions/Precautions   RUE Weight Bearing Per Order NWB   Other Precautions Multiple lines;Telemetry;WBS   General   Chart Reviewed Yes   Additional Pertinent History cleared for Tx session (spoke to nsg)   Response to Previous Treatment Patient with no complaints from previous session  Cognition   Overall Cognitive Status WFL   Arousal/Participation Alert; Cooperative   Attention Within functional limits   Orientation Level Oriented to person;Oriented to place;Oriented to situation   Memory Within functional limits   Following Commands Follows one step commands without difficulty   Subjective   Subjective Alert; in bed; responds to questions appropriately; pleasant and cooperative; agreeable to mobilize   Bed Mobility   Supine to Sit 6  Modified independent   Transfers   Sit to Stand 6  Modified independent   Stand to Sit 6  Modified independent   Ambulation/Elevation   Gait pattern Excessively slow; Short stride; Inconsistent leny  (min increased sway when amb w/o AD but no uncorrected LOB and appeared to be at baseline (reports she holds on to furniture at home))   Gait Assistance 6  Modified independent  (w/ rw; (S) w/o AD)   Additional items Verbal cues   Assistive Device Rolling walker;None   Distance 180 ft w/ rw and 50 ft w/o AD w/ seated rest period in between   Balance   Static Sitting Good   Dynamic Sitting Fair +   Static Standing Fair +   Dynamic Standing Fair   Ambulatory Fair   Activity Tolerance   Activity Tolerance Patient tolerated treatment well   Nurse Made Aware spoke to RN   Exercises   Knee AROM Long Arc Quad Sitting;15 reps;AROM; Bilateral   Ankle Pumps Sitting;15 reps;AROM; Bilateral   Marching Sitting;10 reps;AROM; Bilateral   Assessment   Prognosis Good   Problem List Decreased strength;Decreased endurance   Assessment Pt demonstrated overall improvement in mobility skills progressing to mod (I) level w/ transfers and amb w/ rw and (S) w/o AD; overall functional mobility status currently appears to be at or near premorbid level --> cont to anticipate pt will return home upon D/C when medically cleared; home PT follow up is recommended; will follow to max endurance  Goals   Patient Goals to go home   STG Expiration Date 08/06/22   PT Treatment Day 1   Plan   Treatment/Interventions LE strengthening/ROM; Therapeutic exercise; Endurance training;Equipment eval/education;Gait training;Spoke to nursing   Progress Progressing toward goals   PT Frequency 3-5x/wk   Recommendation   PT Discharge Recommendation Home with home health rehabilitation  (home PT)   Equipment Recommended [address] [facility] Recommended Wheeled walker   Change/add to [employer]? No   AM-PAC Basic Mobility Inpatient   Turning in Bed Without Bedrails 4   Lying on Back to Sitting on Edge of Flat Bed 4   Moving Bed to Chair 4   Standing Up From Chair 4   Walk in Room 4   Climb 3-5 Stairs 2   Basic Mobility Inpatient Raw Score 22   Basic Mobility Standardized Score 47 4   Highest Level Of Mobility   JH-HLM Goal 7: Walk 25 feet or more   JH-HLM Achieved 8: Walk 250 feet ot more   Education   Education Provided Mobility training;Assistive device   Patient Demonstrates verbal understanding   End of Consult   Patient Position at End of Consult Bedside chair; All needs within reach     [facility], PT

## 2022-07-28 NOTE — PLAN OF CARE
Problem: MOBILITY - ADULT  Goal: Maintain or return to baseline ADL function  Description: INTERVENTIONS:  -  Assess patient's ability to carry out ADLs; assess patient's baseline for ADL function and identify physical deficits which impact ability to perform ADLs (bathing, care of mouth/teeth, toileting, grooming, dressing, etc )  - Assess/evaluate cause of self-care deficits   - Assess range of motion  - Assess patient's mobility; develop plan if impaired  - Assess patient's need for assistive devices and provide as appropriate  - Encourage maximum independence but intervene and supervise when necessary  - Involve family in performance of ADLs  - Assess for home care needs following discharge   - Consider OT consult to assist with ADL evaluation and planning for discharge  - Provide patient education as appropriate  Outcome: Progressing  Goal: Maintains/Returns to pre admission functional level  Description: INTERVENTIONS:  - Perform BMAT or MOVE assessment daily    - Set and communicate daily mobility goal to care team and patient/family/caregiver     - Collaborate with rehabilitation services on mobility goals if consulted  - Out of bed for toileting  - Record patient progress and toleration of activity level   Outcome: Progressing     Problem: PAIN - ADULT  Goal: Verbalizes/displays adequate comfort level or baseline comfort level  Description: Interventions:  - Encourage patient to monitor pain and request assistance  - Assess pain using appropriate pain scale  - Administer analgesics based on type and severity of pain and evaluate response  - Implement non-pharmacological measures as appropriate and evaluate response  - Consider cultural and social influences on pain and pain management  - Notify physician/advanced practitioner if interventions unsuccessful or patient reports new pain  Outcome: Progressing     Problem: INFECTION - ADULT  Goal: Absence or prevention of progression during hospitalization  Description: INTERVENTIONS:  - Assess and monitor for signs and symptoms of infection  - Monitor lab/diagnostic results  - Monitor all insertion sites, i e  indwelling lines, tubes, and drains  - Monitor endotracheal if appropriate and nasal secretions for changes in amount and color  - Kinsey appropriate cooling/warming therapies per order  - Administer medications as ordered  - Instruct and encourage patient and family to use good hand hygiene technique  - Identify and instruct in appropriate isolation precautions for identified infection/condition  Outcome: Progressing  Goal: Absence of fever/infection during neutropenic period  Description: INTERVENTIONS:  - Monitor WBC    Outcome: Progressing     Problem: DISCHARGE PLANNING  Goal: Discharge to home or other facility with appropriate resources  Description: INTERVENTIONS:  - Identify barriers to discharge w/patient and caregiver  - Arrange for needed discharge resources and transportation as appropriate  - Identify discharge learning needs (meds, wound care, etc )  - Arrange for interpretive services to assist at discharge as needed  - Refer to Case Management Department for coordinating discharge planning if the patient needs post-hospital services based on physician/advanced practitioner order or complex needs related to functional status, cognitive ability, or social support system  Outcome: Progressing     Problem: Knowledge Deficit  Goal: Patient/family/caregiver demonstrates understanding of disease process, treatment plan, medications, and discharge instructions  Description: Complete learning assessment and assess knowledge base    Interventions:  - Provide teaching at level of understanding  - Provide teaching via preferred learning methods  Outcome: Progressing     Problem: Potential for Falls  Goal: Patient will remain free of falls  Description: INTERVENTIONS:  - Educate patient/family on patient safety including physical limitations  - Instruct patient to call for assistance with activity   - Consult OT/PT to assist with strengthening/mobility   - Keep Call bell within reach  - Keep bed low and locked with side rails adjusted as appropriate  - Keep care items and personal belongings within reach  - Initiate and maintain comfort rounds  - Make Fall Risk Sign visible to staff  - Apply yellow socks and bracelet for high fall risk patients  - Consider moving patient to room near nurses station  Outcome: Progressing

## 2022-07-28 NOTE — PROGRESS NOTES
[facility]  Progress Note - Sina Estrada 2/24/1927, 80 y o  female MRN: [id number]  Unit/Bed#: -01 Encounter: [id number]  Primary Care Provider: Tarun Lomeli MD   Date and time admitted to hospital: 7/26/2022  6:49 PM    * Cellulitis of right index finger  Assessment & Plan  · Patient presented with right index finger swelling, erythema and pain in the past week, initially was evaluated in ED at [address] s/p bedside I&D but was transferred to [facility] for hand surgical evaluation  · Wound culture: 4+ growth of Staphylococcus aureus, susceptible to cefazolin; awaiting further ID recommendations  · CRP: 76 4  · Add IV Dilaudid p r n   · Orthopedic surgery following:  · NWB RUE  · Warm soapy soaks TID after removal of packing  · If no improvement on IV antibiotics, may need further interventions  · Wound care  · Elevate RUE  · On morning labs, noted to have metabolic acidosis; will obtain lactic to rule out worsening infection    Hypernatremia  Assessment & Plan  · POA with Na 148   Na improved to 138 today  · Suspected to be due to dehydration, s/p D5 half saline  · D/C IV fluids and monitor off, resumed diet    Type 2 diabetes mellitus with diabetic chronic kidney disease [facility])  Assessment & Plan  Lab Results   Component Value Date    HGBA1C 5 8 (H) 07/26/2022       Recent Labs     07/27/22  1605 07/27/22  2052 07/28/22  0605 07/28/22  1045   POCGLU 136 113 133 183*       Blood Sugar Average: Last 72 hrs:  (P) 203 [id number]   · SSI coverage while inpatient   · Hypoglycemia protocol    Chronic kidney disease, stage IV (severe) [facility])  Assessment & Plan  Lab Results   Component Value Date    EGFR 16 07/28/2022    EGFR 17 07/27/2022    EGFR 14 07/25/2022    CREATININE 2 40 (H) 07/28/2022    CREATININE 2 30 (H) 07/27/2022    CREATININE 2 73 (H) 07/25/2022   · Baseline creatinine appears to be between 2 3-2 7  · Renal function remains stable at baseline, monitor BMP    Essential hypertension  Assessment & Plan  · Home regimen: Lasix 40 mg daily  · Patient symptomatically improving; restart lasix tomorrow      VTE Pharmacologic Prophylaxis:   Pharmacologic: Heparin  Mechanical VTE Prophylaxis in Place: Yes    Patient Centered Rounds: I have performed bedside rounds with nursing staff today  Discussions with Specialists or Other Care Team Provider: ID    Education and Discussions with Family / Patient: Discussed treatment plan with family and patient who agree with current plan; encouraged to ask questions and participate  Time Spent for Care: 45 minutes  More than 50% of total time spent on counseling and coordination of care as described above  Current Length of Stay: 2 day(s)    Current Patient Status: Inpatient   Certification Statement: The patient will continue to require additional inpatient hospital stay due to treatment of cellulitis    Discharge Plan: TBD    Code Status: Level 3 - DNAR and DNI      Subjective:   Patient seen and examined bedside; reports pain is much improved from before  Susceptibilities have resulted and awaiting further ID recs  Surgery following along incase abx fail to resolve infx; then may need surgery  Restart lasix tomorrow; concerning for drop  In bicarb today  Will obtain lactic to rule out doubtful worsening infection  Objective:     Vitals:   Temp (24hrs), Av 2 °F (36 8 °C), Min:97 8 °F (36 6 °C), Max:99 °F (37 2 °C)    Temp:  [97 8 °F (36 6 °C)-99 °F (37 2 °C)] 97 8 °F (36 6 °C)  HR:  [63-71] 65  Resp:  [18] 18  BP: (144-162)/(61-69) 144/61  SpO2:  [93 %-97 %] 97 %  Body mass index is 27 02 kg/m²  Input and Output Summary (last 24 hours): Intake/Output Summary (Last 24 hours) at 2022 1557  Last data filed at 2022 1100  Gross per 24 hour   Intake 500 ml   Output --   Net 500 ml       Physical Exam:     Physical Exam  Vitals and nursing note reviewed     Constitutional:       General: She is not in acute distress  Appearance: She is well-developed  HENT:      Head: Normocephalic and atraumatic  Eyes:      Conjunctiva/sclera: Conjunctivae normal    Cardiovascular:      Rate and Rhythm: Normal rate and regular rhythm  Heart sounds: No murmur heard  Pulmonary:      Effort: Pulmonary effort is normal  No respiratory distress  Breath sounds: Normal breath sounds  Abdominal:      Palpations: Abdomen is soft  Tenderness: There is no abdominal tenderness  Musculoskeletal:         General: Swelling and deformity present  Cervical back: Neck supple  Comments: Right 2nd digit   Skin:     General: Skin is warm and dry  Neurological:      Mental Status: She is alert  Psychiatric:         Behavior: Behavior normal            Additional Data:     Labs:    Results from last 7 days   Lab Units 07/28/22  0451   WBC Thousand/uL 8 47   HEMOGLOBIN g/dL 8 3*   HEMATOCRIT % 27 3*   PLATELETS Thousands/uL 236   NEUTROS PCT % 72   LYMPHS PCT % 13*   MONOS PCT % 7   EOS PCT % 7*     Results from last 7 days   Lab Units 07/28/22  0451   SODIUM mmol/L 138   POTASSIUM mmol/L 4 6   CHLORIDE mmol/L 116*   CO2 mmol/L 18*   BUN mg/dL 38*   CREATININE mg/dL 2 40*   ANION GAP mmol/L 4   CALCIUM mg/dL 9 0   ALBUMIN g/dL 2 2*   TOTAL BILIRUBIN mg/dL 0 67   ALK PHOS U/L 106   ALT U/L 9*   AST U/L 15   GLUCOSE RANDOM mg/dL 138         Results from last 7 days   Lab Units 07/28/22  1045 07/28/22  0605 07/27/22  2052 07/27/22  1605 07/27/22  1150 07/27/22  0638 07/26/22  2133   POC GLUCOSE mg/dl 183* 133 113 136 117 114 82     Results from last 7 days   Lab Units 07/26/22  2317   HEMOGLOBIN A1C % 5 8*               * I Have Reviewed All Lab Data Listed Above  * Additional Pertinent Lab Tests Reviewed:  All Labs Within Last 24 Hours Reviewed    Imaging:    Imaging Reports Reviewed Today Include: Xray hand  Imaging Personally Reviewed by Myself Includes:      Recent Cultures (last 7 days):     Results from last 7 days   Lab Units 07/26/22  0049   GRAM STAIN RESULT  4+ Polys*  2+ Gram positive cocci in pairs*   WOUND CULTURE  4+ Growth of Staphylococcus aureus*       Last 24 Hours Medication List:   Current Facility-Administered Medications   Medication Dose Route Frequency Provider Last Rate    acetaminophen  650 mg Oral Q6H PRN Karey Luis PA-C      allopurinol  50 mg Oral Daily Hetul Horne, DO      aspirin  81 mg Oral Daily Hetul Horne, DO      cinacalcet  30 mg Oral Once per day on Mon Wed Fri Hetul Horne, DO      citalopram  10 mg Oral Daily Hetul Horne, DO      colchicine  0 3 mg Oral Daily Hetul Horne, DO      [START ON 7/29/2022] furosemide  40 mg Oral Daily Shyla Lindsey MD      gabapentin  400 mg Oral HS Hetul Horne, DO      heparin (porcine)  5,000 Units Subcutaneous [facility] Keyonna Kenny PA-C      HYDROmorphone  0 5 mg Intravenous Q4H PRN Keyonna Kenny PA-C      insulin lispro  1-5 Units Subcutaneous TID AC Hetul Horne, DO      mirtazapine  7 5 mg Oral HS Hetul Horne, DO      oxyCODONE  2 5 mg Oral Q6H PRN Karey Luis PA-C      oxyCODONE  5 mg Oral Q4H PRN Karey Luis PA-C      vancomycin  10 mg/kg Intravenous Daily PRN Hetul Horne,  mg (07/28/22 0037)        Today, Patient Was Seen By: Lord Syeda MD    ** Please Note: Dictation voice to text software may have been used in the creation of this document   **

## 2022-07-28 NOTE — PLAN OF CARE
Problem: PHYSICAL THERAPY ADULT  Goal: Performs mobility at highest level of function for planned discharge setting  See evaluation for individualized goals  Description: Treatment/Interventions: Functional transfer training, LE strengthening/ROM, Therapeutic exercise, Endurance training, Gait training, Spoke to nursing, OT  Equipment Recommended: Kirill Edwards       See flowsheet documentation for full assessment, interventions and recommendations  Outcome: Adequate for Discharge  Note: Prognosis: Good  Problem List: Decreased strength, Decreased endurance  Assessment: Pt demonstrated overall improvement in mobility skills progressing to mod (I) level w/ transfers and amb w/ rw and (S) w/o AD; overall functional mobility status currently appears to be at or near premorbid level --> cont to anticipate pt will return home upon D/C when medically cleared; home PT follow up is recommended; will follow to max endurance  PT Discharge Recommendation: Home with home health rehabilitation (home PT)    See flowsheet documentation for full assessment

## 2022-07-28 NOTE — PROGRESS NOTES
Progress Note - Infectious Disease   Jim Brown 80 y o  female MRN: [id number]  Unit/Bed#: -01 Encounter: [id number]      Impression:  1  Staphylococcus aureus (MSSA) wound infection with cellulitis right index R/0 septic arthritis DIP joint  2  Type 2 DM  3  GAY on CKD  4  History of  gout    Recommendations:  Patient is afebrile with normal WBC count  Discussed with primary service  1  Wound culture reveals Staphylococcus aureus (MSSA)  2  Will discontinue vancomycin and begin cefazolin 1 g q 12 hours IV  3  Once sure that no further intervention may be required, the patient could be switched to p o  Cephalexin to complete a total of 28 day Rx to cover the possibility of a septic joint  Antibiotics:  1  Cefazolin 1 g q 12 hours IV, day 4 total antibiotic Rx of 28    Subjective: The patient has no complaints  Denies pain  Denies fevers, chills, or sweats  Denies nausea, vomiting, or diarrhea  Objective:  Vitals:  Temp:  [97 8 °F (36 6 °C)-99 °F (37 2 °C)] 97 8 °F (36 6 °C)  HR:  [63-71] 65  Resp:  [18] 18  BP: (144-162)/(61-69) 144/61  SpO2:  [93 %-97 %] 97 %  Temp (24hrs), Av 2 °F (36 8 °C), Min:97 8 °F (36 6 °C), Max:99 °F (37 2 °C)  Current: Temperature: 97 8 °F (36 6 °C)    Physical Exam:     General Appearance:  Alert, elderly, responsive nontoxic, no acute distress  Throat: Oropharynx moist without lesions  Lips, mucosa, and tongue normal, partial denture   Neck: Supple, symmetrical, trachea midline, no adenopathy,  no tenderness/mass/nodules   Lungs:   Clear to auscultation bilaterally, no audible wheezes, rhonchi or rales; respirations unlabored   Heart:  Regular rate and rhythm, S1, S2 normal, no murmur, rub or gallop   Abdomen:   Soft, non-tender, non-distended, positive bowel sounds    No masses, no organomegaly    No CVA tenderness   Extremities: Right index finger with dry surgical dressing   Skin: As above, surgical scars         Invasive Devices  Report    Peripheral Intravenous Line  Duration           Peripheral IV 07/25/22 Left Antecubital 2 days    Peripheral IV 07/26/22 Right Antecubital 2 days                Labs, Imaging, & Other studies:   All pertinent labs were personally reviewed  Results from last 7 days   Lab Units 07/28/22  0451 07/25/22  2303   WBC Thousand/uL 8 47 10 05   HEMOGLOBIN g/dL 8 3* 8 6*   PLATELETS Thousands/uL 236 245     Results from last 7 days   Lab Units 07/28/22  0451 07/27/22  0447 07/25/22  2303 07/25/22  2303   SODIUM mmol/L 138 145  --  148*   POTASSIUM mmol/L 4 6 4 3  --  5 0   CHLORIDE mmol/L 116* 118*  --  112*   CO2 mmol/L 18* 25  --  26   BUN mg/dL 38* 40*  --  43*   CREATININE mg/dL 2 40* 2 30*  --  2 73*   EGFR ml/min/1 73sq m 16 17  --  14   CALCIUM mg/dL 9 0 8 7  --  8 5   AST U/L 15 8  --   --    ALT U/L 9* 9*   < >  --    ALK PHOS U/L 106 108   < >  --     < > = values in this interval not displayed       Results from last 7 days   Lab Units 07/26/22  0049   GRAM STAIN RESULT  4+ Polys*  2+ Gram positive cocci in pairs*   WOUND CULTURE  4+ Growth of Staphylococcus aureus*

## 2022-07-28 NOTE — ASSESSMENT & PLAN NOTE
Lab Results   Component Value Date    EGFR 16 07/28/2022    EGFR 17 07/27/2022    EGFR 14 07/25/2022    CREATININE 2 40 (H) 07/28/2022    CREATININE 2 30 (H) 07/27/2022    CREATININE 2 73 (H) 07/25/2022   · Baseline creatinine appears to be between 2 3-2 7  · Renal function remains stable at baseline, monitor BMP

## 2022-07-28 NOTE — ASSESSMENT & PLAN NOTE
· Patient presented with right index finger swelling, erythema and pain in the past week, initially was evaluated in ED at [address] s/p bedside I&D but was transferred to [facility] for hand surgical evaluation      · Wound culture: 4+ growth of Staphylococcus aureus, susceptible to ampicillin; awaiting further ID recommendations  · CRP: 76 4  · Add IV Dilaudid p r n   · Orthopedic surgery following:  · NWB RUE  · Warm soapy soaks TID after removal of packing  · If no improvement on IV antibiotics, may need further interventions  · Wound care  · Elevate RUE  · On morning labs, noted to have metabolic acidosis; will obtain lactic to rule out worsening infection

## 2022-07-28 NOTE — ASSESSMENT & PLAN NOTE
· POA with Na 148   Na improved to 138 today  · Suspected to be due to dehydration, s/p D5 half saline  · D/C IV fluids and monitor off, resumed diet

## 2022-07-28 NOTE — PROGRESS NOTES
Vancomycin IV Pharmacy-to-Dose Consultation    Fabienne Gimenez is a 80 y o  female who is currently receiving Vancomycin IV with management by the Pharmacy Consult service  Assessment/Plan:  The patient was reviewed  Renal function is stable and no signs or symptoms of nephrotoxicity and/or infusion reactions were documented in the chart  Based on todays assessment, continue current vancomycin (day # 2) dosing of 750 mg IV PRN random < 20, dose to be given tonight (random 13 6), with a plan for repeat random tomorrow night  We will continue to follow the patients culture results and clinical progress daily      Lizzie Malik, Pharmacist

## 2022-07-28 NOTE — ASSESSMENT & PLAN NOTE
Right index finger cellulitis  Status post bedside I and D  Wound cultures positive for MSSA  Receiving IV cefazolin  Discussed with orthopedics no further interventions planned inpatient  Infectious Disease following  Will likely transition to p o   Keflex to complete total 28 days of antibiotics

## 2022-07-29 NOTE — PLAN OF CARE
Problem: PHYSICAL THERAPY ADULT  Goal: Performs mobility at highest level of function for planned discharge setting  See evaluation for individualized goals  Description: Treatment/Interventions: Functional transfer training, LE strengthening/ROM, Therapeutic exercise, Endurance training, Gait training, Spoke to nursing, OT  Equipment Recommended: Dulce Tillman       See flowsheet documentation for full assessment, interventions and recommendations  Outcome: Progressing  Note: Prognosis: Good  Problem List: Decreased strength, Decreased endurance  Assessment: The patient was more tired today, and she did have some minor loss of balance after first ambulating into the hallway  She went to turn to the left, but she was able to correct herself with a step response  This appears to be due to her still being somewhat sleepy as she was awoken from a sound sleep  She had no further losses of balance, and she demonstrated safe mobility otherwise  She has been independent with the rolling walker, but she does continue to require supervision for safety with no device  Recommend continued ambulation with the walker at this time  PT Discharge Recommendation: Home with home health rehabilitation    See flowsheet documentation for full assessment

## 2022-07-29 NOTE — PLAN OF CARE
Problem: MOBILITY - ADULT  Goal: Maintain or return to baseline ADL function  Description: INTERVENTIONS:  -  Assess patient's ability to carry out ADLs; assess patient's baseline for ADL function and identify physical deficits which impact ability to perform ADLs (bathing, care of mouth/teeth, toileting, grooming, dressing, etc )  - Assess/evaluate cause of self-care deficits   - Assess range of motion  - Assess patient's mobility; develop plan if impaired  - Assess patient's need for assistive devices and provide as appropriate  - Encourage maximum independence but intervene and supervise when necessary  - Involve family in performance of ADLs  - Assess for home care needs following discharge   - Consider OT consult to assist with ADL evaluation and planning for discharge  - Provide patient education as appropriate  Outcome: Progressing  Goal: Maintains/Returns to pre admission functional level  Description: INTERVENTIONS:  - Perform BMAT or MOVE assessment daily    - Set and communicate daily mobility goal to care team and patient/family/caregiver     - Collaborate with rehabilitation services on mobility goals if consulted  - Out of bed for toileting  - Record patient progress and toleration of activity level   Outcome: Progressing     Problem: PAIN - ADULT  Goal: Verbalizes/displays adequate comfort level or baseline comfort level  Description: Interventions:  - Encourage patient to monitor pain and request assistance  - Assess pain using appropriate pain scale  - Administer analgesics based on type and severity of pain and evaluate response  - Implement non-pharmacological measures as appropriate and evaluate response  - Consider cultural and social influences on pain and pain management  - Notify physician/advanced practitioner if interventions unsuccessful or patient reports new pain  Outcome: Progressing     Problem: INFECTION - ADULT  Goal: Absence or prevention of progression during hospitalization  Description: INTERVENTIONS:  - Assess and monitor for signs and symptoms of infection  - Monitor lab/diagnostic results  - Monitor all insertion sites, i e  indwelling lines, tubes, and drains  - Monitor endotracheal if appropriate and nasal secretions for changes in amount and color  - [place] appropriate cooling/warming therapies per order  - Administer medications as ordered  - Instruct and encourage patient and family to use good hand hygiene technique  - Identify and instruct in appropriate isolation precautions for identified infection/condition  Outcome: Progressing  Goal: Absence of fever/infection during neutropenic period  Description: INTERVENTIONS:  - Monitor WBC    Outcome: Progressing     Problem: DISCHARGE PLANNING  Goal: Discharge to home or other facility with appropriate resources  Description: INTERVENTIONS:  - Identify barriers to discharge w/patient and caregiver  - Arrange for needed discharge resources and transportation as appropriate  - Identify discharge learning needs (meds, wound care, etc )  - Arrange for interpretive services to assist at discharge as needed  - Refer to Case Management Department for coordinating discharge planning if the patient needs post-hospital services based on physician/advanced practitioner order or complex needs related to functional status, cognitive ability, or social support system  Outcome: Progressing     Problem: Knowledge Deficit  Goal: Patient/family/caregiver demonstrates understanding of disease process, treatment plan, medications, and discharge instructions  Description: Complete learning assessment and assess knowledge base    Interventions:  - Provide teaching at level of understanding  - Provide teaching via preferred learning methods  Outcome: Progressing     Problem: Potential for Falls  Goal: Patient will remain free of falls  Description: INTERVENTIONS:  - Educate patient/family on patient safety including physical limitations  - Instruct patient to call for assistance with activity   - Consult OT/PT to assist with strengthening/mobility   - Keep Call bell within reach  - Keep bed low and locked with side rails adjusted as appropriate  - Keep care items and personal belongings within reach  - Initiate and maintain comfort rounds  - Make Fall Risk Sign visible to staff  - Apply yellow socks and bracelet for high fall risk patients  - Consider moving patient to room near nurses station  Outcome: Progressing

## 2022-07-29 NOTE — DISCHARGE INSTRUCTIONS
Discharge Instructions - Orthopedics  Lilliana Giselle 80 y o  female MRN: [id number]  Unit/Bed#:  -01    Weight Bearing Status:                                           Non weight bearing to the right upper extremity    Pain:  Continue analgesics as directed    Dressing Instructions:   Please keep clean, dry and intact until follow up     Appt Instructions: If you do not have your appointment, please call the clinic at [phone] t  Otherwise followup as scheduled     Contact the office sooner if you experience any increased numbness/tingling in the extremities  Miscellaneous:  Please take Keflex as prescribed

## 2022-07-29 NOTE — PROGRESS NOTES
Progress Note - Orthopedics   Richard Hernandez 80 y o  female MRN: [id number]      Subjective:  No acute events overnight  No acute distress  Denies fevers, chills, SOB  Objective:  A 10 point ROS was performed; negative except as noted above       Labs:  Lab Results   Component Value Date/Time    WBC 8 53 07/29/2022 04:37 AM    WBC 6 3 12/09/2016 08:28 AM    HGB 7 9 (L) 07/29/2022 04:37 AM    HGB 13 1 12/09/2016 08:28 AM       Physical:  Vitals:    07/28/22 2319   BP: 157/69   Pulse: 69   Resp: 18   Temp: 98 2 °F (36 8 °C)   SpO2: 95%     Musculoskeletal: right Upper Extremity  · Dressing changed, less erythema and swelling is improved  · TTP around IF  · SILT m/r/u    · Motor intact ain/pin/m/r/u  · Fingers WWP     Assessment:    80 y o  female with Right IF felon s/p bedside I&D; feels better and continues to show improvement on physical exam    Final cx MSSA     Plan:  · NWB RUE  · Betadine soaks 3-4 times a day  · PT/OT  · Pain control  · Appreciate ID evaluation and recommendations    Radha Mackey MD

## 2022-07-29 NOTE — ASSESSMENT & PLAN NOTE
Lab Results   Component Value Date    HGBA1C 5 8 (H) 07/26/2022       Recent Labs     07/28/22  1615 07/28/22  2043 07/29/22  0616 07/29/22  1131   POCGLU 211* 142* 91 117       Blood Sugar Average: Last 72 hrs:  (P) 135 7   · Monitor Accu-Cheks  · Avoid hypoglycemia  · Hypoglycemia protocol in place

## 2022-07-29 NOTE — PLAN OF CARE
Problem: DISCHARGE PLANNING  Goal: Discharge to home or other facility with appropriate resources  Description: INTERVENTIONS:  - Identify barriers to discharge w/patient and caregiver  - Arrange for needed discharge resources and transportation as appropriate  - Identify discharge learning needs (meds, wound care, etc )  - Arrange for interpretive services to assist at discharge as needed  - Refer to Case Management Department for coordinating discharge planning if the patient needs post-hospital services based on physician/advanced practitioner order or complex needs related to functional status, cognitive ability, or social support system  Outcome: Progressing     Problem: INFECTION - ADULT  Goal: Absence or prevention of progression during hospitalization  Description: INTERVENTIONS:  - Assess and monitor for signs and symptoms of infection  - Monitor lab/diagnostic results  - Monitor all insertion sites, i e  indwelling lines, tubes, and drains  - Monitor endotracheal if appropriate and nasal secretions for changes in amount and color  - [place] appropriate cooling/warming therapies per order  - Administer medications as ordered  - Instruct and encourage patient and family to use good hand hygiene technique  - Identify and instruct in appropriate isolation precautions for identified infection/condition  Outcome: Progressing  Goal: Absence of fever/infection during neutropenic period  Description: INTERVENTIONS:  - Monitor WBC    Outcome: Progressing

## 2022-07-29 NOTE — PROGRESS NOTES
[address] [facility]  Progress Note - Ya Luis 2/24/1927, 80 y o  female MRN: [id number]  Unit/Bed#: -01 Encounter: [id number]  Primary Care Provider: Nessa Baig MD   Date and time admitted to hospital: 7/26/2022  6:49 PM    * Cellulitis of right index finger  Assessment & Plan  Right index finger cellulitis  Status post bedside I and D  Wound cultures positive for MSSA  Receiving IV cefazolin  Discussed with orthopedics no further interventions planned inpatient  Infectious Disease following  Will likely transition to p o  Keflex to complete total 28 days of antibiotics    Hypernatremia  Assessment & Plan  Present on admission  Resolved  Encourage adequate p o  Intake  Monitor closely    Type 2 diabetes mellitus with diabetic chronic kidney disease [facility])  Assessment & Plan  Lab Results   Component Value Date    HGBA1C 5 8 (H) 07/26/2022       Recent Labs     07/28/22  1615 07/28/22  2043 07/29/22  0616 07/29/22  1131   POCGLU 211* 142* 91 117       Blood Sugar Average: Last 72 hrs:  (P) 135 7   · Monitor Accu-Cheks  · Avoid hypoglycemia  · Hypoglycemia protocol in place    Chronic kidney disease, stage IV (severe) [facility])  Assessment & Plan  Lab Results   Component Value Date    EGFR 15 07/29/2022    EGFR 16 07/28/2022    EGFR 17 07/27/2022    CREATININE 2 54 (H) 07/29/2022    CREATININE 2 40 (H) 07/28/2022    CREATININE 2 30 (H) 07/27/2022   · Monitor kidney function closely    Essential hypertension  Assessment & Plan  · Monitor blood pressures  · Avoid hypotension            VTE Pharmacologic Prophylaxis: VTE Score: 5 High Risk (Score >/= 5) - Pharmacological DVT Prophylaxis Ordered: heparin  Sequential Compression Devices Ordered  Patient Centered Rounds: I performed bedside rounds with nursing staff today    Discussions with Specialists or Other Care Team Provider:  Orthopedics    Education and Discussions with Family / Patient: Patient, called and updated daughter Geno Hoffman in detail questions answered  Time Spent for Care: 30 minutes  More than 50% of total time spent on counseling and coordination of care as described above  Current Length of Stay: 3 day(s)  Current Patient Status: Inpatient   Certification Statement: The patient will continue to require additional inpatient hospital stay due to As outlined  Discharge Plan: Possible discharge 24 to 48 hours    Code Status: Level 3 - DNAR and DNI    Subjective:       Comfortably sitting up in bed  Appetite improving  History chart labs medications reviewed    Objective:     Vitals:   Temp (24hrs), Av 1 °F (36 7 °C), Min:97 9 °F (36 6 °C), Max:98 2 °F (36 8 °C)    Temp:  [97 9 °F (36 6 °C)-98 2 °F (36 8 °C)] 97 9 °F (36 6 °C)  HR:  [54-69] 54  Resp:  [18] 18  BP: (157-169)/(69-70) 169/70  SpO2:  [95 %] 95 %  Body mass index is 27 02 kg/m²       Input and Output Summary (last 24 hours):   No intake or output data in the 24 hours ending 22 1606    Physical Exam:   Physical Exam     Comfortably sitting up in bed  Neck supple  Lungs diminished breath sounds bilaterally  Heart sounds S1-S2 noted  Abdomen soft  Awake obeys simple commands  Right index finger bandage  No rash    Additional Data:     Labs:  Results from last 7 days   Lab Units 22  0437   WBC Thousand/uL 8 53   HEMOGLOBIN g/dL 7 9*   HEMATOCRIT % 27 7*   PLATELETS Thousands/uL 231   NEUTROS PCT % 66   LYMPHS PCT % 16   MONOS PCT % 9   EOS PCT % 8*     Results from last 7 days   Lab Units 22  0437   SODIUM mmol/L 140   POTASSIUM mmol/L 4 6   CHLORIDE mmol/L 112*   CO2 mmol/L 23   BUN mg/dL 43*   CREATININE mg/dL 2 54*   ANION GAP mmol/L 5   CALCIUM mg/dL 9 1   ALBUMIN g/dL 2 3*   TOTAL BILIRUBIN mg/dL 0 30   ALK PHOS U/L 102   ALT U/L 7*   AST U/L 7   GLUCOSE RANDOM mg/dL 98         Results from last 7 days   Lab Units 22  1131 22  0616 22  2043 22  1615 22  1045 22  0605 22  20522  1605 22  1156 07/27/22  0305 07/26/22  2133   POC GLUCOSE mg/dl 117 91 142* 211* 183* 133 113 136 117 114 82     Results from last 7 days   Lab Units 07/26/22  2317   HEMOGLOBIN A1C % 5 8*     Results from last 7 days   Lab Units 07/28/22  1806   LACTIC ACID mmol/L 1 6       Lines/Drains:  Invasive Devices  Report    Peripheral Intravenous Line  Duration           Peripheral IV 07/26/22 Right Antecubital 3 days                  Imaging: Reviewed radiology reports from this admission including: xray(s)    Recent Cultures (last 7 days):   Results from last 7 days   Lab Units 07/26/22  0049   GRAM STAIN RESULT  4+ Polys*  2+ Gram positive cocci in pairs*   WOUND CULTURE  4+ Growth of Staphylococcus aureus*       Last 24 Hours Medication List:   Current Facility-Administered Medications   Medication Dose Route Frequency Provider Last Rate    acetaminophen  650 mg Oral Q6H PRN Isabel Owens PA-C      allopurinol  50 mg Oral Daily Hetul Horne, DO      aspirin  81 mg Oral Daily Hetul Horne, DO      cefazolin  1,000 mg Intravenous Q12H Zada Saint, MD 1,000 mg (07/29/22 0532)    cinacalcet  30 mg Oral Once per day on Mon Wed Fri Hetul Horne, DO      citalopram  10 mg Oral Daily Hetul Horne, DO      colchicine  0 3 mg Oral Daily Hetul Horne, DO      ferrous sulfate  325 mg Oral Daily With Breakfast Penny Mir MD      folic acid  1 mg Oral Daily Penny Mir MD      furosemide  40 mg Oral Daily Ariella Chan MD      gabapentin  400 mg Oral HS Hetul Horne, DO      heparin (porcine)  5,000 Units Subcutaneous Q8H [facility] Coy Johnson PA-C      HYDROmorphone  0 5 mg Intravenous Q4H PRN Coy Johnson PA-C      insulin lispro  1-5 Units Subcutaneous TID AC Hetul Horne, DO      mirtazapine  7 5 mg Oral HS Hetul Horne, DO      oxyCODONE  2 5 mg Oral Q6H PRN Isabel Owens PA-C      oxyCODONE  5 mg Oral Q4H PRN Isabel Owens PA-C          Today, Patient Was Seen By: Penny Mir MD    **Please Note: This note may have been constructed using a voice recognition system  **

## 2022-07-29 NOTE — ASSESSMENT & PLAN NOTE
Lab Results   Component Value Date    EGFR 15 07/29/2022    EGFR 16 07/28/2022    EGFR 17 07/27/2022    CREATININE 2 54 (H) 07/29/2022    CREATININE 2 40 (H) 07/28/2022    CREATININE 2 30 (H) 07/27/2022   · Monitor kidney function closely

## 2022-07-29 NOTE — PHYSICAL THERAPY NOTE
Physical Therapy Progress Note     07/29/22 1400   PT Last Visit   PT Visit Date 07/29/22   Note Type   Note Type Treatment   Pain Assessment   Pain Assessment Tool 0-10   Pain Score No Pain   Restrictions/Precautions   RUE Weight Bearing Per Order NWB   Other Precautions Pain; Fall Risk   Subjective   Subjective The pt  notes that she is tired, but she is amenable to work with therapy  Bed Mobility   Supine to Sit 6  Modified independent   Additional items Increased time required   Sit to Supine 6  Modified independent   Additional items Increased time required   Transfers   Sit to Stand 6  Modified independent   Stand to Sit 6  Modified independent   Ambulation/Elevation   Gait pattern Excessively slow; Inconsistent leny; Short stride   Gait Assistance 5  Supervision   Additional items Verbal cues   Assistive Device None   Distance 50 feet x 2, 60 feet x 2  Balance   Static Sitting Good   Dynamic Sitting Fair +   Static Standing Fair +   Ambulatory Fair -   Activity Tolerance   Activity Tolerance Patient limited by fatigue   Assessment   Prognosis Good   Problem List Decreased strength;Decreased endurance   Assessment The patient was more tired today, and she did have some minor loss of balance after first ambulating into the hallway  She went to turn to the left, but she was able to correct herself with a step response  This appears to be due to her still being somewhat sleepy as she was awoken from a sound sleep  She had no further losses of balance, and she demonstrated safe mobility otherwise  She has been independent with the rolling walker, but she does continue to require supervision for safety with no device  Recommend continued ambulation with the walker at this time  Goals   Patient Goals To go home  STG Expiration Date 08/06/22   PT Treatment Day 2   Plan   Treatment/Interventions Functional transfer training;LE strengthening/ROM; Therapeutic exercise; Endurance training;Patient/family training;Bed mobility;Gait training   Progress Progressing toward goals   PT Frequency 3-5x/wk   Recommendation   PT Discharge Recommendation Home with home health rehabilitation   Equipment Recommended [place] walker   AM-PAC Basic Mobility Inpatient   Turning in Bed Without Bedrails 4   Lying on Back to Sitting on Edge of Flat Bed 4   Moving Bed to Chair 4   Standing Up From Chair 4   Walk in Room 3   Climb 3-5 Stairs 3   Basic Mobility Inpatient Raw Score 22   Basic Mobility Standardized Score 47 4   Highest Level Of Mobility   JH-HLM Goal 7: Walk 25 feet or more   JH-HLM Achieved 7: Walk 25 feet or more       An AM-PAC Basic Mobility standardized score less than 40 78 suggests the patient may benefit from discharge to post-acute rehab services      Cecily Mekhi, PTA

## 2022-07-29 NOTE — PROGRESS NOTES
Progress Note - Infectious Disease   Ya Luis 80 y o  female MRN: [id number]  Unit/Bed#: -01 Encounter: [id number]      Impression:  1  Staphylococcus aureus (MSSA) wound infection with cellulitis right index R/0 septic arthritis DIP joint  2  Type 2 DM  3  GAY on CKD  4  History of  gout    Recommendations:  Patient is afebrile with normal WBC count  Discussed with primary service  Wound seen during dressing change  and appears clean  1  Wound culture reveals Staphylococcus aureus (MSSA)  2  Continue cefazolin 1 g q 12 hours IV  3  Once sure tomorrow that no further intervention may be required, the patient could be switched to p o  Cephalexin 500 mg q 8 hours to complete a total of 28 day Rx( 23 more days) to cover the possibility of a septic joint and discharged  Antibiotics:  1  Cefazolin 1 g q 12 hours IV, day 5 total antibiotic Rx of 28    Subjective: The patient has no complaints  Denies pain  Denies fevers, chills, or sweats  Denies nausea, vomiting, or diarrhea  Objective:  Vitals:  Temp:  [97 9 °F (36 6 °C)-98 2 °F (36 8 °C)] 97 9 °F (36 6 °C)  HR:  [54-69] 54  Resp:  [18] 18  BP: (157-169)/(69-70) 169/70  SpO2:  [95 %] 95 %  Temp (24hrs), Av 1 °F (36 7 °C), Min:97 9 °F (36 6 °C), Max:98 2 °F (36 8 °C)  Current: Temperature: 97 9 °F (36 6 °C)    Physical Exam:     General Appearance:  Alert, elderly, responsive nontoxic, no acute distress  Throat: Oropharynx moist without lesions  Lips, mucosa, and tongue normal, partial denture   Neck: Supple, symmetrical, trachea midline, no adenopathy,  no tenderness/mass/nodules   Lungs:   Clear to auscultation bilaterally, no audible wheezes, rhonchi or rales; respirations unlabored   Heart:  Regular rate and rhythm, S1, S2 normal, no murmur, rub or gallop   Abdomen:   Soft, non-tender, non-distended, positive bowel sounds    No masses, no organomegaly    No CVA tenderness   Extremities: Right index finger wound is clean   Skin: As above, surgical scars         Invasive Devices  Report    Peripheral Intravenous Line  Duration           Peripheral IV 07/26/22 Right Antecubital 3 days                Labs, Imaging, & Other studies:   All pertinent labs were personally reviewed  Results from last 7 days   Lab Units 07/29/22  0437 07/28/22  0451 07/25/22  2303   WBC Thousand/uL 8 53 8 47 10 05   HEMOGLOBIN g/dL 7 9* 8 3* 8 6*   PLATELETS Thousands/uL 231 236 245     Results from last 7 days   Lab Units 07/29/22  0437 07/28/22  0451 07/27/22  0447   SODIUM mmol/L 140 138 145   POTASSIUM mmol/L 4 6 4 6 4 3   CHLORIDE mmol/L 112* 116* 118*   CO2 mmol/L 23 18* 25   BUN mg/dL 43* 38* 40*   CREATININE mg/dL 2 54* 2 40* 2 30*   EGFR ml/min/1 73sq m 15 16 17   CALCIUM mg/dL 9 1 9 0 8 7   AST U/L 7 15 8   ALT U/L 7* 9* 9*   ALK PHOS U/L 102 106 108     Results from last 7 days   Lab Units 07/26/22  0049   GRAM STAIN RESULT  4+ Polys*  2+ Gram positive cocci in pairs*   WOUND CULTURE  4+ Growth of Staphylococcus aureus*

## 2022-07-30 NOTE — ASSESSMENT & PLAN NOTE
Right index finger cellulitis  Status post bedside I and D  Wound cultures positive for MSSA  Receiving IV cefazolin  Orthopedic input noted wound is healing well no interventions planned  Orthopedic plans for outpatient follow-up noted  Infectious Disease input noted patient be transition to p o   Keflex 500 g q 8 hours to complete total 28 day course  Supportive care

## 2022-07-30 NOTE — DISCHARGE SUMMARY
[address] [facility]  Discharge- Digna Rodriguez 2/24/1927, 80 y o  female MRN: [id number]  Unit/Bed#: -01 Encounter: [id number]  Primary Care Provider: Skyler Darnell MD   Date and time admitted to hospital: 7/26/2022  6:49 PM    * Cellulitis of right index finger  Assessment & Plan  Right index finger cellulitis  Status post bedside I and D  Wound cultures positive for MSSA  Receiving IV cefazolin  Orthopedic input noted wound is healing well no interventions planned  Orthopedic plans for outpatient follow-up noted  Infectious Disease input noted patient be transition to p o  Keflex 500 g q 8 hours to complete total 28 day course  Supportive care    Hypernatremia  Assessment & Plan  Present on admission  Resolved  Encourage adequate p o   Intake  Monitor closely    Type 2 diabetes mellitus with diabetic chronic kidney disease [facility])  Assessment & Plan  Lab Results   Component Value Date    HGBA1C 5 8 (H) 07/26/2022       Recent Labs     07/29/22  1634 07/29/22  2118 07/30/22  0603 07/30/22  1122   POCGLU 136 152* 106 139       Blood Sugar Average: Last 72 hrs:  (P) 135   · Monitor Accu-Cheks  · Avoid hypoglycemia  · Hypoglycemia protocol in place    Chronic kidney disease, stage IV (severe) [facility])  Assessment & Plan  Lab Results   Component Value Date    EGFR 15 07/29/2022    EGFR 16 07/28/2022    EGFR 17 07/27/2022    CREATININE 2 54 (H) 07/29/2022    CREATININE 2 40 (H) 07/28/2022    CREATININE 2 30 (H) 07/27/2022   · Monitor kidney function closely    Essential hypertension  Assessment & Plan  · Monitor blood pressures  · Avoid hypotension            Discharge Summary - [facility]    Patient Information: Digna Rodriguez 80 y o  female MRN: [id number]  Unit/Bed#: -01 Encounter: [id number]    Discharging Physician / Practitioner: Nicole Szymanski MD  PCP: Skyler Darnell MD  Admission Date: 7/26/2022  Discharge Date: 07/30/22    Disposition:     Home    Reason for Admission:  Right index finger cellulitis    Discharge Diagnoses:     Principal Problem:    Cellulitis of right index finger  Active Problems:    Essential hypertension    Chronic kidney disease, stage IV (severe) ([facility])    Type 2 diabetes mellitus with diabetic chronic kidney disease ([facility])    Hypernatremia  Resolved Problems:    * No resolved hospital problems  *      Consultations During Hospital Stay:  · Orthopedics  · Infectious Disease    Procedures Performed:     · Right hand x-ray  Significant soft tissue swelling of the 2nd digit with irregularity of the DIP joint concerning for infection  Correlation with medical history and symptoms is advised to assess whether this can represent a component of an inflammatory arthropathy  No   Fracture  Hospital Course:     Abiel Johnston is a 80 y o  female patient who originally presented to the hospital on 7/26/2022 due to right index finger cellulitis    Patient presented with worsening pain and swelling of right index finger  She was diagnosed with right index finger cellulitis  Imaging studies with results as outlined above  She was seen in consultation and Orthopedics and Infectious Disease  She initially received IV vancomycin  She underwent bedside I&D  Wound cultures positive for MSSA  Her antibiotics to transition to IV cefazolin  The wound is healing well patient is not plan for any operative intervention while inpatient  Infectious disease plans to transition to p o  Keflex 500 mg q 8 hours to complete 28 day course noted and accordingly transition to p o  Keflex  Her chronic conditions remained stable no medication changes at discharge  She has symptomatically improved, hemodynamically stable and is deemed ready for discharge today  Kindly the chart for details      Condition at Discharge: fair     Discharge Day Visit / Exam:     Subjective:      Comfortably sitting up in bed  Reports feeling better  Agreeable to discharge plan    Vitals: Blood Pressure: (!) 175/79 (07/30/22 0749)  Pulse: 68 (07/30/22 0749)  Temperature: 98 3 °F (36 8 °C) (07/30/22 0749)  Temp Source: Oral (07/27/22 5125)  Respirations: 18 (07/28/22 2319)  Height: 5' 1" (154 9 cm) (07/27/22 0738)  Weight - Scale: 64 9 kg (143 lb) (07/27/22 0738)  SpO2: 90 % (07/30/22 0749)  Exam:   Physical Exam    Comfortably sitting up in bed  Neck supple  Lungs diminished breath sounds bases  No additional sounds  Heart sounds S1 and S2 noted  Abdomen soft  Awake obeys simple commands  Pedal edema noted  Right index finger swelling is improved    Discharge instructions/Information to patient and family:   See after visit summary for information provided to patient and family  Discharge plan discussed with Orthopedics  Discharge plan discussed the patient, updated daughter Kareem Nieves in detail questions answered  Outpatient follow-up with Orthopedics, primary care physician    Provisions for Follow-Up Care:  See after visit summary for information related to follow-up care and any pertinent home health orders  Planned Readmission: no     Discharge Statement:  I spent 45 minutes discharging the patient  This time was spent on the day of discharge  I had direct contact with the patient on the day of discharge  Greater than 50% of the total time was spent examining patient, answering all patient questions, arranging and discussing plan of care with patient as well as directly providing post-discharge instructions  Additional time then spent on discharge activities  Discharge Medications:  See after visit summary for reconciled discharge medications provided to patient and family        ** Please Note: This note has been constructed using a voice recognition system **

## 2022-07-30 NOTE — PLAN OF CARE
Problem: MOBILITY - ADULT  Goal: Maintain or return to baseline ADL function  Description: INTERVENTIONS:  -  Assess patient's ability to carry out ADLs; assess patient's baseline for ADL function and identify physical deficits which impact ability to perform ADLs (bathing, care of mouth/teeth, toileting, grooming, dressing, etc )  - Assess/evaluate cause of self-care deficits   - Assess range of motion  - Assess patient's mobility; develop plan if impaired  - Assess patient's need for assistive devices and provide as appropriate  - Encourage maximum independence but intervene and supervise when necessary  - Involve family in performance of ADLs  - Assess for home care needs following discharge   - Consider OT consult to assist with ADL evaluation and planning for discharge  - Provide patient education as appropriate  Outcome: Progressing     Problem: PAIN - ADULT  Goal: Verbalizes/displays adequate comfort level or baseline comfort level  Description: Interventions:  - Encourage patient to monitor pain and request assistance  - Assess pain using appropriate pain scale  - Administer analgesics based on type and severity of pain and evaluate response  - Implement non-pharmacological measures as appropriate and evaluate response  - Consider cultural and social influences on pain and pain management  - Notify physician/advanced practitioner if interventions unsuccessful or patient reports new pain  Outcome: Progressing     Problem: Potential for Falls  Goal: Patient will remain free of falls  Description: INTERVENTIONS:  - Educate patient/family on patient safety including physical limitations  - Instruct patient to call for assistance with activity   - Consult OT/PT to assist with strengthening/mobility   - Keep Call bell within reach  - Keep bed low and locked with side rails adjusted as appropriate  - Keep care items and personal belongings within reach  - Initiate and maintain comfort rounds  - Make Fall Risk Sign visible to staff  - Offer Toileting every  Hours, in advance of need  - Initiate/Maintain alarm  - Obtain necessary fall risk management equipment:   - Apply yellow socks and bracelet for high fall risk patients  - Consider moving patient to room near nurses station  Outcome: Progressing

## 2022-07-30 NOTE — PROGRESS NOTES
Progress Note - Orthopedics   Dada Carlos 80 y o  female MRN: [id number]      Subjective:  No acute events overnight  No acute distress  C/o left ankle pain possible gout flair up  Denies fevers, chills, SOB  Objective:  A 10 point ROS was performed; negative except as noted above  Labs:  Lab Results   Component Value Date/Time    WBC 8 53 07/29/2022 04:37 AM    WBC 6 3 12/09/2016 08:28 AM    HGB 7 9 (L) 07/29/2022 04:37 AM    HGB 13 1 12/09/2016 08:28 AM       Physical:  Vitals:    07/29/22 2313   BP: 156/70   Pulse: 65   Resp:    Temp: 98 1 °F (36 7 °C)   SpO2: 95%     Musculoskeletal: right hand  · Dressing changed, no erythema and swelling is improved  · Mild TTP around distal IF  · SILT m/r/u    · Motor intact ain/pin/m/r/u  · Fingers WWP, cap refill <2s           Assessment:    80 y o  female with Right IF felon s/p bedside I&D, cx grew mssa   Patient continues to improve, can dc on keflex for 23 more days      Plan:  · NWB RUE  · Betadine soaks 3 times a day, dry dsg after  · PT/OT  · Pain control  · Appreciate ID recommendations    Mao Rodriguez MD

## 2022-07-30 NOTE — ASSESSMENT & PLAN NOTE
Lab Results   Component Value Date    HGBA1C 5 8 (H) 07/26/2022       Recent Labs     07/29/22  1634 07/29/22  2118 07/30/22  0603 07/30/22  1122   POCGLU 136 152* 106 139       Blood Sugar Average: Last 72 hrs:  (P) 135   · Monitor Accu-Cheks  · Avoid hypoglycemia  · Hypoglycemia protocol in place

## 2022-08-01 NOTE — UTILIZATION REVIEW
Notification of Discharge   This is a Notification of Discharge from our facility [address]  Please be advised that this patient has been discharge from our facility  Below you will find the admission and discharge date and time including the patients disposition  UTILIZATION REVIEW CONTACT:  Virginia Bray  Utilization   Network Utilization Review Department  Phone: [phone] x carefully listen to the prompts  All voicemails are confidential   Email: [email or web address]     PHYSICIAN ADVISORY SERVICES:  FOR [id number] REVIEW - MEDICAL NECESSITY DENIAL  Phone: [phone]  Fax: [phone]  Email: [email or web address]     PRESENTATION DATE: 7/26/2022  6:49 PM  OBERVATION ADMISSION DATE:   INPATIENT ADMISSION DATE: 7/26/22  6:49 PM   DISCHARGE DATE: 7/30/2022  3:56 PM  DISPOSITION: Home/Self Care Home/Self Care      IMPORTANT INFORMATION:  Send all requests for admission clinical reviews, approved or denied determinations and any other requests to dedicated fax number below belonging to the campus where the patient is receiving treatment   List of dedicated fax numbers:  [address] DENIALS (Administrative/Medical Necessity) [phone]   [address] (Maternity/NICU/Pediatrics) [phone]   [facility] [phone]   130 [facility] [phone]   [address] [place] [address] [phone]   2000 [facility] [address] [place] [address],[address] [phone]   [facility]  [phone]   2205 [facility]  [address] [phone]

## 2022-08-01 NOTE — TELEPHONE ENCOUNTER
PATIENT'S DAUGHTER CALLED STATING PATIENT WAS DISCHARGED FROM HOSPITAL Saturday AND TOLD TO CALL THE OFFICE AND SCHEDULE APPOINTMENT FOR TCM EITHER THIS, OR NEXT WEEK  DR KAUR DOES NOT HAVE ANY OPEN APPOINTMENTS  DAUGHTER ASKING FOR DR KAUR TO FIND A GOOD TIME TO SEE PATIENT SOME TIME WITHIN THE NEXT FEW WEEKS PLEASE  PATIENT'S DAUGHTER STATES THAT DR KAUR WILL BE ABLE TO FIND A TIME TO FIT HER IN TO BE SEEN  PLEASE CALL DAUGHTER TO ADVISE  I BELIEVE IF WE ARE ABLE TO SEE HER WITHIN THE 14 DAY WINDOW WE CAN COUNT AS TCM AS SHE DID REACH OUT TODAY AND PATIENT WAS DISCHARGED Saturday   I JUST COULDN'T START TCM DOCUMENTATION SINCE I WAS NOT ABLE TO MAKE AN APPOINTMENT

## 2022-08-02 PROBLEM — Z76.89 ENCOUNTER FOR SUPPORT AND COORDINATION OF TRANSITION OF CARE: Status: ACTIVE | Noted: 2022-01-01

## 2022-08-02 PROBLEM — I83.009 STASIS ULCER (HCC): Status: RESOLVED | Noted: 2022-01-01 | Resolved: 2022-01-01

## 2022-08-02 PROBLEM — L97.909 STASIS ULCER (HCC): Status: RESOLVED | Noted: 2022-01-01 | Resolved: 2022-01-01

## 2022-08-02 PROBLEM — Z92.89 HISTORY OF RECENT HOSPITALIZATION: Status: ACTIVE | Noted: 2022-08-02

## 2022-08-02 NOTE — PROGRESS NOTES
Assessment/Plan:     Mrs Dia Kamara is a spry 78-year-old female who presents today with her daughter for transition of care visit  She was hospitalized at [facility] from July 26 to July 30th for a cellulitis of her 2nd right digit  Started as a gouty flare in the second finger  She is feeling quite well at the present time  Her appetite is good, mood is good and she has no acute complaints  1  Cellulitis of right index finger - patient will remain on 28 days of Keflex oral therapy, after receiving IV antibiotics in the hospital  Small wound/scab area cleansed with H2O2 and bandaid placed to protect area from further trauma  Pt may shower  She       2  Hypernatremia - likely secondary to dehydration  Sodium has normalized  3  Diabetes mellitus with stage 4 chronic kidney disease - recent hemoglobin A1c is 5 8  We discussed food choices and portion control  However, given her age, she may mostly it her discretion  4  Hypertensive chronic kidney disease - pt is having BW for Dr Regina David today  Last blood work on July 29th revealed creatinine of 2 54 and GFR of 15  This is mostly stable for her  I have reviewed her medication list to make sure we are avoiding any nephrotoxic agents  I would consider stopping allopurinol and or colchicine, however, due to recent gouty flare, she may continue them for now  If Dr Adelita Chang feels that these meds should be discontinued, I will leave it to his discretion  5  Hypertension - blood pressure today is within normal limits, actually on the low side  Patient remains on only Lasix 40 mg daily  No other meds for blood pressure at this time  Continue to monitor  6  Lower extremity edema - or on stable and at baseline for patient  Continue Lasix 40 mg daily  Continue to watch renal function  She has repeat CMP ordered from Nephrology  7  Peripheral neuropathy - stable on gabapentin  8  Reactive depression - her mood remains much improved  Continue citalopram 10 mg daily  The addition of Remeron 7 5 mg is helping her sleep at night  Continue current regimen  9  Lesion posterior scalp - plaque-like  ?  Large SK? It is bothersome to patient  Refer to derm for eval       Follow-up in 3 months  Subjective:     Patient ID: Chhaya Acevedo is a 80 y o  female  Mrs Emma Mehta is a spry 27-year-old female who presents today with her daughter for transition of care visit  She was hospitalized at [facility] from July 26 to July 30th for a cellulitis of her 2nd right digit  Started as a gouty flare in the second finger  She is feeling quite well at the present time  Her appetite is good, mood is good and she has no acute complaints  Review of Systems   Constitutional: Negative for appetite change, fatigue and fever  See HPI   HENT: Negative for congestion, ear pain, mouth sores, sinus pressure and trouble swallowing  Eyes: Negative for discharge, redness and itching  Respiratory: Negative for apnea, cough, chest tightness, shortness of breath, wheezing and stridor  Cardiovascular: Positive for leg swelling  Negative for chest pain and palpitations  Bilateral lower extremity edema is stable and at baseline  Gastrointestinal: Negative for abdominal distention, abdominal pain, blood in stool, constipation, diarrhea, nausea and vomiting  Endocrine: Negative for cold intolerance and heat intolerance  Genitourinary: Negative for difficulty urinating, dysuria, flank pain and urgency  Musculoskeletal: Negative for arthralgias and myalgias  Right 2nd digit is much improved  It remains swollen at baseline but no current erythema  Pain has improved  Skin: Negative for rash  Patient notes lesion on left posterior scalp  She is not sure how long it has been there but it is bothersome     Neurological: Negative for dizziness, seizures, syncope, speech difficulty, weakness, light-headedness, numbness and headaches  Continues to use walker for added stability  Hematological: Negative for adenopathy  Psychiatric/Behavioral: Negative for agitation, behavioral problems, confusion and sleep disturbance  The patient is not nervous/anxious  Mood is good  She denies feeling depressed  Her sleep has improved since the addition of Remeron  Objective:     Physical Exam  Vitals and nursing note reviewed  Constitutional:       General: She is not in acute distress  Appearance: She is well-developed  She is not toxic-appearing or diaphoretic  Comments: Well groomed, in no apparent distress  Good historian  HENT:      Head: Normocephalic and atraumatic  Right Ear: External ear normal       Left Ear: External ear normal    Eyes:      General: No scleral icterus  Conjunctiva/sclera: Conjunctivae normal       Pupils: Pupils are equal, round, and reactive to light  Neck:      Vascular: No carotid bruit  Cardiovascular:      Rate and Rhythm: Normal rate and regular rhythm  Heart sounds: Normal heart sounds  No murmur heard  No friction rub  No gallop  Comments: No carotid bruits appreciated  Pulmonary:      Effort: Pulmonary effort is normal  No respiratory distress  Breath sounds: No wheezing or rales  Chest:      Chest wall: No tenderness  Musculoskeletal:         General: No tenderness or deformity  Normal range of motion  Cervical back: Normal range of motion and neck supple  Right lower leg: Edema present  Left lower leg: Edema present  Comments: Mild erythema of both ankles with 1+ edema  This is stable and at baseline for her  Right 2nd digit is swollen but no associated erythema or tenderness  Scab on palmar aspect noted  Lymphadenopathy:      Cervical: No cervical adenopathy  Skin:     General: Skin is warm and dry  Coloration: Skin is not jaundiced        Comments: Left posterior scalp reveals large, stuck on plaque like lesion which is 3 cm in its longest diameter  It is gray in color  Is well-circumscribed  Neurological:      Mental Status: She is alert and oriented to person, place, and time  Gait: Gait abnormal       Deep Tendon Reflexes: Reflexes are normal and symmetric  Comments: Using walker for added stability  Psychiatric:         Mood and Affect: Mood normal          Behavior: Behavior normal          Thought Content: Thought content normal          Judgment: Judgment normal            Vitals:    08/02/22 1014   BP: 98/50   Pulse: 64   Weight: 62 1 kg (137 lb)   Height: 5' 1" (1 549 m)       Transitional Care Management Review:  Margaret Rangel is a 80 y o  female here for TCM follow up  During the TCM phone call patient stated:    TCM Call     Date and time call was made  8/2/2022  9:06 AM    Hospital care reviewed  Records reviewed    Patient was hospitialized at  [facility]    Date of Admission  07/26/22    Date of discharge  07/30/22    Diagnosis  Cellulitis of right index finger    Disposition  Home    Current Symptoms  None      TCM Call     Post hospital issues  None    Should patient be enrolled in anticoag monitoring? No    Scheduled for follow up?   Yes    I have advised the patient to call PCP with any new or worsening symptoms  [facility]

## 2022-08-02 NOTE — ASSESSMENT & PLAN NOTE
Limited med choices due to severe kidney disease  Lab Results   Component Value Date    HGBA1C 5 8 (H) 07/26/2022

## 2022-08-02 NOTE — TELEPHONE ENCOUNTER
Please call patient's daughter to see if she can bring her in on Tuesday  It appears that I am not in the office, but I am and am not sure why I have not schedule    Thank you,  CB

## 2023-09-12 NOTE — DISCHARGE INSTR - OTHER ORDERS
Wound Care Plan:   1-Hydraguard lotion to bilateral buttocks, sacrum, and heels three times daily and as needed  2-Elevate heels off of bed/chair surface to offload pressure  3-Offloading air cushion in chair when out of bed  4-Moisturize skin daily with lotion  5-Turn/reposition every 2 hours while in bed and weight shift frequently while in chair for pressure re-distribution on skin  6-Left upper arm tear--cleanse with normal saline, pat dry  Apply Hydrogel to wound bed  Cover with non-adherent dressing and ABD  Wrap with kunal  Change dressing every other day  failure to thrive failure to thrive failure to thrive failure to thrive failure to thrive failure to thrive failure to thrive failure to thrive failure to thrive failure to thrive failure to thrive failure to thrive

## 2024-05-21 NOTE — ASSESSMENT & PLAN NOTE
Assessment/Plan:       Problem List Items Addressed This Visit          Genitourinary    Dysplasia of cervix, low grade (ANAIS 1) - Primary       Surgery/Wound/Pain    S/P laparoscopic hysterectomy         Plan     1. Pathology reviewed  2. Wound care discussed  3. Activity restrictions: can gradually increase activity and weight lifted. Nothing per vagina  4. Anticipated return to work: 4- 6 wk postop  5. Follow up: 4 weeks      Subjective:      Patient ID: Elena Blackwell is a 46 y.o. y.o. female.    HPI She presents today for 2 week postoperative follow up. She is feeling very well. Denies fevers, chills, nausea, vomiting, diarrhea, constipation. No difficulty with activity.     The following portions of the patient's history were reviewed and updated as appropriate: allergies, current medications, past family history, past medical history, past social history, past surgical history and problem list.    Review of Systems  as above    Objective:  Vitals:    05/21/24 1319   BP: 112/80        Physical Exam   Constitutional: She is oriented to person, place, and time. She appears well-developed and well-nourished.   HENT:   Head: Normocephalic.   Eyes: EOM are normal.   Neck: Normal range of motion.   Pulmonary/Chest: Effort normal.   Abdominal: Soft. She exhibits no distension and no mass. There is no tenderness. There is no rebound and no guarding.   Incisions well-healed x3, glue removed   Musculoskeletal: Normal range of motion.   Neurological: She is alert and oriented to person, place, and time.   Skin: Skin is warm and dry.   Psychiatric: She has a normal mood and affect. Her behavior is normal.   Vitals reviewed.   · Doubt cardiac  · Troponin negative x2, will check 1 more set  · GUERO 3  · EKG: SR, PAC, rate 81  · CTA:  Pulmonary artery mildly dilated which may indicate pulmonary artery hypertension  · CXR:  Negative for acute cardiopulmonary disease, official CXR result pending  · Continue ASA, lisinopril and statin  · Lidocaine TD patch  · Pain control  · Telemetry monitoring

## 2025-02-04 NOTE — TELEPHONE ENCOUNTER
Lmom of granddaughters phone with Ruby bower  Detail Level: Simple Render Risk Assessment In Note?: no Additional Notes: Will schedule appt with Dr. Smalls for excision.